# Patient Record
Sex: FEMALE | Race: WHITE | Employment: FULL TIME | URBAN - METROPOLITAN AREA
[De-identification: names, ages, dates, MRNs, and addresses within clinical notes are randomized per-mention and may not be internally consistent; named-entity substitution may affect disease eponyms.]

---

## 2019-04-28 ENCOUNTER — APPOINTMENT (OUTPATIENT)
Dept: CT IMAGING | Age: 59
DRG: 385 | End: 2019-04-28
Attending: EMERGENCY MEDICINE
Payer: COMMERCIAL

## 2019-04-28 ENCOUNTER — APPOINTMENT (OUTPATIENT)
Dept: GENERAL RADIOLOGY | Age: 59
DRG: 385 | End: 2019-04-28
Attending: EMERGENCY MEDICINE
Payer: COMMERCIAL

## 2019-04-28 ENCOUNTER — HOSPITAL ENCOUNTER (INPATIENT)
Age: 59
LOS: 5 days | Discharge: HOME | DRG: 385 | End: 2019-05-03
Attending: EMERGENCY MEDICINE | Admitting: INTERNAL MEDICINE
Payer: COMMERCIAL

## 2019-04-28 DIAGNOSIS — R65.20 SEVERE SEPSIS (HCC): Primary | ICD-10-CM

## 2019-04-28 DIAGNOSIS — K52.9 ACUTE COLITIS: ICD-10-CM

## 2019-04-28 DIAGNOSIS — A41.9 SEVERE SEPSIS (HCC): Primary | ICD-10-CM

## 2019-04-28 PROBLEM — I95.9 HYPOTENSION: Status: ACTIVE | Noted: 2019-04-28

## 2019-04-28 LAB
ABO + RH BLD: NORMAL
ALBUMIN SERPL-MCNC: 3 G/DL (ref 3.5–5)
ALBUMIN/GLOB SERPL: 1.1 {RATIO} (ref 1.1–2.2)
ALP SERPL-CCNC: 60 U/L (ref 45–117)
ALT SERPL-CCNC: 64 U/L (ref 12–78)
ANION GAP SERPL CALC-SCNC: 8 MMOL/L (ref 5–15)
ARTERIAL PATENCY WRIST A: ABNORMAL
AST SERPL-CCNC: 35 U/L (ref 15–37)
BASE DEFICIT BLDV-SCNC: 13 MMOL/L
BASOPHILS # BLD: 0 K/UL (ref 0–0.1)
BASOPHILS NFR BLD: 0 % (ref 0–1)
BDY SITE: ABNORMAL
BILIRUB SERPL-MCNC: 1.3 MG/DL (ref 0.2–1)
BLOOD GROUP ANTIBODIES SERPL: NORMAL
BUN SERPL-MCNC: 18 MG/DL (ref 6–20)
BUN/CREAT SERPL: 25 (ref 12–20)
CALCIUM SERPL-MCNC: 7.1 MG/DL (ref 8.5–10.1)
CHLORIDE SERPL-SCNC: 115 MMOL/L (ref 97–108)
CO2 SERPL-SCNC: 19 MMOL/L (ref 21–32)
CREAT SERPL-MCNC: 0.73 MG/DL (ref 0.55–1.02)
DIFFERENTIAL METHOD BLD: ABNORMAL
EOSINOPHIL # BLD: 0 K/UL (ref 0–0.4)
EOSINOPHIL NFR BLD: 0 % (ref 0–7)
ERYTHROCYTE [DISTWIDTH] IN BLOOD BY AUTOMATED COUNT: 12.6 % (ref 11.5–14.5)
GAS FLOW.O2 O2 DELIVERY SYS: ABNORMAL L/MIN
GLOBULIN SER CALC-MCNC: 2.8 G/DL (ref 2–4)
GLUCOSE SERPL-MCNC: 112 MG/DL (ref 65–100)
HCO3 BLDV-SCNC: 13.3 MMOL/L (ref 23–28)
HCT VFR BLD AUTO: 29 % (ref 35–47)
HEMOCCULT STL QL: POSITIVE
HGB BLD-MCNC: 9 G/DL (ref 11.5–16)
IMM GRANULOCYTES # BLD AUTO: 0.1 K/UL (ref 0–0.04)
IMM GRANULOCYTES NFR BLD AUTO: 1 % (ref 0–0.5)
LACTATE SERPL-SCNC: 2 MMOL/L (ref 0.4–2)
LIPASE SERPL-CCNC: 123 U/L (ref 73–393)
LYMPHOCYTES # BLD: 1 K/UL (ref 0.8–3.5)
LYMPHOCYTES NFR BLD: 8 % (ref 12–49)
MAGNESIUM SERPL-MCNC: 2.2 MG/DL (ref 1.6–2.4)
MCH RBC QN AUTO: 31 PG (ref 26–34)
MCHC RBC AUTO-ENTMCNC: 31 G/DL (ref 30–36.5)
MCV RBC AUTO: 100 FL (ref 80–99)
MONOCYTES # BLD: 0.4 K/UL (ref 0–1)
MONOCYTES NFR BLD: 4 % (ref 5–13)
NEUTS SEG # BLD: 10.7 K/UL (ref 1.8–8)
NEUTS SEG NFR BLD: 87 % (ref 32–75)
NRBC # BLD: 0 K/UL (ref 0–0.01)
NRBC BLD-RTO: 0 PER 100 WBC
PCO2 BLDV: 26.7 MMHG (ref 41–51)
PH BLDV: 7.3 [PH] (ref 7.32–7.42)
PLATELET # BLD AUTO: 211 K/UL (ref 150–400)
PMV BLD AUTO: 9.4 FL (ref 8.9–12.9)
PO2 BLDV: 55 MMHG (ref 25–40)
POTASSIUM SERPL-SCNC: 4 MMOL/L (ref 3.5–5.1)
PROT SERPL-MCNC: 5.8 G/DL (ref 6.4–8.2)
RBC # BLD AUTO: 2.9 M/UL (ref 3.8–5.2)
SAO2 % BLDV: 86 % (ref 65–88)
SODIUM SERPL-SCNC: 142 MMOL/L (ref 136–145)
SPECIMEN EXP DATE BLD: NORMAL
SPECIMEN TYPE: ABNORMAL
TOTAL RESP. RATE, ITRR: 16
TROPONIN I SERPL-MCNC: <0.05 NG/ML
WBC # BLD AUTO: 12.2 K/UL (ref 3.6–11)

## 2019-04-28 PROCEDURE — 71045 X-RAY EXAM CHEST 1 VIEW: CPT

## 2019-04-28 PROCEDURE — 83690 ASSAY OF LIPASE: CPT

## 2019-04-28 PROCEDURE — 99285 EMERGENCY DEPT VISIT HI MDM: CPT

## 2019-04-28 PROCEDURE — 74011636320 HC RX REV CODE- 636/320: Performed by: INTERNAL MEDICINE

## 2019-04-28 PROCEDURE — 84484 ASSAY OF TROPONIN QUANT: CPT

## 2019-04-28 PROCEDURE — 74011250636 HC RX REV CODE- 250/636: Performed by: EMERGENCY MEDICINE

## 2019-04-28 PROCEDURE — 93005 ELECTROCARDIOGRAM TRACING: CPT

## 2019-04-28 PROCEDURE — 36415 COLL VENOUS BLD VENIPUNCTURE: CPT

## 2019-04-28 PROCEDURE — 82272 OCCULT BLD FECES 1-3 TESTS: CPT

## 2019-04-28 PROCEDURE — C1751 CATH, INF, PER/CENT/MIDLINE: HCPCS

## 2019-04-28 PROCEDURE — 82803 BLOOD GASES ANY COMBINATION: CPT

## 2019-04-28 PROCEDURE — 86900 BLOOD TYPING SEROLOGIC ABO: CPT

## 2019-04-28 PROCEDURE — 83735 ASSAY OF MAGNESIUM: CPT

## 2019-04-28 PROCEDURE — 87040 BLOOD CULTURE FOR BACTERIA: CPT

## 2019-04-28 PROCEDURE — 65660000000 HC RM CCU STEPDOWN

## 2019-04-28 PROCEDURE — 83605 ASSAY OF LACTIC ACID: CPT

## 2019-04-28 PROCEDURE — 96361 HYDRATE IV INFUSION ADD-ON: CPT

## 2019-04-28 PROCEDURE — 96360 HYDRATION IV INFUSION INIT: CPT

## 2019-04-28 PROCEDURE — 74177 CT ABD & PELVIS W/CONTRAST: CPT

## 2019-04-28 PROCEDURE — 85025 COMPLETE CBC W/AUTO DIFF WBC: CPT

## 2019-04-28 PROCEDURE — 80053 COMPREHEN METABOLIC PANEL: CPT

## 2019-04-28 PROCEDURE — 75810000455 HC PLCMT CENT VENOUS CATH LVL 2 5182

## 2019-04-28 RX ORDER — ONDANSETRON 2 MG/ML
4 INJECTION INTRAMUSCULAR; INTRAVENOUS
Status: DISCONTINUED | OUTPATIENT
Start: 2019-04-28 | End: 2019-05-03 | Stop reason: HOSPADM

## 2019-04-28 RX ORDER — LISINOPRIL 10 MG/1
10 TABLET ORAL
COMMUNITY
End: 2019-05-03

## 2019-04-28 RX ORDER — LEVOFLOXACIN 5 MG/ML
750 INJECTION, SOLUTION INTRAVENOUS EVERY 24 HOURS
Status: DISCONTINUED | OUTPATIENT
Start: 2019-04-29 | End: 2019-05-02

## 2019-04-28 RX ORDER — LEVOFLOXACIN 5 MG/ML
750 INJECTION, SOLUTION INTRAVENOUS
Status: COMPLETED | OUTPATIENT
Start: 2019-04-28 | End: 2019-04-29

## 2019-04-28 RX ORDER — ATORVASTATIN CALCIUM 10 MG/1
10 TABLET, FILM COATED ORAL
COMMUNITY
End: 2019-09-10

## 2019-04-28 RX ORDER — ONDANSETRON 4 MG/1
4 TABLET, ORALLY DISINTEGRATING ORAL
Status: ACTIVE | OUTPATIENT
Start: 2019-04-28 | End: 2019-04-29

## 2019-04-28 RX ORDER — SODIUM CHLORIDE 0.9 % (FLUSH) 0.9 %
10 SYRINGE (ML) INJECTION
Status: COMPLETED | OUTPATIENT
Start: 2019-04-28 | End: 2019-04-28

## 2019-04-28 RX ORDER — SODIUM CHLORIDE 0.9 % (FLUSH) 0.9 %
5-10 SYRINGE (ML) INJECTION AS NEEDED
Status: DISCONTINUED | OUTPATIENT
Start: 2019-04-28 | End: 2019-04-29 | Stop reason: SDUPTHER

## 2019-04-28 RX ORDER — SODIUM CHLORIDE 0.9 % (FLUSH) 0.9 %
5-40 SYRINGE (ML) INJECTION EVERY 8 HOURS
Status: DISCONTINUED | OUTPATIENT
Start: 2019-04-28 | End: 2019-05-01 | Stop reason: SDUPTHER

## 2019-04-28 RX ORDER — SODIUM CHLORIDE 9 MG/ML
125 INJECTION, SOLUTION INTRAVENOUS CONTINUOUS
Status: DISCONTINUED | OUTPATIENT
Start: 2019-04-28 | End: 2019-05-01

## 2019-04-28 RX ORDER — METRONIDAZOLE 500 MG/100ML
500 INJECTION, SOLUTION INTRAVENOUS EVERY 8 HOURS
Status: DISCONTINUED | OUTPATIENT
Start: 2019-04-29 | End: 2019-04-29

## 2019-04-28 RX ORDER — METRONIDAZOLE 500 MG/100ML
500 INJECTION, SOLUTION INTRAVENOUS
Status: COMPLETED | OUTPATIENT
Start: 2019-04-29 | End: 2019-04-29

## 2019-04-28 RX ORDER — SODIUM CHLORIDE 0.9 % (FLUSH) 0.9 %
5-40 SYRINGE (ML) INJECTION AS NEEDED
Status: DISCONTINUED | OUTPATIENT
Start: 2019-04-28 | End: 2019-05-01 | Stop reason: SDUPTHER

## 2019-04-28 RX ADMIN — Medication 10 ML: at 23:15

## 2019-04-28 RX ADMIN — LEVOFLOXACIN 750 MG: 5 INJECTION, SOLUTION INTRAVENOUS at 23:05

## 2019-04-28 RX ADMIN — SODIUM CHLORIDE 1000 ML: 900 INJECTION, SOLUTION INTRAVENOUS at 18:54

## 2019-04-28 RX ADMIN — SODIUM CHLORIDE 1000 ML: 900 INJECTION, SOLUTION INTRAVENOUS at 23:05

## 2019-04-28 RX ADMIN — Medication 10 ML: at 19:07

## 2019-04-28 RX ADMIN — SODIUM CHLORIDE 1000 ML: 900 INJECTION, SOLUTION INTRAVENOUS at 20:41

## 2019-04-28 RX ADMIN — IOPAMIDOL 100 ML: 755 INJECTION, SOLUTION INTRAVENOUS at 19:07

## 2019-04-28 NOTE — ED NOTES
Pt in [place] on monitor for approx 15 seconds. Pt awake and talking. Dr. Eunice Long notified.

## 2019-04-28 NOTE — ED PROVIDER NOTES
EMERGENCY DEPARTMENT HISTORY AND PHYSICAL EXAM 
 
 
Date: 4/28/2019 Patient Name: Leslie Gonzalez History of Presenting Illness Chief Complaint Patient presents with  Altered mental status  Vomiting History Provided By: Patient, Patient's Sister and EMS 
 
HPI: Leslie Gonzalez, 63 y.o. female with PMHx significant for hypertension, presents via EMS to the ED with cc of abdominal pain nausea vomiting diarrhea and altered mental status. She had the sudden onset of some abdominal pain nausea vomiting and diarrhea that started around 1300. She was feeling fine earlier in the day. Scribes abdominal pain is a diffuse cramping intermittent nonradiating. Her sister called her on the phone shortly after this and noted that she was slightly confused. EMS reports that patient was hypotensive on arrival.  She received a 500 cc normal saline bolus in route. Denies any fevers or chills. Denies any chest pain or shortness of breath. No known sick contacts. She does report her  passed away 2 months ago. PMHx: Significant for hypertension PSHx: Significant for noncontributory Social Hx: Non-smoker There are no other complaints, changes, or physical findings at this time. PCP: None No current facility-administered medications on file prior to encounter. Current Outpatient Medications on File Prior to Encounter Medication Sig Dispense Refill  lisinopril (PRINIVIL, ZESTRIL) 10 mg tablet Take 10 mg by mouth nightly.  atorvastatin (LIPITOR) 10 mg tablet Take 10 mg by mouth nightly. Past History Past Medical History: 
Past Medical History:  
Diagnosis Date  Hypertension  Spina bifida ([address] Past Surgical History: 
Past Surgical History:  
Procedure Laterality Date  HX OTHER SURGICAL  1890s  
 sacral wound flap repair Family History: 
History reviewed. No pertinent family history. Social History: 
Social History Tobacco Use  
  Smoking status: Never Smoker  Smokeless tobacco: Never Used Substance Use Topics  Alcohol use: Never Frequency: Never  Drug use: Never Allergies: Allergies Allergen Reactions  Sulfa (Sulfonamide Antibiotics) Nausea and Vomiting Review of Systems Review of Systems Constitutional: Negative for activity change, chills and fever. HENT: Negative for congestion and sore throat. Eyes: Negative for pain and redness. Respiratory: Negative for cough, chest tightness and shortness of breath. Cardiovascular: Negative for chest pain and palpitations. Gastrointestinal: Positive for diarrhea, nausea and vomiting. Negative for abdominal pain. Genitourinary: Negative for dysuria, frequency and urgency. Musculoskeletal: Negative for back pain and neck pain. Skin: Negative for rash. Neurological: Negative for syncope, light-headedness and headaches. Psychiatric/Behavioral: Positive for confusion. All other systems reviewed and are negative. Physical Exam  
Physical Exam  
Constitutional: She is oriented to person, place, and time. She appears well-developed and well-nourished. No distress. HENT:  
Head: Normocephalic. Nose: Nose normal.  
Mouth/Throat: Oropharynx is clear and moist. No oropharyngeal exudate. Eyes: Pupils are equal, round, and reactive to light. Conjunctivae are normal. No scleral icterus. Neck: Normal range of motion. Neck supple. No JVD present. No tracheal deviation present. No thyromegaly present. Cardiovascular: Normal rate, regular rhythm and intact distal pulses. Exam reveals no gallop and no friction rub. No murmur heard. Pulmonary/Chest: Effort normal and breath sounds normal. No stridor. No respiratory distress. She has no wheezes. She has no rales. Abdominal: Soft. Bowel sounds are normal. She exhibits no distension. There is tenderness. There is no rebound and no guarding. Mild diffuse tenderness. Musculoskeletal: Normal range of motion. She exhibits no edema. Lymphadenopathy:  
  She has no cervical adenopathy. Neurological: She is alert and oriented to person, place, and time. No cranial nerve deficit. She exhibits normal muscle tone. Coordination normal.  
Skin: Skin is warm and dry. No rash noted. She is not diaphoretic. No erythema. Psychiatric: She has a normal mood and affect. Her behavior is normal.  
Nursing note and vitals reviewed. Diagnostic Study Results Labs - Recent Results (from the past 12 hour(s)) CBC WITH AUTOMATED DIFF Collection Time: 04/28/19  6:56 PM  
Result Value Ref Range WBC 12.2 (H) 3.6 - 11.0 K/uL  
 RBC 2.90 (L) 3.80 - 5.20 M/uL HGB 9.0 (L) 11.5 - 16.0 g/dL HCT 29.0 (L) 35.0 - 47.0 % .0 (H) 80.0 - 99.0 FL  
 MCH 31.0 26.0 - 34.0 PG  
 MCHC 31.0 30.0 - 36.5 g/dL  
 RDW 12.6 11.5 - 14.5 % PLATELET 928 942 - 248 K/uL MPV 9.4 8.9 - 12.9 FL  
 NRBC 0.0 0  WBC ABSOLUTE NRBC 0.00 0.00 - 0.01 K/uL NEUTROPHILS 87 (H) 32 - 75 % LYMPHOCYTES 8 (L) 12 - 49 % MONOCYTES 4 (L) 5 - 13 % EOSINOPHILS 0 0 - 7 % BASOPHILS 0 0 - 1 % IMMATURE GRANULOCYTES 1 (H) 0.0 - 0.5 % ABS. NEUTROPHILS 10.7 (H) 1.8 - 8.0 K/UL  
 ABS. LYMPHOCYTES 1.0 0.8 - 3.5 K/UL  
 ABS. MONOCYTES 0.4 0.0 - 1.0 K/UL  
 ABS. EOSINOPHILS 0.0 0.0 - 0.4 K/UL  
 ABS. BASOPHILS 0.0 0.0 - 0.1 K/UL  
 ABS. IMM. GRANS. 0.1 (H) 0.00 - 0.04 K/UL  
 DF AUTOMATED OCCULT BLOOD, STOOL Collection Time: 04/28/19  6:56 PM  
Result Value Ref Range Occult blood, stool POSITIVE (A) NEG    
TYPE & SCREEN Collection Time: 04/28/19  9:44 PM  
Result Value Ref Range Crossmatch Expiration 05/01/2019 ABO/Rh(D) A POSITIVE Antibody screen NEG   
LIPASE Collection Time: 04/28/19  9:44 PM  
Result Value Ref Range Lipase 123 73 - 393 U/L MAGNESIUM Collection Time: 04/28/19  9:44 PM  
Result Value Ref Range Magnesium 2.2 1.6 - 2.4 mg/dL METABOLIC PANEL, COMPREHENSIVE Collection Time: 04/28/19  9:44 PM  
Result Value Ref Range Sodium 142 136 - 145 mmol/L Potassium 4.0 3.5 - 5.1 mmol/L Chloride 115 (H) 97 - 108 mmol/L  
 CO2 19 (L) 21 - 32 mmol/L Anion gap 8 5 - 15 mmol/L Glucose 112 (H) 65 - 100 mg/dL BUN 18 6 - 20 MG/DL Creatinine 0.73 0.55 - 1.02 MG/DL  
 BUN/Creatinine ratio 25 (H) 12 - 20 GFR est AA >60 >60 ml/min/1.73m2 GFR est non-AA >60 >60 ml/min/1.73m2 Calcium 7.1 (L) 8.5 - 10.1 MG/DL Bilirubin, total 1.3 (H) 0.2 - 1.0 MG/DL  
 ALT (SGPT) 64 12 - 78 U/L  
 AST (SGOT) 35 15 - 37 U/L Alk. phosphatase 60 45 - 117 U/L Protein, total 5.8 (L) 6.4 - 8.2 g/dL Albumin 3.0 (L) 3.5 - 5.0 g/dL Globulin 2.8 2.0 - 4.0 g/dL A-G Ratio 1.1 1.1 - 2.2 LACTIC ACID Collection Time: 04/28/19  9:44 PM  
Result Value Ref Range Lactic acid 2.0 0.4 - 2.0 MMOL/L  
TROPONIN I Collection Time: 04/28/19  9:44 PM  
Result Value Ref Range Troponin-I, Qt. <0.05 <0.05 ng/mL POC VENOUS BLOOD GAS Collection Time: 04/28/19 10:35 PM  
Result Value Ref Range Device: ROOM AIR    
 pH, venous (POC) 7.303 (L) 7.32 - 7.42    
 pCO2, venous (POC) 26.7 (L) 41 - 51 MMHG  
 pO2, venous (POC) 55 (H) 25 - 40 mmHg HCO3, venous (POC) 13.3 (L) 23.0 - 28.0 MMOL/L  
 sO2, venous (POC) 86 65 - 88 % Base deficit, venous (POC) 13 mmol/L Allens test (POC) N/A Total resp. rate 16 Site OTHER Specimen type (POC) VENOUS BLOOD Radiologic Studies -  
CT ABD PELV W CONT Final Result IMPRESSION:  
Wall thickening throughout the descending colon, sigmoid colon and rectum  
suspicious for colitis though this could be accentuated by lack of oral contrast  
material/underdistention. No bowel obstruction, free air, or free fluid. XR CHEST PORT Final Result IMPRESSION:  
No pneumothorax following insertion of internal jugular catheter. CT Results  (Last 48 hours) 04/28/19 2213  CT ABD PELV W CONT Final result Impression:  IMPRESSION:  
Wall thickening throughout the descending colon, sigmoid colon and rectum  
suspicious for colitis though this could be accentuated by lack of oral contrast  
material/underdistention. No bowel obstruction, free air, or free fluid. Narrative:  INDICATION: Abdominal pain; n/v/d. hypotensive COMPARISON: None TECHNIQUE:  
Following the uneventful intravenous administration of IV contrast, thin axial  
images were obtained through the abdomen and pelvis. Coronal and sagittal  
reconstructions were generated. Oral contrast was not administered. CT dose  
reduction was achieved through use of a standardized protocol tailored for this  
examination and automatic exposure control for dose modulation. FINDINGS:  
LUNG BASES: No abnormality. LIVER: Hepatic steatosis. GALLBLADDER: Unremarkable. SPLEEN: No enlargement or lesion. PANCREAS: No mass or ductal dilatation. ADRENALS: No mass. KIDNEYS: No mass, calculus, or hydronephrosis. GI TRACT: No bowel obstruction. Wall oral contrast was not administered,  
possible wall thickening throughout the descending and sigmoid colon as well as  
the rectum. PERITONEUM: No free air or free fluid. APPENDIX: Unremarkable. RETROPERITONEUM: No aortic aneurysm. LYMPH NODES: None enlarged. ADDITIONAL COMMENTS: N/A. URINARY BLADDER: Unremarkable. REPRODUCTIVE ORGANS: Unremarkable. LYMPH NODES: None enlarged. FREE FLUID: None. BONES: Evidence of spina bifida. ADDITIONAL COMMENTS: N/A. CXR Results  (Last 48 hours) 04/28/19 2150  XR CHEST PORT Final result Impression:  IMPRESSION:  
No pneumothorax following insertion of internal jugular catheter. Narrative:  INDICATION: central line placement verification EXAMINATION:  AP CHEST, PORTABLE  
   
COMPARISON: None FINDINGS: Single AP portable view of the chest at 2127 hours demonstrates  
interval placement of a right internal jugular venous catheter with tip over the  
distal superior vena cava. The cardiomediastinal silhouette is stable. There is  
no pneumothorax. Medical Decision Making I am the first provider for this patient. I reviewed the vital signs, available nursing notes, past medical history, past surgical history, family history and social history. Vital Signs-Reviewed the patient's vital signs. Patient Vitals for the past 12 hrs: 
 Temp Pulse Resp BP SpO2  
04/28/19 2115  (!) 105 20 (!) 70/18 100 % 04/28/19 2109  (!) 101 16 100/57 96 % 04/28/19 2045  98 22 (!) 68/27 90 % 04/28/19 2030  (!) 101 18 (!) 63/30   
04/28/19 2027  (!) 103 19 (!) 71/29 90 % 04/28/19 2000  (!) 106 18 (!) 80/32   
04/28/19 1956  (!) 110 18 (!) 68/12   
04/28/19 1945  (!) 107  96/84   
04/28/19 1919  (!) 116 18 (!) 55/29   
04/28/19 1915  (!) 111 19 (!) 65/32   
04/28/19 1913  (!) 113 18 (!) 64/34 96 % 04/28/19 1845  (!) 119 22 (!) 77/25 100 % 04/28/19 1841  (!) 119 17 (!) 73/31 100 % 04/28/19 1839 97.6 °F (36.4 °C) (!) 118 18 (!) 65/41 100 % 04/28/19 1824    (!) 62/48  Pulse Oximetry Analysis - 100% on RA Cardiac Monitor:  
Rate: 118 bpm 
Rhythm: Sinus Tachycardia ED EKG interpretation: 19:21 Rhythm: sinus tachycardia; and regular . Rate (approx.): 108; Axis: normal; CT Interval: normal; QRS interval: normal ; ST/T wave: non-specific changes; This EKG was interpreted by Pedro Garcia MD,ED Provider. Records Reviewed: Nursing Notes and Old Medical Records Provider Notes (Medical Decision Making): DDX: Gastroenteritis, dehydration, sepsis, metabolic abnormality, colitis. ED Course:  
Initial assessment performed.  The patients presenting problems have been discussed, and they are in agreement with the care plan formulated and outlined with them. I have encouraged them to ask questions as they arise throughout their visit. On my arrival to the room to evaluate the patient she is lying on her side. Profuse stool output with both formed and liquid diarrhea stools. Was a small amount of bright red blood as well. Procedure Note - Central Venous Access:  
Performed by Mary Vu MD 
 
Obtained informed Consent. Immediately prior to the procedure, the patient was reevaluated and found suitable for the planned procedure and any planned medications. Immediately prior to the procedure a time out was called to verify the correct patient, procedure, equipment, staff, and marking as appropriate. The site was prepped with ChloraPrep and Sterile draping. Using Seldinger technique a Triple Lumen CVC was placed in the Right, Internal Jugular Vein via direct cannulation with 2 number of attempts for Monitoring, Blood Drawing and IV Access. Ultrasound Guidance was utilized. There was good blood return. The following complications were encountered: None. A follow-up chest x-ray was ordered post procedure. The procedure was tolerated well. CRITICAL CARE NOTE : 
 
11:11 PM 
 
 
IMPENDING DETERIORATION -Cardiovascular, CNS and Metabolic ASSOCIATED RISK FACTORS - Hypotension, Shock, Bleeding, Dysrhythmia, Metabolic changes, Dehydration, Vascular Compromise and CNS Decompensation MANAGEMENT- Bedside Assessment and Supervision of Care INTERPRETATION -  Xrays, CT Scan, Blood Gases, ECG and Blood Pressure INTERVENTIONS - hemodynamic mngmt, Metobolic interventions and TLC placement CASE REVIEW - Hospitalist, Medical Sub-Specialist, Nursing and Family TREATMENT RESPONSE -Improved PERFORMED BY - Self and Resident NOTES   : 
 
 
I have spent 50 minutes of critical care time involved in lab review, consultations with specialist, family decision- making, bedside attention and documentation. During this entire length of time I was immediately available to the patient . Critical Care: The reason for providing this level of medical care for this critically ill patient was due to a critical illness that impaired one or more vital organ systems, such that there was a high probability of imminent or life threatening deterioration in the patient's condition. This care involved high complexity decision making to assess, manipulate, and support vital system functions, to treat this degree of vital organ system failure, and to prevent further life threatening deterioration of the patients condition. Jim Soriano MD 
 
 
I consulted the hospitalist Dr. Giovanny Marion. Reviewed patient's presentation labs and imaging studies. He agreed to evaluate patient for admission. PROGRESS NOTE Pt reevaluated. Pt critically ill. Hypotension due to dehydration versus sepsis. WBC only minimally elevated at 12.2. Hgb 9.0 with no old for comparison. Hypotension despite IV fluids. Central line placed. Continue aggressive IV fluid resuscitation. Hold on pressors at this time. CT abdomen pelvis with acute colitis. Started IV Levaquin and Flagyl. Will admit to hospitalist.  
Written by Jim Soriano MD  
 
 
 
 
 
Critical Care Time:  
0 Disposition: 
Admit to hospitalist 
 
PLAN: 
1. Current Discharge Medication List  
  
 
2. Follow-up Information None Return to ED if worse Diagnosis Clinical Impression: 1. Severe sepsis ([address] 2. Acute colitis Please note that this dictation was completed with [employer], the computer voice recognition software. Quite often unanticipated grammatical, syntax, homophones, and other interpretive errors are inadvertently transcribed by the computer software. Please disregard these errors. Please excuse any errors that have escaped final proofreading.

## 2019-04-28 NOTE — ED NOTES
Patient from EMS. Patient is alert to verbal stimuli and disoriented, respirations even and unlabored. EMS states patient began having n/v/d around 1300 and started becoming altered around 1400 per sister at house. Patient was initially hypotensive with EMS, fluid bolus of 500mL provided. Patient arrives to ED with continuing n/v/d. Pt. Meghann Marcos in low bed in POC, side rail x2, cardiac monitor x3.

## 2019-04-29 LAB
ALBUMIN SERPL-MCNC: 2.5 G/DL (ref 3.5–5)
ALBUMIN/GLOB SERPL: 0.9 {RATIO} (ref 1.1–2.2)
ALP SERPL-CCNC: 46 U/L (ref 45–117)
ALT SERPL-CCNC: 51 U/L (ref 12–78)
ANION GAP SERPL CALC-SCNC: 8 MMOL/L (ref 5–15)
APPEARANCE UR: ABNORMAL
ARTERIAL PATENCY WRIST A: ABNORMAL
AST SERPL-CCNC: 28 U/L (ref 15–37)
ATRIAL RATE: 108 BPM
BACTERIA URNS QL MICRO: ABNORMAL /HPF
BASE DEFICIT BLD-SCNC: 13 MMOL/L
BDY SITE: ABNORMAL
BILIRUB SERPL-MCNC: 0.7 MG/DL (ref 0.2–1)
BILIRUB UR QL CFM: NEGATIVE
BUN SERPL-MCNC: 13 MG/DL (ref 6–20)
BUN/CREAT SERPL: 28 (ref 12–20)
C DIFF GDH STL QL: NEGATIVE
C DIFF TOX A+B STL QL IA: NEGATIVE
CA-I BLD-SCNC: 1.11 MMOL/L (ref 1.12–1.32)
CALCIUM SERPL-MCNC: 6.7 MG/DL (ref 8.5–10.1)
CALCULATED P AXIS, ECG09: 49 DEGREES
CALCULATED R AXIS, ECG10: 87 DEGREES
CALCULATED T AXIS, ECG11: 4 DEGREES
CHLORIDE SERPL-SCNC: 118 MMOL/L (ref 97–108)
CO2 SERPL-SCNC: 19 MMOL/L (ref 21–32)
COLOR UR: YELLOW
CORTIS AM PEAK SERPL-MCNC: 19 UG/DL (ref 4.3–22.45)
CREAT SERPL-MCNC: 0.46 MG/DL (ref 0.55–1.02)
DIAGNOSIS, 93000: NORMAL
EPITH CASTS URNS QL MICRO: ABNORMAL /LPF
ERYTHROCYTE [DISTWIDTH] IN BLOOD BY AUTOMATED COUNT: 12.9 % (ref 11.5–14.5)
GAS FLOW.O2 O2 DELIVERY SYS: ABNORMAL L/MIN
GLOBULIN SER CALC-MCNC: 2.7 G/DL (ref 2–4)
GLUCOSE SERPL-MCNC: 113 MG/DL (ref 65–100)
GLUCOSE UR STRIP.AUTO-MCNC: NEGATIVE MG/DL
HCO3 BLD-SCNC: 14.8 MMOL/L (ref 22–26)
HCT VFR BLD AUTO: 32.1 % (ref 35–47)
HCT VFR BLD AUTO: 32.4 % (ref 35–47)
HCT VFR BLD AUTO: 32.5 % (ref 35–47)
HGB BLD-MCNC: 10.3 G/DL (ref 11.5–16)
HGB BLD-MCNC: 10.4 G/DL (ref 11.5–16)
HGB BLD-MCNC: 10.8 G/DL (ref 11.5–16)
HGB UR QL STRIP: NEGATIVE
HYALINE CASTS URNS QL MICRO: ABNORMAL /LPF (ref 0–5)
INTERPRETATION: NORMAL
KETONES UR QL STRIP.AUTO: ABNORMAL MG/DL
LACTATE SERPL-SCNC: 1.9 MMOL/L (ref 0.4–2)
LEUKOCYTE ESTERASE UR QL STRIP.AUTO: ABNORMAL
MCH RBC QN AUTO: 31.4 PG (ref 26–34)
MCHC RBC AUTO-ENTMCNC: 33.2 G/DL (ref 30–36.5)
MCV RBC AUTO: 94.5 FL (ref 80–99)
NITRITE UR QL STRIP.AUTO: POSITIVE
NRBC # BLD: 0 K/UL (ref 0–0.01)
NRBC BLD-RTO: 0 PER 100 WBC
P-R INTERVAL, ECG05: 130 MS
PCO2 BLD: 34.8 MMHG (ref 35–45)
PH BLD: 7.24 [PH] (ref 7.35–7.45)
PH UR STRIP: 5 [PH] (ref 5–8)
PHOSPHATE SERPL-MCNC: 3 MG/DL (ref 2.6–4.7)
PLATELET # BLD AUTO: 215 K/UL (ref 150–400)
PMV BLD AUTO: 9.8 FL (ref 8.9–12.9)
PO2 BLD: 72 MMHG (ref 80–100)
POTASSIUM SERPL-SCNC: 4.1 MMOL/L (ref 3.5–5.1)
PROCALCITONIN SERPL-MCNC: 7.6 NG/ML
PROT SERPL-MCNC: 5.2 G/DL (ref 6.4–8.2)
PROT UR STRIP-MCNC: 30 MG/DL
Q-T INTERVAL, ECG07: 350 MS
QRS DURATION, ECG06: 78 MS
QTC CALCULATION (BEZET), ECG08: 469 MS
RBC # BLD AUTO: 3.44 M/UL (ref 3.8–5.2)
RBC #/AREA URNS HPF: ABNORMAL /HPF (ref 0–5)
SAO2 % BLD: 91 % (ref 92–97)
SODIUM SERPL-SCNC: 145 MMOL/L (ref 136–145)
SP GR UR REFRACTOMETRY: 1.02 (ref 1–1.03)
SPECIMEN TYPE: ABNORMAL
TSH SERPL DL<=0.05 MIU/L-ACNC: 0.34 UIU/ML (ref 0.36–3.74)
UA: UC IF INDICATED,UAUC: ABNORMAL
UROBILINOGEN UR QL STRIP.AUTO: 1 EU/DL (ref 0.2–1)
VENTRICULAR RATE, ECG03: 108 BPM
WBC # BLD AUTO: 10 K/UL (ref 3.6–11)
WBC URNS QL MICRO: ABNORMAL /HPF (ref 0–4)

## 2019-04-29 PROCEDURE — 87045 FECES CULTURE AEROBIC BACT: CPT

## 2019-04-29 PROCEDURE — 85027 COMPLETE CBC AUTOMATED: CPT

## 2019-04-29 PROCEDURE — 74011000250 HC RX REV CODE- 250: Performed by: EMERGENCY MEDICINE

## 2019-04-29 PROCEDURE — 87449 NOS EACH ORGANISM AG IA: CPT

## 2019-04-29 PROCEDURE — 74011250636 HC RX REV CODE- 250/636: Performed by: INTERNAL MEDICINE

## 2019-04-29 PROCEDURE — 74011250637 HC RX REV CODE- 250/637: Performed by: EMERGENCY MEDICINE

## 2019-04-29 PROCEDURE — 87086 URINE CULTURE/COLONY COUNT: CPT

## 2019-04-29 PROCEDURE — 84145 PROCALCITONIN (PCT): CPT

## 2019-04-29 PROCEDURE — 65660000000 HC RM CCU STEPDOWN

## 2019-04-29 PROCEDURE — 74011250636 HC RX REV CODE- 250/636: Performed by: EMERGENCY MEDICINE

## 2019-04-29 PROCEDURE — 82533 TOTAL CORTISOL: CPT

## 2019-04-29 PROCEDURE — 85018 HEMOGLOBIN: CPT

## 2019-04-29 PROCEDURE — 82803 BLOOD GASES ANY COMBINATION: CPT

## 2019-04-29 PROCEDURE — 36415 COLL VENOUS BLD VENIPUNCTURE: CPT

## 2019-04-29 PROCEDURE — 77030034850

## 2019-04-29 PROCEDURE — 84443 ASSAY THYROID STIM HORMONE: CPT

## 2019-04-29 PROCEDURE — 81001 URINALYSIS AUTO W/SCOPE: CPT

## 2019-04-29 PROCEDURE — 83605 ASSAY OF LACTIC ACID: CPT

## 2019-04-29 PROCEDURE — 84100 ASSAY OF PHOSPHORUS: CPT

## 2019-04-29 PROCEDURE — P9047 ALBUMIN (HUMAN), 25%, 50ML: HCPCS | Performed by: EMERGENCY MEDICINE

## 2019-04-29 PROCEDURE — 80053 COMPREHEN METABOLIC PANEL: CPT

## 2019-04-29 PROCEDURE — 74011000250 HC RX REV CODE- 250: Performed by: INTERNAL MEDICINE

## 2019-04-29 RX ORDER — METRONIDAZOLE 500 MG/100ML
500 INJECTION, SOLUTION INTRAVENOUS EVERY 8 HOURS
Status: DISCONTINUED | OUTPATIENT
Start: 2019-04-29 | End: 2019-05-01

## 2019-04-29 RX ORDER — ACETAMINOPHEN 325 MG/1
650 TABLET ORAL
Status: DISCONTINUED | OUTPATIENT
Start: 2019-04-29 | End: 2019-05-03 | Stop reason: HOSPADM

## 2019-04-29 RX ORDER — ALBUMIN HUMAN 250 G/1000ML
12.5 SOLUTION INTRAVENOUS ONCE
Status: COMPLETED | OUTPATIENT
Start: 2019-04-29 | End: 2019-04-29

## 2019-04-29 RX ADMIN — SODIUM CHLORIDE 125 ML/HR: 900 INJECTION, SOLUTION INTRAVENOUS at 22:27

## 2019-04-29 RX ADMIN — LEVOFLOXACIN 750 MG: 5 INJECTION, SOLUTION INTRAVENOUS at 22:32

## 2019-04-29 RX ADMIN — Medication 10 ML: at 08:23

## 2019-04-29 RX ADMIN — ACETAMINOPHEN 650 MG: 325 TABLET ORAL at 15:41

## 2019-04-29 RX ADMIN — Medication 20 ML: at 14:00

## 2019-04-29 RX ADMIN — ALBUMIN (HUMAN) 12.5 G: 0.25 INJECTION, SOLUTION INTRAVENOUS at 08:20

## 2019-04-29 RX ADMIN — Medication 4 MCG/MIN: at 12:40

## 2019-04-29 RX ADMIN — SODIUM CHLORIDE 1944 ML: 900 INJECTION, SOLUTION INTRAVENOUS at 02:54

## 2019-04-29 RX ADMIN — FAMOTIDINE 20 MG: 10 INJECTION, SOLUTION INTRAVENOUS at 08:22

## 2019-04-29 RX ADMIN — METRONIDAZOLE 500 MG: 500 INJECTION, SOLUTION INTRAVENOUS at 08:22

## 2019-04-29 RX ADMIN — METRONIDAZOLE 500 MG: 500 INJECTION, SOLUTION INTRAVENOUS at 08:23

## 2019-04-29 RX ADMIN — METRONIDAZOLE 500 MG: 500 INJECTION, SOLUTION INTRAVENOUS at 16:47

## 2019-04-29 RX ADMIN — Medication 2 MCG/MIN: at 08:38

## 2019-04-29 RX ADMIN — Medication 10 ML: at 22:26

## 2019-04-29 RX ADMIN — FAMOTIDINE 20 MG: 10 INJECTION, SOLUTION INTRAVENOUS at 20:22

## 2019-04-29 RX ADMIN — Medication 0.5 MCG/MIN: at 07:46

## 2019-04-29 RX ADMIN — Medication 3 MCG/MIN: at 10:49

## 2019-04-29 NOTE — CONSULTS
GI Consultation Note Francesca Wu)    NAME: Ean Cannon : 1960 MRN: [id number]   ATTG: Dr. Aguilar Section PCP: Yang De MD  Date/Time:  2019 5:18 PM  Subjective:   REASON FOR CONSULT:        Rik Juares is a 63 y.o. W female who I was asked to see for colitis and diarrhea. She's with her sister in the room. They relate 3 separate instances of severe diarrhea, each lasting 3 days, since January. This episode, which has been much worse, resulted in hypotension and she remains on levophed with SBP 88 during my interview. She notes stool has been non-bloody. She had vomiting at times. Some urgency but no real abd pain. Just extreme weakness. PMH is notable for spina bifida, though she functions independently at home by herself. Her   3 months ago. CT on admission with left-sided colitis. No previous colonoscopy, didn't think she could do the prep without being in the hospital.    Past Medical History:   Diagnosis Date    Hypertension     Spina bifida [facility])       Past Surgical History:   Procedure Laterality Date    HX OTHER SURGICAL      sacral wound flap repair     Social History     Tobacco Use    Smoking status: Never Smoker    Smokeless tobacco: Never Used   Substance Use Topics    Alcohol use: Never     Frequency: Never      History reviewed. No pertinent family history. Allergies   Allergen Reactions    Sulfa (Sulfonamide Antibiotics) Nausea and Vomiting      Home Medications:  Prior to Admission Medications   Prescriptions Last Dose Informant Patient Reported? Taking?   atorvastatin (LIPITOR) 10 mg tablet 2019 at Unknown time  Yes Yes   Sig: Take 10 mg by mouth nightly. lisinopril (PRINIVIL, ZESTRIL) 10 mg tablet 2019 at Unknown time  Yes Yes   Sig: Take 10 mg by mouth nightly.       Facility-Administered Medications: None     Hospital medications:  Current Facility-Administered Medications   Medication Dose Route Frequency    metroNIDAZOLE (FLAGYL) IVPB premix 500 mg  500 mg IntraVENous Q8H    NOREPINephrine (LEVOPHED) 8 mg in dextrose 5% 250 mL infusion  2-16 mcg/min IntraVENous TITRATE    acetaminophen (TYLENOL) tablet 650 mg  650 mg Oral Q6H PRN    levoFLOXacin (LEVAQUIN) 750 mg in D5W IVPB  750 mg IntraVENous Q24H    0.9% sodium chloride infusion  125 mL/hr IntraVENous CONTINUOUS    sodium chloride (NS) flush 5-40 mL  5-40 mL IntraVENous Q8H    sodium chloride (NS) flush 5-40 mL  5-40 mL IntraVENous PRN    ondansetron (ZOFRAN) injection 4 mg  4 mg IntraVENous Q4H PRN    famotidine (PF) (PEPCID) 20 mg in sodium chloride 0.9% 10 mL injection  20 mg IntraVENous Q12H     REVIEW OF SYSTEMS:     []     Unable to obtain  ROS due to  []    mental status change  []    sedated   []    intubated   [x]    Total of 11 systems reviewed as follows:  Const:   negative weight loss  Eyes:   negative diplopia or visual changes, negative eye pain  ENT:   negative coryza, negative sore throat  Resp:   negative cough, hemoptysis, dyspnea  Cards:  negative for chest pain, palpitations, lower extremity edema  :  negative for frequency, dysuria and hematuria  Skin:   negative for rash and pruritus  Heme:  negative for easy bruising and gum/nose bleeding  MS:  negative for myalgias, arthralgias, back pain and muscle weakness  Neurolo:  negative for headaches, dizziness, vertigo, memory problems   Psych:  negative for feelings of anxiety, depression     Pertinent Positives include :    Objective:   VITALS:    Visit Vitals  BP 95/46   Pulse (!) 123   Temp 98.1 °F (36.7 °C) Comment: Pt was feeling \"Very hot\"   Resp 17   Ht 4' 7\" (1.397 m)   Wt 64.8 kg (142 lb 13.7 oz)   SpO2 99%   BMI 33.20 kg/m²     Temp (24hrs), Av.3 °F (36.8 °C), Min:97.6 °F (36.4 °C), Max:99 °F (37.2 °C)    PHYSICAL EXAM:   General:    Alert, cooperative, sleepy, no distress, appears stated age. Head:   Normocephalic, without obvious abnormality, atraumatic. Eyes:   Conjunctivae clear, anicteric sclerae. Pupils are equal  Nose:  Nares normal. No drainage or sinus tenderness. Throat:    Lips, mucosa, and tongue normal.  No Thrush  Neck:  Supple, symmetrical,  no adenopathy, thyroid: non tender  Back:    Symmetric,  No CVA tenderness. Lungs:   CTA bilaterally. No wheezing/rhonchi/rales. Chest wall:  No tenderness or deformity. No Accessory muscle use. Heart:   Regular rate and rhythm,  no murmur, rub or gallop. Abdomen:   Soft, non-tender. Not distended. Bowel sounds normal. No masses  Extremities: Atraumatic, No cyanosis. Skin:     Texture, turgor normal. No rashes/lesions/jaundice  Lymph:  Cervical, supraclavicular normal.  Psych:  Good insight. Not depressed. Not anxious or agitated. Neurologic: EOMs intact. No facial asymmetry. No aphasia or slurred speech. LAB DATA REVIEWED:    Recent Results (from the past 48 hour(s))   CBC WITH AUTOMATED DIFF    Collection Time: 04/28/19  6:56 PM   Result Value Ref Range    WBC 12.2 (H) 3.6 - 11.0 K/uL    RBC 2.90 (L) 3.80 - 5.20 M/uL    HGB 9.0 (L) 11.5 - 16.0 g/dL    HCT 29.0 (L) 35.0 - 47.0 %    .0 (H) 80.0 - 99.0 FL    MCH 31.0 26.0 - 34.0 PG    MCHC 31.0 30.0 - 36.5 g/dL    RDW 12.6 11.5 - 14.5 %    PLATELET 789 849 - 913 K/uL    MPV 9.4 8.9 - 12.9 FL    NRBC 0.0 0  WBC    ABSOLUTE NRBC 0.00 0.00 - 0.01 K/uL    NEUTROPHILS 87 (H) 32 - 75 %    LYMPHOCYTES 8 (L) 12 - 49 %    MONOCYTES 4 (L) 5 - 13 %    EOSINOPHILS 0 0 - 7 %    BASOPHILS 0 0 - 1 %    IMMATURE GRANULOCYTES 1 (H) 0.0 - 0.5 %    ABS. NEUTROPHILS 10.7 (H) 1.8 - 8.0 K/UL    ABS. LYMPHOCYTES 1.0 0.8 - 3.5 K/UL    ABS. MONOCYTES 0.4 0.0 - 1.0 K/UL    ABS. EOSINOPHILS 0.0 0.0 - 0.4 K/UL    ABS. BASOPHILS 0.0 0.0 - 0.1 K/UL    ABS. IMM.  GRANS. 0.1 (H) 0.00 - 0.04 K/UL    DF AUTOMATED     OCCULT BLOOD, STOOL    Collection Time: 04/28/19  6:56 PM   Result Value Ref Range    Occult blood, stool POSITIVE (A) NEG     CULTURE, BLOOD, PAIRED    Collection Time: 04/28/19  7:18 PM   Result Value Ref Range    Special Requests: NO SPECIAL REQUESTS      Culture result: NO GROWTH AFTER 13 HOURS     EKG, 12 LEAD, INITIAL    Collection Time: 04/28/19  7:21 PM   Result Value Ref Range    Ventricular Rate 108 BPM    Atrial Rate 108 BPM    P-R Interval 130 ms    QRS Duration 78 ms    Q-T Interval 350 ms    QTC Calculation (Bezet) 469 ms    Calculated P Axis 49 degrees    Calculated R Axis 87 degrees    Calculated T Axis 4 degrees    Diagnosis       Sinus tachycardia  Cannot rule out Anterior infarct , age undetermined  No previous ECGs available  Confirmed by Shalonda Dover ([id number]) on 4/29/2019 11:01:40 AM     TYPE & SCREEN    Collection Time: 04/28/19  9:44 PM   Result Value Ref Range    Crossmatch Expiration 05/01/2019     ABO/Rh(D) A POSITIVE     Antibody screen NEG    LIPASE    Collection Time: 04/28/19  9:44 PM   Result Value Ref Range    Lipase 123 73 - 393 U/L   MAGNESIUM    Collection Time: 04/28/19  9:44 PM   Result Value Ref Range    Magnesium 2.2 1.6 - 2.4 mg/dL   METABOLIC PANEL, COMPREHENSIVE    Collection Time: 04/28/19  9:44 PM   Result Value Ref Range    Sodium 142 136 - 145 mmol/L    Potassium 4.0 3.5 - 5.1 mmol/L    Chloride 115 (H) 97 - 108 mmol/L    CO2 19 (L) 21 - 32 mmol/L    Anion gap 8 5 - 15 mmol/L    Glucose 112 (H) 65 - 100 mg/dL    BUN 18 6 - 20 MG/DL    Creatinine 0.73 0.55 - 1.02 MG/DL    BUN/Creatinine ratio 25 (H) 12 - 20      GFR est AA >60 >60 ml/min/1.73m2    GFR est non-AA >60 >60 ml/min/1.73m2    Calcium 7.1 (L) 8.5 - 10.1 MG/DL    Bilirubin, total 1.3 (H) 0.2 - 1.0 MG/DL    ALT (SGPT) 64 12 - 78 U/L    AST (SGOT) 35 15 - 37 U/L    Alk.  phosphatase 60 45 - 117 U/L    Protein, total 5.8 (L) 6.4 - 8.2 g/dL    Albumin 3.0 (L) 3.5 - 5.0 g/dL    Globulin 2.8 2.0 - 4.0 g/dL    A-G Ratio 1.1 1.1 - 2.2     LACTIC ACID    Collection Time: 04/28/19  9:44 PM   Result Value Ref Range    Lactic acid 2.0 0.4 - 2.0 MMOL/L   TROPONIN I    Collection Time: 04/28/19  9:44 PM   Result Value Ref Range    Troponin-I, Qt. <0.05 <0.05 ng/mL   POC VENOUS BLOOD GAS    Collection Time: 04/28/19 10:35 PM   Result Value Ref Range    Device: ROOM AIR      pH, venous (POC) 7.303 (L) 7.32 - 7.42      pCO2, venous (POC) 26.7 (L) 41 - 51 MMHG    pO2, venous (POC) 55 (H) 25 - 40 mmHg    HCO3, venous (POC) 13.3 (L) 23.0 - 28.0 MMOL/L    sO2, venous (POC) 86 65 - 88 %    Base deficit, venous (POC) 13 mmol/L    Allens test (POC) N/A      Total resp.  rate 16      Site OTHER      Specimen type (POC) VENOUS BLOOD     URINALYSIS W/ REFLEX CULTURE    Collection Time: 04/29/19 12:29 AM   Result Value Ref Range    Color YELLOW      Appearance CLOUDY (A) CLEAR      Specific gravity 1.025 1.003 - 1.030      pH (UA) 5.0 5.0 - 8.0      Protein 30 (A) NEG mg/dL    Glucose NEGATIVE  NEG mg/dL    Ketone TRACE (A) NEG mg/dL    Blood NEGATIVE  NEG      Urobilinogen 1.0 0.2 - 1.0 EU/dL    Nitrites POSITIVE (A) NEG      Leukocyte Esterase SMALL (A) NEG      UA:UC IF INDICATED URINE CULTURE ORDERED (A) CNI      WBC 0-4 0 - 4 /hpf    RBC 0-5 0 - 5 /hpf    Epithelial cells FEW FEW /lpf    Bacteria 1+ (A) NEG /hpf    Hyaline cast 2-5 0 - 5 /lpf   BILIRUBIN, CONFIRM    Collection Time: 04/29/19 12:29 AM   Result Value Ref Range    Bilirubin UA, confirm NEGATIVE  NEG     LACTIC ACID    Collection Time: 04/29/19  5:14 AM   Result Value Ref Range    Lactic acid 1.9 0.4 - 2.0 MMOL/L   CBC W/O DIFF    Collection Time: 04/29/19  5:14 AM   Result Value Ref Range    WBC 10.0 3.6 - 11.0 K/uL    RBC 3.44 (L) 3.80 - 5.20 M/uL    HGB 10.8 (L) 11.5 - 16.0 g/dL    HCT 32.5 (L) 35.0 - 47.0 %    MCV 94.5 80.0 - 99.0 FL    MCH 31.4 26.0 - 34.0 PG    MCHC 33.2 30.0 - 36.5 g/dL    RDW 12.9 11.5 - 14.5 %    PLATELET 750 496 - 985 K/uL    MPV 9.8 8.9 - 12.9 FL    NRBC 0.0 0  WBC    ABSOLUTE NRBC 0.00 0.00 - 6.73 K/uL   METABOLIC PANEL, COMPREHENSIVE    Collection Time: 04/29/19  5:14 AM   Result Value Ref Range    Sodium 145 136 - 145 mmol/L    Potassium 4.1 3.5 - 5.1 mmol/L    Chloride 118 (H) 97 - 108 mmol/L    CO2 19 (L) 21 - 32 mmol/L    Anion gap 8 5 - 15 mmol/L    Glucose 113 (H) 65 - 100 mg/dL    BUN 13 6 - 20 MG/DL    Creatinine 0.46 (L) 0.55 - 1.02 MG/DL    BUN/Creatinine ratio 28 (H) 12 - 20      GFR est AA >60 >60 ml/min/1.73m2    GFR est non-AA >60 >60 ml/min/1.73m2    Calcium 6.7 (L) 8.5 - 10.1 MG/DL    Bilirubin, total 0.7 0.2 - 1.0 MG/DL    ALT (SGPT) 51 12 - 78 U/L    AST (SGOT) 28 15 - 37 U/L    Alk.  phosphatase 46 45 - 117 U/L    Protein, total 5.2 (L) 6.4 - 8.2 g/dL    Albumin 2.5 (L) 3.5 - 5.0 g/dL    Globulin 2.7 2.0 - 4.0 g/dL    A-G Ratio 0.9 (L) 1.1 - 2.2     CULTURE, STOOL    Collection Time: 04/29/19  5:14 AM   Result Value Ref Range    Special Requests: NO SPECIAL REQUESTS      Campylobacter antigen NEGATIVE      Culture result: PENDING    C. DIFFICILE AG & TOXIN A/B    Collection Time: 04/29/19  5:14 AM   Result Value Ref Range    GDH ANTIGEN NEGATIVE  NEG      C. difficile toxin NEGATIVE  NEG      INTERPRETATION NEGATIVE FOR TOXIGENIC C. DIFFICILE NTXCD     PROCALCITONIN    Collection Time: 04/29/19  9:04 AM   Result Value Ref Range    Procalcitonin 7.6 ng/mL   TSH 3RD GENERATION    Collection Time: 04/29/19  9:04 AM   Result Value Ref Range    TSH 0.34 (L) 0.36 - 3.74 uIU/mL   PHOSPHORUS    Collection Time: 04/29/19  9:04 AM   Result Value Ref Range    Phosphorus 3.0 2.6 - 4.7 MG/DL   CORTISOL, AM    Collection Time: 04/29/19  9:04 AM   Result Value Ref Range    Cortisol, a.m. 19.0 4.30 - 22.45 ug/dL   HGB & HCT    Collection Time: 04/29/19  9:04 AM   Result Value Ref Range    HGB 10.3 (L) 11.5 - 16.0 g/dL    HCT 32.4 (L) 35.0 - 47.0 %   POC EG7    Collection Time: 04/29/19 11:02 AM   Result Value Ref Range    Calcium, ionized (POC) 1.11 (L) 1.12 - 1.32 mmol/L    pH (POC) 7.236 (LL) 7.35 - 7.45      pCO2 (POC) 34.8 (L) 35.0 - 45.0 MMHG    pO2 (POC) 72 (L) 80 - 100 MMHG    HCO3 (POC) 14.8 (L) 22 - 26 MMOL/L    Base deficit (POC) 13 mmol/L sO2 (POC) 91 (L) 92 - 97 %    Site OTHER      Device: OTHER      Allens test (POC) N/A      Specimen type (POC) VENOUS BLOOD       IMAGING RESULTS:  CT A/P:  \"IMPRESSION:  Wall thickening throughout the descending colon, sigmoid colon and rectum  suspicious for colitis though this could be accentuated by lack of oral contrast  material/underdistention. No bowel obstruction, free air, or free fluid. \"    Assessment/Plan:      Active Problems:    Colitis presumed infectious (4/28/2019)      Hypotension (4/28/2019)      Severe sepsis ([address] (4/28/2019)    62 yo F with acute colitis, left-sided, recurrent. Could be infectious, inflammatory (or possibly both). Would treat sepsis as you are doing first, then may require colonoscopy for diagnosis. C.diff negative. Levophed requirement unideal, can't get sedated with SBP 88 and on pressors.  For now, continue abx, if needed can add corticosteroids if infection ruled out but ideally we can get a colonoscopy for flex sig first.  ___________________________________________________  RECOMMENDATIONS:      -- sepsis management as you are doing  -- agree with empirix abx for now  -- follow for clinical improvement  -- okay with liquid diet for now, rest the gut otherwise  -- as improves, can plan for bowel prep and colonoscopy    Discussed Code Status:    [x]    Full Code      []    DNR    ___________________________________________________  Care Plan discussed with:    [x]    Patient   [x]    Family   [x]    Nursing   []    Attending  Total Time :  45   minutes   ___________________________________________________  GI: Kenia Beal MD

## 2019-04-29 NOTE — PROGRESS NOTES
0800 pt with hypotension and tachycardia, levophed started for pressure support. Lopez order obtained due to inability to properly monitor I/O, nurse driven order placed. Patient educated of infection risk of indwelling catheter and proper care. Patient verbalized understanding and states she straight caths at home every 2-3 hours due to high residuals. Patient had episode of incontinence prior to lopez placement, unable to measure output. Patient will not benefit from purewick due to high residuals and anatomy. Lopez placed with PCT (not second RN as no RN available) sterile technique used.

## 2019-04-29 NOTE — PROGRESS NOTES
Reason for Admission:   Severe sepsis RRAT Score:   0 Plan for utilizing home health:     No recommendations at this time Current Advanced Directive/Advance Care Plan: FULL-sister Likelihood of Readmission:  LOW Transition of Care Plan:                   
 
CM completed room visit with pt and sister by bedside. Pt reported that she resides alone in their one story home. Pt reported that she is independent with ADLs, and he drives. Pt reported that she is active with PCP: seen last year and uses [facility] (Vanda Chun). Pt reported that she uses DME at home: wheelchair and shower chair. Pt reported no HHC and SNF in the past. 
 
Pt denies needing additional services at d/c. Pt listed her sister: Constantino Campos as decision maker, when discussing Advance Care Planning. CM will continue to follow. Care Management Interventions PCP Verified by CM: Yes Mode of Transport at Discharge: Other (see comment) Transition of Care Consult (CM Consult): Discharge Planning Discharge Durable Medical Equipment: No 
Physical Therapy Consult: No 
Occupational Therapy Consult: Yes Speech Therapy Consult: No 
Current Support Network: Own Home, Lives Alone Confirm Follow Up Transport: Family Plan discussed with Pt/Family/Caregiver: Yes Discharge Location Discharge Placement: Home [facility], [address] [facility]

## 2019-04-29 NOTE — ROUTINE PROCESS
Care assumed, hypotensive, levophed started. .1000 titrated levophed to 3, continue to monitor 12:40 PM 
Levo to 4m cg Map trending >65, continue to monitor. [address] [facility] at bedside.

## 2019-04-29 NOTE — PROGRESS NOTES
Hospitalist Progress Note NAME: Maria Elena Vila :  1960 MRN:  [id number] Assessment / Plan: 
 
 
Septic shock w/ worsening hypotension, despite IV fluid boluses Secondary to acute colitis, suspected infectious, present on admission Rectal bleeding, BRB with heme positive stools History of 2 bouts of similar symptoms in the past, no colonoscopy Anemia, possible acute blood loss (no baseline) 
-Patient's blood pressure when I was called was in the 70s with a heart rate in the 130s at approximately 7 this morning. An IV fluid bolus of 250 was ordered as well as an infusion of albumin and to initiate pressors with Levophed via her central line was placed in the ER. 
-We will repeat her lactic acid which has been normal despite her hypotension. 
-We will check her other electrolytes including her phosphorus and ionized calcium given the drop in her calcium and supplement electrolytes as needed 
-We will check a cortisol level and TSH 
-We will continue her Levaquin and Flagyl and follow-up blood cultures, urine cultures and stool studies. -Await evaluation by GI, may need colonoscopy 
-We will follow her H&H but so far it appears that only small amounts of blood and H&H is stable, we will transfuse as needed and avoid anticoagulants. Send anemia labs 
-If persistent hypotension may require transfer to intensive care unit in the pulmonary critical care consult. 
-Her tachycardia is likely related to her hypotension, but will monitor on telemetry currently in sinus, negative troponin on admission, no chest pain, consider an echocardiogram if persistent hypotension to rule out any underlying cardiac disease contributing to hypotension and monitor for any potential arrhythmias given her recurrent episodes of palpitations.   
-check a TSH to rule out any thyroid dysfunction History of hypertension We will hold blood pressure medications given her current hypotension Hyperlipidemia We will hold her statin for now LFTs and lipase okay other than somewhat low albumin, 2.5 this morning after fluids Neurogenic bladder Secondary to spina bifida 
-Patient does in and out cath 
-given her sepsis and need for close monitoring of urine output with her hypotension, we will place a Tee catheter and remove as soon as possible 
- she has no sign of urinary tract infection based on her initial urinalysis which appeared to be possibly a contaminated specimen, does have some nitrates and leukocyte esterase but only 0-4 white blood cell and was a very concentrated specimen.   
-urine culture is pending. History of spina bifida 
-Patient is normally independent she has no sensation in her legs but is able to transfer on her own to a manual wheelchair and does all her ADLs. Code Status: Full Surrogate Decision Maker:  [employer] # [phone] 
  
DVT Prophylaxis: SCDs GI Prophylaxis: IV pepcid for now 
  
Baseline: Pt lives alone after recent death of spouse 60 days ago, independent with ADLs, uses wheelchair 30.0 - 39.9 Obese / Body mass index is 33.2 kg/m². Recommended Disposition: TBD Subjective: Chief Complaint / Reason for Physician Visit 
abd pain/n/v/d/AMS Called to evaluate patient at change of shift with progressive hypotension, BP 70s and HR 130s. Patient was admitted yesterday evening with severe sepsis hypertension tachycardia leukocytosis felt to be secondary to acute colitis. Patient received sepsis bolus protocol and overnight had some recurrent hypotension requiring additional 2 L of fluid. A central line was placed and patient required pressors. Her lactic acid is normal she is seen positive stools but H&H is stable. She was placed empirically on Levaquin and Flagyl for changes seen on the CT scan consistent with colitis.  
 
Patient reports some sensation of palpitations earlier this morning but not currently. She denies any lightheadedness or dizziness no chest pain breath. She currently does not have abdominal pain but when she has the diarrhea she gets acute abdominal cramping. Reports some spotting of blood from her rectum, bright red but no stool per nurse. Her symptoms started acutely in the last 24 hours. she has had 2 bouts that were similar but did not require hospitalization. She has never had a colonoscopy. She denies any recent antibiotic treatment well prior to the symptoms. She denies any respiratory symptoms or new urinary symptoms. she does chronic in and out caths and has noticed that her urine is much darker than usual.  Vomiting yesterday, but denies current nausea. No sick exposures or unusual foods. Discussed with RN events overnight. Review of Systems: 
Symptom Y/N Comments  Symptom Y/N Comments Fever/Chills    Chest Pain n   
Poor Appetite    Edema Cough n   Abdominal Pain n   
Sputum    Joint Pain SOB/ALVARADO n   Pruritis/Rash Nausea/vomit n   Tolerating PT/OT Diarrhea y blood  Tolerating Diet Constipation    Other Could NOT obtain due to:   
 
Objective: VITALS:  
Last 24hrs VS reviewed since prior progress note. Most recent are: 
Patient Vitals for the past 24 hrs: 
 Temp Pulse Resp BP SpO2  
04/29/19 1102  (!) 121 16 111/56 100 % 04/29/19 1047 98.3 °F (36.8 °C) (!) 120 12  99 % 04/29/19 1032 98.3 °F (36.8 °C) (!) 122 16 (!) 84/29 100 % 04/29/19 1017  (!) 118   100 % 04/29/19 1002    (!) 72/29   
04/29/19 0947  (!) 124   100 % 04/29/19 0932  (!) 120  106/61 100 % 04/29/19 0917  (!) 127 16  100 % 04/29/19 0902  (!) 127 17 (!) 81/31 100 % 04/29/19 0847 98.3 °F (36.8 °C) (!) 125 17 (!) 87/48 99 % 04/29/19 0837 98.6 °F (37 °C) (!) 131 12 (!) 82/36 99 % 04/29/19 0719 98.6 °F (37 °C) (!) 140 14 90/49 99 % 04/29/19 0644  (!) 133 19 (!) 88/41   
04/29/19 0642  (!) 134 13  98 % 04/29/19 0430 99 °F (37.2 °C) (!) 123 19 100/43 99 % 04/29/19 0400    98/55   
04/29/19 0359  (!) 122 15  100 % 04/29/19 0330 98.3 °F (36.8 °C) (!) 121 20 106/55 100 % 04/29/19 0252  (!) 132 20 (!) 84/36 97 % 04/29/19 0052  (!) 111 17  100 % 04/29/19 0050 98 °F (36.7 °C) (!) 111 17 117/72 100 % 04/29/19 0000  (!) 109  100/70 100 % 04/28/19 2328  (!) 107  96/49 100 % 04/28/19 2300  (!) 114  (!) 58/27 100 % 04/28/19 2115  (!) 105 20 (!) 70/18 100 % 04/28/19 2109  (!) 101 16 100/57 96 % 04/28/19 2045  98 22 (!) 68/27 90 % 04/28/19 2030  (!) 101 18 (!) 63/30   
04/28/19 2027  (!) 103 19 (!) 71/29 90 % 04/28/19 2000  (!) 106 18 (!) 80/32   
04/28/19 1956  (!) 110 18 (!) 68/12   
04/28/19 1945  (!) 107  96/84   
04/28/19 1919  (!) 116 18 (!) 55/29   
04/28/19 1915  (!) 111 19 (!) 65/32   
04/28/19 1913  (!) 113 18 (!) 64/34 96 % 04/28/19 1845  (!) 119 22 (!) 77/25 100 % 04/28/19 1841  (!) 119 17 (!) 73/31 100 % 04/28/19 1839 97.6 °F (36.4 °C) (!) 118 18 (!) 65/41 100 % 04/28/19 1824    (!) 62/48  Intake/Output Summary (Last 24 hours) at 4/29/2019 1204 Last data filed at 4/29/2019 9306 Gross per 24 hour Intake 2569 ml Output 3 ml Net 2566 ml PHYSICAL EXAM: 
Patient is awake and alert remarkably well-appearing given the circumstances on room air no distress oriented x3. Conjunctiva pink mucous membranes are tacky neck is supple nontender. Cardiovascular regular rate tachycardic no obvious murmurs rubs or gallops currently in the 130s. Lungs are grossly clear no wheezes rhonchi or crackles. Abdomen is obese bowel sounds present soft nontender no rebound or guarding. Extremities no clubbing cyanosis or significant edema her feet are cool to touch palpable pedal pulses capillary refill. She has no sensation in her legs baseline from SB. Reviewed most current lab test results and cultures  YES 
 Reviewed most current radiology test results   YES Review and summation of old records today    NO Reviewed patient's current orders and MAR    YES 
PMH/SH reviewed - no change compared to H&P 
________________________________________________________________________ Care Plan discussed with: 
  Comments Patient y Family  y Sister on phone RN Care Manager Consultant  y Heads up to [facility] Multidiciplinary team rounds were held today with , nursing, pharmacist and clinical coordinator. Patient's plan of care was discussed; medications were reviewed and discharge planning was addressed. ________________________________________________________________________ Total NON critical care TIME:    Minutes Total CRITICAL CARE TIME Spent:   40 Minutes non procedure based Comments >50% of visit spent in counseling and coordination of care    
________________________________________________________________________ Cristi Sequeira MD  
 
Procedures: see electronic medical records for all procedures/Xrays and details which were not copied into this note but were reviewed prior to creation of Plan. LABS: 
I reviewed today's most current labs and imaging studies. Pertinent labs include: 
Recent Labs  
  04/29/19 
0904 04/29/19 
0514 04/28/19 
1856 WBC  --  10.0 12.2* HGB 10.3* 10.8* 9.0*  
HCT 32.4* 32.5* 29.0*  
PLT  --  215 211 Recent Labs  
  04/29/19 
0904 04/29/19 
0514 04/28/19 
2144 NA  --  145 142 K  --  4.1 4.0  
CL  --  118* 115* CO2  --  19* 19* GLU  --  113* 112* BUN  --  13 18 CREA  --  0.46* 0.73 CA  --  6.7* 7.1*  
MG  --   --  2.2 PHOS 3.0  --   --   
ALB  --  2.5* 3.0* TBILI  --  0.7 1.3*  
SGOT  --  28 35 ALT  --  51 64 Signed: Cristi Sequeira MD

## 2019-04-29 NOTE — ED NOTES
Patient bedding and brief changed during straight cath - Straight Cath performed with 2 Rn's at bedside with sterile technique used

## 2019-04-29 NOTE — H&P
Hospitalist Admission NoteNAME: Mackenzie Gomez :  1960 MRN:  [id number] Date/Time:  2019 10:50 PM 
 
Patient PCP: Preston Nazario MD 
______________________________________________________________________ Given the patient's current clinical presentation, I have a high level of concern for decompensation if discharged from the emergency department. Complex decision making was performed, which includes reviewing the patient's available past medical records, laboratory results, and x-ray films. My assessment of this patient's clinical condition and my plan of care is as follows. Assessment / Plan: 
Severe Sepsis (tachycardia, Hypotension, tachypnea, leukocytosis + infection)  POA Due to L sided Colitis (Desc, Sigmoid, Rectum) POA- presumed infectious Causing Hypotension POA Causing Lower GI bleed POA 
WBC= 12.2 Lactate= 2.0 Stool Guaiac +ve in ER 
CT A/P with IV contrast= Desc, Sigmoid Colon & Rectal wall thickening Admit to Stepdown unit for close observation - pt at risk of further deterioration due to Hypotension- may progress to septic shock needing Pressor support if pt fails to respond to IVF challenge- s/p 3 L NS in ER, will order 2L more before maintenance IVF at 125ml/hr thereafter Pt has R IJ CVC for pressors if needed- start levophed if MAP <65 after completion of total 5 L NS Check Lactate in AM 
Follow Blood Cx (sent in ER), Stool studies (ordered) Empiric IV Abx for now- Levaquin + Flagyl for now Contact enteric precautions for now IP GI consult Dr Cuong Arnett (per pt/family choice) Avoid anticoagulation at this time due to LGIBleed- likely due to colitis Hold Home BP med- lisinopril Clear liquids for now Zofran prn HTN Holding BP med Lisinopril as above for hypotension Hyperlipidemia Holding home statin for now Code Status: Full Surrogate Decision Maker: sister Vanessa Neumann # [phone] DVT Prophylaxis: SCDs GI Prophylaxis: IV pepcid for now Baseline: Pt lives alone after recent death of spouse 60 days ago, pretty independent with ADLs Subjective: CHIEF COMPLAINT: Diarrhea with Nausea/vomiting x  1 day HISTORY OF PRESENT ILLNESS:    
Beka Kaminski is a 63 y.o.  female who presents with above complains from home via EMS. Pt presents with CC of sudden onset Diarrhea with associated Nausea/vomiting x 1 day H/o associated crampy abdominal pain which relieves with BM per pt 
H/o associated AMS/confusion today noticed by sister who brought her in to ER via EMS. Denies any preceding fever, chills, sick contact, bad food. H/o Associated palpitations prior to the episodes- denies any known heart disease/Afib diagnosis. Pt was found to have Hypotension with CT A/P showing L sided colitis & stool +ve for Guaiac in ER . We were asked to admit for work up and evaluation of the above problems. Past Medical History:  
Diagnosis Date  Hypertension  Spina bifida ([facility] [address] Past Surgical History:  
Procedure Laterality Date  HX OTHER SURGICAL  1890s  
 sacral wound flap repair Social History Tobacco Use  Smoking status: Never Smoker  Smokeless tobacco: Never Used Substance Use Topics  Alcohol use: Never Frequency: Never Family History +ve for CAD, Breat cancer Denies any h/o colorectal cancer or IBD in family Allergies Allergen Reactions  Sulfa (Sulfonamide Antibiotics) Nausea and Vomiting Prior to Admission medications Medication Sig Start Date End Date Taking? Authorizing Provider  
lisinopril (PRINIVIL, ZESTRIL) 10 mg tablet Take 10 mg by mouth nightly. Yes Melodie Ordoñez MD  
atorvastatin (LIPITOR) 10 mg tablet Take 10 mg by mouth nightly. Yes Tiago, MD Melodie  
 
 
REVIEW OF SYSTEMS:    
 
 
 
Total of 12 systems reviewed as follows:   
   POSITIVE= underlined text  Negative = text not underlined General:  fever, chills, sweats, generalized weakness, weight loss/gain,  
   loss of appetite Eyes:    blurred vision, eye pain, loss of vision, double vision ENT:    rhinorrhea, pharyngitis Respiratory:   cough, sputum production, SOB, ALVARADO, wheezing, pleuritic pain  
Cardiology:   chest pain, palpitations, orthopnea, PND, edema, syncope Gastrointestinal:  abdominal pain , N/V, diarrhea, dysphagia, constipation, bleeding Genitourinary:  frequency, urgency, dysuria, hematuria, incontinence Muskuloskeletal :  arthralgia, myalgia, back pain Hematology:  easy bruising, nose or gum bleeding, lymphadenopathy Dermatological: rash, ulceration, pruritis, color change / jaundice Endocrine:   hot flashes or polydipsia Neurological:  headache, dizziness, confusion (transient), focal weakness, paresthesia, Speech difficulties, memory loss, gait difficulty Psychological: Feelings of anxiety, depression, agitation Objective: VITALS:   
Visit Vitals BP (!) 70/18 Pulse (!) 105 Temp 97.6 °F (36.4 °C) Resp 20 Ht 4' 7\" (1.397 m) Wt 64.8 kg (142 lb 13.7 oz) SpO2 100% BMI 33.20 kg/m² PHYSICAL EXAM: 
 
General:    Alert, cooperative, no distress, appears stated age. HEENT: Atraumatic, anicteric sclerae, pink conjunctivae No oral ulcers, mucosa moist, throat clear, dentition fair Neck:  Supple, symmetrical,  thyroid: non tender Lungs:   Clear to auscultation bilaterally. No Wheezing or Rhonchi. No rales. Chest wall:  No tenderness  No Accessory muscle use. Heart:   Regular  rhythm,  No  murmur   No edema Abdomen:   Soft, mildly tender on the LLQ +. Not distended. Bowel sounds normal 
Extremities: No cyanosis. No clubbing,   
  Skin turgor normal, Capillary refill normal, Radial dial pulse 2+ Skin:     Not pale. Not Jaundiced  No rashes Psych:  Good insight. Not depressed. Not anxious or agitated. Neurologic: EOMs intact. No facial asymmetry.  No aphasia or slurred speech. Symmetrical strength, Sensation grossly intact. Alert and oriented X 4.  
 
_______________________________________________________________________ Care Plan discussed with: 
  Comments Patient x Family  x Sister & rest of the family at bedside in ER  
RN x Care Manager Consultant:  lizzy Fields  
_______________________________________________________________________ Expected  Disposition:  
Home with Family x HH/PT/OT/RN ?  
SNF/LTC   
GEO   
________________________________________________________________________ TOTAL TIME:  71 Minutes Critical Care Provided   71  Minutes non procedure based Comments  
 x Reviewed previous records  
>50% of visit spent in counseling and coordination of care x Discussion with patient and family and questions answered 
  
 
________________________________________________________________________ Signed: Ciarra Floyd MD 
 
Procedures: see electronic medical records for all procedures/Xrays and details which were not copied into this note but were reviewed prior to creation of Plan. LAB DATA REVIEWED:   
Recent Results (from the past 24 hour(s)) CBC WITH AUTOMATED DIFF Collection Time: 04/28/19  6:56 PM  
Result Value Ref Range WBC 12.2 (H) 3.6 - 11.0 K/uL  
 RBC 2.90 (L) 3.80 - 5.20 M/uL HGB 9.0 (L) 11.5 - 16.0 g/dL HCT 29.0 (L) 35.0 - 47.0 % .0 (H) 80.0 - 99.0 FL  
 MCH 31.0 26.0 - 34.0 PG  
 MCHC 31.0 30.0 - 36.5 g/dL  
 RDW 12.6 11.5 - 14.5 % PLATELET 821 719 - 122 K/uL MPV 9.4 8.9 - 12.9 FL  
 NRBC 0.0 0  WBC ABSOLUTE NRBC 0.00 0.00 - 0.01 K/uL NEUTROPHILS 87 (H) 32 - 75 % LYMPHOCYTES 8 (L) 12 - 49 % MONOCYTES 4 (L) 5 - 13 % EOSINOPHILS 0 0 - 7 % BASOPHILS 0 0 - 1 % IMMATURE GRANULOCYTES 1 (H) 0.0 - 0.5 % ABS. NEUTROPHILS 10.7 (H) 1.8 - 8.0 K/UL  
 ABS. LYMPHOCYTES 1.0 0.8 - 3.5 K/UL  
 ABS. MONOCYTES 0.4 0.0 - 1.0 K/UL ABS. EOSINOPHILS 0.0 0.0 - 0.4 K/UL  
 ABS. BASOPHILS 0.0 0.0 - 0.1 K/UL  
 ABS. IMM. GRANS. 0.1 (H) 0.00 - 0.04 K/UL  
 DF AUTOMATED OCCULT BLOOD, STOOL Collection Time: 04/28/19  6:56 PM  
Result Value Ref Range Occult blood, stool POSITIVE (A) NEG    
TYPE & SCREEN Collection Time: 04/28/19  9:44 PM  
Result Value Ref Range Crossmatch Expiration 05/01/2019 ABO/Rh(D) A POSITIVE Antibody screen NEG   
LIPASE Collection Time: 04/28/19  9:44 PM  
Result Value Ref Range Lipase 123 73 - 393 U/L MAGNESIUM Collection Time: 04/28/19  9:44 PM  
Result Value Ref Range Magnesium 2.2 1.6 - 2.4 mg/dL METABOLIC PANEL, COMPREHENSIVE Collection Time: 04/28/19  9:44 PM  
Result Value Ref Range Sodium 142 136 - 145 mmol/L Potassium 4.0 3.5 - 5.1 mmol/L Chloride 115 (H) 97 - 108 mmol/L  
 CO2 19 (L) 21 - 32 mmol/L Anion gap 8 5 - 15 mmol/L Glucose 112 (H) 65 - 100 mg/dL BUN 18 6 - 20 MG/DL Creatinine 0.73 0.55 - 1.02 MG/DL  
 BUN/Creatinine ratio 25 (H) 12 - 20 GFR est AA >60 >60 ml/min/1.73m2 GFR est non-AA >60 >60 ml/min/1.73m2 Calcium 7.1 (L) 8.5 - 10.1 MG/DL Bilirubin, total 1.3 (H) 0.2 - 1.0 MG/DL  
 ALT (SGPT) 64 12 - 78 U/L  
 AST (SGOT) 35 15 - 37 U/L Alk. phosphatase 60 45 - 117 U/L Protein, total 5.8 (L) 6.4 - 8.2 g/dL Albumin 3.0 (L) 3.5 - 5.0 g/dL Globulin 2.8 2.0 - 4.0 g/dL A-G Ratio 1.1 1.1 - 2.2 LACTIC ACID Collection Time: 04/28/19  9:44 PM  
Result Value Ref Range Lactic acid 2.0 0.4 - 2.0 MMOL/L  
TROPONIN I Collection Time: 04/28/19  9:44 PM  
Result Value Ref Range Troponin-I, Qt. <0.05 <0.05 ng/mL POC VENOUS BLOOD GAS Collection Time: 04/28/19 10:35 PM  
Result Value Ref Range Device: ROOM AIR    
 pH, venous (POC) 7.303 (L) 7.32 - 7.42    
 pCO2, venous (POC) 26.7 (L) 41 - 51 MMHG  
 pO2, venous (POC) 55 (H) 25 - 40 mmHg  HCO3, venous (POC) 13.3 (L) 23.0 - 28.0 MMOL/L  
 sO2, venous (POC) 86 65 - 88 % Base deficit, venous (POC) 13 mmol/L Allens test (POC) N/A Total resp. rate 16 Site OTHER Specimen type (POC) VENOUS BLOOD

## 2019-04-29 NOTE — PROGRESS NOTES
Initial Nutrition Assessment: 
 
INTERVENTIONS/RECOMMENDATIONS:  
· Diet/Snacks, General/Healthful diet: Advance diet as tolerated · Supplements: Ensure clear  BID,  Gelatein 20 BID ASSESSMENT:  
Patient was admitted for severe sepsis, hypotension, lower GI bleed and colitis . PMH hypertension and hyperlipidemia . Pt uses a wheelchair for mobility. She stated that she is unsure of any weight loss because she does not get the opportunity to weigh herself due to her mobility status . She stated that she has started to regain her appetite and reported feeling hungry this morning. She was very interested in having lunch and stated that the foods offered on the clear liquid diet soothes her throat . Patient wanted to learn more about the foods she could tolerate while on this diet because red and orange foods are currently restricted. Discussed the addition of  oral nutrition supplements to meals. Patient stated that she was interested in trying Ensure clear and Gelatein 20 with some of her meals. Patient asked to be advanced to a low sodium diet as tolerated. GI consult pending. Advance diet as tolerated. Diet Order: Clear liquids 
% Eaten:  No data found. Pertinent Medications: [x]Reviewed []Other: Famotidine, levo Pertinent Labs: [x]Reviewed []Other Food Allergies: [x]None []Other Last BM: 04/29   [x]Active     []Hyperactive  []Hypoactive       [] Absent BS Skin:    [x] Intact   [] Incision  [] Breakdown  [] Other: Anthropometrics:  
Height: 4' 7\" (139.7 cm) Weight: 64.8 kg (142 lb 13.7 oz) IBW (%IBW):   ( ) UBW (%UBW):   (  %) Last Weight Metrics: 
Weight Loss Metrics 4/28/2019 Today's Wt 142 lb 13.7 oz  
BMI 33.2 kg/m2 BMI: Body mass index is 33.2 kg/m². This BMI is indicative of: 
 []Underweight    []Normal    []Overweight    [x] Obesity   [] Extreme Obesity (BMI>40) Estimated Nutrition Needs (Based on):  
1300 Kcals/day(BMR (1075) X 1.2 AF ) , 65 g( 1.0 G/KG BW) Protein Carbohydrate: At Least 130 g/day  Fluids: 1300 mL/day (1ml/kcal) NUTRITION DIAGNOSES:  
Problem:  Altered GI function Etiology: related to colitis Signs/Symptoms: as evidenced by diarrhea and clear liquid diet NUTRITION INTERVENTIONS: 
Meals/Snacks: Modify diet/texture/consistency/nutrients   Supplements: Commercial supplement GOAL:  
Continue PO intake >70 % of meals and diet advanced next 1-3 days LEARNING NEEDS (Diet, Food/Nutrient-Drug Interaction):  
 [x] None Identified 
 [] Identified and Education Provided/Documented 
 [] Identified and Pt declined/was not appropriate Cultureal, Episcopal, OR Ethnic Dietary Needs:  
 [x] None Identified 
 [] Identified and Addressed 
 
 [x] Interdisciplinary Care Plan Reviewed/Documented  
 [x] Discharge Planning:Continue PO intake, advance diet to low sodium as tolerated MONITORING /EVALUATION:  
Food/Nutrient Intake Outcomes: Total energy intake Physical Signs/Symptoms Outcomes: Weight/weight change NUTRITION RISK:  
 [x] Patient At Nutritional Risk  
 [] Patient Not At Nutritional Risk PT SEEN FOR:  
 []  MD Consult: []Calorie Count []Diabetic Diet Education []Diet Education []Electrolyte Management []General Nutrition Management and Supplements []Management of Tube Feeding []TPN Recommendations [x]  RN Referral:  [x]MST score >=2 
   []Enteral/Parenteral Nutrition PTA []Pregnant: Gestational DM or Multigestation 
   []Pressure Ulcer/Wound Care needs 
     
[]  Low BMI 
[]  DUANE Langley [facility] Pager [phone] Weekend Pager [phone]

## 2019-04-29 NOTE — ED NOTES
TRANSFER - OUT REPORT: 
 
Verbal report given to Keyana Louis RN (name) on Claudette Pal  being transferred to #[id number](unit) for routine progression of care Report consisted of patients Situation, Background, Assessment and  
Recommendations(SBAR). Information from the following report(s) SBAR, ED Summary, [facility] and Recent Results was reviewed with the receiving nurse. Lines:  
Triple Lumen RIJ  04/28/19 Right Internal jugular (Active) Central Line Being Utilized Yes 4/28/2019 10:20 PM  
Site Assessment Clean, dry, & intact 4/28/2019 10:20 PM  
Infiltration Assessment 0 4/28/2019 10:20 PM  
Affected Extremity/Extremities Color distal to insertion site pink (or appropriate for race) 4/28/2019 10:20 PM  
Dressing Status Clean, dry, & intact 4/28/2019 10:20 PM  
Positive Blood Return (Medial Site) Yes 4/28/2019 10:20 PM  
Positive Blood Return (Lateral Site) Yes 4/28/2019 10:20 PM  
Positive Blood Return (Site #3) Yes 4/28/2019 10:20 PM  
   
Peripheral IV 04/28/19 Right Antecubital (Active) Site Assessment Clean, dry, & intact 4/28/2019  6:51 PM  
Phlebitis Assessment 0 4/28/2019  6:51 PM  
Infiltration Assessment 0 4/28/2019  6:51 PM  
Dressing Status Clean, dry, & intact 4/28/2019  6:51 PM  
Hub Color/Line Status Pink 4/28/2019  6:51 PM  
   
Peripheral IV 04/28/19 Right;Upper Antecubital (Active) Site Assessment Clean, dry, & intact 4/28/2019  7:20 PM  
Phlebitis Assessment 0 4/28/2019  7:20 PM  
Infiltration Assessment 0 4/28/2019  7:20 PM  
Dressing Status Clean, dry, & intact 4/28/2019  7:20 PM  
Hub Color/Line Status Pink 4/28/2019  7:20 PM  
Action Taken Blood drawn 4/28/2019  7:20 PM  
  
 
Opportunity for questions and clarification was provided. Patient transported with: 
 Registered Nurse Tech

## 2019-04-30 LAB
ANION GAP SERPL CALC-SCNC: 5 MMOL/L (ref 5–15)
BACTERIA SPEC CULT: NORMAL
BASOPHILS # BLD: 0 K/UL (ref 0–0.1)
BASOPHILS NFR BLD: 0 % (ref 0–1)
BUN SERPL-MCNC: 3 MG/DL (ref 6–20)
BUN/CREAT SERPL: 9 (ref 12–20)
CALCIUM SERPL-MCNC: 7.7 MG/DL (ref 8.5–10.1)
CC UR VC: NORMAL
CHLORIDE SERPL-SCNC: 115 MMOL/L (ref 97–108)
CO2 SERPL-SCNC: 23 MMOL/L (ref 21–32)
COMMENT, HOLDF: NORMAL
CREAT SERPL-MCNC: 0.32 MG/DL (ref 0.55–1.02)
DIFFERENTIAL METHOD BLD: ABNORMAL
EOSINOPHIL # BLD: 0 K/UL (ref 0–0.4)
EOSINOPHIL NFR BLD: 0 % (ref 0–7)
ERYTHROCYTE [DISTWIDTH] IN BLOOD BY AUTOMATED COUNT: 13.2 % (ref 11.5–14.5)
FERRITIN SERPL-MCNC: 73 NG/ML (ref 26–388)
FOLATE SERPL-MCNC: 20.6 NG/ML (ref 5–21)
GLUCOSE SERPL-MCNC: 104 MG/DL (ref 65–100)
HCT VFR BLD AUTO: 29.7 % (ref 35–47)
HGB BLD-MCNC: 9.7 G/DL (ref 11.5–16)
IMM GRANULOCYTES # BLD AUTO: 0 K/UL (ref 0–0.04)
IMM GRANULOCYTES NFR BLD AUTO: 0 % (ref 0–0.5)
IRON SATN MFR SERPL: 10 % (ref 20–50)
IRON SERPL-MCNC: 21 UG/DL (ref 35–150)
LACTATE SERPL-SCNC: 1.4 MMOL/L (ref 0.4–2)
LYMPHOCYTES # BLD: 1.2 K/UL (ref 0.8–3.5)
LYMPHOCYTES NFR BLD: 23 % (ref 12–49)
MAGNESIUM SERPL-MCNC: 1.8 MG/DL (ref 1.6–2.4)
MCH RBC QN AUTO: 31.2 PG (ref 26–34)
MCHC RBC AUTO-ENTMCNC: 32.7 G/DL (ref 30–36.5)
MCV RBC AUTO: 95.5 FL (ref 80–99)
MONOCYTES # BLD: 0.3 K/UL (ref 0–1)
MONOCYTES NFR BLD: 6 % (ref 5–13)
NEUTS BAND NFR BLD MANUAL: 6 %
NEUTS SEG # BLD: 3.8 K/UL (ref 1.8–8)
NEUTS SEG NFR BLD: 65 % (ref 32–75)
NRBC # BLD: 0 K/UL (ref 0–0.01)
NRBC BLD-RTO: 0 PER 100 WBC
PHOSPHATE SERPL-MCNC: 1.2 MG/DL (ref 2.6–4.7)
PLATELET # BLD AUTO: 193 K/UL (ref 150–400)
PMV BLD AUTO: 9.6 FL (ref 8.9–12.9)
POTASSIUM SERPL-SCNC: 3.4 MMOL/L (ref 3.5–5.1)
RBC # BLD AUTO: 3.11 M/UL (ref 3.8–5.2)
RBC MORPH BLD: ABNORMAL
SAMPLES BEING HELD,HOLD: NORMAL
SERVICE CMNT-IMP: NORMAL
SODIUM SERPL-SCNC: 143 MMOL/L (ref 136–145)
TIBC SERPL-MCNC: 216 UG/DL (ref 250–450)
VIT B12 SERPL-MCNC: 170 PG/ML (ref 193–986)
WBC # BLD AUTO: 5.3 K/UL (ref 3.6–11)

## 2019-04-30 PROCEDURE — 74011250636 HC RX REV CODE- 250/636: Performed by: INTERNAL MEDICINE

## 2019-04-30 PROCEDURE — 74011250637 HC RX REV CODE- 250/637: Performed by: EMERGENCY MEDICINE

## 2019-04-30 PROCEDURE — 97530 THERAPEUTIC ACTIVITIES: CPT | Performed by: PHYSICAL THERAPIST

## 2019-04-30 PROCEDURE — 74011000250 HC RX REV CODE- 250: Performed by: EMERGENCY MEDICINE

## 2019-04-30 PROCEDURE — 82607 VITAMIN B-12: CPT

## 2019-04-30 PROCEDURE — 82746 ASSAY OF FOLIC ACID SERUM: CPT

## 2019-04-30 PROCEDURE — 80048 BASIC METABOLIC PNL TOTAL CA: CPT

## 2019-04-30 PROCEDURE — 85025 COMPLETE CBC W/AUTO DIFF WBC: CPT

## 2019-04-30 PROCEDURE — 74011000250 HC RX REV CODE- 250: Performed by: INTERNAL MEDICINE

## 2019-04-30 PROCEDURE — 84100 ASSAY OF PHOSPHORUS: CPT

## 2019-04-30 PROCEDURE — 97165 OT EVAL LOW COMPLEX 30 MIN: CPT | Performed by: OCCUPATIONAL THERAPIST

## 2019-04-30 PROCEDURE — 83540 ASSAY OF IRON: CPT

## 2019-04-30 PROCEDURE — 82728 ASSAY OF FERRITIN: CPT

## 2019-04-30 PROCEDURE — 36415 COLL VENOUS BLD VENIPUNCTURE: CPT

## 2019-04-30 PROCEDURE — 83735 ASSAY OF MAGNESIUM: CPT

## 2019-04-30 PROCEDURE — 65660000000 HC RM CCU STEPDOWN

## 2019-04-30 PROCEDURE — 97535 SELF CARE MNGMENT TRAINING: CPT | Performed by: OCCUPATIONAL THERAPIST

## 2019-04-30 PROCEDURE — 97161 PT EVAL LOW COMPLEX 20 MIN: CPT | Performed by: PHYSICAL THERAPIST

## 2019-04-30 PROCEDURE — 83605 ASSAY OF LACTIC ACID: CPT

## 2019-04-30 RX ORDER — CYANOCOBALAMIN 1000 UG/ML
1000 INJECTION, SOLUTION INTRAMUSCULAR; SUBCUTANEOUS DAILY
Status: DISCONTINUED | OUTPATIENT
Start: 2019-04-30 | End: 2019-05-03 | Stop reason: HOSPADM

## 2019-04-30 RX ORDER — POLYETHYLENE GLYCOL 3350, SODIUM SULFATE ANHYDROUS, SODIUM BICARBONATE, SODIUM CHLORIDE, POTASSIUM CHLORIDE 236; 22.74; 6.74; 5.86; 2.97 G/4L; G/4L; G/4L; G/4L; G/4L
4000 POWDER, FOR SOLUTION ORAL ONCE
Status: COMPLETED | OUTPATIENT
Start: 2019-04-30 | End: 2019-04-30

## 2019-04-30 RX ADMIN — CYANOCOBALAMIN 1000 MCG: 1000 INJECTION, SOLUTION INTRAMUSCULAR; SUBCUTANEOUS at 18:47

## 2019-04-30 RX ADMIN — POTASSIUM PHOSPHATE, MONOBASIC AND POTASSIUM PHOSPHATE, DIBASIC: 224; 236 INJECTION, SOLUTION, CONCENTRATE INTRAVENOUS at 10:03

## 2019-04-30 RX ADMIN — FAMOTIDINE 20 MG: 10 INJECTION, SOLUTION INTRAVENOUS at 08:32

## 2019-04-30 RX ADMIN — POLYETHYLENE GLYCOL 3350, SODIUM SULFATE ANHYDROUS, SODIUM BICARBONATE, SODIUM CHLORIDE, POTASSIUM CHLORIDE 4000 ML: 236; 22.74; 6.74; 5.86; 2.97 POWDER, FOR SOLUTION ORAL at 17:45

## 2019-04-30 RX ADMIN — Medication 10 ML: at 17:47

## 2019-04-30 RX ADMIN — LEVOFLOXACIN 750 MG: 5 INJECTION, SOLUTION INTRAVENOUS at 22:01

## 2019-04-30 RX ADMIN — Medication 30 ML: at 20:49

## 2019-04-30 RX ADMIN — FAMOTIDINE 20 MG: 10 INJECTION, SOLUTION INTRAVENOUS at 20:48

## 2019-04-30 RX ADMIN — METRONIDAZOLE 500 MG: 500 INJECTION, SOLUTION INTRAVENOUS at 08:32

## 2019-04-30 RX ADMIN — METRONIDAZOLE 500 MG: 500 INJECTION, SOLUTION INTRAVENOUS at 17:44

## 2019-04-30 RX ADMIN — ACETAMINOPHEN 650 MG: 325 TABLET ORAL at 20:48

## 2019-04-30 RX ADMIN — METRONIDAZOLE 500 MG: 500 INJECTION, SOLUTION INTRAVENOUS at 01:28

## 2019-04-30 NOTE — ACP (ADVANCE CARE PLANNING)
Request by Silvana Mcgill, nurse to assist with Advance Medical Directive (AMD) in Progressive Care. Consulted with patients nurse. Explained document to patient and patient's sister, who were present in the room. Assisted patient in completing the document. Made a copy for patients chart and handed it to Silvana Mcgill. Returned original document and another copy to the patient. [address] Paging Service [address] [place] [phone])

## 2019-04-30 NOTE — PROGRESS NOTES
Hospitalist Progress Note NAME: Vonna Lefort :  1960 MRN:  [id number] Assessment / Plan: 
Septic shock versus Hypovolemic Shock Acute colitis Hematochezia (History of 2 bouts of similar symptoms in the past, no colonoscopy) Anemia, acute blood loss anemia seems less certain because of Hgb trends->wanemia work-up reveals Ironr deficiency as well as Vitamin B12 deficiency 
-continue IVF's 
-Levophed weaned to off on am of  
-continue on Levaquin and Flagyl 
-C. Diff ruled out 
-Colonoscopy tomorrow per Dr. Epi Sarah  
-start IM B12 supplementation since B12 level is 170 
-continue to monitor Hgb Low TSH: 
-check Free T4 History of hypertension 
-continue to hold PTA ACEI 
  
Hyperlipidemia 
-will resume home statin on discharge Neurogenic bladder Secondary to spina bifida 
-Patient does in and out cath at home 
-UCx negative 
-will discontinue lopez after tomorrow's colonoscopy 
  
History of spina bifida 
-Patient is normally independent she has no sensation in her legs but is able to transfer on her own to a manual wheelchair and does all her ADLs.   
Hypophosphatemia: 
Hypokalemia: 
-IV potassium phosphate ordered today Obesity: Body mass index is 33.2 kg/m². Code Status: Full Surrogate Decision Maker: sister Tanmay Potts # [phone] 
  
DVT Prophylaxis: SCDs GI Prophylaxis: IV pepcid for now 
  
Baseline: Pt lives alone after recent death of spouse 60 days ago, independent with ADLs, uses wheelchair 
  
Recommended Disposition: TBD Subjective: Chief Complaint / Reason for Physician Visit: follow-up hypotension Patient is seen for follow-up Feels better Still with small volume diarrhea Case discussed with RN Review of Systems: 
Symptom Y/N Comments  Symptom Y/N Comments Fever/Chills    Chest Pain n   
Poor Appetite    Edema Cough    Abdominal Pain y sometimes Sputum    Joint Pain SOB/ALVARADO n   Pruritis/Rash Nausea/vomit    Tolerating PT/OT Diarrhea    Tolerating Diet Constipation    Other y Palpitations PTA Could NOT obtain due to:   
 
Objective: VITALS:  
Last 24hrs VS reviewed since prior progress note. Most recent are: 
Patient Vitals for the past 24 hrs: 
 Temp Pulse Resp BP SpO2  
04/30/19 1159  (!) 109  112/51 96 % 04/30/19 1155  (!) 110  94/51 95 % 04/30/19 1103 98.3 °F (36.8 °C) (!) 107 18 114/55 99 % 04/30/19 0953  (!) 102 21 114/57   
04/30/19 0900 98.2 °F (36.8 °C) (!) 104 19 117/64 98 % 04/30/19 0853  (!) 111 21 117/64   
04/30/19 0830    108/57   
04/30/19 0800  100 9 106/53   
04/30/19 0700  (!) 109 16 126/67   
04/30/19 0630  (!) 117 15 121/60   
04/30/19 0530    115/49   
04/30/19 0500  (!) 109  120/64   
04/30/19 0441  (!) 115 20 117/58 99 % 04/30/19 0400  (!) 103 17 112/69 (!) 88 % 04/30/19 0330    (!) 86/54   
04/30/19 0325  (!) 119 20 113/60 100 % 04/30/19 0310 98.7 °F (37.1 °C) (!) 106 16 (!) 88/42 99 % 04/30/19 0300  (!) 102  100/47   
04/30/19 0230  (!) 117 18 111/54 100 % 04/30/19 0200  (!) 107 15 118/61 98 % 04/30/19 0136  (!) 110 17 121/64 99 % 04/30/19 0133  (!) 111  125/64   
04/30/19 0130  (!) 115  122/68   
04/30/19 0037  (!) 120 13 107/63 100 % 04/29/19 2330  (!) 136 17 105/70 99 % 04/29/19 2300 98.6 °F (37 °C) (!) 115 17 116/60 99 % 04/29/19 2230    116/64   
04/29/19 2200  (!) 107 15 115/62 99 % 04/29/19 2130  (!) 105 16 110/61 99 % 04/29/19 2100  (!) 124  120/59   
04/29/19 2000  (!) 110 19 96/54 98 % 04/29/19 1948  (!) 117  108/58 98 % 04/29/19 1900 98.2 °F (36.8 °C) (!) 122 15 115/61 99 % 04/29/19 1802 98.3 °F (36.8 °C) (!) 124 17 104/47 97 % 04/29/19 1732  (!) 128 17 (!) 84/44 97 % 04/29/19 1702  (!) 127 25 (!) 88/42 96 % 04/29/19 1632  (!) 123 17 95/46 99 % 04/29/19 1602    98/43   
04/29/19 1532 98.1 °F (36.7 °C) (!) 126 18 94/60 100 % 04/29/19 1502  (!) 122 17 120/63 100 % 04/29/19 1432  (!) 121 19 119/64 99 % Intake/Output Summary (Last 24 hours) at 4/30/2019 1427 Last data filed at 4/30/2019 5182 Gross per 24 hour Intake 2773.89 ml Output 3180 ml Net -406.11 ml PHYSICAL EXAM: 
General: WD, WN. Alert, cooperative, no acute distress   
EENT:  EOMI. Anicteric sclerae. MM dry Resp:  CTA bilaterally, no wheezing or rales. No accessory muscle use CV:  Regular  rhythm,  No edema GI:  Soft, Non distended, Non tender.  +Bowel sounds Neurologic:  Alert and oriented X 3, normal speech, Psych:   Good insight. Not anxious nor agitated Skin:  No rashes. No jaundice Reviewed most current lab test results and cultures  YES Reviewed most current radiology test results   YES Review and summation of old records today    NO Reviewed patient's current orders and MAR    YES 
PMH/ reviewed - no change compared to H&P 
________________________________________________________________________ Care Plan discussed with: 
  Comments Patient x Family RN x Care Manager Consultant Multidiciplinary team rounds were held today with , nursing, pharmacist and clinical coordinator. Patient's plan of care was discussed; medications were reviewed and discharge planning was addressed. ________________________________________________________________________ Total NON critical care TIME:  35   Minutes Total CRITICAL CARE TIME Spent:   Minutes non procedure based Comments >50% of visit spent in counseling and coordination of care x   
________________________________________________________________________ Marie Lands, MD  
 
Procedures: see electronic medical records for all procedures/Xrays and details which were not copied into this note but were reviewed prior to creation of Plan. LABS: 
I reviewed today's most current labs and imaging studies. Pertinent labs include: 
Recent Labs 04/30/19 1434 04/29/19 
1800 04/29/19 
8730 04/29/19 
0514 04/28/19 
1856 WBC 5.3  --   --  10.0 12.2* HGB 9.7* 10.4* 10.3* 10.8* 9.0*  
HCT 29.7* 32.1* 32.4* 32.5* 29.0*  
  --   --  215 211 Recent Labs 04/30/19 
7682 04/29/19 
1143 04/29/19 
4622 04/28/19 
2144   --  145 142  
K 3.4*  --  4.1 4.0  
*  --  118* 115* CO2 23  --  19* 19* *  --  113* 112* BUN 3*  --  13 18 CREA 0.32*  --  0.46* 0.73 CA 7.7*  --  6.7* 7.1*  
MG 1.8  --   --  2.2 PHOS 1.2* 3.0  --   --   
ALB  --   --  2.5* 3.0* TBILI  --   --  0.7 1.3*  
SGOT  --   --  28 35 ALT  --   --  51 64 Signed: Creighton Schirmer, MD

## 2019-04-30 NOTE — ROUTINE PROCESS
0800 levo to 3 mcg 
0900 levo to2 
1005 levo to 1, map 68 
 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 12yo M with VHR ALL on maintenance chemotherapy protocol AALL 1131 admitted for fever (resolved) and prolonged pancytopenia most likely due to infection (MSSA bacteremia +Typhlitis) and the myelosuppressive effect of chemotherapy.   Initially HLH was suspected and Jevin was started on Anakinra, but due to low clinical suspicion given borderline HLH criteria, Anakinra was stopped on 1/24/17. His hospital course complicated by resolved MSSA bacteremia, typhlitis, and ELVIN.

## 2019-04-30 NOTE — PROGRESS NOTES
Occupational Therapy OT consult received, chart reviewed. Patient was seen this morning for OT evaluation. Recommend patient resume her outpatient PT upon discharge (for her shoulder), but no OT needs. Full evaluation note to follow.

## 2019-04-30 NOTE — PROGRESS NOTES
PCU SHIFT NURSING NOTE Bedside shift change report given to Benja Blancas  (oncoming nurse) by Rocco Suarez (offgoing nurse). Report included the following information SBAR, Kardex, Intake/Output, MAR, Recent Results and Cardiac Rhythm ST. Shift Summary:  
0045 Lactic acid and morning labs drawn and sent. 0130  Levophed gtt decreased from 4 mcg/min to 3 mcg/min. Continuing to monitor b/p closely. 0630  Unable to wean levophed further d/t labile b/p's overnight. B/p readings might be positional.   
 
0730  Bedside report given to Rocco Suarez. Admission Date 4/28/2019 Admission Diagnosis Severe sepsis ([facility] [address] [A41.9, R65.20] Hypotension [I95.9] Colitis presumed infectious [K52.9] Consults IP CONSULT TO GASTROENTEROLOGY Consults []PT []OT []Speech  
[]Case Management  
  
[] Palliative Cardiac Monitoring Order  
[x]Yes []No  
 
IV drips [x]Yes Drip:  Levophed          Dose: 4 mcg/min Drip:                            Dose: 
Drip:                            Dose:  
[]No  
 
GI Prophylaxis []Yes []No  
 
 
 
DVT Prophylaxis SCDs:     
     
 Hugo stockings:     
  
[] Medication []Contraindicated []None Activity Level Activity Level: Bed Rest   
    
Purposeful Rounding every 1-2 hour? [x]Yes Traore Score  Total Score: 3 Bed Alarm (If score 3 or >) [x]Yes  
[] Refused (See signed refusal form in chart) Martinez Score  Martinez Score: 16 Martinez Score (if score 14 or less) []PMT consult  
[]Wound Care consult []Specialty bed  
[] Nutrition consult Needs prior to discharge:  
Home O2 required:   
[]Yes  
[x]No  
 If yes, how much O2 required? Other:  
 Last Bowel Movement: Last Bowel Movement Date: 04/29/19 Influenza Vaccine Received Flu Vaccine for Current Season (usually Sept-March): Yes Pneumonia Vaccine Diet Active Orders Diet DIET CLEAR LIQUID  
  
LDAs Triple Lumen RIJ  04/28/19 Right Internal jugular (Active) Central Line Being Utilized Yes 4/29/2019 11:00 PM  
Criteria for Appropriate Use Irritant/vesicant medication 4/29/2019 11:00 PM  
Site Assessment Clean, dry, & intact 4/29/2019 11:00 PM  
Infiltration Assessment 0 4/29/2019 11:00 PM  
Affected Extremity/Extremities Color distal to insertion site pink (or appropriate for race) 4/29/2019 11:00 PM  
Date of Last Dressing Change 04/29/19 4/29/2019 11:00 PM  
Dressing Status Clean, dry, & intact 4/29/2019 11:00 PM  
Dressing Type Disk with Chlorhexadine gluconate (CHG) 4/29/2019 11:00 PM  
Positive Blood Return (Medial Site) Yes 4/29/2019  7:00 PM  
Positive Blood Return (Lateral Site) Yes 4/29/2019 11:00 PM  
Positive Blood Return (Site #3) Yes 4/29/2019 11:00 PM  
    
Peripheral IV 04/28/19 Right Antecubital (Active) Site Assessment Clean, dry, & intact 4/29/2019 11:00 PM  
Phlebitis Assessment 0 4/29/2019 11:00 PM  
Infiltration Assessment 0 4/29/2019 11:00 PM  
Dressing Status Clean, dry, & intact 4/29/2019 11:00 PM  
Dressing Type Tape;Transparent 4/29/2019 11:00 PM  
Hub Color/Line Status Pink;Flushed;Patent 4/29/2019 11:00 PM  
Action Taken Open ports on tubing capped 4/29/2019 11:00 PM  
   
Peripheral IV 04/28/19 Right;Upper Antecubital (Active) Site Assessment Clean, dry, & intact 4/29/2019 11:00 PM  
Phlebitis Assessment 0 4/29/2019 11:00 PM  
Infiltration Assessment 0 4/29/2019 11:00 PM  
Dressing Status Clean, dry, & intact 4/29/2019 11:00 PM  
Dressing Type Tape;Transparent 4/29/2019 11:00 PM  
Hub Color/Line Status Pink;Flushed;Patent 4/29/2019 11:00 PM  
Action Taken Open ports on tubing capped 4/29/2019 11:00 PM  
                  
Urinary Catheter Urinary Catheter 04/29/19 Tee-Indications for Use: Accurate measurement of urinary output Intake & Output Date 04/29/19 0700 - 04/30/19 5831 04/30/19 0700 - 05/01/19 3230 Shift 0700-1859 1900-0659 24 Hour Total 0700-1859 1900-0659 24 Hour Total  
INTAKE  
P.O.  200 200     
  P. O.  200 200 I.V.(mL/kg/hr) 250(0.3) 1094.3 1344. 3 Levophed Volume  94.3 94.3 Volume (0.9% sodium chloride infusion) 250 1000 1250 Shift Total(mL/kg) 250(3.9) 1294. 3(20) 1544. 3(23.8) OUTPUT Urine(mL/kg/hr) 980(1.3) 500 1480 Urine Output (mL) (Urinary Catheter 04/29/19 Tee)  Stool Stool Occurrence(s) 1 x  1 x Shift Total(mL/kg) 980(15.1) 500(7.7) [id number]) NET -730 794.3 64.3 Weight (kg) 64.8 64.8 64.8 64.8 64.8 64.8 Readmission Risk Assessment Tool Score Low Risk   
      
 0 Total Score Criteria that do not apply:  
 Has Seen PCP in Last 6 Months (Yes=3, No=0) . Living with Significant Other. Assisted Living. LTAC. SNF. or  
Rehab Patient Length of Stay (>5 days = 3) IP Visits Last 12 Months (1-3=4, 4=9, >4=11) Pt. Coverage (Medicare=5 , Medicaid, or Self-Pay=4) Charlson Comorbidity Score (Age + Comorbid Conditions) Expected Length of Stay - - - Actual Length of Stay 2

## 2019-04-30 NOTE — PROGRESS NOTES
Occupational Therapy EVALUATION/discharge Patient: Shane Diaz (12 y.o. female) Date: 4/30/2019 Primary Diagnosis: Severe sepsis ([facility] [address] [A41.9, R65.20] Hypotension [I95.9] Colitis presumed infectious [K52.9] Procedure(s) (LRB): 
COLONOSCOPY (N/A) Precautions:     
 
ASSESSMENT:  
Based on the objective data described below, the patient presents at an overall SBA to Independent level for basic self-care. At baseline, she uses a w/c for mobility, but is independent with transfers and ADL. Patient is functioning mostly at her baseline level, but supervision/assist is being provided mostly due to environmental limitations (She is 4'7\" and her feet don't hit the floor from the side of the bed like they do at home and she has a lopez and IV attached). No concerns for her safety or ability to perform ADL when she gets back home into her adapted environment. Recommended that she be up out of bed as much as possible, but to have someone stand by for transfers while here. She was getting outpatient ortho PT for her shoulder and is recommended to resume at discharge. Further skilled acute occupational therapy is not indicated at this time. Discharge Recommendations: None Further Equipment Recommendations for Discharge: None SUBJECTIVE:  
Patient stated I think I'll be fine.  OBJECTIVE DATA SUMMARY:  
HISTORY:  
Past Medical History:  
Diagnosis Date  Hypertension  Spina bifida ([facility] [address] Past Surgical History:  
Procedure Laterality Date  HX OTHER SURGICAL  1890s  
 sacral wound flap repair Prior Level of Function/Environment/Context: Independent in an accessible apartment; Patient drives Expanded or extensive additional review of patient history: h/o spina bifida Home Situation Home Environment: Apartment # Steps to Enter: 0 Wheelchair Ramp: No(flat entry into home) One/Two Story Residence: One story Living Alone: Yes Support Systems: Family member(s) Patient Expects to be [facility] Current [facility] Used/Available at Home: Wheelchair, Transfer bench, Grab bars Tub or Shower Type: Tub/Shower combination Hand dominance: Right EXAMINATION OF PERFORMANCE DEFICITS: 
Cognitive/Behavioral Status: 
Neurologic State: Alert Orientation Level: Oriented X4 Cognition: Appropriate decision making; Follows commands Perception: Appears intact Perseveration: No perseveration noted Safety/Judgement: Awareness of environment; Insight into deficits;Good awareness of safety precautions Hearing: Auditory Auditory Impairment: None Vision/Perceptual:   
    
    
    
  
    
Acuity: Within Defined Limits Corrective Lenses: Glasses Range of Motion: 
AROM: Generally decreased, functional 
  
  
  
  
  
  
  
Strength: 
Strength: Generally decreased, functional(UE's WFL. LE paralyzed) Coordination: 
  
Fine Motor Skills-Upper: Left Intact; Right Intact Gross Motor Skills-Upper: Left Intact; Right Intact Tone & Sensation: 
Sensation: (no sensation in B LE's) Balance: 
Sitting: Intact Standing: (patient does not stand) Functional Mobility and Transfers for ADLs:Bed Mobility: 
Supine to Sit: Supervision Sit to Supine: Supervision Scooting: Supervision Transfers: 
Bathroom Mobility: (uses w/c) Toilet Transfer : Stand-by assistance ADL Assessment: 
Feeding: Independent Oral Facial Hygiene/Grooming: Independent(seated) Bathing: Setup(seated or in bed) Upper Body Dressing: Modified independent Lower Body Dressing: Supervision Toileting: Stand by assistance ADL Intervention and task modifications: 
Discussed safety and fall prevention. Recommended that she have someone stand by for transfers, mostly due to height of the bed, IV, and lopez. Patient indicates that she was going to outpatient PT for her shoulder due to overuse injuries of her shoulders.   Discussed the possibility of a power wheelchair. Patient indicates she has been talking with her PT about pursuing in the near future. Cognitive Retraining Safety/Judgement: Awareness of environment; Insight into deficits;Good awareness of safety precautions Functional Measure: 
Barthel Index: 
 
Bathin Bladder: 0 Bowels: 5 Groomin Dressin Feeding: 10 Mobility: 5 Stairs: 0 Toilet Use: 5 Transfer (Bed to Chair and Back): 10 Total: 45/100 Percentage of impairment  
0% 1-19% 20-39% 40-59% 60-79% 80-99% 100% Barthel Score 0-100 100 99-80 79-60 59-40 20-39 1-19 
 0 The Barthel ADL Index: Guidelines 1. The index should be used as a record of what a patient does, not as a record of what a patient could do. 2. The main aim is to establish degree of independence from any help, physical or verbal, however minor and for whatever reason. 3. The need for supervision renders the patient not independent. 4. A patient's performance should be established using the best available evidence. Asking the patient, friends/relatives and nurses are the usual sources, but direct observation and common sense are also important. However direct testing is not needed. 5. Usually the patient's performance over the preceding 24-48 hours is important, but occasionally longer periods will be relevant. 6. Middle categories imply that the patient supplies over 50 per cent of the effort. 7. Use of aids to be independent is allowed. Deanna Bobby., Barthel, D.W. ([id number]). Functional evaluation: the Barthel Index. [address] [facility] (14)2. Bessie Watson slade JONATHAN Beckman, Enzo Lma., Ariana Hayes., [place], [facility] (). Measuring the change indisability after inpatient rehabilitation; comparison of the responsiveness of the Barthel Index and Functional [place] Measure. Journal of Neurology, Neurosurgery, and Psychiatry, 66(4), 634-999.  
DAIN CameronJ.LUZ ELENA, YEN Jovel, Angi Tobin MLeolaA. (2004.) Assessment of post-stroke quality of life in cost-effectiveness studies: The usefulness of the Barthel Index and the EuroQoL-5D. [facility], 13, [phone] Occupational Therapy Evaluation Charge Determination History Examination Decision-Making LOW Complexity : Brief history review  LOW Complexity : 1-3 performance deficits relating to physical, cognitive , or psychosocial skils that result in activity limitations and / or participation restrictions  MEDIUM Complexity : Patient may present with comorbidities that affect occupational performnce. Miniml to moderate modification of tasks or assistance (eg, physical or verbal ) with assesment(s) is necessary to enable patient to complete evaluation Based on the above components, the patient evaluation is determined to be of the following complexity level: LOW Pain: 
Pain Scale 1: Numeric (0 - 10) Pain Intensity 1: 0 Activity Tolerance:  
Good Please refer to the flowsheet for vital signs taken during this treatment. After treatment:  
[x]  Patient left in no apparent distress sitting up in wheelchair 
[]  Patient left in no apparent distress in bed 
[x]  Call bell left within reach [x]  Nursing notified 
[]  Caregiver present 
[]  Bed alarm activated COMMUNICATION/EDUCATION:  
Communication/Collaboration: 
[x]      Home safety education was provided and the patient/caregiver indicated understanding. [x]      Patient/family have participated as able and agree with findings and recommendations. []      Patient is unable to participate in plan of care at this time. Findings and recommendations were discussed with: Physical Therapist and Registered Nurse PADMAJA Mike/L Time Calculation: 28 mins

## 2019-04-30 NOTE — PROGRESS NOTES
GI Progress Note (Ev) NAME:Jessica Lindsey :1960 XQN:[id number] ATTG: Dr. Rafael Gonsalves PCP: Derrell Apgar, MD 
Date/Time:  2019 12:27 PM  
 
Reason for following: colitis Assessment: · Acute colitis, recurrent episodes though · Abnormal CT scan with left-sided colitis · Severe dehydration · Hypotension, now off pressors, spina bifida, associated neurogenic bladder Plan:  
· Reasonable to continue empiric abx for now · Clear liquid diet · Bowel prep this afternoon · NPO p MN 
· Diagnostic colonoscopy tomorrow, exclude inflammatory bowel disease Subjective:  
Discussed with RN events overnight. Weaned from pressors this AM. Feels much better. Still mucousy stool. Still some blood. Had a self-limited episode of emesis during a transfer earlier. Stool cultures negative. Review of Systems: 
Symptom Y/N Comments  Symptom Y/N Comments Fever/Chills n   Chest Pain n   
Cough n   Headaches n Sputum n   Joint Pain n   
SOB/ALVARADO n   Pruritis/Rash n Tolerating Diet y   Other Could NOT obtain due to:   
 
Objective: VITALS:  
Last 24hrs VS reviewed since prior progress note. Most recent are: 
Visit Vitals /51 (BP 1 Location: Left arm, BP Patient Position: Sitting) Pulse (!) 109 Temp 98.3 °F (36.8 °C) Resp 18 Ht 4' 7\" (1.397 m) Wt 64.8 kg (142 lb 13.7 oz) SpO2 96% BMI 33.20 kg/m² Intake/Output Summary (Last 24 hours) at 2019 1227 Last data filed at 2019 4968 Gross per 24 hour Intake 2773.89 ml Output 3180 ml Net -406.11 ml PHYSICAL EXAM: 
General: WD, WN. Alert, cooperative, no acute distress   
HEENT: NC, Atraumatic. Anicteric sclerae. Lungs:  CTA Bilaterally. No Wheezing/Rhonchi/Rales. Heart:  Regular  rhythm,  No murmur (), No Rubs, No Gallops Abdomen: Soft, Non distended, Non tender.  +Bowel sounds, no HSM Extremities: No c/c/e Neurologic:  Alert and oriented X 3. Psych:   Good insight. Not anxious nor agitated. Lab and Radiology Data Reviewed: (see below) Medications Reviewed: (see below) PMH/SH reviewed - no change compared to H&P 
________________________________________________________________________ Total time spent with patient: 15 minutes ________________________________________________________________________ Care Plan discussed with: 
Patient x Family RN x Consultant:    
 
Joshua Heck MD  
 
Procedures: see electronic medical records for all procedures/Xrays and details which were not copied into this note but were reviewed prior to creation of Plan. LABS: 
Recent Labs 04/30/19 
0059 04/29/19 
1800  04/29/19 
1130 WBC 5.3  --   --  10.0 HGB 9.7* 10.4*   < > 10.8* HCT 29.7* 32.1*   < > 32.5*  
  --   --  215  
 < > = values in this interval not displayed. Recent Labs 04/30/19 
2215 04/29/19 
4602 04/29/19 
4031 04/28/19 
2144   --  145 142  
K 3.4*  --  4.1 4.0  
*  --  118* 115* CO2 23  --  19* 19* BUN 3*  --  13 18 CREA 0.32*  --  0.46* 0.73 *  --  113* 112* CA 7.7*  --  6.7* 7.1*  
MG 1.8  --   --  2.2 PHOS 1.2* 3.0  --   --   
 
Recent Labs  
  04/29/19 
0514 04/28/19 
2144 SGOT 28 35 AP 46 60  
TP 5.2* 5.8* ALB 2.5* 3.0*  
GLOB 2.7 2.8 LPSE  --  123 No results for input(s): INR, PTP, APTT in the last 72 hours. No lab exists for component: INREXT Recent Labs 04/30/19 
0059 TIBC 216* PSAT 10* FERR 73 Lab Results Component Value Date/Time Folate 20.6 04/30/2019 12:59 AM  
 
No results for input(s): PH, PCO2, PO2 in the last 72 hours. No results for input(s): CPK, CKMB in the last 72 hours. No lab exists for component: TROPONINI Lab Results Component Value Date/Time  Color YELLOW 04/29/2019 12:29 AM  
 Appearance CLOUDY (A) 04/29/2019 12:29 AM  
 Specific gravity 1.025 04/29/2019 12:29 AM  
 pH (UA) 5.0 04/29/2019 12:29 AM  
 Protein 30 (A) 04/29/2019 12:29 AM  
 Glucose NEGATIVE  04/29/2019 12:29 AM  
 Ketone TRACE (A) 04/29/2019 12:29 AM  
 Urobilinogen 1.0 04/29/2019 12:29 AM  
 Nitrites POSITIVE (A) 04/29/2019 12:29 AM  
 Leukocyte Esterase SMALL (A) 04/29/2019 12:29 AM  
 Epithelial cells FEW 04/29/2019 12:29 AM  
 Bacteria 1+ (A) 04/29/2019 12:29 AM  
 WBC 0-4 04/29/2019 12:29 AM  
 RBC 0-5 04/29/2019 12:29 AM  
 
 
MEDICATIONS: 
Current Facility-Administered Medications Medication Dose Route Frequency  potassium phosphate 30 mmol in 0.9% sodium chloride 500 mL infusion   IntraVENous ONCE  
 metroNIDAZOLE (FLAGYL) IVPB premix 500 mg  500 mg IntraVENous Q8H  
 NOREPINephrine (LEVOPHED) 8 mg in dextrose 5% 250 mL infusion  2-16 mcg/min IntraVENous TITRATE  acetaminophen (TYLENOL) tablet 650 mg  650 mg Oral Q6H PRN  
 levoFLOXacin (LEVAQUIN) 750 mg in D5W IVPB  750 mg IntraVENous Q24H  
 0.9% sodium chloride infusion  125 mL/hr IntraVENous CONTINUOUS  
 sodium chloride (NS) flush 5-40 mL  5-40 mL IntraVENous Q8H  
 sodium chloride (NS) flush 5-40 mL  5-40 mL IntraVENous PRN  
 ondansetron (ZOFRAN) injection 4 mg  4 mg IntraVENous Q4H PRN  
 famotidine (PF) (PEPCID) 20 mg in sodium chloride 0.9% 10 mL injection  20 mg IntraVENous Q12H

## 2019-04-30 NOTE — PROGRESS NOTES
Problem: Risk for Spread of Infection Goal: Prevent transmission of infectious organism to others Description Prevent the transmission of infectious organisms to other patients, staff members, and visitors. Outcome: Progressing Towards Goal 
  
Problem: Patient Education:  Go to Education Activity Goal: Patient/Family Education Outcome: Progressing Towards Goal 
  
Problem: Pressure Injury - Risk of 
Goal: *Prevention of pressure injury Description Document Martinez Scale and appropriate interventions in the flowsheet. Outcome: Progressing Towards Goal 
  
Problem: Patient Education: Go to Patient Education Activity Goal: Patient/Family Education Outcome: Progressing Towards Goal 
  
Problem: Falls - Risk of 
Goal: *Absence of Falls Description Document Anaid Bryan Fall Risk and appropriate interventions in the flowsheet. Outcome: Progressing Towards Goal 
  
Problem: Patient Education: Go to Patient Education Activity Goal: Patient/Family Education Outcome: Progressing Towards Goal 
  
Problem: Sepsis: Day of Diagnosis Goal: *Pneumococcal immunization (if eligible) Outcome: Progressing Towards Goal 
Goal: *Influenza immunization (if eligible) Outcome: Progressing Towards Goal 
Goal: Activity/Safety Outcome: Progressing Towards Goal 
Goal: Consults, if ordered Outcome: Progressing Towards Goal 
Goal: Diagnostic Test/Procedures Outcome: Progressing Towards Goal 
Goal: Nutrition/Diet Outcome: Progressing Towards Goal 
Goal: Discharge Planning Outcome: Progressing Towards Goal 
Goal: Medications Outcome: Progressing Towards Goal 
Goal: Respiratory Outcome: Progressing Towards Goal 
Goal: Treatments/Interventions/Procedures Outcome: Progressing Towards Goal 
Goal: Psychosocial 
Outcome: Progressing Towards Goal

## 2019-04-30 NOTE — PROGRESS NOTES
Spiritual Care Assessment/Progress Note [address] 
 
 
NAME: Kassidy Friend      MRN: [id number] AGE: 63 y.o. SEX: female Christian Affiliation: Erin Askew Language: [place] 4/30/2019     Total Time (in minutes): 41 Spiritual Assessment begun in [facility] 2 PROGRESSIVE CARE through conversation with: 
  
    [x]Patient        [] Family    [] Friend(s) Reason for Consult: Advance medical directive consult Spiritual beliefs: (Please include comment if needed) [x] Identifies with a jess tradition:     
   [] Supported by a jess community:        
   [] Claims no spiritual orientation:       
   [] Seeking spiritual identity:            
   [] Adheres to an individual form of spirituality:       
   [] Not able to assess:                   
 
    
Identified resources for coping:  
   [] Prayer                           
   [] Music                  [] Guided Imagery [x] Family/friends                 [] Pet visits [] Devotional reading                         [] Unknown 
   [] Other:                                        
 
 
Interventions offered during this visit: (See comments for more details) Patient Interventions: Advance medical directive completed, Advance medical directive consult, Affirmation of emotions/emotional suffering, Initial/Spiritual assessment, patient floor, Normalization of emotional/spiritual concerns Family/Friend(s): Advance medical directive consult, Advance medical directive completed Plan of Care: 
 
 [x] Support spiritual and/or cultural needs  
 [] Support AMD and/or advance care planning process    
 [] Support grieving process 
 [] Coordinate Rites and/or Rituals  
 [] Coordination with community clergy [] No spiritual needs identified at this time 
 [] Detailed Plan of Care below (See Comments)  [] Make referral to Music Therapy 
[] Make referral to Pet Therapy    
[] Make referral to Addiction services [] Make referral to [facility] 
[] Make referral to Spiritual Care Partner 
[] No future visits requested       
[x] Follow up visits as needed Comments:  
Request by ALMAZ Wells, to assist with Advance Medical Directive (AMD) in Progressive Care. Consulted with patients nurse. Explained document to patient and patient's sister, who were present in the room. Assisted patient in completing the document. Made a copy for patients chart and handed it to Markel Chiu. Returned original document and another copy to the patient. [address]  Paging Service [phone])

## 2019-04-30 NOTE — PROGRESS NOTES
Problem: Mobility Impaired (Adult and Pediatric) Goal: *Acute Goals and Plan of Care (Insert Text) Description Physical Therapy Goals Initiated 4/30/2019 1. Patient will move from supine to sit and sit to supine  in bed with modified independence within 7 day(s). 2.  Patient will transfer from bed to chair and chair to bed with distant supervision/set-up using the least restrictive device within 7 day(s). Outcome: Progressing Towards Goal 
 PHYSICAL THERAPY EVALUATION Patient: Barbie Mustafa (30 y.o. female) Date: 4/30/2019 Primary Diagnosis: Severe sepsis ([facility] [address] [A41.9, R65.20] Hypotension [I95.9] Colitis presumed infectious [K52.9] Procedure(s) (LRB): 
COLONOSCOPY (N/A) Precautions: fall ASSESSMENT : 
Based on the objective data described below, the patient presents with slight decrease in functional mobility from baseline level of function. Prior to admit patient lives alone in a 1 level apartment with flat entry into apartment. Wheelchair bound at baseline and performs transfers and ADLs on her own. Works and drives. Patient currently needing supervision for transfer to w/c secondary to different surface than she is used to. Currently does not have cushion in w/c and would recommend she not sit too long because of this. Educated patient on alternative way to enter bed (T transfer) if she does not feel safe to pivot back to bed. Will follow up 1-2 more visits to ensure safety. Otherwise she is close to baseline and will likely not require any HH PT.  Suggest resumption of OP PT to address her shld pain. Patient will benefit from skilled intervention to address the above impairments. Patient?s rehabilitation potential is considered to be Good Factors which may influence rehabilitation potential include:  
? None noted ? Mental ability/status ? Medical condition ? Home/family situation and support systems ? Safety awareness ?         Pain tolerance/management 
? Other: PLAN : 
Recommendations and Planned Interventions: 
?           Bed Mobility Training             ? Neuromuscular Re-Education ? Transfer Training                   ? Orthotic/Prosthetic Training 
? Gait Training                         ? Modalities ? Therapeutic Exercises           ? Edema Management/Control ? Therapeutic Activities            ? Patient and Family Training/Education ? Other (comment): Frequency/Duration: Patient will be followed by physical therapy  2 times a week to address goals. Discharge Recommendations: Home Health Further Equipment Recommendations for Discharge: none SUBJECTIVE:  
Patient stated ? This is a little different way to transfer than I am used to.? OBJECTIVE DATA SUMMARY:  
HISTORY:   
Past Medical History:  
Diagnosis Date Hypertension Spina bifida ([facility] [address] Past Surgical History:  
Procedure Laterality Date HX OTHER SURGICAL  1890s  
 sacral wound flap repair Prior Level of Function/Home Situation: Independent with transfers and self care at home. Is non ambulatory secondary to spina bifida and uses a w/c. Works and drives. Personal factors and/or comorbidities impacting plan of care:  
 
Home Situation Home Environment: Apartment # Steps to Enter: 0 Wheelchair Ramp: No(flat entry into home) One/Two Story Residence: One story Living Alone: Yes Support Systems: Family member(s) Patient Expects to be Discharged to[de-identified] [id number] Current DME Used/Available at Home: Wheelchair, Transfer bench, Grab bars Tub or Shower Type: Tub/Shower combination EXAMINATION/PRESENTATION/DECISION MAKING:  
Critical Behavior: 
Neurologic State: Alert Orientation Level: Oriented X4 Cognition: Appropriate decision making, Appropriate for age attention/concentration, Appropriate safety awareness, Follows commands Hearing: Auditory Auditory Impairment: None Range Of Motion: 
AROM: Generally decreased, functional 
  
  
  
  
  
  
  
Strength:   
Strength: Generally decreased, functional(UE's WFL. LE paralyzed) Tone & Sensation:  
  
  
  
  
  
Sensation: (no sensation in B LE's) Coordination: 
  
Vision:  
  
Functional Mobility: 
Bed Mobility: 
  
Supine to Sit: Supervision Sit to Supine: Supervision Scooting: Supervision Transfers: 
  
  
     
  
Lateral Transfers: Supervision Balance:  
Sitting: Intact Standing: (patient does not stand) Ambulation/Gait Training: 
  
  
  
  
Gait Description (WDL): (non ambulatory at baseline) Pain: 
Pain Scale 1: Numeric (0 - 10) Pain Intensity 1: 0 Activity Tolerance: VSS-BP stable Please refer to the flowsheet for vital signs taken during this treatment. After treatment:  
?         Patient left in no apparent distress sitting up in chair ? Patient left in no apparent distress in bed 
? Call bell left within reach ? Nursing notified ? Caregiver present ? Bed alarm activated COMMUNICATION/EDUCATION:  
The patient?s plan of care was discussed with: Physical Therapist, Occupational Therapist and Registered Nurse. ?         Fall prevention education was provided and the patient/caregiver indicated understanding. ? Patient/family have participated as able in goal setting and plan of care. ?         Patient/family agree to work toward stated goals and plan of care. ?         Patient understands intent and goals of therapy, but is neutral about his/her participation. ? Patient is unable to participate in goal setting and plan of care. Thank you for this referral. 
Benjamin Up, PT, DPT Time Calculation: 28 mins

## 2019-05-01 ENCOUNTER — ANESTHESIA EVENT (OUTPATIENT)
Dept: ENDOSCOPY | Age: 59
DRG: 385 | End: 2019-05-01
Payer: COMMERCIAL

## 2019-05-01 ENCOUNTER — ANESTHESIA (OUTPATIENT)
Dept: ENDOSCOPY | Age: 59
DRG: 385 | End: 2019-05-01
Payer: COMMERCIAL

## 2019-05-01 LAB
ALBUMIN SERPL-MCNC: 2.4 G/DL (ref 3.5–5)
ALBUMIN/GLOB SERPL: 0.9 {RATIO} (ref 1.1–2.2)
ALP SERPL-CCNC: 42 U/L (ref 45–117)
ALT SERPL-CCNC: 26 U/L (ref 12–78)
ANION GAP SERPL CALC-SCNC: 7 MMOL/L (ref 5–15)
AST SERPL-CCNC: 13 U/L (ref 15–37)
BACTERIA SPEC CULT: NORMAL
BASOPHILS # BLD: 0 K/UL (ref 0–0.1)
BASOPHILS NFR BLD: 0 % (ref 0–1)
BILIRUB SERPL-MCNC: 0.6 MG/DL (ref 0.2–1)
BUN SERPL-MCNC: 2 MG/DL (ref 6–20)
BUN/CREAT SERPL: 7 (ref 12–20)
C JEJUNI+C COLI AG STL QL: NEGATIVE
CALCIUM SERPL-MCNC: 7.5 MG/DL (ref 8.5–10.1)
CHLORIDE SERPL-SCNC: 112 MMOL/L (ref 97–108)
CO2 SERPL-SCNC: 23 MMOL/L (ref 21–32)
CREAT SERPL-MCNC: 0.27 MG/DL (ref 0.55–1.02)
DIFFERENTIAL METHOD BLD: ABNORMAL
E COLI SXT1+2 STL IA: NEGATIVE
EOSINOPHIL # BLD: 0 K/UL (ref 0–0.4)
EOSINOPHIL NFR BLD: 1 % (ref 0–7)
ERYTHROCYTE [DISTWIDTH] IN BLOOD BY AUTOMATED COUNT: 12.8 % (ref 11.5–14.5)
GLOBULIN SER CALC-MCNC: 2.6 G/DL (ref 2–4)
GLUCOSE SERPL-MCNC: 92 MG/DL (ref 65–100)
HCT VFR BLD AUTO: 26.7 % (ref 35–47)
HGB BLD-MCNC: 8.7 G/DL (ref 11.5–16)
IMM GRANULOCYTES # BLD AUTO: 0 K/UL (ref 0–0.04)
IMM GRANULOCYTES NFR BLD AUTO: 1 % (ref 0–0.5)
LYMPHOCYTES # BLD: 1.1 K/UL (ref 0.8–3.5)
LYMPHOCYTES NFR BLD: 21 % (ref 12–49)
MCH RBC QN AUTO: 31 PG (ref 26–34)
MCHC RBC AUTO-ENTMCNC: 32.6 G/DL (ref 30–36.5)
MCV RBC AUTO: 95 FL (ref 80–99)
MONOCYTES # BLD: 0.5 K/UL (ref 0–1)
MONOCYTES NFR BLD: 9 % (ref 5–13)
NEUTS SEG # BLD: 3.6 K/UL (ref 1.8–8)
NEUTS SEG NFR BLD: 68 % (ref 32–75)
NRBC # BLD: 0 K/UL (ref 0–0.01)
NRBC BLD-RTO: 0 PER 100 WBC
PLATELET # BLD AUTO: 169 K/UL (ref 150–400)
PMV BLD AUTO: 9.6 FL (ref 8.9–12.9)
POTASSIUM SERPL-SCNC: 2.9 MMOL/L (ref 3.5–5.1)
PROT SERPL-MCNC: 5 G/DL (ref 6.4–8.2)
RBC # BLD AUTO: 2.81 M/UL (ref 3.8–5.2)
SERVICE CMNT-IMP: NORMAL
SODIUM SERPL-SCNC: 142 MMOL/L (ref 136–145)
T4 FREE SERPL-MCNC: 1 NG/DL (ref 0.8–1.5)
WBC # BLD AUTO: 5.3 K/UL (ref 3.6–11)

## 2019-05-01 PROCEDURE — 74011250636 HC RX REV CODE- 250/636: Performed by: INTERNAL MEDICINE

## 2019-05-01 PROCEDURE — 76040000007: Performed by: INTERNAL MEDICINE

## 2019-05-01 PROCEDURE — 77030021593 HC FCPS BIOP ENDOSC BSC -A: Performed by: INTERNAL MEDICINE

## 2019-05-01 PROCEDURE — 88305 TISSUE EXAM BY PATHOLOGIST: CPT

## 2019-05-01 PROCEDURE — 0DBM8ZX EXCISION OF DESCENDING COLON, VIA NATURAL OR ARTIFICIAL OPENING ENDOSCOPIC, DIAGNOSTIC: ICD-10-PCS | Performed by: INTERNAL MEDICINE

## 2019-05-01 PROCEDURE — 74011250637 HC RX REV CODE- 250/637: Performed by: EMERGENCY MEDICINE

## 2019-05-01 PROCEDURE — 0DBN8ZX EXCISION OF SIGMOID COLON, VIA NATURAL OR ARTIFICIAL OPENING ENDOSCOPIC, DIAGNOSTIC: ICD-10-PCS | Performed by: INTERNAL MEDICINE

## 2019-05-01 PROCEDURE — 84439 ASSAY OF FREE THYROXINE: CPT

## 2019-05-01 PROCEDURE — 65660000000 HC RM CCU STEPDOWN

## 2019-05-01 PROCEDURE — 74011250637 HC RX REV CODE- 250/637: Performed by: INTERNAL MEDICINE

## 2019-05-01 PROCEDURE — 36415 COLL VENOUS BLD VENIPUNCTURE: CPT

## 2019-05-01 PROCEDURE — 74011250636 HC RX REV CODE- 250/636

## 2019-05-01 PROCEDURE — 76060000032 HC ANESTHESIA 0.5 TO 1 HR: Performed by: INTERNAL MEDICINE

## 2019-05-01 PROCEDURE — 80053 COMPREHEN METABOLIC PANEL: CPT

## 2019-05-01 PROCEDURE — 74011000250 HC RX REV CODE- 250: Performed by: INTERNAL MEDICINE

## 2019-05-01 PROCEDURE — 0DBP8ZX EXCISION OF RECTUM, VIA NATURAL OR ARTIFICIAL OPENING ENDOSCOPIC, DIAGNOSTIC: ICD-10-PCS | Performed by: INTERNAL MEDICINE

## 2019-05-01 PROCEDURE — 85025 COMPLETE CBC W/AUTO DIFF WBC: CPT

## 2019-05-01 RX ORDER — NALOXONE HYDROCHLORIDE 0.4 MG/ML
0.4 INJECTION, SOLUTION INTRAMUSCULAR; INTRAVENOUS; SUBCUTANEOUS
Status: DISCONTINUED | OUTPATIENT
Start: 2019-05-01 | End: 2019-05-01 | Stop reason: HOSPADM

## 2019-05-01 RX ORDER — MAGNESIUM SULFATE HEPTAHYDRATE 40 MG/ML
2 INJECTION, SOLUTION INTRAVENOUS ONCE
Status: COMPLETED | OUTPATIENT
Start: 2019-05-01 | End: 2019-05-01

## 2019-05-01 RX ORDER — SODIUM CHLORIDE 0.9 % (FLUSH) 0.9 %
5-40 SYRINGE (ML) INJECTION AS NEEDED
Status: DISCONTINUED | OUTPATIENT
Start: 2019-05-01 | End: 2019-05-03 | Stop reason: HOSPADM

## 2019-05-01 RX ORDER — LIDOCAINE HYDROCHLORIDE 20 MG/ML
INJECTION, SOLUTION INFILTRATION; PERINEURAL AS NEEDED
Status: DISCONTINUED | OUTPATIENT
Start: 2019-05-01 | End: 2019-05-01 | Stop reason: HOSPADM

## 2019-05-01 RX ORDER — SODIUM CHLORIDE 0.9 % (FLUSH) 0.9 %
5-40 SYRINGE (ML) INJECTION EVERY 8 HOURS
Status: DISCONTINUED | OUTPATIENT
Start: 2019-05-01 | End: 2019-05-03 | Stop reason: HOSPADM

## 2019-05-01 RX ORDER — POTASSIUM CHLORIDE 14.9 MG/ML
10 INJECTION INTRAVENOUS
Status: COMPLETED | OUTPATIENT
Start: 2019-05-01 | End: 2019-05-01

## 2019-05-01 RX ORDER — ESMOLOL HYDROCHLORIDE 10 MG/ML
INJECTION INTRAVENOUS AS NEEDED
Status: DISCONTINUED | OUTPATIENT
Start: 2019-05-01 | End: 2019-05-01 | Stop reason: HOSPADM

## 2019-05-01 RX ORDER — METRONIDAZOLE 500 MG/100ML
500 INJECTION, SOLUTION INTRAVENOUS EVERY 12 HOURS
Status: DISCONTINUED | OUTPATIENT
Start: 2019-05-01 | End: 2019-05-02

## 2019-05-01 RX ORDER — PROPOFOL 10 MG/ML
INJECTION, EMULSION INTRAVENOUS AS NEEDED
Status: DISCONTINUED | OUTPATIENT
Start: 2019-05-01 | End: 2019-05-01 | Stop reason: HOSPADM

## 2019-05-01 RX ORDER — DEXTROMETHORPHAN/PSEUDOEPHED 2.5-7.5/.8
1.2 DROPS ORAL
Status: DISCONTINUED | OUTPATIENT
Start: 2019-05-01 | End: 2019-05-01 | Stop reason: HOSPADM

## 2019-05-01 RX ORDER — EPINEPHRINE 0.1 MG/ML
1 INJECTION INTRACARDIAC; INTRAVENOUS
Status: DISCONTINUED | OUTPATIENT
Start: 2019-05-01 | End: 2019-05-01 | Stop reason: HOSPADM

## 2019-05-01 RX ORDER — FLUMAZENIL 0.1 MG/ML
0.2 INJECTION INTRAVENOUS
Status: DISCONTINUED | OUTPATIENT
Start: 2019-05-01 | End: 2019-05-01 | Stop reason: HOSPADM

## 2019-05-01 RX ORDER — ATROPINE SULFATE 0.1 MG/ML
0.5 INJECTION INTRAVENOUS
Status: DISCONTINUED | OUTPATIENT
Start: 2019-05-01 | End: 2019-05-01 | Stop reason: HOSPADM

## 2019-05-01 RX ORDER — SODIUM CHLORIDE 9 MG/ML
25 INJECTION, SOLUTION INTRAVENOUS CONTINUOUS
Status: DISCONTINUED | OUTPATIENT
Start: 2019-05-01 | End: 2019-05-01

## 2019-05-01 RX ADMIN — Medication 10 ML: at 22:01

## 2019-05-01 RX ADMIN — METRONIDAZOLE 500 MG: 500 INJECTION, SOLUTION INTRAVENOUS at 08:21

## 2019-05-01 RX ADMIN — MESALAMINE 1000 MG: 250 CAPSULE ORAL at 12:33

## 2019-05-01 RX ADMIN — POTASSIUM CHLORIDE 10 MEQ: 200 INJECTION, SOLUTION INTRAVENOUS at 10:05

## 2019-05-01 RX ADMIN — METRONIDAZOLE 500 MG: 500 INJECTION, SOLUTION INTRAVENOUS at 00:04

## 2019-05-01 RX ADMIN — FAMOTIDINE 20 MG: 10 INJECTION, SOLUTION INTRAVENOUS at 08:22

## 2019-05-01 RX ADMIN — PROPOFOL 25 MG: 10 INJECTION, EMULSION INTRAVENOUS at 10:38

## 2019-05-01 RX ADMIN — PROPOFOL 25 MG: 10 INJECTION, EMULSION INTRAVENOUS at 10:33

## 2019-05-01 RX ADMIN — LEVOFLOXACIN 750 MG: 5 INJECTION, SOLUTION INTRAVENOUS at 22:00

## 2019-05-01 RX ADMIN — METHYLPREDNISOLONE SODIUM SUCCINATE 40 MG: 40 INJECTION, POWDER, FOR SOLUTION INTRAMUSCULAR; INTRAVENOUS at 11:46

## 2019-05-01 RX ADMIN — PROPOFOL 25 MG: 10 INJECTION, EMULSION INTRAVENOUS at 10:40

## 2019-05-01 RX ADMIN — Medication 30 ML: at 03:03

## 2019-05-01 RX ADMIN — ACETAMINOPHEN 650 MG: 325 TABLET ORAL at 03:03

## 2019-05-01 RX ADMIN — PROPOFOL 25 MG: 10 INJECTION, EMULSION INTRAVENOUS at 10:46

## 2019-05-01 RX ADMIN — MESALAMINE 1000 MG: 250 CAPSULE ORAL at 22:00

## 2019-05-01 RX ADMIN — CYANOCOBALAMIN 1000 MCG: 1000 INJECTION, SOLUTION INTRAMUSCULAR; SUBCUTANEOUS at 20:41

## 2019-05-01 RX ADMIN — METRONIDAZOLE 500 MG: 500 INJECTION, SOLUTION INTRAVENOUS at 20:41

## 2019-05-01 RX ADMIN — LIDOCAINE HYDROCHLORIDE 40 MG: 20 INJECTION, SOLUTION INFILTRATION; PERINEURAL at 10:29

## 2019-05-01 RX ADMIN — Medication 30 ML: at 05:15

## 2019-05-01 RX ADMIN — MAGNESIUM SULFATE HEPTAHYDRATE 2 G: 40 INJECTION, SOLUTION INTRAVENOUS at 06:07

## 2019-05-01 RX ADMIN — PROPOFOL 50 MG: 10 INJECTION, EMULSION INTRAVENOUS at 10:29

## 2019-05-01 RX ADMIN — PROPOFOL 25 MG: 10 INJECTION, EMULSION INTRAVENOUS at 10:35

## 2019-05-01 RX ADMIN — METHYLPREDNISOLONE SODIUM SUCCINATE 40 MG: 40 INJECTION, POWDER, FOR SOLUTION INTRAMUSCULAR; INTRAVENOUS at 20:39

## 2019-05-01 RX ADMIN — ESMOLOL HYDROCHLORIDE 25 MG: 10 INJECTION INTRAVENOUS at 10:53

## 2019-05-01 RX ADMIN — SODIUM CHLORIDE 125 ML/HR: 900 INJECTION, SOLUTION INTRAVENOUS at 10:23

## 2019-05-01 RX ADMIN — POTASSIUM CHLORIDE 10 MEQ: 200 INJECTION, SOLUTION INTRAVENOUS at 07:09

## 2019-05-01 RX ADMIN — FAMOTIDINE 20 MG: 10 INJECTION, SOLUTION INTRAVENOUS at 20:37

## 2019-05-01 RX ADMIN — MESALAMINE 1000 MG: 250 CAPSULE ORAL at 17:23

## 2019-05-01 RX ADMIN — POTASSIUM CHLORIDE 10 MEQ: 200 INJECTION, SOLUTION INTRAVENOUS at 08:22

## 2019-05-01 RX ADMIN — ACETAMINOPHEN 650 MG: 325 TABLET ORAL at 12:06

## 2019-05-01 RX ADMIN — ESMOLOL HYDROCHLORIDE 25 MG: 10 INJECTION INTRAVENOUS at 10:47

## 2019-05-01 RX ADMIN — SIMETHICONE 80 MG: 20 SUSPENSION/ DROPS ORAL at 10:44

## 2019-05-01 RX ADMIN — POTASSIUM CHLORIDE 10 MEQ: 200 INJECTION, SOLUTION INTRAVENOUS at 08:55

## 2019-05-01 RX ADMIN — SODIUM CHLORIDE 125 ML/HR: 900 INJECTION, SOLUTION INTRAVENOUS at 00:03

## 2019-05-01 RX ADMIN — PROPOFOL 25 MG: 10 INJECTION, EMULSION INTRAVENOUS at 10:43

## 2019-05-01 RX ADMIN — SALINE NASAL SPRAY 2 SPRAY: 1.5 SOLUTION NASAL at 01:45

## 2019-05-01 RX ADMIN — Medication 40 ML: at 11:49

## 2019-05-01 RX ADMIN — Medication 10 ML: at 06:09

## 2019-05-01 NOTE — PERIOP NOTES
TRANSFER - OUT REPORT: 
 
Verbal report given to Qasim Parker RN(name) on [employer]  being transferred to [facility](unit) for routine progression of care Report consisted of patients Situation, Background, Assessment and  
Recommendations(SBAR). Information from the following report(s) SBAR was reviewed with the receiving nurse. Lines:  
Triple Lumen RIJ  04/28/19 Right Internal jugular (Active) Central Line Being Utilized Yes 5/1/2019  9:00 AM  
Criteria for Appropriate Use Limited/no vessel suitable for conventional peripheral access 5/1/2019  9:00 AM  
Site Assessment Clean, dry, & intact 5/1/2019  9:00 AM  
Infiltration Assessment 0 5/1/2019  9:00 AM  
Affected Extremity/Extremities Color distal to insertion site pink (or appropriate for race) 5/1/2019  9:00 AM  
Date of Last Dressing Change 05/01/19 5/1/2019  9:00 AM  
Dressing Status Clean, dry, & intact 5/1/2019  9:00 AM  
Dressing Type Disk with Chlorhexadine gluconate (CHG) 5/1/2019  9:00 AM  
Action Taken Dressing changed 5/1/2019  7:09 AM  
Proximal Hub Color/Line Status Flushed 5/1/2019  9:00 AM  
Positive Blood Return (Medial Site) Yes 5/1/2019  3:15 AM  
Medial Hub Color/Line Status Flushed; Infusing 5/1/2019  9:00 AM  
Positive Blood Return (Lateral Site) Yes 4/30/2019  9:08 PM  
Distal Hub Color/Line Status Brown;Capped 5/1/2019  3:15 AM  
Positive Blood Return (Site #3) Yes 4/30/2019  9:08 PM  
Alcohol Cap Used Yes 5/1/2019  9:00 AM  
   
Peripheral IV 04/28/19 Right;Upper Antecubital (Active) Site Assessment Clean, dry, & intact 5/1/2019  9:00 AM  
Phlebitis Assessment 0 5/1/2019  9:00 AM  
Infiltration Assessment 0 5/1/2019  9:00 AM  
Dressing Status Clean, dry, & intact 5/1/2019  9:00 AM  
Dressing Type Transparent;Tape 5/1/2019  3:15 AM  
Hub Color/Line Status Pink;Capped 5/1/2019  3:15 AM  
Action Taken Open ports on tubing capped 4/30/2019  3:10 AM  
Alcohol Cap Used Yes 5/1/2019  9:00 AM  
  
 
 Opportunity for questions and clarification was provided.

## 2019-05-01 NOTE — PROGRESS NOTES
Hospitalist Progress Note NAME: Mackenzie Gomez :  1960 MRN:  [id number] Assessment / Plan: Hypovolemic Shock: resolved; Septic shock ruled out by clinical course Acute colitis: felt to be Ulcerative Colitis after Colonoscopy on  Hematochezia (History of 2 bouts of similar symptoms in the past, no colonoscopy) Anemia, acute blood loss anemia seems less certain because of Hgb trends->anemia work-up reveals Iron deficiency as well as Vitamin B12 deficiency 
-discontinue IVF's, patient on  
-Levophed weaned to off on am of  
-continue on Levaquin and Flagyl, empiric at this time, will likely discontinue once pathology available 
-C. Diff ruled out 
-continue to monitor Hgb, current down-trend may be in part dilutional 
 
Low Vitamin B12 levels 
-continue IM B12 supplementation since B12 level is 170 Low TSH: 
-Free T4 normal, Sich Euthyroid History of hypertension 
-continue to hold PTA ACEI 
  
Hyperlipidemia 
-will resume home statin on discharge Neurogenic bladder Secondary to spina bifida 
-Patient does in and out cath at home 
-UCx negative 
-will leave lopez at this time, likely discontinue prior to discharge 
  
History of spina bifida 
-Patient is normally independent she has no sensation in her legs but is able to transfer on her own to a manual wheelchair and does all her ADLs.   
Hypophosphatemia: 
Hypokalemia: 
Hypomagnesemia: 
-replete electrolytes and monitor Obesity: Body mass index is 33.2 kg/m². Code Status: Full Surrogate Decision Maker: sister Vanessa Neumann # [phone] 
  
DVT Prophylaxis: SCDs GI Prophylaxis: IV pepcid for now 
  
Baseline: Pt lives alone after recent death of spouse 60 days ago, independent with ADLs, uses wheelchair 
  
Recommended Disposition: TBD Subjective: Chief Complaint / Reason for Physician Visit: follow-up hypotension Patient is seen for follow-up post colonoscopy Has HA No abdominal pain No bleeding Review of Systems: 
Symptom Y/N Comments  Symptom Y/N Comments Fever/Chills n   Chest Pain n   
Poor Appetite n   Edema Cough    Abdominal Pain n   
Sputum    Joint Pain SOB/ALVARADO n   Pruritis/Rash Nausea/vomit    Tolerating PT/OT Diarrhea    Tolerating Diet Constipation    Other Could NOT obtain due to:   
 
Objective: VITALS:  
Last 24hrs VS reviewed since prior progress note. Most recent are: 
Patient Vitals for the past 24 hrs: 
 Temp Pulse Resp BP SpO2  
05/01/19 1141 98.1 °F (36.7 °C) 90 16 118/53 100 % 05/01/19 1122  80 16 101/56 99 % 05/01/19 1117  90 20 107/55 95 % 05/01/19 1111  98 18 114/73 100 % 05/01/19 1109 97.5 °F (36.4 °C) 97 16 110/69 100 % 05/01/19 1054  (!) 105 22 114/90 97 % 05/01/19 1024  90 19 105/55 99 % 05/01/19 0824 98.3 °F (36.8 °C) 96 14 100/43 98 % 05/01/19 0620  92 15  96 % 05/01/19 0504  88 17  97 % 05/01/19 0315 97.4 °F (36.3 °C) 100 15 104/50 99 % 05/01/19 0203  87   97 % 05/01/19 0102  83 14  98 % 05/01/19 0016  93 14  100 % 04/30/19 2346 98.3 °F (36.8 °C) (!) 104 19 106/48 99 % 04/30/19 2305  97 14  96 % 04/30/19 2227  90 16  100 % 04/30/19 2108  100  112/52   
04/30/19 2045  (!) 102 21  100 % 04/30/19 1943 98.1 °F (36.7 °C) (!) 101 17 104/52 100 % 04/30/19 1436 98.1 °F (36.7 °C) 94 16 114/59 100 % 04/30/19 1359  99 20 114/59 100 % 04/30/19 1259  (!) 108 17 110/51 100 % 04/30/19 1159  (!) 109 (!) 32 112/51 96 % 04/30/19 1155  (!) 110  94/51 95 % Intake/Output Summary (Last 24 hours) at 5/1/2019 1144 Last data filed at 5/1/2019 1055 Gross per 24 hour Intake 5374.31 ml Output 2100 ml Net 3274.31 ml PHYSICAL EXAM: 
General: WD, WN. Alert, cooperative, no acute distress   
EENT:  EOMI. Anicteric sclerae. MM dry Resp:  CTA bilaterally, no wheezing or rales. No accessory muscle use CV:  Regular  rhythm,  No edema GI:  Soft, Non distended, No significant tenderness.  +Bowel sounds Neurologic:  Alert and oriented X 3, normal speech, Psych:   Good insight. Not anxious nor agitated Skin:  No rashes. No jaundice Reviewed most current lab test results and cultures  YES Reviewed most current radiology test results   YES Review and summation of old records today    NO Reviewed patient's current orders and MAR    YES 
PMH/SH reviewed - no change compared to H&P 
________________________________________________________________________ Care Plan discussed with: 
  Comments Patient x Family  x sister RN x Care Manager Consultant Multidiciplinary team rounds were held today with , nursing, pharmacist and clinical coordinator. Patient's plan of care was discussed; medications were reviewed and discharge planning was addressed. ________________________________________________________________________ Total NON critical care TIME:  35   Minutes Total CRITICAL CARE TIME Spent:   Minutes non procedure based Comments >50% of visit spent in counseling and coordination of care x   
________________________________________________________________________ Aye Tobias MD  
 
Procedures: see electronic medical records for all procedures/Xrays and details which were not copied into this note but were reviewed prior to creation of Plan. LABS: 
I reviewed today's most current labs and imaging studies. Pertinent labs include: 
Recent Labs 05/01/19 
0301 04/30/19 
0059 04/29/19 
1800  04/29/19 
7547 WBC 5.3 5.3  --   --  10.0 HGB 8.7* 9.7* 10.4*   < > 10.8* HCT 26.7* 29.7* 32.1*   < > 32.5*  
 193  --   --  215  
 < > = values in this interval not displayed. Recent Labs 05/01/19 
0301 04/30/19 
6194 04/29/19 
6998 04/29/19 
8452 04/28/19 
2144  143  --  145 142  
K 2.9* 3.4*  --  4.1 4.0  
* 115*  --  118* 115* CO2 23 23  --  19* 19* GLU 92 104*  --  113* 112* BUN 2* 3*  --  13 18 CREA 0.27* 0.32*  --  0.46* 0.73 CA 7.5* 7.7*  --  6.7* 7.1*  
MG  --  1.8  --   --  2.2 PHOS  --  1.2* 3.0  --   --   
ALB 2.4*  --   --  2.5* 3.0* TBILI 0.6  --   --  0.7 1.3*  
SGOT 13*  --   --  28 35 ALT 26  --   --  51 64 Signed: Cam Barry MD

## 2019-05-01 NOTE — ROUTINE PROCESS
Billy Mercedes 1960 
[id number] Situation: 
Verbal report received from: CHARLEE Bernardo RN Procedure: Procedure(s): 
COLONOSCOPY 
COLON BIOPSY Background: 
 
Preoperative diagnosis: Colitis [K52.9] Abnormal CT scan, gastrointestinal tract [R93.3] Postoperative diagnosis: Left-sided colitis :  Dr. Brenden Medrano Assistant(s): Endoscopy Technician-1: Eleonore Laser Endoscopy RN-1: Trinh Carl RN Specimens:  
ID Type Source Tests Collected by Time Destination 1 : Random colon biopsy Preservative   Mandy Gottron, MD 5/1/2019 1041 Pathology 2 : Splenic flexure ulcer biopsy Taylerative   Mandy Gottron, MD 5/1/2019 1046 Pathology 3 : Left colon biopsy Preskevinative   Mandy Gottron, MD 5/1/2019 1048 Pathology 4 : Rectal ulcer biopsy Preskevinative   Mandy Gottron, MD 5/1/2019 1050 Pathology H. Pylori Assessment: Anesthesia gave intra-procedure sedation and medications, see anesthesia flow sheet Intravenous fluids: NS@ [place] Vital signs stable Abdominal assessment: round and soft Recommendation:. Return to floor 35 23 07 Family or Friend Permission to share finding with family or friend

## 2019-05-01 NOTE — H&P
Date of Surgery Update: 
Christiano Michael was seen and examined. History and physical has been reviewed. The patient has been examined.  There have been no significant clinical changes since the completion of the originally dated History and Physical. 
 
Signed By: Jacek Hayden MD   
 May 1, 2019 10:27 AM

## 2019-05-01 NOTE — PERIOP NOTES
Anesthesia reports 200mg Propofol, 40mg Lidocaine 50 Mg Versedand 150mL NS given during procedure. Received report from anesthesia staff on vital signs and status of patient.

## 2019-05-01 NOTE — ANESTHESIA PREPROCEDURE EVALUATION
Relevant Problems No relevant active problems Anesthetic History No history of anesthetic complications Review of Systems / Medical History Patient summary reviewed, nursing notes reviewed and pertinent labs reviewed Pulmonary Within defined limits Neuro/Psych Within defined limits Cardiovascular Hypertension Exercise tolerance: >4 METS 
  
GI/Hepatic/Renal 
Within defined limits Endo/Other Obesity Other Findings Comments: Colitis Sepsis Hypokalemia, received 30 mEq today Spina bifida Physical Exam 
 
Airway Mallampati: II 
TM Distance: 4 - 6 cm Neck ROM: normal range of motion Mouth opening: Normal 
 
 Cardiovascular Regular rate and rhythm,  S1 and S2 normal,  no murmur, click, rub, or gallop Dental 
No notable dental hx Pulmonary Breath sounds clear to auscultation Abdominal 
GI exam deferred Other Findings Anesthetic Plan ASA: 3 Anesthesia type: total IV anesthesia and MAC Induction: Intravenous Anesthetic plan and risks discussed with: Patient

## 2019-05-01 NOTE — PROCEDURES
NAME:  Leslie Gonzalez   :   1960   MRN:   [id number]     Date/Time:  2019 10:27 AM    Colonoscopy Operative Report    Procedure Type:  Colonoscopy with biopsy     Indications: colitis on CT scan, suspect possible IBD  Pre-operative Diagnosis: see indication above  Post-operative Diagnosis:  See findings below  :  Mendez Cervantes MD  Referring Provider: Gregory Benson MD    Exam:  Airway: clear, no airway problems anticipated  Heart: RRR, without gallops or rubs  Lungs: clear bilaterally without wheezes, crackles, or rhonchi  Abdomen: soft, nontender, nondistended, bowel sounds present  Mental Status: awake, alert and oriented to person, place and time    Sedation:  MAC anesthesia Propofol  Procedure Details:  After informed consent was obtained with all risks and benefits of procedure explained and preoperative exam completed, the patient was taken to the endoscopy suite and placed in the left lateral decubitus position. Upon sequential sedation as per above, a digital rectal exam was performed demonstrating internal hemorrhoids. The Olympus videocolonoscope  was inserted in the rectum and carefully advanced to the cecum, which was identified by the ileocecal valve and appendiceal orifice. The quality of preparation was poor. The colonoscope was slowly withdrawn with careful evaluation between folds. Retroflexion in the rectum was completed demonstrating internal hemorrhoids. Findings:   ANUS: Anal exam reveals no masses but prolapsed internal hemorrhoids, sphincter tone is lax. RECTUM: severe ulcerations, contact friability of erythematous mucosa with mucosal microabscesses. Biopsied. SIGMOID COLON: contact friability of erythematous mucosa with mucosal microabscesses. Biopsied. DESCENDING COLON: contact friability of erythematous mucosa with mucosal microabscesses. Biopsied.   TRANSVERSE COLON: The mucosa is normal with good vascular pattern and without ulcers, diverticula, and polyps within limitations of poor bowel prep. ASCENDING COLON: The mucosa is normal with good vascular pattern and without ulcers, diverticula, and polyps within limitations of poor bowel prep. CECUM: The appendiceal orifice appears normal. The ileocecal valve appears normal.   TERMINAL ILEUM: The terminal ileum was not entered. Specimen Removed:  Random colon, left colon, rectal ulcer  Complications:  None. EBL:     None.     Impression:  -- ulceration, mucosal friability and hemorrhage in the left colon, from rectum to splenic flexure, biopsied; appearance most consistent with left-sided ulcerative colitis  -- prolapsed internal hemorrhoids  -- lax rectal tone  -- sub-optimal bowel preparation    Recommendations:   -- await pathology results  -- add methylprednisolone  -- add mesalamine daily  -- follow response  -- resume diet    Discharge Disposition:  Back to wards      Osbaldo Llamas MD

## 2019-05-01 NOTE — PROGRESS NOTES
Problem: Pressure Injury - Risk of 
Goal: *Prevention of pressure injury Description Document Martinez Scale and appropriate interventions in the flowsheet. Outcome: Progressing Towards Goal 
  
Problem: Patient Education: Go to Patient Education Activity Goal: Patient/Family Education Outcome: Progressing Towards Goal 
  
Problem: Falls - Risk of 
Goal: *Absence of Falls Description Document Howard Christopher Fall Risk and appropriate interventions in the flowsheet. Outcome: Progressing Towards Goal 
  
Problem: Sepsis: Day of Diagnosis Goal: Activity/Safety Outcome: Progressing Towards Goal 
  
Problem: Sepsis: Day of Diagnosis Goal: Respiratory Outcome: Progressing Towards Goal 
  
Problem: Risk for Spread of Infection Goal: Prevent transmission of infectious organism to others Description Prevent the transmission of infectious organisms to other patients, staff members, and visitors. Outcome: Resolved/Met Problem: Patient Education:  Go to Education Activity Goal: Patient/Family Education Outcome: Resolved/Met

## 2019-05-01 NOTE — PROGRESS NOTES
Bedside and Verbal shift change report given to Madhuri Amos (oncoming nurse) by Yeison Hooks (offgoing nurse). Report included the following information SBAR, ED Summary, Intake/Output, MAR, Recent Results and Cardiac Rhythm sinus tachy. 2200 Pt completed consuming bowel prep, still have liquid brown tinged bowel movements. 0006 transfer pt to new bed, with working scale. Pt complains of nasal congestion, RN contacted MD, orders 0613 consent signed for colonoscopy, pt continues to have small clearing liquid stools 0530 Pt K 2.9 on this mornings labs, contacted on call MD to update on pt status. MD placed new orders. 0700 Bedside and Verbal shift change report given to Yeison Hooks (oncoming nurse) by Madhuri Amos (offgoing nurse). Report included the following information SBAR, Intake/Output, MAR, Recent Results and Cardiac Rhythm sinus, sinus tachy.

## 2019-05-01 NOTE — ROUTINE PROCESS
0845  Report to endo 10:17 AM 
 
Transport to [facility] for colonoscopy, tele aware,

## 2019-05-02 LAB
ALBUMIN SERPL-MCNC: 2.7 G/DL (ref 3.5–5)
ALBUMIN/GLOB SERPL: 1 {RATIO} (ref 1.1–2.2)
ALP SERPL-CCNC: 48 U/L (ref 45–117)
ALT SERPL-CCNC: 27 U/L (ref 12–78)
ANION GAP SERPL CALC-SCNC: 7 MMOL/L (ref 5–15)
AST SERPL-CCNC: 12 U/L (ref 15–37)
BASOPHILS # BLD: 0 K/UL (ref 0–0.1)
BASOPHILS NFR BLD: 0 % (ref 0–1)
BILIRUB SERPL-MCNC: 0.3 MG/DL (ref 0.2–1)
BUN SERPL-MCNC: 5 MG/DL (ref 6–20)
BUN/CREAT SERPL: 12 (ref 12–20)
CALCIUM SERPL-MCNC: 8 MG/DL (ref 8.5–10.1)
CHLORIDE SERPL-SCNC: 113 MMOL/L (ref 97–108)
CO2 SERPL-SCNC: 23 MMOL/L (ref 21–32)
CREAT SERPL-MCNC: 0.42 MG/DL (ref 0.55–1.02)
DIFFERENTIAL METHOD BLD: ABNORMAL
EOSINOPHIL # BLD: 0 K/UL (ref 0–0.4)
EOSINOPHIL NFR BLD: 0 % (ref 0–7)
ERYTHROCYTE [DISTWIDTH] IN BLOOD BY AUTOMATED COUNT: 12.6 % (ref 11.5–14.5)
GLOBULIN SER CALC-MCNC: 2.7 G/DL (ref 2–4)
GLUCOSE SERPL-MCNC: 188 MG/DL (ref 65–100)
HCT VFR BLD AUTO: 27.5 % (ref 35–47)
HGB BLD-MCNC: 9.2 G/DL (ref 11.5–16)
IMM GRANULOCYTES # BLD AUTO: 0.1 K/UL (ref 0–0.04)
IMM GRANULOCYTES NFR BLD AUTO: 2 % (ref 0–0.5)
LYMPHOCYTES # BLD: 0.5 K/UL (ref 0.8–3.5)
LYMPHOCYTES NFR BLD: 11 % (ref 12–49)
MAGNESIUM SERPL-MCNC: 2.2 MG/DL (ref 1.6–2.4)
MCH RBC QN AUTO: 31 PG (ref 26–34)
MCHC RBC AUTO-ENTMCNC: 33.5 G/DL (ref 30–36.5)
MCV RBC AUTO: 92.6 FL (ref 80–99)
MONOCYTES # BLD: 0.1 K/UL (ref 0–1)
MONOCYTES NFR BLD: 3 % (ref 5–13)
NEUTS SEG # BLD: 3.8 K/UL (ref 1.8–8)
NEUTS SEG NFR BLD: 85 % (ref 32–75)
NRBC # BLD: 0 K/UL (ref 0–0.01)
NRBC BLD-RTO: 0 PER 100 WBC
PHOSPHATE SERPL-MCNC: 1.6 MG/DL (ref 2.6–4.7)
PLATELET # BLD AUTO: 202 K/UL (ref 150–400)
PMV BLD AUTO: 9.9 FL (ref 8.9–12.9)
POTASSIUM SERPL-SCNC: 3.5 MMOL/L (ref 3.5–5.1)
PROT SERPL-MCNC: 5.4 G/DL (ref 6.4–8.2)
RBC # BLD AUTO: 2.97 M/UL (ref 3.8–5.2)
SODIUM SERPL-SCNC: 143 MMOL/L (ref 136–145)
WBC # BLD AUTO: 4.4 K/UL (ref 3.6–11)

## 2019-05-02 PROCEDURE — 74011250636 HC RX REV CODE- 250/636: Performed by: INTERNAL MEDICINE

## 2019-05-02 PROCEDURE — 74011250637 HC RX REV CODE- 250/637: Performed by: INTERNAL MEDICINE

## 2019-05-02 PROCEDURE — 65660000000 HC RM CCU STEPDOWN

## 2019-05-02 PROCEDURE — 85025 COMPLETE CBC W/AUTO DIFF WBC: CPT

## 2019-05-02 PROCEDURE — 74011000250 HC RX REV CODE- 250: Performed by: INTERNAL MEDICINE

## 2019-05-02 PROCEDURE — 97530 THERAPEUTIC ACTIVITIES: CPT | Performed by: PHYSICAL THERAPIST

## 2019-05-02 PROCEDURE — 83735 ASSAY OF MAGNESIUM: CPT

## 2019-05-02 PROCEDURE — 36415 COLL VENOUS BLD VENIPUNCTURE: CPT

## 2019-05-02 PROCEDURE — 84100 ASSAY OF PHOSPHORUS: CPT

## 2019-05-02 PROCEDURE — 80053 COMPREHEN METABOLIC PANEL: CPT

## 2019-05-02 RX ORDER — PREDNISONE 20 MG/1
40 TABLET ORAL
Status: DISCONTINUED | OUTPATIENT
Start: 2019-05-03 | End: 2019-05-03 | Stop reason: HOSPADM

## 2019-05-02 RX ADMIN — MESALAMINE 1000 MG: 250 CAPSULE ORAL at 21:05

## 2019-05-02 RX ADMIN — Medication 10 ML: at 06:36

## 2019-05-02 RX ADMIN — METHYLPREDNISOLONE SODIUM SUCCINATE 40 MG: 40 INJECTION, POWDER, FOR SOLUTION INTRAMUSCULAR; INTRAVENOUS at 09:37

## 2019-05-02 RX ADMIN — CYANOCOBALAMIN 1000 MCG: 1000 INJECTION, SOLUTION INTRAMUSCULAR; SUBCUTANEOUS at 20:37

## 2019-05-02 RX ADMIN — FAMOTIDINE 20 MG: 10 INJECTION, SOLUTION INTRAVENOUS at 09:37

## 2019-05-02 RX ADMIN — Medication 10 ML: at 14:16

## 2019-05-02 RX ADMIN — MESALAMINE 1000 MG: 250 CAPSULE ORAL at 18:21

## 2019-05-02 RX ADMIN — MESALAMINE 1000 MG: 250 CAPSULE ORAL at 14:16

## 2019-05-02 RX ADMIN — Medication 10 ML: at 21:05

## 2019-05-02 RX ADMIN — SODIUM CHLORIDE: 900 INJECTION, SOLUTION INTRAVENOUS at 09:47

## 2019-05-02 RX ADMIN — MESALAMINE 1000 MG: 250 CAPSULE ORAL at 10:42

## 2019-05-02 NOTE — PROGRESS NOTES
0700: Bedside shift change report given to Santos Maloney RN (oncoming nurse) by Pat Coe RN (offgoing nurse). Report included the following information SBAR, Kardex, ED Summary, Procedure Summary, Intake/Output, MAR and Recent Results. 1135: PIV accidentally removed by pt, will reattempt new PIV after pt showers  
1900: Bedside shift change report given to Tom Hardwick RN (oncoming nurse) by Santos Maloney RN (offgoing nurse). Report included the following information SBAR, Kardex, ED Summary, Procedure Summary, Intake/Output, MAR and Recent Results. Problem: Pressure Injury - Risk of 
Goal: *Prevention of pressure injury Description Document Martinez Scale and appropriate interventions in the flowsheet. Outcome: Progressing Towards Goal 
  
Problem: Patient Education: Go to Patient Education Activity Goal: Patient/Family Education Outcome: Progressing Towards Goal 
  
Problem: Falls - Risk of 
Goal: *Absence of Falls Description Document Charolett President Fall Risk and appropriate interventions in the flowsheet. Outcome: Progressing Towards Goal 
  
Problem: Patient Education: Go to Patient Education Activity Goal: Patient/Family Education Outcome: Progressing Towards Goal 
  
Problem: Sepsis: Day of Diagnosis Goal: Off Pathway (Use only if patient is Off Pathway) Outcome: Progressing Towards Goal 
Goal: *Pneumococcal immunization (if eligible) Outcome: Progressing Towards Goal 
Goal: *Influenza immunization (if eligible) Outcome: Progressing Towards Goal 
Goal: Activity/Safety Outcome: Progressing Towards Goal 
Goal: Consults, if ordered Outcome: Progressing Towards Goal 
Goal: Diagnostic Test/Procedures Outcome: Progressing Towards Goal 
Goal: Nutrition/Diet Outcome: Progressing Towards Goal 
Goal: Discharge Planning Outcome: Progressing Towards Goal 
Goal: Medications Outcome: Progressing Towards Goal 
Goal: Respiratory Outcome: Progressing Towards Goal 
Goal: Treatments/Interventions/Procedures Outcome: Progressing Towards Goal 
Goal: Psychosocial 
Outcome: Progressing Towards Goal 
  
Problem: Patient Education: Go to Patient Education Activity Goal: Patient/Family Education Outcome: Progressing Towards Goal 
  
Problem: Infection - Risk of, Urinary Catheter-Associated Urinary Tract Infection Goal: *Absence of infection signs and symptoms Outcome: Progressing Towards Goal 
  
Problem: Patient Education: Go to Patient Education Activity Goal: Patient/Family Education Outcome: Progressing Towards Goal

## 2019-05-02 NOTE — PROGRESS NOTES
Hospitalist Progress Note NAME: Veda Goyal :  1960 MRN:  [id number] Assessment / Plan: Hypovolemic Shock: resolved; Septic shock ruled out by clinical course Acute colitis: felt to be Ulcerative Colitis after Colonoscopy on  Hematochezia (History of 2 bouts of similar symptoms in the past, no colonoscopy) Anemia, acute blood loss anemia seems less certain because of Hgb trends->anemia work-up reveals Iron deficiency as well as Vitamin B12 deficiency 
-Levophed weaned to off on am of  
-discontinue on Levaquin and Flagyl, empiric at this time, not sure it is helping patient in setting of unremarkable Cx's, patient prefers not to be on unless needed 
-continue to monitor Hgb, current down-trend may be in part dilutional; essentially stable 
-continue on steroids, de-escalate to Prednisone, continue mesalamine 
-case discussed with GI today 
-consult Nutrition per patient request 
 
Low Vitamin B12 levels 
-continue IM B12 supplementation since B12 level is 170, patient to practive injection today, discussed with ALENA Mon TSH: 
-Free T4 normal, Sick Euthyroid History of hypertension 
-continue to hold PTA ACEI 
  
Hyperlipidemia 
-will resume home statin on discharge Neurogenic bladder Secondary to spina bifida 
-Patient does in and out cath at home 
-UCx negative 
-will d/c lopez tomorrow am 
  
History of spina bifida 
-Patient is normally independent she has no sensation in her legs but is able to transfer on her own to a manual wheelchair and does all her ADLs. -PT ordered Hypophosphatemia: 
Hypokalemia: 
Hypomagnesemia: 
-replete with IV potassium phosphate today Obesity: Body mass index is 33.2 kg/m². Code Status: Full Surrogate Decision Maker: sister Juan Domingo # [phone] 
  
DVT Prophylaxis: SCDs GI Prophylaxis: IV pepcid for now 
  
Baseline: Pt lives alone after recent death of spouse about 2 months ago, independent with ADLs, uses wheelchair 
  
 Recommended Disposition: TBD Subjective: Chief Complaint / Reason for Physician Visit: follow-up hypotension Patient seen for follow-up Feels better today Would like therapy and Nutrition consult Wants to practice B12 injection Review of Systems: 
Symptom Y/N Comments  Symptom Y/N Comments Fever/Chills n   Chest Pain n   
Poor Appetite n   Edema Cough    Abdominal Pain n   
Sputum    Joint Pain SOB/ALVARADO n   Pruritis/Rash Nausea/vomit    Tolerating PT/OT Diarrhea    Tolerating Diet Constipation    Other Could NOT obtain due to:   
 
Objective: VITALS:  
Last 24hrs VS reviewed since prior progress note. Most recent are: 
Patient Vitals for the past 24 hrs: 
 Temp Pulse Resp BP SpO2  
05/02/19 0758 98.3 °F (36.8 °C) 89 18 119/61 95 % 05/02/19 0415 98.1 °F (36.7 °C) 80 20 108/61   
05/01/19 2319 98.3 °F (36.8 °C) 86 20 121/61   
05/01/19 1909   18  94 % 05/01/19 1539 98.1 °F (36.7 °C) 95 23 107/53 98 % 05/01/19 1141 98.1 °F (36.7 °C) 90 16 118/53 100 % 05/01/19 1122  80 16 101/56 99 % 05/01/19 1117  90 20 107/55 95 % 05/01/19 1111  98 18 114/73 100 % 05/01/19 1109 97.5 °F (36.4 °C) 97 16 110/69 100 % 05/01/19 1054  (!) 105 22 114/90 97 % 05/01/19 1024  90 19 105/55 99 % Intake/Output Summary (Last 24 hours) at 5/2/2019 6662 Last data filed at 5/2/2019 5268 Gross per 24 hour Intake 720 ml Output 1950 ml Net -1230 ml PHYSICAL EXAM: 
General: WD, WN. Alert, cooperative, no acute distress   
EENT:  EOMI. Anicteric sclerae. MM dry Resp:  CTA bilaterally, no wheezing or rales. No accessory muscle use CV:  Regular  rhythm,  No edema GI:  Soft, Non distended, No significant tenderness.  +Bowel sounds Neurologic:  Alert and oriented X 3, normal speech, Psych:   Good insight. Not anxious nor agitated Skin:  No rashes. No jaundice Reviewed most current lab test results and cultures  YES 
 Reviewed most current radiology test results   YES Review and summation of old records today    NO Reviewed patient's current orders and MAR    YES 
PMH/SH reviewed - no change compared to H&P 
________________________________________________________________________ Care Plan discussed with: 
  Comments Patient x Family RN x Care Manager Consultant  x Dr. Amador Jones Multidiciplinary team rounds were held today with , nursing, pharmacist and clinical coordinator. Patient's plan of care was discussed; medications were reviewed and discharge planning was addressed. ________________________________________________________________________ Total NON critical care TIME:  25   Minutes Total CRITICAL CARE TIME Spent:   Minutes non procedure based Comments >50% of visit spent in counseling and coordination of care    
________________________________________________________________________ Kee Magaña MD  
 
Procedures: see electronic medical records for all procedures/Xrays and details which were not copied into this note but were reviewed prior to creation of Plan. LABS: 
I reviewed today's most current labs and imaging studies. Pertinent labs include: 
Recent Labs 05/02/19 
0403 05/01/19 
0301 04/30/19 
5094 WBC 4.4 5.3 5.3 HGB 9.2* 8.7* 9.7* HCT 27.5* 26.7* 29.7*  
 169 193 Recent Labs 05/02/19 
0403 05/01/19 
0301 04/30/19 
9428  142 143  
K 3.5 2.9* 3.4*  
* 112* 115* CO2 23 23 23 * 92 104* BUN 5* 2* 3*  
CREA 0.42* 0.27* 0.32* CA 8.0* 7.5* 7.7* MG 2.2  --  1.8 PHOS 1.6*  --  1.2* ALB 2.7* 2.4*  --   
TBILI 0.3 0.6  --   
SGOT 12* 13*  --   
ALT 27 26  --   
 
 
Signed: Kee Magaña MD

## 2019-05-02 NOTE — ANESTHESIA POSTPROCEDURE EVALUATION
Procedure(s): 
COLONOSCOPY 
COLON BIOPSY. total IV anesthesia, MAC Anesthesia Post Evaluation Patient location during evaluation: PACU Note status: Adequate. Level of consciousness: responsive to verbal stimuli and sleepy but conscious Pain management: satisfactory to patient Airway patency: patent Anesthetic complications: no 
Cardiovascular status: acceptable Respiratory status: acceptable Hydration status: acceptable Comments: +Post-Anesthesia Evaluation and Assessment Patient: Misti Cheung MRN: [id number]  SSN: [id number] YOB: 1960  Age: 63 y.o. Sex: female Cardiovascular Function/Vital Signs /66 (BP 1 Location: Left arm, BP Patient Position: At rest)   Pulse 75   Temp 36.7 °C (98 °F)   Resp 16   Ht 4' 7\" (1.397 m)   Wt 71.2 kg (156 lb 15.5 oz)   SpO2 94%   BMI 36.48 kg/m² Patient is status post Procedure(s): 
COLONOSCOPY 
COLON BIOPSY. Nausea/Vomiting: Controlled. Postoperative hydration reviewed and adequate. Pain: 
Pain Scale 1: Numeric (0 - 10) (05/02/19 1123) Pain Intensity 1: 0 (05/02/19 1123) Managed. Neurological Status: At baseline. Mental Status and Level of Consciousness: Arousable. Pulmonary Status:  
O2 Device: Room air (05/01/19 1539) Adequate oxygenation and airway patent. Complications related to anesthesia: None Post-anesthesia assessment completed. No concerns. Signed By: Yanira Reece MD  
 5/2/2019 Post anesthesia nausea and vomiting:  controlled Vitals Value Taken Time /66 5/2/2019 11:23 AM  
Temp 36.7 °C (98 °F) 5/2/2019 11:23 AM  
Pulse 72 5/2/2019  3:18 PM  
Resp 20 5/2/2019  3:18 PM  
SpO2 94 % 5/2/2019 11:23 AM  
Vitals shown include unvalidated device data.

## 2019-05-02 NOTE — PROGRESS NOTES
Nutrition Assessment: 
 
INTERVENTIONS/RECOMMENDATIONS:  
Advance to Regular/low fiber diet ASSESSMENT:  
Chart reviewed; diet advanced to GI lite. Colonoscopy showing likely left-sided ulcerative colitis. Patient requesting diet education and consult received but she mostly wanted recipes. Discussed low fiber diet. Handouts provided and questions answered. PO intake has improved. She likes the ensure clear supplement. She reports she is likely being discharged home tomorrow. Diet Order: (GI Lite, ensure clear BID, gelatein BID) % Eaten:   
Patient Vitals for the past 72 hrs: 
 % Diet Eaten 05/01/19 1724 100 % Pertinent Medications: [x] Reviewed []Other: Vitamin B12, Solu-medrol, KPhos Pertinent Labs: [x]Reviewed  []Other: Phos 1.6 Food Allergies: [x]None []Other:   Last BM: 5/1   [x]Active     []Hyperactive  []Hypoactive       [] Absent  BS Skin:    [x] Intact   [] Incision  [] Breakdown   []Edema   []Other: Anthropometrics: Height: 4' 7\" (139.7 cm) Weight: 71.2 kg (156 lb 15.5 oz) IBW (%IBW):   ( ) UBW (%UBW):   (  %) BMI: Body mass index is 36.48 kg/m². This BMI is indicative of: 
[]Underweight   []Normal   []Overweight   [x] Obesity   [] Extreme Obesity (BMI>40) Last Weight Metrics: 
Weight Loss Metrics 5/2/2019 Today's Wt 156 lb 15.5 oz  
BMI 36.48 kg/m2 Estimated Nutrition Needs (Based on): 4328 Kcals/day(BMR (1137) x 1. 3AF) , 71 g(1.0 g/kg bw) Protein Carbohydrate: At Least 130 g/day  Fluids: 1350 mL/day Pt expected to meet estimated nutrient needs: [x]Yes []No 
 
NUTRITION DIAGNOSES:  
Problem:  Altered GI function Etiology: related to colitis Signs/Symptoms: as evidenced by diarrhea and clear liquid diet Previous diagnosis resolved; GI symptoms resolved. Tolerating GI lite diet NUTRITION INTERVENTIONS: 
Meals/Snacks: General/healthful diet   Supplements: Commercial supplement GOAL:  
PO intake >70% of meals/supplements next 5-7 days NUTRITION MONITORING AND EVALUATION Food/Nutrient Intake Outcomes: Total energy intake Physical Signs/Symptoms Outcomes: Weight/weight change, Electrolyte and renal profile, GI profile Previous Goal Met: 
 [x] Met              [] Progressing Towards Goal              [] Not Progressing Towards Goal  
Previous Recommendations: 
 [x] Implemented          [] Not Implemented          [] Not Applicable LEARNING NEEDS (Diet, Food/Nutrient-Drug Interaction):  
 [] None Identified 
 [x] Identified and Education Provided/Documented 
 [] Identified and Pt declined/was not appropriate Cultural, Buddhism, OR Ethnic Dietary Needs:  
 [x] None Identified 
 [] Identified and Addressed 
 
 [x] Interdisciplinary Care Plan Reviewed/Documented  
 [x] Discharge Planning: Low fiber diet  
 [] Participated in Interdisciplinary Rounds NUTRITION RISK:  
 [] High              [] Moderate           [x]  Low  []  Minimal/Uncompromised [facility] Pager [phone] Weekend Pager [phone]

## 2019-05-02 NOTE — PROGRESS NOTES
Problem: Mobility Impaired (Adult and Pediatric) Goal: *Acute Goals and Plan of Care (Insert Text) Description Physical Therapy Goals Initiated 4/30/2019 1. Patient will move from supine to sit and sit to supine  in bed with modified independence within 7 day(s). 2.  Patient will transfer from bed to chair and chair to bed with distant supervision/set-up using the least restrictive device within 7 day(s). Outcome: Resolved/Met PHYSICAL THERAPY TREATMENT and Discharge Patient: Veda Goyal (22 y.o. female) Date: 5/2/2019 Diagnosis: Severe sepsis ([facility] [address] [A41.9, R65.20] Hypotension [I95.9] Colitis presumed infectious [K52.9] <principal problem not specified> Procedure(s) (LRB): 
COLONOSCOPY (N/A) COLON BIOPSY (N/A) 1 Day Post-Op Chart, physical therapy assessment, plan of care and goals were reviewed. ASSESSMENT: Patient has progressed well in therapy. She is independent with bed mobility and completed transfer to wheelchair with CGA/SBA. Patient reporting she feels she is now at her baseline and does not require further therapy. Patient is independent at [employer] level and working at baseline. She would benefit from having her w/c serviced following discharge. Patient is safe to transfer to w/c with supervision from nursing staff. No further PT indicated at this time. Will sign off. Progression toward goals: 
x? Improving appropriately and progressing toward goals ? Improving slowly and progressing toward goals ? Not making progress toward goals and plan of care will be adjusted PLAN: 
Patient continues to benefit from skilled intervention to address the above impairments. Continue treatment per established plan of care. Discharge Recommendations:  None Further Equipment Recommendations for Discharge:  Patient would benefit from having w/c serviced SUBJECTIVE:  
Patient stated I am so excited to get into the shower! Jt Wright OBJECTIVE DATA SUMMARY:  
 Critical Behavior: 
Neurologic State: Alert Orientation Level: Oriented X4 Cognition: Appropriate decision making, Appropriate for age attention/concentration, Appropriate safety awareness, Follows commands Safety/Judgement: Awareness of environment, Insight into deficits, Good awareness of safety precautions Functional Mobility Training: 
Bed Mobility: 
  
Supine to Sit: Independent Scooting: Independent Transfers: 
  
  
     
Bed to Chair: Contact guard assistance Balance: 
Sitting: Intact Pain: 
Pain Scale 1: Numeric (0 - 10) Pain Intensity 1: 0 Activity Tolerance: VSS Please refer to the flowsheet for vital signs taken during this treatment. After treatment:  
x? Patient left in no apparent distress sitting up in wheelchair ? Patient left in no apparent distress in bed 
x? Call bell left within reach 
x? Nursing notified ? Caregiver present ? Bed alarm activated COMMUNICATION/COLLABORATION:  
The patients plan of care was discussed with: Registered Nurse Teresa Coker, PT Time Calculation: 31 mins

## 2019-05-02 NOTE — PROGRESS NOTES
Bedside shift change report given to Jason Hernandez (oncoming nurse) by Rocco Ganser (offgoing nurse). Report included the following information SBAR, Kardex, Procedure Summary, Intake/Output, MAR, Recent Results and Cardiac Rhythm NSR.

## 2019-05-02 NOTE — PROGRESS NOTES
GI PROGRESS NOTE Zaria Aleman, [phone] NP in-hospital cell phone M-F until 4:30 After 5pm or on weekends, please call  for physician on call NAME:Jessica Lindsey :1960 QQC:[id number] ATTG: Dr Nelia Gu PCP: Preston Nazario MD 
Date/Time:  2019 9:45 AM  
 
Primary GI: Dr. Elver Rush Reason for following:  
 
Assessment:  
Acute colitis, hematochezia, Anemia, Shock · recurrent episodes without colonic evaluation until yesterday 19 · Abnormal CT scan with left-sided colitis Colonoscopy 19 
-- ulceration, mucosal friability and hemorrhage in the left colon, from rectum to splenic flexure, biopsied; appearance most consistent with left-sided ulcerative colitis -- prolapsed internal hemorrhoids -- lax rectal tone -- sub-optimal bowel preparation · BRBPR and mucous overnight, mostly smears · Hgb stable today at 9.2, WBC 4.4 Severe dehydration Hypotension inpatient but hx of HTN Spina bifida with associated neurogenic bladder Plan:  
· Pathology pending - likely will take a few more days · Transition to PO steroids - Prednisone 40mg x 5 days, then 30mg x 5 days, then 20mg x 5 days, then 10mg x 5 days then stop. · Continue Mesalamine 4gm daily-- resume diet · Discontinue antibiotics · Okay to advance diet to regular, low fiber diet · Will set up outpatient follow up appointment in 3 weeks to discuss long term treatment. · Will sign off as nothing further to add at this time. Subjective:  
Discussed with RN events overnight. Complaint Y/N Description Abdominal Pain n Hematemesis n Hematochezia y Smears of BRB and mucous Melena n   
Constipation n   
Diarrhea n Dyspepsia n Dysphagia Jaundiced n   
Nausea/vomiting n Review of Systems: 
Symptom Y/N Comments  Symptom Y/N Comments Fever/Chills    Chest Pain Cough    Headaches n Sputum    Joint Pain SOB/ALVARADO    Pruritis/Rash Tolerating Diet y   Other Pt concerned about fluid filled blister on top of right foot. Pt feels as though foot is \"tighter\" than it was yesterday. Pt tearful with nurse present. Pt feels overwhelmed with medical situation and home situation. Pt states \"i'm so tired of being sick. \" pt states she only has nephew and pt parents for support.  . Pt may benefit from SW consult for temporary assistance at home. Will make hospitalist and sw aware. Could NOT obtain due to:   
 
Objective: VITALS:  
Last 24hrs VS reviewed since prior progress note. Most recent are: 
Visit Vitals /61 (BP 1 Location: Left arm, BP Patient Position: At rest) Pulse 89 Temp 98.3 °F (36.8 °C) Resp 18 Ht 4' 7\" (1.397 m) Wt 71.2 kg (156 lb 15.5 oz) SpO2 95% BMI 36.48 kg/m² Intake/Output Summary (Last 24 hours) at 5/2/2019 0945 Last data filed at 5/2/2019 6820 Gross per 24 hour Intake 720 ml Output 1950 ml Net -1230 ml PHYSICAL EXAM: 
General:          WD, WN. Alert, cooperative, no acute distress, hx of spina bifida   
EENT:              EOMI. Anicteric sclerae. MM dry Resp:               CTA bilaterally, no wheezing or rales. No accessory muscle use CV:                  Regular  rhythm,  No edema GI:                   Soft, Non distended, No significant tenderness.  +Bowel sounds Neurologic:      Alert and oriented X 3, normal speech, Psych:             Good insight. Not anxious nor agitated Skin:                No rashes. No jaundice 
  
Lab and Radiology Data Reviewed: (see below) Medications Reviewed: (see below) PMH/SH reviewed - no change compared to H&P 
________________________________________________________________________ Total time spent with patient: 30 minutes ________________________________________________________________________ Care Plan discussed with: 
Patient y Family RN y  
           Consultant: Jose Agrawal NP Procedures: see electronic medical records for all procedures/Xrays and details which were not copied into this note but were reviewed prior to creation of Plan. LABS: 
Recent Labs 05/02/19 
0403 05/01/19 
0301 WBC 4.4 5.3 HGB 9.2* 8.7* HCT 27.5* 26.7*  
 169 Recent Labs 05/02/19 
0403 05/01/19 
0301 04/30/19 
5269  142 143  
K 3.5 2.9* 3.4*  
* 112* 115* CO2 23 23 23 BUN 5* 2* 3*  
CREA 0.42* 0.27* 0.32* * 92 104* CA 8.0* 7.5* 7.7* MG 2.2  --  1.8 PHOS 1.6*  --  1.2* Recent Labs 05/02/19 
0403 05/01/19 
0301 SGOT 12* 13* AP 48 42* TP 5.4* 5.0* ALB 2.7* 2.4*  
GLOB 2.7 2.6 No results for input(s): INR, PTP, APTT in the last 72 hours. No lab exists for component: INREXT Recent Labs 04/30/19 
0059 TIBC 216* PSAT 10* FERR 73 Lab Results Component Value Date/Time Folate 20.6 04/30/2019 12:59 AM  
 
No results for input(s): PH, PCO2, PO2 in the last 72 hours. No results for input(s): CPK, CKMB in the last 72 hours. No lab exists for component: TROPONINI Lab Results Component Value Date/Time Color YELLOW 04/29/2019 12:29 AM  
 Appearance CLOUDY (A) 04/29/2019 12:29 AM  
 Specific gravity 1.025 04/29/2019 12:29 AM  
 pH (UA) 5.0 04/29/2019 12:29 AM  
 Protein 30 (A) 04/29/2019 12:29 AM  
 Glucose NEGATIVE  04/29/2019 12:29 AM  
 Ketone TRACE (A) 04/29/2019 12:29 AM  
 Urobilinogen 1.0 04/29/2019 12:29 AM  
 Nitrites POSITIVE (A) 04/29/2019 12:29 AM  
 Leukocyte Esterase SMALL (A) 04/29/2019 12:29 AM  
 Epithelial cells FEW 04/29/2019 12:29 AM  
 Bacteria 1+ (A) 04/29/2019 12:29 AM  
 WBC 0-4 04/29/2019 12:29 AM  
 RBC 0-5 04/29/2019 12:29 AM  
 
 
MEDICATIONS: 
Current Facility-Administered Medications Medication Dose Route Frequency  potassium phosphate 30 mmol in 0.9% sodium chloride 500 mL infusion   IntraVENous ONCE  
 sodium chloride (OCEAN) 0.65 % nasal squeeze bottle 2 Spray  2 Spray Both Nostrils Q2H PRN  
 sodium chloride (NS) flush 5-40 mL  5-40 mL IntraVENous Q8H  
 sodium chloride (NS) flush 5-40 mL  5-40 mL IntraVENous PRN  
 methylPREDNISolone (PF) (SOLU-MEDROL) injection 40 mg  40 mg IntraVENous Q12H  
 mesalamine (PENTASA) CR capsule 1,000 mg  1 g Oral QID  cyanocobalamin (VITAMIN B12) injection 1,000 mcg  1,000 mcg IntraMUSCular DAILY  acetaminophen (TYLENOL) tablet 650 mg  650 mg Oral Q6H PRN  
  ondansetron (ZOFRAN) injection 4 mg  4 mg IntraVENous Q4H PRN

## 2019-05-02 NOTE — PROGRESS NOTES
Bedside shift change report given to Jones Mejía (oncoming nurse) by Renee Marin (offgoing nurse).  Report included the following information SBAR, Kardex, Procedure Summary, Intake/Output, MAR, Recent Results and Cardiac Rhythm NSR.

## 2019-05-03 VITALS
WEIGHT: 159.7 LBS | HEART RATE: 72 BPM | BODY MASS INDEX: 36.96 KG/M2 | SYSTOLIC BLOOD PRESSURE: 105 MMHG | DIASTOLIC BLOOD PRESSURE: 55 MMHG | TEMPERATURE: 98.3 F | HEIGHT: 55 IN | OXYGEN SATURATION: 99 % | RESPIRATION RATE: 18 BRPM

## 2019-05-03 PROBLEM — K51.90 ULCERATIVE COLITIS (HCC): Status: ACTIVE | Noted: 2019-05-03

## 2019-05-03 PROBLEM — E53.8 B12 DEFICIENCY: Status: ACTIVE | Noted: 2019-05-03

## 2019-05-03 LAB
ANION GAP SERPL CALC-SCNC: 5 MMOL/L (ref 5–15)
BACTERIA SPEC CULT: NORMAL
BUN SERPL-MCNC: 11 MG/DL (ref 6–20)
BUN/CREAT SERPL: 29 (ref 12–20)
CALCIUM SERPL-MCNC: 8.2 MG/DL (ref 8.5–10.1)
CHLORIDE SERPL-SCNC: 114 MMOL/L (ref 97–108)
CO2 SERPL-SCNC: 24 MMOL/L (ref 21–32)
CREAT SERPL-MCNC: 0.38 MG/DL (ref 0.55–1.02)
GLUCOSE SERPL-MCNC: 97 MG/DL (ref 65–100)
HCT VFR BLD AUTO: 28.5 % (ref 35–47)
HGB BLD-MCNC: 9.4 G/DL (ref 11.5–16)
PHOSPHATE SERPL-MCNC: 2.8 MG/DL (ref 2.6–4.7)
POTASSIUM SERPL-SCNC: 3.8 MMOL/L (ref 3.5–5.1)
SERVICE CMNT-IMP: NORMAL
SODIUM SERPL-SCNC: 143 MMOL/L (ref 136–145)

## 2019-05-03 PROCEDURE — 74011636637 HC RX REV CODE- 636/637: Performed by: INTERNAL MEDICINE

## 2019-05-03 PROCEDURE — 85018 HEMOGLOBIN: CPT

## 2019-05-03 PROCEDURE — 80048 BASIC METABOLIC PNL TOTAL CA: CPT

## 2019-05-03 PROCEDURE — 74011250637 HC RX REV CODE- 250/637: Performed by: INTERNAL MEDICINE

## 2019-05-03 PROCEDURE — 84100 ASSAY OF PHOSPHORUS: CPT

## 2019-05-03 PROCEDURE — 74011250637 HC RX REV CODE- 250/637: Performed by: EMERGENCY MEDICINE

## 2019-05-03 PROCEDURE — 36415 COLL VENOUS BLD VENIPUNCTURE: CPT

## 2019-05-03 RX ORDER — SYRINGE WITH NEEDLE, 1 ML 27GX1/2"
1 SYRINGE, EMPTY DISPOSABLE MISCELLANEOUS
Qty: 30 SYRINGE | Refills: 0 | Status: SHIPPED | OUTPATIENT
Start: 2019-05-03 | End: 2019-09-10

## 2019-05-03 RX ORDER — LANOLIN ALCOHOL/MO/W.PET/CERES
325 CREAM (GRAM) TOPICAL
Qty: 30 TAB | Refills: 5 | Status: SHIPPED | OUTPATIENT
Start: 2019-05-03 | End: 2019-12-13

## 2019-05-03 RX ORDER — MESALAMINE 0.38 G/1
1.5 CAPSULE, EXTENDED RELEASE ORAL DAILY
Qty: 120 CAP | Refills: 1 | Status: SHIPPED | OUTPATIENT
Start: 2019-05-03 | End: 2019-05-03

## 2019-05-03 RX ORDER — MESALAMINE 0.38 G/1
1.5 CAPSULE, EXTENDED RELEASE ORAL DAILY
Qty: 120 CAP | Refills: 1 | Status: SHIPPED | OUTPATIENT
Start: 2019-05-03 | End: 2019-12-13

## 2019-05-03 RX ORDER — CYANOCOBALAMIN 1000 UG/ML
1000 INJECTION, SOLUTION INTRAMUSCULAR; SUBCUTANEOUS
Qty: 1 VIAL | Refills: 0 | Status: SHIPPED | OUTPATIENT
Start: 2019-05-03 | End: 2021-07-20 | Stop reason: SDUPTHER

## 2019-05-03 RX ORDER — PREDNISONE 10 MG/1
TABLET ORAL
Qty: 50 TAB | Refills: 0 | Status: SHIPPED | OUTPATIENT
Start: 2019-05-03 | End: 2019-09-10 | Stop reason: ALTCHOICE

## 2019-05-03 RX ADMIN — ACETAMINOPHEN 650 MG: 325 TABLET ORAL at 11:07

## 2019-05-03 RX ADMIN — PREDNISONE 40 MG: 20 TABLET ORAL at 09:09

## 2019-05-03 RX ADMIN — Medication 10 ML: at 06:39

## 2019-05-03 RX ADMIN — MESALAMINE 1000 MG: 250 CAPSULE ORAL at 09:09

## 2019-05-03 NOTE — PROGRESS NOTES
Problem: Pressure Injury - Risk of 
Goal: *Prevention of pressure injury Description Document Martinez Scale and appropriate interventions in the flowsheet. Outcome: Resolved/Met Problem: Patient Education: Go to Patient Education Activity Goal: Patient/Family Education Outcome: Resolved/Met Problem: Falls - Risk of 
Goal: *Absence of Falls Description Document Piter Viramontes Fall Risk and appropriate interventions in the flowsheet. Outcome: Resolved/Met Problem: Patient Education: Go to Patient Education Activity Goal: Patient/Family Education Outcome: Resolved/Met Problem: Sepsis: Day of Diagnosis Goal: Off Pathway (Use only if patient is Off Pathway) Outcome: Resolved/Met Goal: *Pneumococcal immunization (if eligible) Outcome: Resolved/Met Goal: *Influenza immunization (if eligible) Outcome: Resolved/Met Goal: Activity/Safety Outcome: Resolved/Met Goal: Consults, if ordered Outcome: Resolved/Met Goal: Diagnostic Test/Procedures Outcome: Resolved/Met Goal: Nutrition/Diet Outcome: Resolved/Met Goal: Discharge Planning Outcome: Resolved/Met Goal: Medications Outcome: Resolved/Met Goal: Respiratory Outcome: Resolved/Met Goal: Treatments/Interventions/Procedures Outcome: Resolved/Met Goal: Psychosocial 
Outcome: Resolved/Met Problem: Patient Education: Go to Patient Education Activity Goal: Patient/Family Education Outcome: Resolved/Met Problem: Infection - Risk of, Urinary Catheter-Associated Urinary Tract Infection Goal: *Absence of infection signs and symptoms Outcome: Resolved/Met Problem: Patient Education: Go to Patient Education Activity Goal: Patient/Family Education Outcome: Resolved/Met

## 2019-05-03 NOTE — PROGRESS NOTES
Bedside and Verbal shift change report given to Robbin/ [facility] (oncoming nurse) by CAYDEN Benitez RN (offgoing nurse). Report given with SBAR, Kardex, Intake/Output, MAR and Recent Results.

## 2019-05-03 NOTE — PROGRESS NOTES
1256 - Bedside shift report received from [facility]. Included MAR, Kardex, I/O, Recent Results. Pt resting in bed. 
 
0935 - lopez catheter removed. 1100 - I have reviewed discharge instructions with the patient. The patient verbalized understanding.

## 2019-05-03 NOTE — PROGRESS NOTES
PCU SHIFT NURSING NOTE Bedside shift change report given to Tatiana Donaldson (oncoming nurse) by Kanika Ordonez RN (offgoing nurse). Report included the following information SBAR, Kardex, Recent Results and Cardiac Rhythm NSR. Shift Summary:  
1925 Patient in bed watching television. Resting quietly. Vitals are stable. Admission Date 4/28/2019 Admission Diagnosis Severe sepsis ([address] [A41.9, R65.20] Hypotension [I95.9] Colitis presumed infectious [K52.9] Consults IP CONSULT TO GASTROENTEROLOGY Consults []PT []OT []Speech  
[]Case Management  
  
[] Palliative Cardiac Monitoring Order []Yes []No  
 
IV drips []Yes Drip:                            Dose: 
Drip:                            Dose: 
Drip:                            Dose:  
[]No  
 
GI Prophylaxis []Yes []No  
 
 
 
DVT Prophylaxis SCDs:  Sequential Compression Device: Bilateral  
  Patient Refused VTE Prophylaxis: Yes Hugo stockings:     
  
[] Medication []Contraindicated []None Activity Level Activity Level: Up with Assistance Activity Assistance: Partial (one person) Purposeful Rounding every 1-2 hour? []Yes Traore Score  Total Score: 3 Bed Alarm (If score 3 or >) []Yes  
[] Refused (See signed refusal form in chart) Martinez Score  Martinez Score: 15 Martinez Score (if score 14 or less) []PMT consult  
[]Wound Care consult []Specialty bed  
[] Nutrition consult Needs prior to discharge:  
Home O2 required:   
[]Yes []No  
 If yes, how much O2 required? Other:  
 Last Bowel Movement: Last Bowel Movement Date: 05/02/19 Influenza Vaccine Received Flu Vaccine for Current Season (usually Sept-March): Yes Pneumonia Vaccine Diet Active Orders Diet DIET GI LITE (POST SURGICAL) LDAs Peripheral IV 05/02/19 Anterior;Right Forearm (Active) Site Assessment Clean, dry, & intact 5/2/2019  7:25 PM  
 Phlebitis Assessment 0 5/2/2019  7:25 PM  
Infiltration Assessment 0 5/2/2019  7:25 PM  
Dressing Status Clean, dry, & intact 5/2/2019  7:25 PM  
Dressing Type Tape;Transparent 5/2/2019  7:25 PM  
Hub Color/Line Status Blue;Patent 5/2/2019  7:25 PM  
Action Taken Open ports on tubing capped 5/2/2019  7:25 PM  
Alcohol Cap Used Yes 5/2/2019  7:25 PM  
                  
Urinary Catheter Urinary Catheter 04/29/19 Tee-Indications for Use: Chronic urinary retention/bladder outlet obstruction Intake & Output Date 05/01/19 1900 - 05/02/19 0659 05/02/19 0700 - 05/03/19 3530 Shift 2446-1303 24 Hour Total 6470-5056 4411-4664 24 Hour Total  
INTAKE  
P.O. 330 570     
  P.O. 330 570     
I. V.(mL/kg/hr)  150 Volume (0.9% sodium chloride infusion)  150 Shift Total(mL/kg) 330(4.6) 720(10.1) OUTPUT Urine(mL/kg/hr) 1050 1950 650(0.8) 250 900 Urine Voided 650 650 Urine Output (mL) (Urinary Catheter 04/29/19 Tee) 400 1300 650 250 900 Stool Stool Occurrence(s)   2 x 1 x 3 x Shift Total(mL/kg) 1050(14.7) 1984(27.0) 650(9.1) 250(3.5) 900(12.6) NET -720 -1230 -650 -250 -900 Weight (kg) 71.2 71.2 71.2 71.2 71.2 Readmission Risk Assessment Tool Score Low Risk   
      
 0 Total Score Criteria that do not apply:  
 Has Seen PCP in Last 6 Months (Yes=3, No=0) . Living with Significant Other. Assisted Living. LTAC. SNF. or  
Rehab Patient Length of Stay (>5 days = 3) IP Visits Last 12 Months (1-3=4, 4=9, >4=11) Pt. Coverage (Medicare=5 , Medicaid, or Self-Pay=4) Charlson Comorbidity Score (Age + Comorbid Conditions) Expected Length of Stay 4d 19h Actual Length of Stay 4

## 2019-05-03 NOTE — DISCHARGE INSTRUCTIONS
HOSPITALIST DISCHARGE INSTRUCTIONS    NAME: Maria Elena Vila   :  1960   MRN:  [id number]     Date/Time:  5/3/2019 9:47 AM    ADMIT DATE: 2019     DISCHARGE DATE: 5/3/2019     DISCHARGE DIAGNOSIS:  Hypovolemic Shock: resolved  Acute colitis/Ulcerative Colitis diagnosed after Colonoscopy on   Hematochezia (History of 2 bouts of similar symptoms in the past, no colonoscopy)  Anemia, related to Iron deficiency as well as Vitamin B12 deficiency  Low Vitamin B12 level  Low TSH-Free T4 normal, thus Sick Euthyroid   History of hypertension  Hyperlipidemia  Neurogenic bladder, Secondary to spina bifida  Hypophosphatemia, resolved after repletion  Hypokalemia, resolved after repletion  Hypomagnesemia, resolved after repletion    MEDICATIONS:  As per medication reconciliation  list  · It is important that you take the medication exactly as they are prescribed. · Keep your medication in the bottles provided by the pharmacist and keep a list of the medication names, dosages, and times to be taken in your wallet. · Do not take other medications without consulting your doctor. Pain Management: Use tylenol (acetaminophen) as needed for pain    What to do at Home    Recommended diet:  Low fiber diet    Recommended activity: Resume same activity    If you experience any of the following symptoms then please call your primary care physician or return to the emergency room if you cannot get hold of your doctor:  Fever, chills, nausea, vomiting, diarrhea, change in mentation, falling, bleeding, shortness of breath or any other concerning symptoms. Follow Up: With your PCP, Dr. Oliva Sweeney MD , to discuss your blood pressure, B12 injections, reckeck of your thyroid function in 4-6 weeks as well as monitoring of your iron levels. With Dr. Lenny Martino to discuss you Ulcerative Colitis. Their office plans to contact you for a 3 week follow-up appointment.       Information obtained by :  I understand that if any problems occur once I am at home I am to contact my physician. I understand and acknowledge receipt of the instructions indicated above.                                                                                                                                            Physician's or R.N.'s Signature                                                                  Date/Time                                                                                                                                              Patient or Representative Signature                                                          Date/Time

## 2019-05-03 NOTE — PROGRESS NOTES
PCP EMMANUEL appt scheduled with Dr. Gregory Crocker on 5/6/2019 at 1:00pm Appt added to AVS. Gaurav Irvin CM Specialist

## 2019-05-03 NOTE — DISCHARGE SUMMARY
Hospitalist Discharge Summary     Patient ID:  Ladan Sanderson  [id number]  59 y.o.  1960 4/28/2019    PCP on record: Josue Holter, MD    Admit date: 4/28/2019  Discharge date and time: 5/3/2019    DISCHARGE DIAGNOSIS:  Hypovolemic Shock: resolved  Acute colitis/Ulcerative Colitis diagnosed after Colonoscopy on 5/1  Hematochezia (History of 2 bouts of similar symptoms in the past, no colonoscopy)  Anemia, related to Iron deficiency as well as Vitamin B12 deficiency  Low Vitamin B12 level  Low TSH-Free T4 normal, thus Sick Euthyroid   History of hypertension  Hyperlipidemia  Neurogenic bladder, Secondary to spina bifida  Hypophosphatemia, resolved after repletion  Hypokalemia, resolved after repletion  Hypomagnesemia, resolved after repletion      CONSULTATIONS:  IP CONSULT TO GASTROENTEROLOGY    See HPI from H&P of Devi Moura MD:    ______________________________________________________________________  DISCHARGE SUMMARY/HOSPITAL COURSE:  for full details see H&P, daily progress notes, labs, consult notes.      Hospital course via problem below:      Hypovolemic Shock: resolved; Septic shock ruled out by clinical course  Acute colitis: felt to be Ulcerative Colitis after Colonoscopy on 5/1  Hematochezia (History of 2 bouts of similar symptoms in the past without the bleeding)  Anemia, acute blood loss anemia ruled out->anemia work-up reveals Iron deficiency as well as Vitamin B12 deficiency  -Levophed weaned to off on am of 4/30  -discontinued Levaquin/Flagyl on 5/2 (empiric based on clinical course) in setting of unremarkable Cultures  -Hgb, subtle down-trend in part dilutional; essentially stable  -continue on steroids, de-escalate to Prednisone (see taper below), continue mesalamine  -follow-up with GI in 3 weeks to discuss pathology results (pending at time of discharge)     Low Vitamin B12 levels  -continue IM B12 supplementation since B12 level is 170    Iron Deficiency: Iron level 21, TIBC 216, % saturation 10 and Ferritin of 73  -oral iron started at discharge, should have repeat iron levels checked in several months to ensure repletion     Low TSH:  -Free T4 normal, Sick Euthyroid; enouuraged repeat TFT's in 4-6 weeks     History of hypertension  -continue to hold lisinopril on discharge as systolic BP was still in the low 100s     Hyperlipidemia  -resume home statin on discharge     Neurogenic bladder Secondary to spina bifida  -Patient does in and out cath at home  -UCx negative     Spina bifida  -Patient is normally independent she has no sensation in her legs but is able to transfer on her own to a manual wheelchair and does all her ADLs.    Hypophosphatemia:  Hypokalemia:  Hypomagnesemia:  -improved after repletion      _______________________________________________________________________  Patient seen and examined by me on discharge day. Pertinent Findings:  Gen:    Not in distress  Chest: No accessory muscle use  CVS:   Regular rhythm   Abd:  ND  Neuro:  Alert  _______________________________________________________________________  DISCHARGE MEDICATIONS:   Current Discharge Medication List      START taking these medications    Details   mesalamine ER (APRISO) 0.375 gram 24 hour capsule Take 4 Caps by mouth daily. Qty: 120 Cap, Refills: 1      cyanocobalamin (VITAMIN B12) 1,000 mcg/mL injection 1 mL by IntraMUSCular route every seven (7) days. Do this for 4 weeks and then monthly thereafter  Qty: 1 Vial, Refills: 0      predniSONE (DELTASONE) 10 mg tablet Prednisone 40mg x 5 days, then 30mg x 5 days, then 20mg x 5 days, then 10mg x 5 days then stop. Qty: 50 Tab, Refills: 0      Syringe with Needle, Disp, 1 mL 25 gauge x 1\" syrg 1 Syringe by Does Not Apply route every seven (7) days.  Inject 1000 mcg B12 every 7 days for 4 weeks and then once monthly thereafter  Qty: 30 Syringe, Refills: 0      ferrous sulfate 325 mg (65 mg iron) tablet Take 1 Tab by mouth Daily (before breakfast). Qty: 30 Tab, Refills: 5         CONTINUE these medications which have NOT CHANGED    Details   atorvastatin (LIPITOR) 10 mg tablet Take 10 mg by mouth nightly. STOP taking these medications       lisinopril (PRINIVIL, ZESTRIL) 10 mg tablet Comments:   Reason for Stopping: Follow Up: With your PCP, Dr. Abdi Maher MD , to discuss your blood pressure, B12 injections, reckeck of your thyroid function in 4-6 weeks as well as monitoring of your iron levels. With Dr. Power Dominique to discuss you Ulcerative Colitis.  Their office plans to contact you for a 3 week follow-up appointment.  ________________________________________________________________    Risk of deterioration: Low    Condition at Discharge:  Stable  __________________________________________________________________    Disposition  Home with family, no needs    ____________________________________________________________________    Code Status: Full Code  ___________________________________________________________________  Case discussed with GI, Patient's Outpatient Pharmacist, RN, CM and Patient     Total time in minutes spent coordinating this discharge (includes going over instructions, follow-up, prescriptions, and preparing report for sign off to her PCP) :  40 minutes    Signed:  Laquita Smith MD

## 2019-05-03 NOTE — PROGRESS NOTES
PCP EMMANUEL appt scheduled with Dr. Yeyo Elise on 5/6/2019 at 1:00pm Appt added to AVS. Layton Beaver CM Specialist

## 2019-08-02 ENCOUNTER — HOSPITAL ENCOUNTER (OUTPATIENT)
Dept: CT IMAGING | Age: 59
Discharge: HOME | End: 2019-08-02
Attending: INTERNAL MEDICINE
Payer: COMMERCIAL

## 2019-08-02 DIAGNOSIS — K51.90 ULCERATIVE COLITIS (HCC): ICD-10-CM

## 2019-08-02 DIAGNOSIS — K55.039 ACUTE ISCHEMIC COLITIS (HCC): ICD-10-CM

## 2019-08-02 DIAGNOSIS — E61.1 IRON DEFICIENCY: ICD-10-CM

## 2019-08-02 PROCEDURE — 74011636320 HC RX REV CODE- 636/320: Performed by: INTERNAL MEDICINE

## 2019-08-02 PROCEDURE — 74177 CT ABD & PELVIS W/CONTRAST: CPT

## 2019-08-02 RX ORDER — SODIUM CHLORIDE 0.9 % (FLUSH) 0.9 %
10 SYRINGE (ML) INJECTION
Status: COMPLETED | OUTPATIENT
Start: 2019-08-02 | End: 2019-08-02

## 2019-08-02 RX ADMIN — Medication 10 ML: at 10:18

## 2019-08-02 RX ADMIN — IOHEXOL 20 ML: 240 INJECTION, SOLUTION INTRATHECAL; INTRAVASCULAR; INTRAVENOUS; ORAL at 10:18

## 2019-08-02 RX ADMIN — IOPAMIDOL 100 ML: 755 INJECTION, SOLUTION INTRAVENOUS at 10:18

## 2019-08-15 ENCOUNTER — TELEPHONE (OUTPATIENT)
Dept: INTERNAL MEDICINE CLINIC | Age: 59
End: 2019-08-15

## 2019-08-15 NOTE — TELEPHONE ENCOUNTER
General Message/Vendor Calls     Caller's first and last name:       Reason for call: Pt requesting a call back regarding seeing if the Dr has a tablet to assist with her getting on for appointment on 09/10/19 due to she's in a wheel chair.        Callback required yes/no and why:Yes       Best contact number(s):[phone]       Details to clarify the request:       Iram Villasenor

## 2019-08-16 ENCOUNTER — TELEPHONE (OUTPATIENT)
Dept: INTERNAL MEDICINE CLINIC | Age: 59
End: 2019-08-16

## 2019-08-16 NOTE — TELEPHONE ENCOUNTER
Called, spoke to pt. Two identifiers confirmed. Pt inquiring on if the office had adjustable exam tables. Notified pt some of tables are adjustable. Pt verbalized understanding of information discussed w/ no further questions at this time.

## 2019-08-16 NOTE — TELEPHONE ENCOUNTER
----- Message from Chip Eddy sent at 8/16/2019 10:38 AM EDT -----  Regarding: Dr. Berta Mae  Patient return call    Caller's first and last name and relationship (if not the patient): ((patient))      Best contact number(s):[phone]      Whose call is being returned: Agustina      Details to clarify the request: Pt missed a call form Agustina and would like  call back      Chip Eddy

## 2019-09-10 ENCOUNTER — OFFICE VISIT (OUTPATIENT)
Dept: INTERNAL MEDICINE CLINIC | Age: 59
End: 2019-09-10

## 2019-09-10 VITALS
DIASTOLIC BLOOD PRESSURE: 72 MMHG | SYSTOLIC BLOOD PRESSURE: 114 MMHG | RESPIRATION RATE: 16 BRPM | TEMPERATURE: 98 F | OXYGEN SATURATION: 100 % | HEIGHT: 55 IN | BODY MASS INDEX: 37.12 KG/M2 | HEART RATE: 92 BPM

## 2019-09-10 DIAGNOSIS — E66.01 SEVERE OBESITY (HCC): ICD-10-CM

## 2019-09-10 DIAGNOSIS — Z00.00 ROUTINE ADULT HEALTH MAINTENANCE: Primary | ICD-10-CM

## 2019-09-10 DIAGNOSIS — N31.9 NEUROGENIC BLADDER: ICD-10-CM

## 2019-09-10 DIAGNOSIS — Q05.9 SPINA BIFIDA, UNSPECIFIED HYDROCEPHALUS PRESENCE, UNSPECIFIED SPINAL REGION (HCC): ICD-10-CM

## 2019-09-10 DIAGNOSIS — I10 ESSENTIAL HYPERTENSION: ICD-10-CM

## 2019-09-10 DIAGNOSIS — K52.9 COLITIS: ICD-10-CM

## 2019-09-10 DIAGNOSIS — E78.2 MIXED HYPERLIPIDEMIA: ICD-10-CM

## 2019-09-10 DIAGNOSIS — E53.8 LOW VITAMIN B12 LEVEL: ICD-10-CM

## 2019-09-10 DIAGNOSIS — D50.9 IRON DEFICIENCY ANEMIA, UNSPECIFIED IRON DEFICIENCY ANEMIA TYPE: ICD-10-CM

## 2019-09-10 RX ORDER — LISINOPRIL 10 MG/1
TABLET ORAL DAILY
COMMUNITY
End: 2019-12-13 | Stop reason: SDUPTHER

## 2019-09-10 RX ORDER — SIMVASTATIN 10 MG/1
10 TABLET, FILM COATED ORAL
COMMUNITY
End: 2019-12-13 | Stop reason: SDUPTHER

## 2019-09-10 RX ORDER — DICLOFENAC POTASSIUM 50 MG/1
50 TABLET, FILM COATED ORAL DAILY
COMMUNITY
End: 2019-12-13 | Stop reason: SDUPTHER

## 2019-09-10 NOTE — PROGRESS NOTES
Reviewed record in preparation for visit and have obtained necessary documentation. Identified pt with two pt identifiers(name and ). Chief Complaint   Patient presents with   [address]  Maintenance Due   Topic Date Due    Hepatitis C Test  1960    DTaP/Tdap/Td  (1 - Tdap) 1982    Pap Test  1982    Shingles Vaccine (1 of 2) 2011    Mammogram  2011    Flu Vaccine  2019       Ms. Ramesh Quintero has a reminder for a \"due or due soon\" health maintenance. I have asked that she discuss this further with her primary care provider for follow-up on this health maintenance. Coordination of Care Questionnaire:  :     1) Have you been to an emergency room, urgent care clinic since your last visit? yes   Hospitalized since your last visit? yes             2) Have you seen or consulted any other health care providers outside of [address] [place] [address] since your last visit? no  (Include any pap smears or colon screenings in this section.)    3) In the event something were to happen to you and you were unable to speak on your behalf, do you have an Advance Directive/ Living Will in place stating your wishes? YES    Do you have an Advance Directive on file? yes    4) Are you interested in receiving information on Advance Directives? NO    Patient is accompanied by self I have received verbal consent from Angel Barnhart to discuss any/all medical information while they are present in the room.

## 2019-09-10 NOTE — PATIENT INSTRUCTIONS
Office Policies    Phone calls/patient messages:            Please allow up to 24 hours for someone in the office to contact you about your call or message. Be mindful your provider may be out of the office or your message may require further review. We encourage you to use [facility] for your messages as this is a faster, more efficient way to communicate with our office                         Medication Refills:            Prescription medications require 48-72 business hours to process. We encourage you to use [facility] for your refills. For controlled medications: Please allow 72 business hours to process. Certain medications may require you to  a written prescription at our office. NO narcotic/controlled medications will be prescribed after 4pm Monday through Friday or on weekends              Form/Paperwork Completion:            Please note a $25 fee may incur for all paperwork for completed by our providers. We ask that you allow 7-10 business days. Pre-payment is due prior to picking up/faxing the completed form. You may also download your forms to [facility] to have your doctor print off. Well Visit, Women 48 to 72: Care Instructions  Your Care Instructions    Physical exams can help you stay healthy. Your doctor has checked your overall health and may have suggested ways to take good care of yourself. He or she also may have recommended tests. At home, you can help prevent illness with healthy eating, regular exercise, and other steps. Follow-up care is a key part of your treatment and safety. Be sure to make and go to all appointments, and call your doctor if you are having problems. It's also a good idea to know your test results and keep a list of the medicines you take. How can you care for yourself at home? · Reach and stay at a healthy weight. This will lower your risk for many problems, such as obesity, diabetes, heart disease, and high blood pressure.   · Get at least 27 minutes of exercise on most days of the week. Walking is a good choice. You also may want to do other activities, such as running, swimming, cycling, or playing tennis or team sports. · Do not smoke. Smoking can make health problems worse. If you need help quitting, talk to your doctor about stop-smoking programs and medicines. These can increase your chances of quitting for good. · Protect your skin from too much sun. When you're outdoors from 10 a.m. to 4 p.m., stay in the shade or cover up with clothing and a hat with a wide brim. Wear sunglasses that block UV rays. Even when it's cloudy, put broad-spectrum sunscreen (SPF 30 or higher) on any exposed skin. · See a dentist one or two times a year for checkups and to have your teeth cleaned. · Wear a seat belt in the car. Follow your doctor's advice about when to have certain tests. These tests can spot problems early. · Cholesterol. Your doctor will tell you how often to have this done based on your age, family history, or other things that can increase your risk for heart attack and stroke. · Blood pressure. Have your blood pressure checked during a routine doctor visit. Your doctor will tell you how often to check your blood pressure based on your age, your blood pressure results, and other factors. · Mammogram. Ask your doctor how often you should have a mammogram, which is an X-ray of your breasts. A mammogram can spot breast cancer before it can be felt and when it is easiest to treat. · Pap test and pelvic exam. Ask your doctor how often you should have a Pap test. You may not need to have a Pap test as often as you used to. · Vision. Have your eyes checked every year or two or as often as your doctor suggests. Some experts recommend that you have yearly exams for glaucoma and other age-related eye problems starting at age 48. · Hearing. Tell your doctor if you notice any change in your hearing.  You can have tests to find out how well you hear.  · Diabetes. Ask your doctor whether you should have tests for diabetes. · Colorectal cancer. Your risk for colorectal cancer gets higher as you get older. Some experts say that adults should start regular screening at age 48 and stop at age 76. Others say to start before age 48 or continue after age 76. Talk with your doctor about your risk and when to start and stop screening. · Thyroid disease. Talk to your doctor about whether to have your thyroid checked as part of a regular physical exam. Women have an increased chance of a thyroid problem. · Osteoporosis. You should begin tests for bone density at age 72. If you are younger than 72, ask your doctor whether you have factors that may increase your risk for this disease. You may want to have this test before age 72. · Heart attack and stroke risk. At least every 4 to 6 years, you should have your risk for heart attack and stroke assessed. Your doctor uses factors such as your age, blood pressure, cholesterol, and whether you smoke or have diabetes to show what your risk for a heart attack or stroke is over the next 10 years. When should you call for help? Watch closely for changes in your health, and be sure to contact your doctor if you have any problems or symptoms that concern you. Where can you learn more? Go to http://shilpa-bill.info/. Enter U460 in the search box to learn more about \"Well Visit, Women 50 to 72: Care Instructions. \"  Current as of: December 13, 2018  Content Version: 12.1  © 3935-0134 [employer], Incorporated. Care instructions adapted under license by [employer] (which disclaims liability or warranty for this information). If you have questions about a medical condition or this instruction, always ask your healthcare professional. [place] [address] any warranty or liability for your use of this information.     Would be a good idea to get your fasting labs done a few days before your next visit with me, then we can discuss the results. Come back for flu shot, we have flu shot clinic every Thursday. No appt needed.

## 2019-09-10 NOTE — PROGRESS NOTES
Peggy Vang is a 63 y.o. female who presents for evaluation of npv, cpe. [facility] dr [facility]. [facility] Was d/c to home with prednisone and mesalamine, but has since follow up with gi and stopped both. Bowels much improved. Saw ortho downstairs for right shoulder pain. Working with PT now. Her  passed away earlier this year. He also had spina bifida. They were  almost 30 years. ROS:  Constitutional: negative for fevers, chills, anorexia and weight loss  Eyes:   negative for visual disturbance and irritation  ENT:   negative for tinnitus,sore throat,nasal congestion,ear pain,hoarseness  Respiratory:  negative for cough, hemoptysis, dyspnea,wheezing  CV:   negative for chest pain, palpitations, lower extremity edema  GI:   negative for nausea, vomiting, diarrhea, abdominal pain,melena  Genitourinary: negative for frequency, dysuria and hematuria  Musculoskel: negative for myalgias, arthralgias, back pain, muscle weakness. ++right shoulder joint pain  Neurological:  negative for headaches, dizziness, focal weakness, numbness  Psychiatric:     Negative for depression or anxiety      Past Medical History:   Diagnosis Date    Hypertension     Spina bifida [facility])        Past Surgical History:   Procedure Laterality Date    COLONOSCOPY N/A 5/1/2019    COLONOSCOPY performed by Yovani Hale MD at [address] [place] [address]  5/1/2019         HX OTHER SURGICAL  1890s    sacral wound flap repair       History reviewed. No pertinent family history.     Social History     Socioeconomic History    Marital status:      Spouse name: Not on file    Number of children: Not on file    Years of education: Not on file    Highest education level: Not on file   Occupational History    Not on file   Social Needs    Financial resource strain: Not on file    Food insecurity:     Worry: Not on file     Inability: Not on file   [place] [employer] needs:     Medical: Not on file     Non-medical: Not on file   Tobacco Use    Smoking status: Never Smoker    Smokeless tobacco: Never Used   Substance and Sexual Activity    Alcohol use: Never     Frequency: Never    Drug use: Never    Sexual activity: Not Currently   Lifestyle    Physical activity:     Days per week: Not on file     Minutes per session: Not on file    Stress: Not on file   Relationships    Social connections:     Talks on phone: Not on file     Gets together: Not on file     Attends Oriental orthodox service: Not on file     Active member of club or organization: Not on file     Attends meetings of clubs or organizations: Not on file     Relationship status: Not on file    Intimate partner violence:     Fear of current or ex partner: Not on file     Emotionally abused: Not on file     Physically abused: Not on file     Forced sexual activity: Not on file   Other Topics Concern    Not on file   Social History Narrative    Not on file            Visit Vitals  /72 (BP 1 Location: Left arm, BP Patient Position: Sitting)   Pulse 92   Temp 98 °F (36.7 °C) (Oral)   Resp 16   Ht 4' 7\" (1.397 m)   SpO2 100%   BMI 37.12 kg/m²       Physical Examination:   General - Well appearing female  HEENT - PERRL, TM no erythema/opacification, normal nasal turbinates, no oropharyngeal erythema or exudate, MMM  Neck - supple, no bruits, no thyroidomegaly, no lymphadenopathy  Pulm - clear to auscultation bilaterally  Cardio - RRR, normal S1 S2, no murmur  Abd - soft, nontender, no masses, no HSM  Extrem - no edema, +2 distal pulses  Neuro-  No focal deficits, CN intact     Assessment/Plan:    1. Routine adult health maintenance--check cbc, cmp, flp, tsh, a1c, hcv, b12  2. Spina bifida--uses w/c most times now. In process of trying to get motorized w/c  3.  htn--controlled with lisinopril,  Check cbc, cmp  4.   Neurogenic bladder from spina bifida--straight caths self about every 4 hours  5.  hyperlipids--on zocor, check flp, cmp  6. Low b12--on replacement, checklevels  7. Hx iron def anemia--check cbc  8. Hx colitis--follows with dr Harriet Ivey. No longer on prednisone or mesalamine.   9.  Right shoulder tendonitis--follows with ortho, dr Murtaza Srivastava    Will get flu shot in October  Had mamm at [facility]  Had pap with [facility]  rtc 3 months        Ciarra Pandey III, DO

## 2019-11-18 ENCOUNTER — TELEPHONE (OUTPATIENT)
Dept: INTERNAL MEDICINE CLINIC | Age: 59
End: 2019-11-18

## 2019-11-18 NOTE — TELEPHONE ENCOUNTER
Called, spoke to pt. Two identifiers confirmed. Notified pt labs have been ordered. Pt verbalized understanding of information discussed w/ no further questions at this time.

## 2019-11-18 NOTE — TELEPHONE ENCOUNTER
[facility], [address] Office Pool             General Message/Vendor Calls     Caller's first and last name: Patient       Reason for call: Requesting to schedule an appointment for a lab visit.        Callback required yes/no and why: yes       Best contact number(s): [phone]       Details to clarify the request:       Benita Lomax     Copy/Paste   [place]

## 2019-12-06 ENCOUNTER — DOCUMENTATION ONLY (OUTPATIENT)
Dept: INTERNAL MEDICINE CLINIC | Age: 59
End: 2019-12-06

## 2019-12-07 LAB
25(OH)D3+25(OH)D2 SERPL-MCNC: 19.3 NG/ML (ref 30–100)
ALBUMIN SERPL-MCNC: 4.5 G/DL (ref 3.5–5.5)
ALBUMIN/GLOB SERPL: 1.8 {RATIO} (ref 1.2–2.2)
ALP SERPL-CCNC: 70 IU/L (ref 39–117)
ALT SERPL-CCNC: 41 IU/L (ref 0–32)
AST SERPL-CCNC: 24 IU/L (ref 0–40)
BASOPHILS # BLD AUTO: 0 X10E3/UL (ref 0–0.2)
BASOPHILS NFR BLD AUTO: 1 %
BILIRUB SERPL-MCNC: 0.9 MG/DL (ref 0–1.2)
BUN SERPL-MCNC: 16 MG/DL (ref 6–24)
BUN/CREAT SERPL: 40 (ref 9–23)
CALCIUM SERPL-MCNC: 9.6 MG/DL (ref 8.7–10.2)
CHLORIDE SERPL-SCNC: 98 MMOL/L (ref 96–106)
CHOLEST SERPL-MCNC: 256 MG/DL (ref 100–199)
CO2 SERPL-SCNC: 24 MMOL/L (ref 20–29)
CREAT SERPL-MCNC: 0.4 MG/DL (ref 0.57–1)
EOSINOPHIL # BLD AUTO: 0.1 X10E3/UL (ref 0–0.4)
EOSINOPHIL NFR BLD AUTO: 1 %
ERYTHROCYTE [DISTWIDTH] IN BLOOD BY AUTOMATED COUNT: 11.7 % (ref 12.3–15.4)
EST. AVERAGE GLUCOSE BLD GHB EST-MCNC: 117 MG/DL
GLOBULIN SER CALC-MCNC: 2.5 G/DL (ref 1.5–4.5)
GLUCOSE SERPL-MCNC: 97 MG/DL (ref 65–99)
HBA1C MFR BLD: 5.7 % (ref 4.8–5.6)
HCT VFR BLD AUTO: 38.2 % (ref 34–46.6)
HCV AB S/CO SERPL IA: <0.1 S/CO RATIO (ref 0–0.9)
HCV AB SERPL QL IA: NORMAL
HDLC SERPL-MCNC: 51 MG/DL
HGB BLD-MCNC: 12.7 G/DL (ref 11.1–15.9)
IMM GRANULOCYTES # BLD AUTO: 0 X10E3/UL (ref 0–0.1)
IMM GRANULOCYTES NFR BLD AUTO: 0 %
LDLC SERPL CALC-MCNC: 168 MG/DL (ref 0–99)
LYMPHOCYTES # BLD AUTO: 1.4 X10E3/UL (ref 0.7–3.1)
LYMPHOCYTES NFR BLD AUTO: 32 %
MCH RBC QN AUTO: 31.2 PG (ref 26.6–33)
MCHC RBC AUTO-ENTMCNC: 33.2 G/DL (ref 31.5–35.7)
MCV RBC AUTO: 94 FL (ref 79–97)
MONOCYTES # BLD AUTO: 0.3 X10E3/UL (ref 0.1–0.9)
MONOCYTES NFR BLD AUTO: 8 %
NEUTROPHILS # BLD AUTO: 2.5 X10E3/UL (ref 1.4–7)
NEUTROPHILS NFR BLD AUTO: 58 %
PLATELET # BLD AUTO: 324 X10E3/UL (ref 150–450)
POTASSIUM SERPL-SCNC: 4.4 MMOL/L (ref 3.5–5.2)
PROT SERPL-MCNC: 7 G/DL (ref 6–8.5)
RBC # BLD AUTO: 4.07 X10E6/UL (ref 3.77–5.28)
SODIUM SERPL-SCNC: 138 MMOL/L (ref 134–144)
TRIGL SERPL-MCNC: 184 MG/DL (ref 0–149)
TSH SERPL DL<=0.005 MIU/L-ACNC: 1.17 UIU/ML (ref 0.45–4.5)
VIT B12 SERPL-MCNC: 688 PG/ML (ref 232–1245)
VLDLC SERPL CALC-MCNC: 37 MG/DL (ref 5–40)
WBC # BLD AUTO: 4.3 X10E3/UL (ref 3.4–10.8)

## 2019-12-08 RX ORDER — MELATONIN
1000 DAILY
Qty: 90 TAB | Refills: 3 | Status: SHIPPED | OUTPATIENT
Start: 2019-12-08 | End: 2019-12-13

## 2019-12-08 NOTE — PROGRESS NOTES
No longer anemic, which is good. Cholesterol levels bit elevated, . Make sure taking zocor daily. Vit D bit low, but b12 normal.  rx sent in for vit D. Continue other meds as before.

## 2019-12-10 NOTE — PROGRESS NOTES
Called, spoke to pt. Two identifiers confirmed. Pt notified of results/recommendations per Dr. Ami Howell. Pt verbalized understanding of information discussed w/ no further questions at this time.

## 2019-12-13 ENCOUNTER — OFFICE VISIT (OUTPATIENT)
Dept: INTERNAL MEDICINE CLINIC | Age: 59
End: 2019-12-13

## 2019-12-13 VITALS
OXYGEN SATURATION: 96 % | HEART RATE: 86 BPM | BODY MASS INDEX: 37.12 KG/M2 | DIASTOLIC BLOOD PRESSURE: 70 MMHG | RESPIRATION RATE: 14 BRPM | TEMPERATURE: 97.9 F | HEIGHT: 55 IN | SYSTOLIC BLOOD PRESSURE: 110 MMHG

## 2019-12-13 DIAGNOSIS — E53.8 LOW VITAMIN B12 LEVEL: ICD-10-CM

## 2019-12-13 DIAGNOSIS — Z23 ENCOUNTER FOR IMMUNIZATION: ICD-10-CM

## 2019-12-13 DIAGNOSIS — I10 ESSENTIAL HYPERTENSION: ICD-10-CM

## 2019-12-13 DIAGNOSIS — E78.2 MIXED HYPERLIPIDEMIA: ICD-10-CM

## 2019-12-13 DIAGNOSIS — N31.9 NEUROGENIC BLADDER: ICD-10-CM

## 2019-12-13 DIAGNOSIS — Q05.9 SPINA BIFIDA, UNSPECIFIED HYDROCEPHALUS PRESENCE, UNSPECIFIED SPINAL REGION (HCC): Primary | ICD-10-CM

## 2019-12-13 RX ORDER — DICLOFENAC POTASSIUM 50 MG/1
50 TABLET, FILM COATED ORAL DAILY
Qty: 90 TAB | Refills: 3 | Status: SHIPPED | OUTPATIENT
Start: 2019-12-13 | End: 2021-07-20 | Stop reason: SDUPTHER

## 2019-12-13 RX ORDER — SIMVASTATIN 10 MG/1
10 TABLET, FILM COATED ORAL
Qty: 90 TAB | Refills: 3 | Status: SHIPPED | OUTPATIENT
Start: 2019-12-13 | End: 2020-06-19 | Stop reason: DRUGHIGH

## 2019-12-13 RX ORDER — LISINOPRIL 10 MG/1
10 TABLET ORAL DAILY
Qty: 90 TAB | Refills: 3 | Status: SHIPPED | OUTPATIENT
Start: 2019-12-13 | End: 2020-12-11 | Stop reason: SDUPTHER

## 2019-12-13 RX ORDER — ERGOCALCIFEROL 1.25 MG/1
50000 CAPSULE ORAL
Qty: 12 CAP | Refills: 3 | Status: SHIPPED | OUTPATIENT
Start: 2019-12-13 | End: 2020-06-17

## 2019-12-13 NOTE — PROGRESS NOTES
Reviewed record in preparation for visit and have obtained necessary documentation. Identified pt with two pt identifiers(name and ). Chief Complaint   Patient presents with    Cholesterol Problem       Health Maintenance Due   Topic Date Due    DTaP/Tdap/Td series (1 - Tdap) 1972    Shingrix Vaccine Age 50> (1 of 2) 2011    PAP AKA CERVICAL CYTOLOGY  2018    Influenza Age 9 to Adult  2019       Ms. Quynh Mcclellan has a reminder for a \"due or due soon\" health maintenance. I have asked that she discuss health maintenance topic(s) due with Her  primary care provider. Coordination of Care Questionnaire:  :     1) Have you been to an emergency room, urgent care clinic since your last visit? no   Hospitalized since your last visit? no             2) Have you seen or consulted any other health care providers outside of [address] [place] [address] since your last visit? no  (Include any pap smears or colon screenings in this section.)    3) Do you have an Advance Directive on file? yes    4) Are you interested in receiving information on Advance Directives? NO    Patient is accompanied by self I have received verbal consent from Sergio Jones to discuss any/all medical information while they are present in the room.

## 2019-12-13 NOTE — PROGRESS NOTES
Jakub Ribera is a 63 y.o. female who presents for evaluation of routine follow up. Last seen by me sept 10, 2019 in npv. Overall doing well, though right shoulder has been bothering her somewhat. Saw orthova, and is working with PT and taking diclofenac. Stopped taking her zocor as she wondered if that was causing her muscle pains. Has since resolved the zocor. ROS:  Constitutional: negative for fevers, chills, anorexia and weight loss  Eyes:   negative for visual disturbance and irritation  ENT:   negative for tinnitus,sore throat,nasal congestion,ear pain,hoarseness  Respiratory:  negative for cough, hemoptysis, dyspnea,wheezing  CV:   negative for chest pain, palpitations, lower extremity edema  GI:   negative for nausea, vomiting, diarrhea, abdominal pain,melena  Genitourinary: negative for frequency, dysuria and hematuria  Musculoskel: negative for myalgias, arthralgias, back pain, muscle weakness. ++right shoulder joint pain  Neurological:  negative for headaches, dizziness, focal weakness, numbness  Psychiatric:     Negative for depression or anxiety      Past Medical History:   Diagnosis Date    Hypercholesterolemia     Hypertension     Spina bifida [facility])        Past Surgical History:   Procedure Laterality Date    COLONOSCOPY N/A 5/1/2019    COLONOSCOPY performed by Tom Mast MD at [facility]  5/1/2019         HX OTHER SURGICAL  1890s    sacral wound flap repair       History reviewed. No pertinent family history.     Social History     Socioeconomic History    Marital status:      Spouse name: Not on file    Number of children: Not on file    Years of education: Not on file    Highest education level: Not on file   Occupational History    Not on file   Social Needs    Financial resource strain: Not on file    Food insecurity:     Worry: Not on file     Inability: Not on file    Transportation needs:     Medical: Not on file Non-medical: Not on file   Tobacco Use    Smoking status: Never Smoker    Smokeless tobacco: Never Used   Substance and Sexual Activity    Alcohol use: Never     Frequency: Never    Drug use: Never    Sexual activity: Not Currently   Lifestyle    Physical activity:     Days per week: Not on file     Minutes per session: Not on file    Stress: Not on file   Relationships    Social connections:     Talks on phone: Not on file     Gets together: Not on file     Attends Moravian service: Not on file     Active member of club or organization: Not on file     Attends meetings of clubs or organizations: Not on file     Relationship status: Not on file    Intimate partner violence:     Fear of current or ex partner: Not on file     Emotionally abused: Not on file     Physically abused: Not on file     Forced sexual activity: Not on file   Other Topics Concern    Not on file   Social History Narrative    Not on file            Visit Vitals  /70 (BP 1 Location: Right arm, BP Patient Position: Sitting)   Pulse 86   Temp 97.9 °F (36.6 °C) (Oral)   Resp 14   Ht 4' 7\" (1.397 m)   LMP 08/01/2018 (LMP Unknown)   SpO2 96%   BMI 37.12 kg/m²       Physical Examination:   General - Well appearing female  HEENT - PERRL, TM no erythema/opacification, normal nasal turbinates, no oropharyngeal erythema or exudate, MMM  Neck - supple, no bruits, no thyroidomegaly, no lymphadenopathy  Pulm - clear to auscultation bilaterally  Cardio - RRR, normal S1 S2, no murmur  Abd - soft, nontender, no masses, no HSM  Extrem - no edema, +2 distal pulses  Neuro-  No focal deficits, CN intact     Assessment/Plan:    1.  hyperlipids--resumed zocor, check flp again at next visit in 6 months  2. Low vit d--rx sent in  3.  htn--controlled with lisinopril  4. Spina bifida--unable to walk, w/c dependent. Asks about getting a scale for w/c pts. 5.  Neurogenic bladder--straight caths self  6. Low b12--on replacement, at goal now  7.   Hx colitis--resolved, no longer on any treatment    Flu shot given today.   rx given for tdap and shingrix  rtc 6 months        Phylliss Billing III, DO

## 2019-12-13 NOTE — PATIENT INSTRUCTIONS
Neurogenic Bladder: Care Instructions  Your Care Instructions    Neurogenic bladder is nerve damage that keeps the bladder from working properly. The damage can be caused by an injury or disease. You may find it hard to go to the bathroom when you need to. Or you may leak urine between bathroom visits. Nerve damage in the brain, spinal cord, or elsewhere in the body can cause neurogenic bladder. Diseases that can lead to neurogenic bladder include Parkinson's disease, diabetes, and multiple sclerosis. The treatment for neurogenic bladder depends on the cause. Sometimes you can solve the problem by changing your diet, doing exercises to keep urine from leaking, or learning how to control your bladder. You might be able to empty your bladder using a thin flexible tube called a catheter that you insert into the bladder. However, you may need medicine, surgery, or both. Follow-up care is a key part of your treatment and safety. Be sure to make and go to all appointments, and call your doctor if you are having problems. It's also a good idea to know your test results and keep a list of the medicines you take. How can you care for yourself at home? · Take medicine as prescribed. If your doctor prescribed antibiotics, take them as directed. Do not stop taking them just because you feel better. You need to take the full course of antibiotics. · If a certain food seems to affect your bladder, stop eating it to see if the problem goes away. · Do not smoke. It can irritate the bladder and cause bladder cancer. If you need help quitting, talk to your doctor about stop-smoking programs and medicines. These can increase your chances of quitting for good. · Try bladder training. Set certain times to go to the bathroom, and slowly increase the time between bathroom visits. This may help lengthen the time your bladder can hold urine. · Do pelvic floor (Kegel) exercises, which tighten and strengthen pelvic muscles.  To do Kegel exercises:  ? Squeeze the same muscles you would use to stop your urine. Your belly and thighs should not move. ? Hold the squeeze for 3 seconds, and then relax for 3 seconds. ? Start with 3 seconds. Then add 1 second each week until you are able to squeeze for 10 seconds. ? Repeat the exercise 10 to 15 times a session. Do three or more sessions a day. · Wash your pubic area with a mild soap. Avoid deodorant soaps or soaps with heavy perfumes. · Wear loose-fitting clothing that does not put pressure on your bladder. · Wear pads in your underwear to absorb urine leakage during treatment. · Consider joining a support group. Sharing your experiences with other people who have the same problem may help you learn more and cope better. When should you call for help? Call your doctor now or seek immediate medical care if:    · You have a fever not caused by the flu or other illness.     · You have severe pain in your lower back.     · You have blood or pus in your urine.     · Your urine is cloudy or smells bad.     · You have pain or bleeding when you insert the catheter.     · You have swelling in your belly.     · You cannot urinate.    Watch closely for changes in your health, and be sure to contact your doctor if:    · You do not get better as expected. Where can you learn more? Go to http://shilpa-bill.info/. Enter F358 in the search box to learn more about \"Neurogenic Bladder: Care Instructions. \"  Current as of: December 19, 2018  Content Version: 12.2  © 4044-1956 [employer]. Care instructions adapted under license by [employer] (which disclaims liability or warranty for this information). If you have questions about a medical condition or this instruction, always ask your healthcare professional. [address] any warranty or liability for your use of this information.

## 2019-12-16 ENCOUNTER — TELEPHONE (OUTPATIENT)
Dept: INTERNAL MEDICINE CLINIC | Age: 59
End: 2019-12-16

## 2019-12-16 NOTE — TELEPHONE ENCOUNTER
Returned call to patient. Clarification given for Vitamin D. Patient to take Vitamin D 50,000 units once weekly. Per patient, no further questions.

## 2019-12-16 NOTE — TELEPHONE ENCOUNTER
Patient states she needs a call back to discuss her Prescription for Vitamin D & what was discussed & what was actually on the prescription. Please call to advise.  Thank you

## 2020-06-15 ENCOUNTER — TELEPHONE (OUTPATIENT)
Dept: INTERNAL MEDICINE CLINIC | Age: 60
End: 2020-06-15

## 2020-06-15 DIAGNOSIS — E55.9 VITAMIN D DEFICIENCY: Primary | ICD-10-CM

## 2020-06-15 DIAGNOSIS — R73.03 PREDIABETES: ICD-10-CM

## 2020-06-15 DIAGNOSIS — E78.2 MIXED HYPERLIPIDEMIA: ICD-10-CM

## 2020-06-15 NOTE — TELEPHONE ENCOUNTER
Vince Le III, DO  You 5 minutes ago (10:46 AM)     Fasting labs ordered:  vit d, cmp, flp, a1c. Routing comment      Lab orders faxed.

## 2020-06-17 LAB
25(OH)D3+25(OH)D2 SERPL-MCNC: 22.1 NG/ML (ref 30–100)
ALBUMIN SERPL-MCNC: 4.5 G/DL (ref 3.8–4.9)
ALBUMIN/GLOB SERPL: 2.6 {RATIO} (ref 1.2–2.2)
ALP SERPL-CCNC: 63 IU/L (ref 39–117)
ALT SERPL-CCNC: 40 IU/L (ref 0–32)
AST SERPL-CCNC: 25 IU/L (ref 0–40)
BILIRUB SERPL-MCNC: 0.5 MG/DL (ref 0–1.2)
BUN SERPL-MCNC: 11 MG/DL (ref 6–24)
BUN/CREAT SERPL: 26 (ref 9–23)
CALCIUM SERPL-MCNC: 9.5 MG/DL (ref 8.7–10.2)
CHLORIDE SERPL-SCNC: 102 MMOL/L (ref 96–106)
CHOLEST SERPL-MCNC: 165 MG/DL (ref 100–199)
CO2 SERPL-SCNC: 23 MMOL/L (ref 20–29)
CREAT SERPL-MCNC: 0.43 MG/DL (ref 0.57–1)
EST. AVERAGE GLUCOSE BLD GHB EST-MCNC: 117 MG/DL
GLOBULIN SER CALC-MCNC: 1.7 G/DL (ref 1.5–4.5)
GLUCOSE SERPL-MCNC: 134 MG/DL (ref 65–99)
HBA1C MFR BLD: 5.7 % (ref 4.8–5.6)
HDLC SERPL-MCNC: 47 MG/DL
LDLC SERPL CALC-MCNC: 70 MG/DL (ref 0–99)
POTASSIUM SERPL-SCNC: 3.9 MMOL/L (ref 3.5–5.2)
PROT SERPL-MCNC: 6.2 G/DL (ref 6–8.5)
SODIUM SERPL-SCNC: 139 MMOL/L (ref 134–144)
TRIGL SERPL-MCNC: 238 MG/DL (ref 0–149)
VLDLC SERPL CALC-MCNC: 48 MG/DL (ref 5–40)

## 2020-06-17 RX ORDER — ACETAMINOPHEN 500 MG
2000 TABLET ORAL 2 TIMES DAILY
Qty: 180 CAP | Refills: 3 | Status: SHIPPED | OUTPATIENT
Start: 2020-06-17 | End: 2020-12-22 | Stop reason: DRUGHIGH

## 2020-06-19 ENCOUNTER — VIRTUAL VISIT (OUTPATIENT)
Dept: INTERNAL MEDICINE CLINIC | Age: 60
End: 2020-06-19

## 2020-06-19 DIAGNOSIS — E78.2 MIXED HYPERLIPIDEMIA: ICD-10-CM

## 2020-06-19 DIAGNOSIS — M19.012 PRIMARY OSTEOARTHRITIS OF LEFT SHOULDER: ICD-10-CM

## 2020-06-19 DIAGNOSIS — E55.9 VITAMIN D DEFICIENCY: ICD-10-CM

## 2020-06-19 DIAGNOSIS — E53.8 LOW VITAMIN B12 LEVEL: ICD-10-CM

## 2020-06-19 DIAGNOSIS — R73.03 PREDIABETES: ICD-10-CM

## 2020-06-19 DIAGNOSIS — I10 ESSENTIAL HYPERTENSION: Primary | ICD-10-CM

## 2020-06-19 DIAGNOSIS — N31.9 NEUROGENIC BLADDER: ICD-10-CM

## 2020-06-19 DIAGNOSIS — Q05.9 SPINA BIFIDA, UNSPECIFIED HYDROCEPHALUS PRESENCE, UNSPECIFIED SPINAL REGION (HCC): ICD-10-CM

## 2020-06-19 DIAGNOSIS — K52.9 COLITIS: ICD-10-CM

## 2020-06-19 NOTE — PROGRESS NOTES
Wicho Lezama is a 63 y.o. female who was seen by synchronous (real-time) audio-video technology on 2020. Consent: Wicho Lezama, who was seen by synchronous (real-time) audio-video technology, and/or her healthcare decision maker, is aware that this patient-initiated, Telehealth encounter on 2020 is a billable service, with coverage as determined by her insurance carrier. She is aware that she may receive a bill and has provided verbal consent to proceed: Yes. Assessment & Plan:   Diagnoses and all orders for this visit:    1. Essential hypertension    2. Spina bifida, unspecified hydrocephalus presence, unspecified spinal region ([facility] [address]    3. Low vitamin B12 level    4. Neurogenic bladder    5. Mixed hyperlipidemia    6. Colitis    7. Vitamin D deficiency    8. Primary osteoarthritis of left shoulder    9. Prediabetes        I spent at least 25 minutes on this visit with this established patient. 712  Subjective:   Wicho Lezama is a 63 y.o. female who was seen for Hypertension and Cholesterol Problem    Last seen by me dec 13, 2019. Doing better now, though has had rough couple of months. Her 's 1 year passing was in feb, her dog  a few weeks ago, and another family member passed away a few months ago. She is on the mend. This is a virtual visit due to covid 19. Prior to Admission medications    Medication Sig Start Date End Date Taking? Authorizing Provider   cholecalciferol (VITAMIN D3) (2,000 UNITS /50 MCG) cap capsule Take 2,000 Units by mouth two (2) times a day. 20  Yes Vince Le III, DO   simvastatin (ZOCOR) 20 mg tablet TAKE 1 TABLET BY MOUTH ONCE DAILY 3/31/20  Yes Vince Le III, DO   lisinopril (PRINIVIL, ZESTRIL) 10 mg tablet Take 1 Tab by mouth daily. 19  Yes Vince Le III, DO   diclofenac potassium (CATAFLAM) 50 mg tablet Take 1 Tab by mouth daily.  19  Yes Vince Le III, DO   cyanocobalamin (VITAMIN B12) 1,000 mcg/mL injection 1 mL by IntraMUSCular route every seven (7) days. Do this for 4 weeks and then monthly thereafter 5/3/19  Yes Luann Ulrich MD     Allergies   Allergen Reactions    Sulfa (Sulfonamide Antibiotics) Nausea and Vomiting           ROS      Objective:     Visit Vitals  LMP 08/01/2018 (LMP Unknown)      General: alert, cooperative, no distress   Mental  status: normal mood, behavior, speech, dress, motor activity, and thought processes, able to follow commands   HENT: NCAT   Neck: no visualized mass   Resp: no respiratory distress   Neuro: no gross deficits   Skin: no discoloration or lesions of concern on visible areas   Psychiatric: normal affect, consistent with stated mood, no evidence of hallucinations     Additional exam findings:     1. Vit d def--increase replacement to 2000 bid  2.  hyperlipids--much improved, continue zocor  3. Prediabetes--stable, a1c 5.7  4.  b12 def--on replacement  5. Bilateral shoulder osteoarthritis--does well with diclofenac, prn tylenol  6.  htn--continue lisinopril  7. Spina bifida with neurogenic bladder--  8. Hx colitis--    rtc 6 months for cpe. We discussed the expected course, resolution and complications of the diagnosis(es) in detail. Medication risks, benefits, costs, interactions, and alternatives were discussed as indicated. I advised her to contact the office if her condition worsens, changes or fails to improve as anticipated. She expressed understanding with the diagnosis(es) and plan. Luli Asencio is a 63 y.o. female who was evaluated by a video visit encounter for concerns as above. Patient identification was verified prior to start of the visit. A caregiver was present when appropriate. Due to this being a TeleHealth encounter (During GOVUV-65 public health emergency), evaluation of the following organ systems was limited: Vitals/Constitutional/EENT/Resp/CV/GI//MS/Neuro/Skin/Heme-Lymph-Imm.   Pursuant to the emergency declaration under the [facility], [facility] waiver authority and the [employer] and [employer] Act, this Virtual  Visit was conducted, with patient's (and/or legal guardian's) consent, to reduce the patient's risk of exposure to COVID-19 and provide necessary medical care. Services were provided through a video synchronous discussion virtually to substitute for in-person clinic visit. Patient and provider were located at their individual homes.       Wilfredo Real III, DO

## 2020-06-19 NOTE — PATIENT INSTRUCTIONS
Office Policies Phone calls/patient messages: Please allow up to 24 hours for someone in the office to contact you about your call or message. Be mindful your provider may be out of the office or your message may require further review. We encourage you to use [employer] for your messages as this is a faster, more efficient way to communicate with our office Medication Refills: 
         
Prescription medications require 48-72 business hours to process. We encourage you to use [employer] for your refills. For controlled medications: Please allow 72 business hours to process. Certain medications may require you to  a written prescription at our office. NO narcotic/controlled medications will be prescribed after 4pm Monday through Friday or on weekends Form/Paperwork Completion: 
         
Please note a $25 fee may incur for all paperwork for completed by our providers. We ask that you allow 7-10 business days. Pre-payment is due prior to picking up/faxing the completed form. You may also download your forms to [employer] to have your doctor print off. This is an established visit conducted via telemedicine. The patient has been instructed that this meets HIPAA criteria and acknowledges and agrees to this method of visitation.  
 
Briseyda Estrada LPN 
15/63/02 
5:15 AM

## 2020-12-09 ENCOUNTER — TELEPHONE (OUTPATIENT)
Dept: INTERNAL MEDICINE CLINIC | Age: 60
End: 2020-12-09

## 2020-12-09 NOTE — TELEPHONE ENCOUNTER
----- Message from Murtaza Hearn sent at 12/9/2020 10:26 AM EST -----  Regarding: Dr Costella Kussmaul Message/Vendor Calls    Caller's first and last name: n/a      Reason for call: routine blood work, A1C      Callback required yes/no and why: yes      Best contact number(s):[phone]      Details to clarify the request: Pt is asking if she needs to get blood work before her next appointment. Pt was advised she will receive a call if she can have the blood work done before the visit. If she does not hear from the office she will do the blood work after her visit.        Message from [facility]

## 2020-12-11 RX ORDER — LISINOPRIL 10 MG/1
10 TABLET ORAL DAILY
Qty: 90 TAB | Refills: 3 | Status: SHIPPED | OUTPATIENT
Start: 2020-12-11 | End: 2021-07-20

## 2020-12-17 ENCOUNTER — TELEPHONE (OUTPATIENT)
Dept: INTERNAL MEDICINE CLINIC | Age: 60
End: 2020-12-17

## 2020-12-17 NOTE — TELEPHONE ENCOUNTER
Pt decided to wait until Monday. Pt verbalized understanding of information discussed w/ no further questions at this time.

## 2020-12-17 NOTE — TELEPHONE ENCOUNTER
----- Message from Rosalina Freedman sent at 12/17/2020  9:48 AM EST -----  Regarding: Dr. Elsa Galarza  Caller's first and last name: self  Reason for call: Has a possible UTI. Would like to know if she should bring a sample in today or wait until her appointment on Monday. Callback required yes/no and why: Yes, for further instructions.   Best contact number(s): [phone]  Details to clarify the request:  Copy/paste [place]

## 2020-12-21 ENCOUNTER — OFFICE VISIT (OUTPATIENT)
Dept: INTERNAL MEDICINE CLINIC | Age: 60
End: 2020-12-21
Payer: COMMERCIAL

## 2020-12-21 VITALS
DIASTOLIC BLOOD PRESSURE: 73 MMHG | TEMPERATURE: 97.2 F | OXYGEN SATURATION: 98 % | HEIGHT: 55 IN | SYSTOLIC BLOOD PRESSURE: 126 MMHG | HEART RATE: 99 BPM | WEIGHT: 159 LBS | RESPIRATION RATE: 16 BRPM | BODY MASS INDEX: 36.8 KG/M2

## 2020-12-21 DIAGNOSIS — K59.00 CONSTIPATION, UNSPECIFIED CONSTIPATION TYPE: ICD-10-CM

## 2020-12-21 DIAGNOSIS — N31.9 NEUROGENIC BLADDER: ICD-10-CM

## 2020-12-21 DIAGNOSIS — Q05.9 SPINA BIFIDA, UNSPECIFIED HYDROCEPHALUS PRESENCE, UNSPECIFIED SPINAL REGION (HCC): ICD-10-CM

## 2020-12-21 DIAGNOSIS — E66.01 SEVERE OBESITY (HCC): ICD-10-CM

## 2020-12-21 DIAGNOSIS — R30.0 PAINFUL URGING TO URINATE: ICD-10-CM

## 2020-12-21 DIAGNOSIS — Z00.00 ANNUAL PHYSICAL EXAM: Primary | ICD-10-CM

## 2020-12-21 DIAGNOSIS — E55.9 VITAMIN D DEFICIENCY: ICD-10-CM

## 2020-12-21 DIAGNOSIS — R73.03 PREDIABETES: ICD-10-CM

## 2020-12-21 DIAGNOSIS — I10 ESSENTIAL HYPERTENSION: ICD-10-CM

## 2020-12-21 DIAGNOSIS — E78.2 MIXED HYPERLIPIDEMIA: ICD-10-CM

## 2020-12-21 LAB
BILIRUB UR QL STRIP: NEGATIVE
GLUCOSE UR-MCNC: NEGATIVE MG/DL
KETONES P FAST UR STRIP-MCNC: NEGATIVE MG/DL
PH UR STRIP: 6 [PH] (ref 4.6–8)
PROT UR QL STRIP: NEGATIVE
SP GR UR STRIP: 1 (ref 1–1.03)
UA UROBILINOGEN AMB POC: NORMAL (ref 0.2–1)
URINALYSIS CLARITY POC: CLEAR
URINALYSIS COLOR POC: YELLOW
URINE BLOOD POC: NORMAL
URINE LEUKOCYTES POC: NEGATIVE
URINE NITRITES POC: NEGATIVE

## 2020-12-21 PROCEDURE — 81003 URINALYSIS AUTO W/O SCOPE: CPT | Performed by: INTERNAL MEDICINE

## 2020-12-21 PROCEDURE — 99396 PREV VISIT EST AGE 40-64: CPT | Performed by: INTERNAL MEDICINE

## 2020-12-21 NOTE — PROGRESS NOTES
Trina Son is a 62 y.o. female who presents for evaluation of annual cpe. Last seen by me June 19, 2020 in virtual visit. Overall doing ok, though has struggled with constipation more so lately. Trying to drink more water and increase fiber in her diet. Also has plans to do weight loss program with [facility] weight loss clinic. ROS:  Constitutional: negative for fevers, chills, anorexia and weight loss  Eyes:   negative for visual disturbance and irritation  ENT:   negative for tinnitus,sore throat,nasal congestion,ear pain,hoarseness  Respiratory:  negative for cough, hemoptysis, dyspnea,wheezing  CV:   negative for chest pain, palpitations, lower extremity edema  GI:   negative for nausea, vomiting, diarrhea, abdominal pain,melena  Genitourinary: negative for frequency, dysuria and hematuria  Musculoskel: negative for myalgias, arthralgias, back pain, muscle weakness, joint pain  Neurological:  negative for headaches, dizziness, focal weakness, numbness  Psychiatric:     Negative for depression or anxiety      Past Medical History:   Diagnosis Date    Hypercholesterolemia     Hypertension     Spina bifida [facility])        Past Surgical History:   Procedure Laterality Date    COLONOSCOPY N/A 5/1/2019    COLONOSCOPY performed by Jony Acevedo MD at [facility] ENDOSCOPY    COLONOSCOPY,DIAGNOSTIC  5/1/2019         HX OTHER SURGICAL  1890s    sacral wound flap repair       History reviewed. No pertinent family history.     Social History     Socioeconomic History    Marital status:      Spouse name: Not on file    Number of children: Not on file    Years of education: Not on file    Highest education level: Not on file   Occupational History    Not on file   Social Needs    Financial resource strain: Not on file    Food insecurity     Worry: Not on file     Inability: Not on file    Transportation needs     Medical: Not on file     Non-medical: Not on file   Tobacco Use    Smoking status: Never Smoker    Smokeless tobacco: Never Used   Substance and Sexual Activity    Alcohol use: Never     Frequency: Never    Drug use: Never    Sexual activity: Not Currently   Lifestyle    Physical activity     Days per week: Not on file     Minutes per session: Not on file    Stress: Not on file   Relationships    Social connections     Talks on phone: Not on file     Gets together: Not on file     Attends Yazidism service: Not on file     Active member of club or organization: Not on file     Attends meetings of clubs or organizations: Not on file     Relationship status: Not on file    Intimate partner violence     Fear of current or ex partner: Not on file     Emotionally abused: Not on file     Physically abused: Not on file     Forced sexual activity: Not on file   Other Topics Concern    Not on file   Social History Narrative    Not on file            Visit Vitals  /73 (BP 1 Location: Left arm, BP Patient Position: Sitting)   Pulse 99   Temp 97.2 °F (36.2 °C) (Temporal)   Resp 16   Ht 4' 7\" (1.397 m)   Wt 159 lb (72.1 kg)   LMP 08/01/2018 (LMP Unknown)   SpO2 98%   BMI 36.96 kg/m²       Physical Examination:   General - Well appearing female. In w/c due to spina bifida. HEENT - PERRL, TM no erythema/opacification, normal nasal turbinates, no oropharyngeal erythema or exudate, MMM  Neck - supple, no bruits, no thyroidomegaly, no lymphadenopathy  Pulm - clear to auscultation bilaterally  Cardio - RRR, normal S1 S2, no murmur  Abd - soft, nontender, no masses, no HSM  Extrem - no edema, +2 distal pulses  Neuro-  No focal deficits, CN intact     Assessment/Plan:    1. Annual cpe--check cbc, cmp, flp, tsh, a1c  2. htn--controlled with lisinpril. Check cbc, cmp, tsh  3.  hyperlipids--on zocor, check flp, cmp  4.  predm--check a1c, last was 5.7  5. Vit b12 def--on monthly shots. Check levels  6.   Vit d def--check levels,  She had stopped replacement recently, wondered if it was contributing to her constipation  7. Spina bifida with neurogenic bladder and bowels--ua today looked clean  8. Constipation--suggested to add stool softener, colace or senna. Stay hydrated. Follows with dr Ana Maria Hurtado. Declines shingrix. Had flu shot few months ago. Has appt for mamm and pap in feb.   rtc 6 months        Beverley Kowalski III, DO

## 2020-12-21 NOTE — PATIENT INSTRUCTIONS
Constipation: Care Instructions Your Care Instructions Constipation means that you have a hard time passing stools (bowel movements). People pass stools from 3 times a day to once every 3 days. What is normal for you may be different. Constipation may occur with pain in the rectum and cramping. The pain may get worse when you try to pass stools. Sometimes there are small amounts of bright red blood on toilet paper or the surface of stools. This is because of enlarged veins near the rectum (hemorrhoids). A few changes in your diet and lifestyle may help you avoid ongoing constipation. Your doctor may also prescribe medicine to help loosen your stool. Some medicines can cause constipation. These include pain medicines and antidepressants. Tell your doctor about all the medicines you take. Your doctor may want to make a medicine change to ease your symptoms. Follow-up care is a key part of your treatment and safety. Be sure to make and go to all appointments, and call your doctor if you are having problems. It's also a good idea to know your test results and keep a list of the medicines you take. How can you care for yourself at home? · Drink plenty of fluids, enough so that your urine is light yellow or clear like water. If you have kidney, heart, or liver disease and have to limit fluids, talk with your doctor before you increase the amount of fluids you drink. · Include high-fiber foods in your diet each day. These include fruits, vegetables, beans, and whole grains. · Get at least 30 minutes of exercise on most days of the week. Walking is a good choice. You also may want to do other activities, such as running, swimming, cycling, or playing tennis or team sports. · Take a fiber supplement, such as Citrucel or Metamucil, every day. Read and follow all instructions on the label. · Schedule time each day for a bowel movement. A daily routine may help. Take your time having your bowel movement. · Support your feet with a small step stool when you sit on the toilet. This helps flex your hips and places your pelvis in a squatting position. · Your doctor may recommend an over-the-counter laxative to relieve your constipation. Examples are Milk of Magnesia and MiraLax. Read and follow all instructions on the label. Do not use laxatives on a long-term basis. When should you call for help? Call your doctor now or seek immediate medical care if: 
  · You have new or worse belly pain.  
  · You have new or worse nausea or vomiting.  
  · You have blood in your stools. Watch closely for changes in your health, and be sure to contact your doctor if: 
  · Your constipation is getting worse.  
  · You do not get better as expected. Where can you learn more? Go to http://www.gray.com/ Enter 21 [phone] in the search box to learn more about \"Constipation: Care Instructions. \" Current as of: June 26, 2019               Content Version: 12.6 © 3330-4165 [employer]. Care instructions adapted under license by [employer] (which disclaims liability or warranty for this information). If you have questions about a medical condition or this instruction, always ask your healthcare professional. [place] [address] any warranty or liability for your use of this information. Would add daily stool softener, like colace or senna-s.

## 2020-12-22 LAB
25(OH)D3+25(OH)D2 SERPL-MCNC: 17.4 NG/ML (ref 30–100)
ALBUMIN SERPL-MCNC: 4.7 G/DL (ref 3.8–4.9)
ALBUMIN/GLOB SERPL: 2 {RATIO} (ref 1.2–2.2)
ALP SERPL-CCNC: 74 IU/L (ref 39–117)
ALT SERPL-CCNC: 34 IU/L (ref 0–32)
AST SERPL-CCNC: 21 IU/L (ref 0–40)
BASOPHILS # BLD AUTO: 0 X10E3/UL (ref 0–0.2)
BASOPHILS NFR BLD AUTO: 1 %
BILIRUB SERPL-MCNC: 0.8 MG/DL (ref 0–1.2)
BUN SERPL-MCNC: 10 MG/DL (ref 6–24)
BUN/CREAT SERPL: 24 (ref 9–23)
CALCIUM SERPL-MCNC: 9.7 MG/DL (ref 8.7–10.2)
CHLORIDE SERPL-SCNC: 100 MMOL/L (ref 96–106)
CHOLEST SERPL-MCNC: 170 MG/DL (ref 100–199)
CO2 SERPL-SCNC: 25 MMOL/L (ref 20–29)
CREAT SERPL-MCNC: 0.42 MG/DL (ref 0.57–1)
EOSINOPHIL # BLD AUTO: 0 X10E3/UL (ref 0–0.4)
EOSINOPHIL NFR BLD AUTO: 0 %
ERYTHROCYTE [DISTWIDTH] IN BLOOD BY AUTOMATED COUNT: 11.5 % (ref 11.7–15.4)
EST. AVERAGE GLUCOSE BLD GHB EST-MCNC: 117 MG/DL
GLOBULIN SER CALC-MCNC: 2.4 G/DL (ref 1.5–4.5)
GLUCOSE SERPL-MCNC: 91 MG/DL (ref 65–99)
HBA1C MFR BLD: 5.7 % (ref 4.8–5.6)
HCT VFR BLD AUTO: 39.6 % (ref 34–46.6)
HDLC SERPL-MCNC: 57 MG/DL
HGB BLD-MCNC: 13.1 G/DL (ref 11.1–15.9)
IMM GRANULOCYTES # BLD AUTO: 0 X10E3/UL (ref 0–0.1)
IMM GRANULOCYTES NFR BLD AUTO: 0 %
LDLC SERPL CALC-MCNC: 86 MG/DL (ref 0–99)
LYMPHOCYTES # BLD AUTO: 1.8 X10E3/UL (ref 0.7–3.1)
LYMPHOCYTES NFR BLD AUTO: 28 %
MCH RBC QN AUTO: 30.5 PG (ref 26.6–33)
MCHC RBC AUTO-ENTMCNC: 33.1 G/DL (ref 31.5–35.7)
MCV RBC AUTO: 92 FL (ref 79–97)
MONOCYTES # BLD AUTO: 0.4 X10E3/UL (ref 0.1–0.9)
MONOCYTES NFR BLD AUTO: 7 %
NEUTROPHILS # BLD AUTO: 4.1 X10E3/UL (ref 1.4–7)
NEUTROPHILS NFR BLD AUTO: 64 %
PLATELET # BLD AUTO: 297 X10E3/UL (ref 150–450)
POTASSIUM SERPL-SCNC: 4.4 MMOL/L (ref 3.5–5.2)
PROT SERPL-MCNC: 7.1 G/DL (ref 6–8.5)
RBC # BLD AUTO: 4.3 X10E6/UL (ref 3.77–5.28)
SODIUM SERPL-SCNC: 138 MMOL/L (ref 134–144)
TRIGL SERPL-MCNC: 159 MG/DL (ref 0–149)
TSH SERPL DL<=0.005 MIU/L-ACNC: 0.77 UIU/ML (ref 0.45–4.5)
VIT B12 SERPL-MCNC: 623 PG/ML (ref 232–1245)
VLDLC SERPL CALC-MCNC: 27 MG/DL (ref 5–40)
WBC # BLD AUTO: 6.4 X10E3/UL (ref 3.4–10.8)

## 2020-12-22 RX ORDER — CHOLECALCIFEROL TAB 125 MCG (5000 UNIT) 125 MCG
5000 TAB ORAL DAILY
Qty: 90 TAB | Refills: 3 | Status: SHIPPED | OUTPATIENT
Start: 2020-12-22 | End: 2021-07-20 | Stop reason: ALTCHOICE

## 2020-12-23 NOTE — PROGRESS NOTES
Called pt  Verified 2 identifiers  Advised of  lab result not that he increased vit D Rx and sent to pharmacy, otherwise labs looked good. Pt expressed understanding of information given.

## 2021-03-29 RX ORDER — DICLOFENAC SODIUM 50 MG/1
TABLET, DELAYED RELEASE ORAL
Qty: 60 TAB | Refills: 2 | Status: SHIPPED | OUTPATIENT
Start: 2021-03-29 | End: 2021-07-20 | Stop reason: ALTCHOICE

## 2021-04-21 RX ORDER — SIMVASTATIN 20 MG/1
TABLET, FILM COATED ORAL
Qty: 90 TAB | Refills: 3 | Status: SHIPPED | OUTPATIENT
Start: 2021-04-21 | End: 2021-07-20 | Stop reason: SDUPTHER

## 2021-07-20 ENCOUNTER — OFFICE VISIT (OUTPATIENT)
Dept: INTERNAL MEDICINE CLINIC | Age: 61
End: 2021-07-20
Payer: COMMERCIAL

## 2021-07-20 VITALS
BODY MASS INDEX: 36.96 KG/M2 | DIASTOLIC BLOOD PRESSURE: 79 MMHG | OXYGEN SATURATION: 98 % | SYSTOLIC BLOOD PRESSURE: 134 MMHG | RESPIRATION RATE: 14 BRPM | HEART RATE: 77 BPM | HEIGHT: 55 IN

## 2021-07-20 DIAGNOSIS — R30.0 DYSURIA: ICD-10-CM

## 2021-07-20 DIAGNOSIS — R73.03 PREDIABETES: ICD-10-CM

## 2021-07-20 DIAGNOSIS — N31.9 NEUROGENIC BLADDER: ICD-10-CM

## 2021-07-20 DIAGNOSIS — E78.2 MIXED HYPERLIPIDEMIA: ICD-10-CM

## 2021-07-20 DIAGNOSIS — K59.00 CONSTIPATION, UNSPECIFIED CONSTIPATION TYPE: ICD-10-CM

## 2021-07-20 DIAGNOSIS — Q05.9 SPINA BIFIDA, UNSPECIFIED HYDROCEPHALUS PRESENCE, UNSPECIFIED SPINAL REGION (HCC): Primary | ICD-10-CM

## 2021-07-20 DIAGNOSIS — L98.9 SKIN LESIONS, GENERALIZED: ICD-10-CM

## 2021-07-20 DIAGNOSIS — I10 ESSENTIAL HYPERTENSION: ICD-10-CM

## 2021-07-20 PROBLEM — K51.90 ULCERATIVE COLITIS (HCC): Status: RESOLVED | Noted: 2019-05-03 | Resolved: 2021-07-20

## 2021-07-20 LAB
BILIRUB UR QL STRIP: NEGATIVE
GLUCOSE UR-MCNC: NEGATIVE MG/DL
KETONES P FAST UR STRIP-MCNC: NEGATIVE MG/DL
PH UR STRIP: 7.5 [PH] (ref 4.6–8)
PROT UR QL STRIP: NEGATIVE
SP GR UR STRIP: 1.01 (ref 1–1.03)
UA UROBILINOGEN AMB POC: NORMAL (ref 0.2–1)
URINALYSIS CLARITY POC: CLEAR
URINALYSIS COLOR POC: YELLOW
URINE BLOOD POC: NEGATIVE
URINE LEUKOCYTES POC: NEGATIVE
URINE NITRITES POC: NEGATIVE

## 2021-07-20 PROCEDURE — 99214 OFFICE O/P EST MOD 30 MIN: CPT | Performed by: INTERNAL MEDICINE

## 2021-07-20 PROCEDURE — 81003 URINALYSIS AUTO W/O SCOPE: CPT | Performed by: INTERNAL MEDICINE

## 2021-07-20 RX ORDER — CYANOCOBALAMIN 1000 UG/ML
1000 INJECTION, SOLUTION INTRAMUSCULAR; SUBCUTANEOUS
Qty: 1 VIAL | Refills: 5 | Status: SHIPPED | OUTPATIENT
Start: 2021-07-20 | End: 2021-08-10

## 2021-07-20 RX ORDER — LISINOPRIL 10 MG/1
10 TABLET ORAL DAILY
COMMUNITY
End: 2021-07-20 | Stop reason: SDUPTHER

## 2021-07-20 RX ORDER — SIMVASTATIN 20 MG/1
TABLET, FILM COATED ORAL
Qty: 90 TABLET | Refills: 3 | Status: SHIPPED | OUTPATIENT
Start: 2021-07-20 | End: 2022-02-03 | Stop reason: SDUPTHER

## 2021-07-20 RX ORDER — LINACLOTIDE 72 UG/1
72 CAPSULE, GELATIN COATED ORAL DAILY
COMMUNITY
Start: 2021-07-02 | End: 2022-03-23 | Stop reason: SINTOL

## 2021-07-20 RX ORDER — LISINOPRIL 10 MG/1
10 TABLET ORAL DAILY
Qty: 90 TABLET | Refills: 3 | Status: SHIPPED | OUTPATIENT
Start: 2021-07-20 | End: 2022-02-03 | Stop reason: SDUPTHER

## 2021-07-20 RX ORDER — DICLOFENAC POTASSIUM 50 MG/1
50 TABLET, FILM COATED ORAL DAILY
Qty: 90 TABLET | Refills: 3 | Status: SHIPPED | OUTPATIENT
Start: 2021-07-20 | End: 2022-02-03 | Stop reason: SDUPTHER

## 2021-07-20 NOTE — PATIENT INSTRUCTIONS
Constipation: Care Instructions  Your Care Instructions     Constipation means that you have a hard time passing stools (bowel movements). People pass stools from 3 times a day to once every 3 days. What is normal for you may be different. Constipation may occur with pain in the rectum and cramping. The pain may get worse when you try to pass stools. Sometimes there are small amounts of bright red blood on toilet paper or the surface of stools. This is because of enlarged veins near the rectum (hemorrhoids). A few changes in your diet and lifestyle may help you avoid ongoing constipation. Your doctor may also prescribe medicine to help loosen your stool. Some medicines can cause constipation. These include pain medicines and antidepressants. Tell your doctor about all the medicines you take. Your doctor may want to make a medicine change to ease your symptoms. Follow-up care is a key part of your treatment and safety. Be sure to make and go to all appointments, and call your doctor if you are having problems. It's also a good idea to know your test results and keep a list of the medicines you take. How can you care for yourself at home? · Drink plenty of fluids. If you have kidney, heart, or liver disease and have to limit fluids, talk with your doctor before you increase the amount of fluids you drink. · Include high-fiber foods in your diet each day. These include fruits, vegetables, beans, and whole grains. · Get at least 30 minutes of exercise on most days of the week. Walking is a good choice. You also may want to do other activities, such as running, swimming, cycling, or playing tennis or team sports. · Take a fiber supplement, such as Citrucel or Metamucil, every day. Read and follow all instructions on the label. · Schedule time each day for a bowel movement. A daily routine may help. Take your time having your bowel movement.   · Support your feet with a small step stool when you sit on the toilet. This helps flex your hips and places your pelvis in a squatting position. · Your doctor may recommend an over-the-counter laxative to relieve your constipation. Examples are Milk of Magnesia and MiraLax. Read and follow all instructions on the label. Do not use laxatives on a long-term basis. When should you call for help? Call your doctor now or seek immediate medical care if:    · You have new or worse belly pain.     · You have new or worse nausea or vomiting.     · You have blood in your stools. Watch closely for changes in your health, and be sure to contact your doctor if:    · Your constipation is getting worse.     · You do not get better as expected. Where can you learn more? Go to http://www.miguel.com/  Enter P343 in the search box to learn more about \"Constipation: Care Instructions. \"  Current as of: February 26, 2020               Content Version: 12.8  © 2272-8538 [employer]. Care instructions adapted under license by [employer] (which disclaims liability or warranty for this information). If you have questions about a medical condition or this instruction, always ask your healthcare professional. [place] [address] any warranty or liability for your use of this information.

## 2021-07-20 NOTE — PROGRESS NOTES
Patti Pope is a 62 y.o. female who presents for evaluation of routine follow up. Last seen by me dec 21, 2020 in [facility]. Overall has done ok since then. However, left shoulder, upper arm, has been tingling more of late. No weakness. Also having some sensations of being 'flushed'--comes and goes. She would like to see derm, PT, and neurology. She did see gyn, had pap and mamm. ROS:  Constitutional: negative for fevers, chills, anorexia and weight loss  Eyes:   negative for visual disturbance and irritation  ENT:   negative for tinnitus,sore throat,nasal congestion,ear pain,hoarseness  Respiratory:  negative for cough, hemoptysis, dyspnea,wheezing  CV:   negative for chest pain, palpitations, lower extremity edema  GI:   negative for nausea, vomiting, diarrhea, abdominal pain,melena  Genitourinary: negative for frequency, dysuria and hematuria  Musculoskel: negative for myalgias, arthralgias, back pain, muscle weakness, joint pain  Neurological:  negative for headaches, dizziness, focal weakness, numbness  Psychiatric:     Negative for depression or anxiety      Past Medical History:   Diagnosis Date    COVID-19 05/2021    Hypercholesterolemia     Hypertension     Sp[facility] bifida [facility])        Past Surgical History:   Procedure Laterality Date    COLONOSCOPY N/A 5/1/2019    COL[facility] performed by [facility]y [facility]a [facility] at [facility]  5/1/2019         HX OTHER SURGICAL  1890s    sacral wound flap repair       History reviewed. No pertinent family history.     Social History     Socioeconomic History    Marital status:      Spouse name: Not on file    Number of children: Not on file    Years of education: Not on file    Highest education level: Not on file   Occupational History    Not on file   Tobacco Use    Smoking status: Never Smoker    Smokeless tobacco: Never Used   Substance and Sexual Activity    Alcohol use: Never    Drug use: Never    Sexual activity: Not Currently   Other Topics Concern    Not on file   Social History Narrative    Not on file     Social Determinants of Health     Financial Resource Strain:     Difficulty of Paying Living Expenses:    Food Insecurity:     Worried About Running Out of Food in the Last Year:     [address] in the Last Year:    Transportation Needs:     Lack of Transportation (Medical):  Lack of Transportation (Non-Medical):    Physical Activity:     Days of Exercise per Week:     Minutes of Exercise per Session:    Stress:     Feeling of Stress :    Social Connections:     Frequency of Communication with Friends and Family:     Frequency of Social Gatherings with Friends and Family:     Attends [employer] Services:     Active Member of Clubs or Organizations:     Attends Club or Organization Meetings:     Marital Status:    Intimate Partner Violence:     Fear of Current or Ex-Partner:     Emotionally Abused:     Physically Abused:     Sexually Abused:             Visit Vitals  /79 (BP 1 Location: Right upper arm, BP Patient Position: Sitting)   Pulse 77   Resp 14   Ht 4' 7\" (1.397 m)   LMP 08/01/2018 (LMP Unknown)   SpO2 98%   BMI 36.96 kg/m²       Physical Examination:   General - Well appearing female. In w/c. HEENT - PERRL, TM no erythema/opacification, normal nasal turbinates, no oropharyngeal erythema or exudate, MMM  Neck - supple, no bruits, no thyroidomegaly, no lymphadenopathy  Pulm - clear to auscultation bilaterally  Cardio - RRR, normal S1 S2, no murmur  Abd - soft, nontender, no masses, no HSM  Extrem - no edema, +2 distal pulses  Neuro-  No focal deficits, CN intact     Assessment/Plan:    1.  ibs-constipation--much improved after starting metamucil. I think she can hold the linzess  2.  htn--continue lisinopril  3. Spina bifida with neurogenic bladder and colon--referral to PT  4.   LUE paresthesias--referral to neurology, dr alena Conrad  5.  covid infection, may 2021--resolved  6.  hyperlipids--on zocor  7.   Skin lesions--referral to derm    Had pap and pam with gyn  rtc 5 months for cpe        Jean-Paul Temple III, DO

## 2021-09-23 NOTE — PROGRESS NOTES
Neurology Note    Patient ID:  Mirian Noel  [id number]  69 y.o.  1960    Date of Consultation:  September 24, 2021    Referring Physician: Dr. Kelly Halsted    Reason for Consultation:  Neurological symptoms    Assessment and Plan:    The patient is a pleasant 61-year-old female with a history of spina bifida, type II Chiari malformation, vitamin D deficiency, hypertension, dyslipidemia, and chronic shoulder tendinitis who presents with sensory symptoms and pain radiating down her left upper extremity. 1. Radiating left upper extremity sensory disturbance and pain:  The differential for this does include focal neuropathy such as an ulnar neuropathy. She does rest on her elbows a considerable amount of time. Also the differential would be cervical radiculopathy, brachial plexopathy, musculoskeletal etiology originating from her shoulder. I would like to start by performing an EMG/nerve conduction study. I did discuss the rationale of this with the patient today. Depending on those results, may need to consider spinal neuro imaging or imaging of her shoulder. I would like her to minimize resting on her elbow or put additional padding on her chair. She will begin to think at work in regards to better ergonomic posturing. 2. History of Chiari malformation and spina bifida:  Her examination does not reveal any increasing signs of upper motor neuron dysfunction. She  Does have chronic incontinence for many years  I will follow clinically for the time being. 3.  Risk for vascular disease: She will continue on her blood pressure and medicine for dyslipidemia. She will discuss with her primary care doctor in regards to the need for low-dose antiplatelet therapy. Subjective: I have intermittent pain in my left shoulder and arm     History of Present Illness:   Mirian Noel is a 62 y.o. female who was referred to the neurology clinic at [facility] for evaluation.   She does have a history of hypertension, spina bifida, Chiari malformation, vitamin D deficiency, dyslipidemia. She was referred from her primary care doctor due to left shoulder, upper arm tingling. The patient states that she does have a longstanding history of neurological conditions. She was born with spina bifida. She also was found on neuro imaging to have a type II Chiari malformation. She did not need a shunt to be placed. She has been wheelchair-bound since her middle school years. At baseline she has very little movement in her lower extremities but does have good strength in her upper extremities and does live independently. She is able to drive and does work a full-time job. She has had a longstanding history of shoulder issues and rotator tendinitis. She has received an injection in her right shoulder in the past.  Due to increasing neck stiffness she has been getting physical therapy recently at [facility]. They are working on her neck, shoulder and back with different exercises and electrical stimulation. The primary reason for her visit today is that she is now developing symptoms that began in her left axilla and at times to radiate down into her arm and hand. It is rather intermittent when this radiation occurs and she is not know of any specific triggers for this. It does not necessarily wake her up from sleep. The physical therapy that she is doing is not hurting her and she does feel it may be helping her. She did have an x-ray performed on September 2 of her neck at [address] [place] [address]. She reports that her vision, speech, and swallowing are okay. She does have history of bowel bladder difficulties since childhood. She does intermittently straight cath herself and she has done this for many years. She does have a vagal response when she is straining with bowel movement but she tries to minimize the event that this occurs.   She does have chronic sensory loss in her lower extremities. She was hospitalized approximately 2 years ago with ulcerative colitis and sepsis. She did used to follow-up with Dr. Jerrica Russell but has not seen him for many years      Past Medical History:   Diagnosis Date    COVID-19 2021    Hypercholesterolemia     Hypertension     Spina bifida [facility])         Past Surgical History:   Procedure Laterality Date    COLONOSCOPY N/A 2019    COLONOS[facility] Yesica [facility]epeda [facility] at [facility]  2019         HX OTHER SURGICAL  1890s    sacral wound flap repair        No family history on file. No family history of neuromuscular disease    Social History     Tobacco Use    Smoking status: Never Smoker    Smokeless tobacco: Never Used   Substance Use Topics    Alcohol use: Never        Allergies   Allergen Reactions    Sulfa (Sulfonamide Antibiotics) Nausea and Vomiting        Prior to Admission medications    Medication Sig Start Date End Date Taking? Authorizing Provider   cyanocobalamin (VITAMIN B12) 1,000 mcg/mL injection INJECT 1 ML INTRAMUSCULARLY EVERY SEVEN DAYS. DO THIS FOR 4 WEEKS AND THEN MONTHLY THEREAFTER 8/10/21  Yes Vince Le III, DO   Linzess 72 mcg cap capsule Take 72 mcg by mouth daily. 21  Yes Provider, Historical   simvastatin (ZOCOR) 20 mg tablet TAKE 1 TABLET BY MOUTH EVERY DAY 21  Yes Vince Le III, DO   lisinopriL (PRINIVIL, ZESTRIL) 10 mg tablet Take 1 Tablet by mouth daily. 21  Yes Vince Le III, DO   diclofenac potassium (CATAFLAM) 50 mg tablet Take 1 Tablet by mouth daily.  21  Yes Emerald Pitts III, DO       Review of Systems:    General, constitutional: negative  Eyes, vision: negative  Ears, nose, throat: negative  Cardiovascular, heart: negative  Respiratory: negative  Gastrointestinal: negative  Genitourinary: negative  Musculoskeletal: negative  Skin and integumentary: negative  Psychiatric: negative  Endocrine: negative  Neurological: negative, except for HPI  Hematologic/lymphatic: negative  Allergy/immunology: negative    Objective:     Visit Vitals  /74 (BP 1 Location: Right arm, BP Patient Position: Sitting, BP Cuff Size: Adult)   Pulse 100   Resp 16   Wt 140 lb (63.5 kg)   LMP 08/01/2018 (LMP Unknown)   SpO2 97%   BMI 32.54 kg/m²       Physical Exam:      General:  appears well nourished in no acute distress  Neck: no carotid bruits  Lungs: clear to auscultation  Heart:  no murmurs, regular rate  Lower extremity: peripheral pulses palpable and no edema  Skin: intact    Neurological exam:    Awake, alert, oriented to person, place and time  Recent and remote memory were normal  Attention and concentration were intact  Language was intact. There was no aphasia  Speech: no dysarthria  Fund of knowledge was preserved    Cranial nerves:   II-XII were tested    Perrrla  Visual fields were full  Eomi, no evidence of nystagmus  Facial sensation:  normal and symmetric  Facial motor: normal and symmetric  Hearing intact  SCM strength intact  Tongue: midline without fasciculations    Motor: Tone normal in her upper extremities but reduced in her lower extremities. No evidence of fasciculations    Strength testing:   deltoid triceps biceps Wrist ext. Wrist flex. intrinsics Hip flex. Hip ext. Knee ext. Knee flex Dorsi flex Plantar flex   Right 5 5 5 5 5 5 1 1 1 1 1 1   Left 5 5 5 5 5 5 1 1 1 1 1 1         Sensory:  Upper extremity: intact to pp, light touch, and vibration > 10 seconds  Lower extremity: She has decreased pinprick and vibration in her distal lower extremities which is chronic    Reflexes:    Right Left  Biceps  2 2  Triceps 2 2  Brachiorad.  2 2  Patella  - -  Achilles - -      Cerebellar testing:  no tremor apparent, finger/nose and kristi were intact    Gait: This was not assessed due to her weakness    Labs:     Lab Results   Component Value Date/Time    Hemoglobin A1c 5.7 (H) 12/21/2020 10:50 AM Sodium 138 12/21/2020 10:50 AM    Potassium 4.4 12/21/2020 10:50 AM    Chloride 100 12/21/2020 10:50 AM    Glucose 91 12/21/2020 10:50 AM    BUN 10 12/21/2020 10:50 AM    Creatinine 0.42 (L) 12/21/2020 10:50 AM    Calcium 9.7 12/21/2020 10:50 AM    WBC 6.4 12/21/2020 10:50 AM    HCT 39.6 12/21/2020 10:50 AM    HGB 13.1 12/21/2020 10:50 AM    PLATELET 432 35/34/9483 10:50 AM       Imaging:    No results found for this or any previous visit. Results from [place] encounter on 08/02/19    CT ABD PELV W CONT    Narrative  EXAM: CT ABD PELV W CONT    INDICATION: Ulcerative colitis ([facility] [address] Iron deficiency, Acute ischemic colitis  ([facility] [address]    COMPARISON: CT 4/20/2019. CONTRAST: 100 mL of Isovue-370. TECHNIQUE:  Following the uneventful intravenous administration of contrast, thin axial  images were obtained through the abdomen and pelvis. Coronal and sagittal  reconstructions were generated. Oral contrast 20 mL Omnipaque 240 administered. CT dose reduction was achieved through use of a standardized protocol tailored  for this examination and automatic exposure control for dose modulation. FINDINGS:  LUNG BASES: Clear. INCIDENTALLY IMAGED HEART AND MEDIASTINUM: Unremarkable. LIVER: No mass or biliary dilatation. GALLBLADDER: Unremarkable. SPLEEN: No mass. PANCREAS: No mass or ductal dilatation. ADRENALS: Unremarkable. KIDNEYS: No mass, calculus, or hydronephrosis. STOMACH: Unremarkable. SMALL BOWEL: No dilatation or wall thickening. COLON: Diffusely distended with fecal material with no evident wall thickening  or dilation or pericolonic inflammation. APPENDIX: Unremarkable. PERITONEUM: No ascites or pneumoperitoneum. RETROPERITONEUM: No lymphadenopathy or aortic aneurysm. REPRODUCTIVE ORGANS: Uterus and ovaries appear unremarkable. URINARY BLADDER: No mass or calculus. BONES: Rightward convex scoliosis with some mild degenerative spine change.   Bilateral advanced osteoarthritis of the hips. No acute fracture or aggressive  lesion. ADDITIONAL COMMENTS: No large vessel mesenteric vascular occlusion is  identified. Impression  IMPRESSION: Pancolonic fecal retention. No CT evidence of colitis or other acute  findings. Laboratory results from December 2020 revealed a normal vitamin B12 level, hemoglobin A1c 5.7, LDL 86, triglycerides 159, vitamin D 17.4. Patient Active Problem List   Diagnosis Code    Hypotension I95.9    Severe sepsis ([facility] [address] A41.9, R65.20    B12 deficiency E53.8    Severe obesity ([facility] [address] E66.01    Iron deficiency anemia D50.9    Neurogenic bladder N31.9    Spina bifida ([facility] [address] Q05.9    Mixed hyperlipidemia E78.2        The patient should return to clinic for emg/ncs    Renewed medication: none today    I spent  60  minutes on the day of the encounter preparing the office visit by reviewing medical records, obtaining a history, performing examination, counseling and educating the patient on diagnosis, ordering  tests, documenting in the clinical medical record, and coordinating the care for the patient. The patient had the ability to ask questions and all questions were answered.                Signed By:  Fausto Cali DO FAAN    September 24, 2021

## 2021-09-24 ENCOUNTER — OFFICE VISIT (OUTPATIENT)
Dept: NEUROLOGY | Age: 61
End: 2021-09-24
Payer: COMMERCIAL

## 2021-09-24 VITALS
DIASTOLIC BLOOD PRESSURE: 74 MMHG | OXYGEN SATURATION: 97 % | BODY MASS INDEX: 32.54 KG/M2 | SYSTOLIC BLOOD PRESSURE: 128 MMHG | HEART RATE: 100 BPM | WEIGHT: 140 LBS | RESPIRATION RATE: 16 BRPM

## 2021-09-24 DIAGNOSIS — G60.9 IDIOPATHIC PERIPHERAL NEUROPATHY: ICD-10-CM

## 2021-09-24 DIAGNOSIS — R20.9 DISTURBANCE OF SKIN SENSATION: Primary | ICD-10-CM

## 2021-09-24 DIAGNOSIS — G62.9 NEUROPATHY: ICD-10-CM

## 2021-09-24 DIAGNOSIS — M79.602 LEFT ARM PAIN: ICD-10-CM

## 2021-09-24 DIAGNOSIS — Q07.01 CHIARI MALFORMATION TYPE II (HCC): ICD-10-CM

## 2021-09-24 DIAGNOSIS — Q05.4 SPINA BIFIDA WITH HYDROCEPHALUS, UNSPECIFIED SPINAL REGION (HCC): ICD-10-CM

## 2021-09-24 PROCEDURE — 99205 OFFICE O/P NEW HI 60 MIN: CPT | Performed by: PSYCHIATRY & NEUROLOGY

## 2021-09-24 NOTE — LETTER
9/24/2021    Patient: Pamela Alberts   YOB: 1960   Date of Visit: 9/24/2021     Melissa Bai III, DO  [address]  [address]  [facility] [address]    Dear Kassie Cheung DO,      Thank you for referring Ms. Pamela Alberts to [address] [place] [address] for evaluation. My notes for this consultation are attached. If you have questions, please do not hesitate to call me. I look forward to following your patient along with you.       Sincerely,    Harry Rodas DO

## 2021-10-22 ENCOUNTER — OFFICE VISIT (OUTPATIENT)
Dept: NEUROLOGY | Age: 61
End: 2021-10-22
Payer: COMMERCIAL

## 2021-10-22 DIAGNOSIS — M79.10 MUSCLE PAIN: ICD-10-CM

## 2021-10-22 DIAGNOSIS — M54.2 NECK PAIN: ICD-10-CM

## 2021-10-22 DIAGNOSIS — R20.0 LEFT ARM NUMBNESS: ICD-10-CM

## 2021-10-22 DIAGNOSIS — M79.602 LEFT ARM PAIN: ICD-10-CM

## 2021-10-22 PROCEDURE — 95886 MUSC TEST DONE W/N TEST COMP: CPT | Performed by: PSYCHIATRY & NEUROLOGY

## 2021-10-22 PROCEDURE — 95910 NRV CNDJ TEST 7-8 STUDIES: CPT | Performed by: PSYCHIATRY & NEUROLOGY

## 2021-10-22 NOTE — PROCEDURES
ELECTRODIAGNOSTIC REPORT      Test Date:  10/22/2021    Patient: Chris Hu : 1960 Physician: Dr. Noemy Woodard   ID#:  SEX: Female Ref. Phys: Dr. Noemy Woodard     Patient History / Exam:  Pleasant female with sensory disturbance and pain in her left upper extremity. Patient does reveal full strength and patchy nondermatomal sensory loss. EMG & NCV Findings:    Nerve conduction studies as listed below in the left upper extremity were within reference of normal.    Disposable concentric needle examination of the muscles listed below in the left upper extremity  was normal.    Interpretation: This study was normal.  There was no electrodiagnostic evidence upon todays examination suggesting a focal or generalized large fiber neuropathy, myopathy, or cervical radiculopathy. Should continue with aggressive physical therapy for musculoskeletal etiologies and continue to work on her ergonomic posture with her new motorized chair.      ___________________________  Elissa Rodas D.O.   FAAN        Nerve Conduction Studies  Anti Sensory Summary Table     Stim Site NR Onset (ms) Peak (ms) O-P Amp (µV) Norm Peak (ms) Norm O-P Amp Site1 Site2 Dist (cm) Norm Gonzalo (m/s)   Left Median Anti Sensory (2nd Digit)   Wrist    2.8 3.3 41.7 <4 >11 Wrist 2nd Digit 14.0    Left Radial Anti Sensory (Base 1st Digit)   Wrist    1.3 1.8 59.2 <2.9 >15 Wrist Base 1st Digit 10.0    Left Ulnar Anti Sensory (5th Digit)   Wrist    2.5 3.3 12.9 <4.0 >10 Wrist 5th Digit 14.0      Motor Summary Table     Stim Site NR Onset (ms) Norm Onset (ms) O-P Amp (mV) Norm O-P Amp P-T Amp (mV) Site1 Site2 Dist (cm) Gonzalo (m/s)   Left Median Motor (Abd Poll Brev)   Wrist    3.4 <4.5 10.4 >4.1  Wrist Abd Poll Brev 8.0 24   Elbow    6.6  9.5   Elbow Wrist 18.5 58   Left Ulnar Motor (Abd Dig Minimi)   Wrist    1.9 <3.1 7.1 >7.0  Wrist Abd Dig Minimi 8.0 42   B Elbow    5.0  7.0   B Elbow Wrist 21.0 68   A Elbow    7.0  6.9   A Elbow B Elbow 10.0 50     Comparison Summary Table     Stim Site NR Peak (ms) P-T Amp (µV) Site1 Site2 Dist (cm) Delta-P (ms)   Left Median/Ulnar Palm Comparison (Wrist)   Median Palm    1.9 75.6 Median Palm Ulnar Palm 8.0 0.3   Ulnar Palm    1.6 34.8           EMG     Side Muscle Nerve Root Ins Act Fibs Psw Recrt Duration Amp Poly Comment   Left Deltoid Axillary C5-6 Nml Nml Nml Nml Nml Nml Nml    Left Triceps Radial C6-7-8 Nml Nml Nml Nml Nml Nml Nml    Left Biceps Musculocut C5-6 Nml Nml Nml Nml Nml Nml Nml    Left PronatorTeres Median C6-7 Nml Nml Nml Nml Nml Nml Nml    Left 1stDorInt Ulnar C8-T1 Nml Nml Nml Nml Nml Nml Nml    Left Lower Cerv Parasp Rami C7,T1 Nml Nml Nml Nml Nml Nml Nml      Waveforms:

## 2022-01-14 RX ORDER — CYANOCOBALAMIN 1000 UG/ML
INJECTION, SOLUTION INTRAMUSCULAR; SUBCUTANEOUS
Qty: 4 ML | Refills: 5
Start: 2022-01-14 | End: 2022-06-01 | Stop reason: SDUPTHER

## 2022-01-14 NOTE — TELEPHONE ENCOUNTER
PCP: Abran Villanueva DO    Last appt: 7/20/2021  Future Appointments   Date Time Provider Wilmer Kapadia   2/3/2022  3:00 PM Abran Villanueva DO [facility] BS AMB   6/24/2022  9:40 AM Dannielle Rodas DO [facility] BS AMB       Requested Prescriptions     Pending Prescriptions Disp Refills    cyanocobalamin (VITAMIN B12) 1,000 mcg/mL injection 4 mL 5     Sig: INJECT 1 ML INTRAMUSCULARLY EVERY SEVEN DAYS.  DO THIS FOR 4 WEEKS AND THEN MONTHLY THEREAFTER       Prior labs and Blood pressures:  BP Readings from Last 3 Encounters:   09/24/21 128/74   07/20/21 134/79   12/21/20 126/73     Lab Results   Component Value Date/Time    Sodium 138 12/21/2020 10:50 AM    Potassium 4.4 12/21/2020 10:50 AM    Chloride 100 12/21/2020 10:50 AM    CO2 25 12/21/2020 10:50 AM    Anion gap 5 05/03/2019 03:41 AM    Glucose 91 12/21/2020 10:50 AM    BUN 10 12/21/2020 10:50 AM    Creatinine 0.42 (L) 12/21/2020 10:50 AM    BUN/Creatinine ratio 24 (H) 12/21/2020 10:50 AM    GFR est  12/21/2020 10:50 AM    GFR est non- 12/21/2020 10:50 AM    Calcium 9.7 12/21/2020 10:50 AM     Lab Results   Component Value Date/Time    Hemoglobin A1c 5.7 (H) 12/21/2020 10:50 AM     Lab Results   Component Value Date/Time    Cholesterol, total 170 12/21/2020 10:50 AM    HDL Cholesterol 57 12/21/2020 10:50 AM    LDL, calculated 86 12/21/2020 10:50 AM    LDL, calculated 70 06/16/2020 01:13 PM    VLDL, calculated 27 12/21/2020 10:50 AM    VLDL, calculated 48 (H) 06/16/2020 01:13 PM    Triglyceride 159 (H) 12/21/2020 10:50 AM     Lab Results   Component Value Date/Time    VITAMIN D, 25-HYDROXY 17.4 (L) 12/21/2020 10:50 AM       Lab Results   Component Value Date/Time    TSH 0.772 12/21/2020 10:50 AM    TSH 0.34 (L) 04/29/2019 09:04 AM

## 2022-01-14 NOTE — TELEPHONE ENCOUNTER
----- Message from Lalita Rodríguez sent at 1/13/2022  2:22 PM EST -----  Subject: Refill Request    QUESTIONS  Name of Medication? cyanocobalamin (VITAMIN B12) 1,000 mcg/mL injection  Patient-reported dosage and instructions? INJECT 1 ML INTRAMUSCULARLY   EVERY SEVEN DAYS. DO THIS FOR 4 WEEKS AND THEN MONTHLY THEREAFTER  How many days do you have left? 10  Preferred Pharmacy? [id number]  Pharmacy phone number (if available)? [phone]  ---------------------------------------------------------------------------  --------------  CALL BACK INFO  What is the best way for the office to contact you? OK to leave message on   voicemail  Preferred Call Back Phone Number?  [phone]

## 2022-02-03 ENCOUNTER — OFFICE VISIT (OUTPATIENT)
Dept: INTERNAL MEDICINE CLINIC | Age: 62
End: 2022-02-03
Payer: COMMERCIAL

## 2022-02-03 VITALS
SYSTOLIC BLOOD PRESSURE: 112 MMHG | OXYGEN SATURATION: 97 % | TEMPERATURE: 97.6 F | RESPIRATION RATE: 16 BRPM | DIASTOLIC BLOOD PRESSURE: 73 MMHG | HEART RATE: 91 BPM

## 2022-02-03 DIAGNOSIS — R73.03 PREDIABETES: ICD-10-CM

## 2022-02-03 DIAGNOSIS — H65.92 LEFT OTITIS MEDIA WITH EFFUSION: ICD-10-CM

## 2022-02-03 DIAGNOSIS — E78.2 MIXED HYPERLIPIDEMIA: ICD-10-CM

## 2022-02-03 DIAGNOSIS — M62.838 MUSCLE SPASMS OF NECK: ICD-10-CM

## 2022-02-03 DIAGNOSIS — I10 ESSENTIAL HYPERTENSION: ICD-10-CM

## 2022-02-03 DIAGNOSIS — Z00.00 ANNUAL PHYSICAL EXAM: Primary | ICD-10-CM

## 2022-02-03 DIAGNOSIS — Q05.9 SPINA BIFIDA, UNSPECIFIED HYDROCEPHALUS PRESENCE, UNSPECIFIED SPINAL REGION (HCC): ICD-10-CM

## 2022-02-03 DIAGNOSIS — N31.9 NEUROGENIC BLADDER: ICD-10-CM

## 2022-02-03 DIAGNOSIS — Q07.01 CHIARI MALFORMATION TYPE II (HCC): ICD-10-CM

## 2022-02-03 DIAGNOSIS — M81.0 POSTMENOPAUSAL BONE LOSS: ICD-10-CM

## 2022-02-03 DIAGNOSIS — E55.9 VITAMIN D DEFICIENCY: ICD-10-CM

## 2022-02-03 DIAGNOSIS — K59.00 CONSTIPATION, UNSPECIFIED CONSTIPATION TYPE: ICD-10-CM

## 2022-02-03 LAB
BILIRUB UR QL STRIP: NEGATIVE
GLUCOSE UR-MCNC: NEGATIVE MG/DL
KETONES P FAST UR STRIP-MCNC: NORMAL MG/DL
PH UR STRIP: 5.5 [PH] (ref 4.6–8)
PROT UR QL STRIP: NEGATIVE
SP GR UR STRIP: 1.03 (ref 1–1.03)
UA UROBILINOGEN AMB POC: NORMAL (ref 0.2–1)
URINALYSIS CLARITY POC: NORMAL
URINALYSIS COLOR POC: YELLOW
URINE BLOOD POC: NEGATIVE
URINE LEUKOCYTES POC: NEGATIVE
URINE NITRITES POC: POSITIVE

## 2022-02-03 PROCEDURE — 81003 URINALYSIS AUTO W/O SCOPE: CPT | Performed by: INTERNAL MEDICINE

## 2022-02-03 PROCEDURE — 99396 PREV VISIT EST AGE 40-64: CPT | Performed by: INTERNAL MEDICINE

## 2022-02-03 RX ORDER — DICLOFENAC POTASSIUM 50 MG/1
50 TABLET, FILM COATED ORAL DAILY
Qty: 90 TABLET | Refills: 3 | Status: SHIPPED | OUTPATIENT
Start: 2022-02-03 | End: 2022-08-01

## 2022-02-03 RX ORDER — SIMVASTATIN 20 MG/1
TABLET, FILM COATED ORAL
Qty: 90 TABLET | Refills: 3 | Status: SHIPPED | OUTPATIENT
Start: 2022-02-03

## 2022-02-03 RX ORDER — AMOXICILLIN AND CLAVULANATE POTASSIUM 875; 125 MG/1; MG/1
1 TABLET, FILM COATED ORAL EVERY 12 HOURS
Qty: 20 TABLET | Refills: 0 | Status: SHIPPED | OUTPATIENT
Start: 2022-02-03 | End: 2022-02-13

## 2022-02-03 RX ORDER — LISINOPRIL 10 MG/1
10 TABLET ORAL DAILY
Qty: 90 TABLET | Refills: 3 | Status: SHIPPED | OUTPATIENT
Start: 2022-02-03 | End: 2022-03-18

## 2022-02-03 NOTE — PROGRESS NOTES
Timothy Madrid is a 62 y.o. female who presents for evaluation of annual cpe. Last seen by me July 20, 2021 for left shoulder tingling. She saw neurology, had negative emg/ncs. She did some PT and her symptoms resolved. Went to [facility] in November for left ear issues. She was given some steroid drops, but that did not seem to help. Some pain radiating into left neck now. She would like to see PT again. Also concerned about possible uti. ROS:  Constitutional: negative for fevers, chills, anorexia and weight loss  Eyes:   negative for visual disturbance and irritation  ENT:   negative for tinnitus,sore throat,nasal congestion,hoarseness. ++left ear pain  Respiratory:  negative for cough, hemoptysis, dyspnea,wheezing  CV:   negative for chest pain, palpitations, lower extremity edema  GI:   negative for nausea, vomiting, diarrhea, abdominal pain,melena  Genitourinary: negative for frequency, dysuria and hematuria  Musculoskel: negative for myalgias, arthralgias, back pain, muscle weakness, joint pain  Neurological:  negative for headaches, dizziness, focal weakness, numbness  Psychiatric:     Negative for depression or anxiety      Past Medical History:   Diagnosis Date    COVID-19 05/2021    Hypercholesterolemia     Hypertension     Spina bifida [facility])        Past Surgical History:   Procedure Laterality Date    COLONOSCOPY N/A 5/1/2019    COLON[facility]COPY performed by Radhika Young [facility] at [facility]  5/1/2019         HX OTHER SURGICAL  1890s    sacral wound flap repair       No family history on file.     Social History     Socioeconomic History    Marital status:      Spouse name: Not on file    Number of children: Not on file    Years of education: Not on file    Highest education level: Not on file   Occupational History    Not on file   Tobacco Use    Smoking status: Never Smoker    Smokeless tobacco: Never Used   Substance and Sexual Activity    Alcohol use: Never    Drug use: Never    Sexual activity: Not Currently   Other Topics Concern    Not on file   Social History Narrative    Not on file     Social Determinants of Health     Financial Resource Strain:     Difficulty of Paying Living Expenses: Not on file   Food Insecurity:     Worried About Running Out of Food in the Last Year: Not on file    Amos of Food in the Last Year: Not on file   Transportation Needs:     Lack of Transportation (Medical): Not on file    Lack of Transportation (Non-Medical): Not on file   Physical Activity:     Days of Exercise per Week: Not on file    Minutes of Exercise per Session: Not on file   Stress:     Feeling of Stress : Not on file   Social Connections:     Frequency of Communication with Friends and Family: Not on file    Frequency of Social Gatherings with Friends and Family: Not on file    Attends Scientologist Services: Not on file    Active Member of [address] [place] [address] [employer] or Organizations: Not on file    Attends Club or Organization Meetings: Not on file    Marital Status: Not on file   Intimate Partner Violence:     Fear of Current or Ex-Partner: Not on file    Emotionally Abused: Not on file    Physically Abused: Not on file    Sexually Abused: Not on file   Housing Stability:     Unable to Pay for Housing in the Last Year: Not on file    Number of [place] in the Last Year: Not on file    Unstable Housing in the Last Year: Not on file            Visit Vitals  /73 (BP 1 Location: Left arm, BP Patient Position: Sitting)   Pulse 91   Temp 97.6 °F (36.4 °C) (Temporal)   Resp 16   LMP 08/01/2018 (LMP Unknown)   SpO2 97%       Physical Examination:   General - Well appearing female.   In w/c  HEENT - PERRL, TM no erythema/opacification, normal nasal turbinates, no oropharyngeal erythema or exudate, MMM.  ++left TM cloudy  Neck - supple, no bruits, no thyroidomegaly, no lymphadenopathy  Pulm - clear to auscultation bilaterally  Cardio - RRR, normal S1 S2, no murmur  Abd - soft, nontender, no masses, no HSM  Extrem - no edema, +2 distal pulses  Neuro-  No focal deficits, CN intact     Assessment/Plan:    1. Annual cpe--check cbc, cmp, flp, tsh, a1c, vit d  2. Neurogenic bladder--check ua--nitrite +, leuks negative. Will send for cx. She denies any symptoms, just cloudy urine. 3.  Spina bifida with arnold chiari malformation--follows with neurology  4.  hyperlpids--on zocor, check flpo, cmp  5.  ibs-constipatino--never tried linzess. Doing ok with current regimen  6. htn--controlled with lisinopril  7. Post menopausal bone loss--check dexa scan  8.   Left om--rx for augmentin    Has pap and mamm upcomign with gyn  rtc 6 months        Ernestine Bragg III, DO

## 2022-02-03 NOTE — PATIENT INSTRUCTIONS

## 2022-02-14 ENCOUNTER — HOSPITAL ENCOUNTER (OUTPATIENT)
Dept: PHYSICAL THERAPY | Age: 62
Discharge: HOME | End: 2022-02-14
Payer: COMMERCIAL

## 2022-02-14 PROCEDURE — 97530 THERAPEUTIC ACTIVITIES: CPT

## 2022-02-14 PROCEDURE — 97162 PT EVAL MOD COMPLEX 30 MIN: CPT

## 2022-02-14 PROCEDURE — 97110 THERAPEUTIC EXERCISES: CPT

## 2022-02-14 PROCEDURE — 97140 MANUAL THERAPY 1/> REGIONS: CPT

## 2022-02-14 NOTE — PROGRESS NOTES
PT INITIAL EVALUATION NOTE - [facility] 2-15    Patient Name: Mitch Santana  Date:2022  : 1960  [x]  Patient  Verified  Payor: Trevor Tran / Plan: Pilar Alu / Product Type: HMO /    In time:   Out time: 955  Total Treatment Time (min): 71 (patient required restroom break from 8:54-9:00am)  Total Timed Codes (min): 40  1:1 Treatment Time ( only): 40   Visit #: 1     Treatment Area: Neck muscle spasm [M62.838]    SUBJECTIVE  Pain Level (0-10 scale): 2 currently in L-sided neck and shoulder. The patient describes symptoms as \"tightness, aching\". Any medication changes, allergies to medications, adverse drug reactions, diagnosis change, or new procedure performed?: [] No    [x] Yes (see summary sheet for update)  Subjective:      *The patient is R hand dominant*    The patient reports she participated in bout of outpatient physical therapy services for her chronic R shoulder pain at [address] [place] [address] ([address] [place] [address]), finishing up mid-2021. She notes that just prior to finishing up her PT bout there, she had insidious onset of L ear and neck pain for which she went to urgent care/PCP and received steroid drops/antibiotic, which did not help much. She does endorse intermittent tingling into her whole L hand, which she does not feel occurs at similar times to intermittent neck pain; she denies any numbness nor temperature changes into hand. She does endorse some \"aching\" into L elbow and forearm and along R triceps. She notes that she has used a manual wheelchair for mobility due to spina bifida for many years; she drives using hand controls, transfers independently, and works full time. She notes she has been working from home since start of COVID-19 pandemic and feels this has contributed to onset of current condition as she has been less active.  She has been working on her shoulder exercises from physical therapy each morning to include: cross body stretch, cervical rotations, triceps stretch, home pulleys into shoulder flexion/abduction, doorway stretch, rows/extensions/no money/shoulder internal rotation/external rotation using red band, and pressure reliefs throughout her day. She performs stand pivot transfers independently, however does not ambulate. She attempts to change positions or surfaces every 2-3 hours while working. She does have new motorized assist wheelchair, however does not yet have level of comfort with this wheelchair and has been using manual wheelchair more recently. Current functional limitations include: notes discomfort when first waking up in the morning; and transfers. Goals: \"Get rid of the neck pain, strengthen, and work on flexibility because I feel my body getting stiff. \"    OBJECTIVE/EXAMINATION    Posture: Seated in manual wheelchair = Forward head posture, bilateral shoulder internal rotation, bilateral scapular winging, L > R accessory muscle recruitment  Other Observations: Shoulder AROM: WNL all planes bilaterally (see below for shoulder ER/IR AROM)  Gait and Functional Mobility: The patient arrives self-propelling herself in manual wheelchair at a level of (I), demonstrating increased bilateral accessory muscle recruitment and decreased bilateral shoulder extension  Palpation: Tender to palpation at L proximal scalenes/first rib/bilateral supraspinatus/infraspinatus/teres minor/R levator scapula/rhomboids near attachment at medial scapular border        Cervical AROM:        R  L    Flexion    51      Extension   54      Side Bending   30 (noted \"stretching\")  25 (noted \"popping\")    Rotation   78 (p!)  74      A/PROM Shoulder:   Right   Left   IR   Hand to T12           Hand to T10   ER   Hand to T4             Hand to T3    Joint Mobility Assessment: Cervical: WNL to PA/bilateral lateral bending/rotational joint mobilizations (C2-7)                                                  Glenohumeral: WNL to L AP/inferior joint mobilizations    Flexibility: Noted grossly decreased bilateral pectoralis major/minor/latissimus dorsi/subscapularis muscle length in seated and mat table positions    UPPER QUARTER     MUSCLE STRENGTH  KEY        R  L  0 - No Contraction   Flexion   4  4-  1 - Trace    Extension (elbow) 4-  4  2 - Poor    Abduction  4 (p!)  4- (noted L shoulder hiking, UE p!)  3 - Fair     IR   5  5  4 - Good    ER   4+  4 (noted L shoulder p!)  5 - Normal       Special Tests: Speed's: (-) bilaterally      Yergason: (+) on L                            Cervical Compression: (-)                  Cervical Distraction: (-)                            ULNTT: MSK response for L median/radial/ulnar nerves                                         Longus Colli Endurance Test: 9 seconds    15 min Therapeutic Exercise:  [x] See flow sheet :   Rationale: increase ROM, increase strength, improve coordination, improve balance and increase proprioception to improve the patients ability to sleep, transfer, and perform work tasks    15 min Therapeutic Activity:  []  See flow sheet : Includes time spent educating patient on home management strategies, including pillow positioning while lying on L side, use of heat/ice and progressive home exercise to manage symptoms throughout her day, and workstation ergonomic considerations. Patient verbalizing and demonstrating understanding of provided education with 100% accuracy using teach back method.    Rationale: increase ROM, increase strength, improve coordination, improve balance and increase proprioception  to improve the patients ability to sleep, transfer, and perform work tasks    10 min Manual Therapy: Bilateral cervical PA/rotational/lateral joint mobilizations (Grade III, C2-7); L shoulder PROM, all planes to tolerance; L glenohumeral inferior/AP joint mobilizations (Grade III); STM/TPR bilateral suboccipitals/L upper trapezius/levator scapula/scalenes/supraspinatus/infraspinatus/teres minor/subscapularis/pectoralis major/minor/latissimus dorsi; L pectoralis major MWM (pin + stretch with horizontal abduction at 120 degrees of shoulder elevation);  L subscapularis MWM (pin + stretch with shoulder ER); supine L scalenes MWM (pin + stretch with R cervical side bending, 10x); seated L first rib inferior mobilizations (Grade III)    Rationale: decrease pain, increase ROM, increase tissue extensibility, decrease trigger points and increase postural awareness to improve the patients ability to sleep, transfer, and perform work tasks          With   [x] TE   [x] TA   [] Neuro   [] SC   [] other: Patient Education: [x] Review HEP    [] Progressed/Changed HEP based on:   [] positioning   [] body mechanics   [] transfers   [] heat/ice application    [] other:      Other Objective/Functional Measures: FOTO Functional Measure: 50/100                Pain Level (0-10 scale) post treatment: Patient noting significant decrease in familiar L-sided neck symptoms post-evaluation today    ASSESSMENT/Changes in Function:     [x]  See Plan of [address] Flores, PT, DPT 2/14/2022

## 2022-02-14 NOTE — PROGRESS NOTES
[address] Physical Therapy  [address] ([address]), [address] [place]  Phone: [phone] Fax: [phone]    Plan of Care/Statement of Necessity for Physical Therapy Services  2-15    Patient name: Jacquie Halsted  : 1960  Provider#: [id number]  Referral source: Ritu Newton      Medical/Treatment Diagnosis: Neck muscle spasm [M62.838]     Prior Hospitalization: see medical history     Comorbidities: Arthritis, hypertension, scoliosis, Chiari malformation, spina bifida, non-ambulatory (performs stand-pivot transfers from wheelchair to/from surfaces, GI dysfunction, headaches, incontinence, chronic back and R shoulder pain, obesity  Prior Level of Function: Independent, non-ambulatory, at a level of I/Mod I for transfers and mobility using manual/power assist wheelchair  Medications: Verified on Patient Summary List    Start of Care: 23      Onset Date: Approximately 2021       The [address] [place] [address] and following information is based on the information from the initial evaluation. Assessment/ key information:     The patient is a 62year old female who presents to physical therapy services due to subacute-onset L-sided neck pain and intermittent L UE \"aching\"/hand numbness. She demonstrates signs and symptoms consistent with movement coordination and muscle power impairments secondary to rotator cuff dysfunction, including (-) [place] cluster for cervical radiculopathy, decreased multiplanar L > R UE strength, decreased deep neck flexor and postural endurance (longus colli endurance test = 9 seconds), and aberrant scapulohumeral movement patterns including increased L > R accessory muscle recruitment with transfers and multiplanar overhead shoulder AROM. Patient noting significant decrease in familiar L-sided neck symptoms post-evaluation and treatment today.  The patient would benefit from continued skilled physical therapy services to improve symptom management and safety/endurance/independence with functional tasks such as transfers and work activities. Evaluation Complexity History HIGH Complexity :3+ comorbidities / personal factors will impact the outcome/ POC ; Examination HIGH Complexity : 4+ Standardized tests and measures addressing body structure, function, activity limitation and / or participation in recreation  ;Presentation MEDIUM Complexity : Evolving with changing characteristics  ; Clinical Decision Making MEDIUM Complexity : FOTO score of 26-74  Overall Complexity Rating: MEDIUM    Problem List: pain affecting function, decrease ROM, decrease strength, impaired gait/ balance, decrease ADL/ functional abilitiies, decrease activity tolerance, decrease flexibility/ joint mobility and decrease transfer abilities   Treatment Plan may include any combination of the following: Therapeutic exercise, Therapeutic activities, Neuromuscular re-education, Physical agent/modality, Gait/balance training, Manual therapy, Patient education, Self Care training, Functional mobility training, Home safety training, Stair training and Other: Dry Needling  Patient / Family readiness to learn indicated by: asking questions, trying to perform skills and interest  Persons(s) to be included in education: patient (P)  Barriers to Learning/Limitations: None  Patient Goal (s): \"Get rid of the neck pain, strengthen, and work on flexibility because I feel my body getting stiff.   Patient Self Reported Health Status: good  Rehabilitation Potential: good    Short Term Goals: To be accomplished in 6-8 treatments: The patient will demonstrate understanding of and compliance with updated and progressive HEP toward improved participation in physical therapy plan of care.               The patient will demonstrate ability to transfer to/from wheelchair to mat table 3/3 times without onset of familiar L-sided neck pain toward improved quality of life  Long Term Goals: To be accomplished in 12-16 treatments: The patient will demonstrate multiplanar bilateral UE strength increased by >= 1/2 MMT grade toward improved endurance with functional activities such as transfers and work tasks. The patient will demonstrate longus colli endurance test >= 38 seconds toward improved postural endurance with transfers and work tasks. The patient will score >= 57 on FOTO to demonstrate significantly improved subjective report of function. Frequency / Duration: Patient to be seen 2 times per week for 12-16 treatments. Patient/ Caregiver education and instruction: self care, activity modification and exercises    [x]  Plan of care has been reviewed with PTA Kraig Cockayne, PT, DPT 2/14/2022     ________________________________________________________________________    I certify that the above Therapy Services are being furnished while the patient is under my care. I agree with the treatment plan and certify that this therapy is necessary.     Physician's Signature:____________________  Date:____________Time: _________      Rosalva Schroeder

## 2022-02-16 ENCOUNTER — APPOINTMENT (OUTPATIENT)
Dept: INTERNAL MEDICINE CLINIC | Age: 62
End: 2022-02-16

## 2022-02-17 ENCOUNTER — PATIENT OUTREACH (OUTPATIENT)
Dept: INTERNAL MEDICINE CLINIC | Age: 62
End: 2022-02-17

## 2022-02-17 LAB
25(OH)D3+25(OH)D2 SERPL-MCNC: 12 NG/ML (ref 30–100)
ALBUMIN SERPL-MCNC: 5 G/DL (ref 3.8–4.9)
ALBUMIN/GLOB SERPL: 2 {RATIO} (ref 1.2–2.2)
ALP SERPL-CCNC: 83 IU/L (ref 44–121)
ALT SERPL-CCNC: 72 IU/L (ref 0–32)
AST SERPL-CCNC: 40 IU/L (ref 0–40)
BASOPHILS # BLD AUTO: 0 X10E3/UL (ref 0–0.2)
BASOPHILS NFR BLD AUTO: 1 %
BILIRUB SERPL-MCNC: 0.6 MG/DL (ref 0–1.2)
BUN SERPL-MCNC: 10 MG/DL (ref 8–27)
BUN/CREAT SERPL: 21 (ref 12–28)
CALCIUM SERPL-MCNC: 9.8 MG/DL (ref 8.7–10.3)
CHLORIDE SERPL-SCNC: 100 MMOL/L (ref 96–106)
CHOLEST SERPL-MCNC: 342 MG/DL (ref 100–199)
CO2 SERPL-SCNC: 21 MMOL/L (ref 20–29)
CREAT SERPL-MCNC: 0.48 MG/DL (ref 0.57–1)
EOSINOPHIL # BLD AUTO: 0.1 X10E3/UL (ref 0–0.4)
EOSINOPHIL NFR BLD AUTO: 1 %
ERYTHROCYTE [DISTWIDTH] IN BLOOD BY AUTOMATED COUNT: 11.7 % (ref 11.7–15.4)
EST. AVERAGE GLUCOSE BLD GHB EST-MCNC: 120 MG/DL
GLOBULIN SER CALC-MCNC: 2.5 G/DL (ref 1.5–4.5)
GLUCOSE SERPL-MCNC: 104 MG/DL (ref 65–99)
HBA1C MFR BLD: 5.8 % (ref 4.8–5.6)
HCT VFR BLD AUTO: 45.6 % (ref 34–46.6)
HDLC SERPL-MCNC: 55 MG/DL
HGB BLD-MCNC: 14.7 G/DL (ref 11.1–15.9)
IMM GRANULOCYTES # BLD AUTO: 0 X10E3/UL (ref 0–0.1)
IMM GRANULOCYTES NFR BLD AUTO: 0 %
LDLC SERPL CALC-MCNC: 213 MG/DL (ref 0–99)
LYMPHOCYTES # BLD AUTO: 1.3 X10E3/UL (ref 0.7–3.1)
LYMPHOCYTES NFR BLD AUTO: 29 %
MCH RBC QN AUTO: 31.1 PG (ref 26.6–33)
MCHC RBC AUTO-ENTMCNC: 32.2 G/DL (ref 31.5–35.7)
MCV RBC AUTO: 97 FL (ref 79–97)
MONOCYTES # BLD AUTO: 0.3 X10E3/UL (ref 0.1–0.9)
MONOCYTES NFR BLD AUTO: 6 %
NEUTROPHILS # BLD AUTO: 2.7 X10E3/UL (ref 1.4–7)
NEUTROPHILS NFR BLD AUTO: 63 %
PLATELET # BLD AUTO: 334 X10E3/UL (ref 150–450)
POTASSIUM SERPL-SCNC: 4.6 MMOL/L (ref 3.5–5.2)
PROT SERPL-MCNC: 7.5 G/DL (ref 6–8.5)
RBC # BLD AUTO: 4.72 X10E6/UL (ref 3.77–5.28)
SODIUM SERPL-SCNC: 141 MMOL/L (ref 134–144)
TRIGL SERPL-MCNC: 357 MG/DL (ref 0–149)
TSH SERPL DL<=0.005 MIU/L-ACNC: 1.46 UIU/ML (ref 0.45–4.5)
VLDLC SERPL CALC-MCNC: 74 MG/DL (ref 5–40)
WBC # BLD AUTO: 4.4 X10E3/UL (ref 3.4–10.8)

## 2022-02-17 RX ORDER — CHOLECALCIFEROL TAB 125 MCG (5000 UNIT) 125 MCG
5000 TAB ORAL DAILY
Qty: 90 TABLET | Refills: 3 | Status: SHIPPED | OUTPATIENT
Start: 2022-02-17 | End: 2022-03-04

## 2022-02-17 NOTE — PROGRESS NOTES
Needs help getting weight down, eating healthier. Cholesterol levels are much worse, LDL up to 213. Has she been taking zocor? A1c is also up to 5.8, for average sugar of 120 (prediabetes). Liver tests are also bit elevated now, likely due to 'fatty liver'. Vit D also very low again. Rx sent in for replacement. Other labs look good.

## 2022-02-17 NOTE — PROGRESS NOTES
[phone] - Pt was given result notes message from Dr. Anne Box. Pt will  vitamin D. She scheduled an appt with this [facility] for prediabetes education on February 28th at 11:30. Pt had to stop the augmentin for UTI due to stomach pain and diarrhea. She took four days. Her symptoms are gone and she will not take any more unless Dr Anne Box wants to give her a different antibiotic.  Message sent to Dr. Anne Box

## 2022-02-17 NOTE — PROGRESS NOTES
DO Ivonne Rodgers [employer], RN  Needs help getting weight down, eating healthier.   Cholesterol levels are much worse, LDL up to 213.  Has she been taking zocor?  A1c is also up to 5.8, for average sugar of 120 (prediabetes).   Liver tests are also bit elevated now, likely due to 'fatty liver'.    Vit D also very low again.  Rx sent in for replacement. Other labs look good. Emily Gastelum RN   2/17/2022  2:45 PM EST Back to [facility]         [phone] - Pt was given result notes message from Dr. Kenia Grayson. Pt will  vitamin D. She scheduled an appt with this [facility] for prediabetes education on February 28th at 11:30. Pt had to stop the augmentin for UTI due to stomach pain and diarrhea. She took four days. Her symptoms are gone and she will not take any more unless Dr Kenia Grayson wants to give her a different antibiotic. Message sent to Dr. Kenia Grayson   Pt stated she stopped Zocor for intense intestinal issues and will restart and try again. DO Ivonne Rodgers [employer], RN  Once her urine cx results, if I think we need to give her something else, will decide then.

## 2022-02-22 ENCOUNTER — HOSPITAL ENCOUNTER (OUTPATIENT)
Dept: PHYSICAL THERAPY | Age: 62
Discharge: HOME | End: 2022-02-22
Payer: COMMERCIAL

## 2022-02-22 PROCEDURE — 97140 MANUAL THERAPY 1/> REGIONS: CPT

## 2022-02-22 PROCEDURE — 97110 THERAPEUTIC EXERCISES: CPT

## 2022-02-22 NOTE — PROGRESS NOTES
PT DAILY TREATMENT NOTE - [facility] 2-15    Patient Name: Negar Mena  Date:2022  : 1960  [x]  Patient  Verified  Payor: Estefania Bella / Plan: Frankey Tippah / Product Type: HMO /    In time: 80  Out time: 7944  Total Treatment Time (min): 58  Total Timed Codes (min): 58  1:1 Treatment Time ( only): 58   Visit #:  2    Treatment Area: Neck muscle spasm [M62.838]    SUBJECTIVE  Pain Level (0-10 scale): 0/10  Any medication changes, allergies to medications, adverse drug reactions, diagnosis change, or new procedure performed?: [x] No    [] Yes (see summary sheet for update)  Subjective functional status/changes:   [] No changes reported  Patient reports her pain is random and happens at any moment, but she did wake up today feeling good     OBJECTIVE    43 min Therapeutic Exercise:  [] See flow sheet :   Rationale: increase ROM and increase strength to improve the patients ability to perform ADLs and reduce pain levels    15 min Manual Therapy: Bilateral cervical PA/rotational/lateral joint mobilizations (Grade III, C2-7); L shoulder PROM, all planes to tolerance; L glenohumeral inferior/AP joint mobilizations (Grade III); STM/TPR bilateral suboccipitals/L upper trapezius/levator scapula/scalenes/supraspinatus/infraspinatus/teres minor/subscapularis/pectoralis major/minor/latissimus dorsi; L pectoralis major MWM (pin + stretch with horizontal abduction at 120 degrees of shoulder elevation);  L subscapularis MWM (pin + stretch with shoulder ER); supine L scalenes MWM (pin + stretch with R cervical side bending, 10x); seated L first rib inferior mobilizations (Grade III)     Rationale: decrease pain, increase ROM, increase tissue extensibility and decrease trigger points to improve the patients ability to perform ADLs and reduce pain levels    With   [] TE   [] TA   [] Neuro   [] SC   [] other: Patient Education: [x] Review HEP    [] Progressed/Changed HEP based on:   [] positioning   [] body mechanics [] transfers   [] heat/ice application    [] other:      Other Objective/Functional Measures: none noted      Pain Level (0-10 scale) post treatment: 0/10    ASSESSMENT/Changes in Function:   Patient will require cues in order to properly perform therex. Trigger points found bilaterally in the scalenes and upper trap. Focused on improving shoulder stability. Tolerated all therex well, will continue to progress as tolerated. Patient will continue to benefit from skilled PT services to modify and progress therapeutic interventions, address functional mobility deficits, address ROM deficits, address strength deficits, analyze and address soft tissue restrictions, analyze and cue movement patterns, analyze and modify body mechanics/ergonomics and assess and modify postural abnormalities to attain remaining goals. []  See Plan of Care  []  See progress note/recertification  []  See Discharge Summary         Progress towards goals / Updated goals:  Patient is progressing towards goals.      PLAN  []  Upgrade activities as tolerated     []  Continue plan of care  []  Update interventions per flow sheet       []  Discharge due to:_  []  Other:_      Radha Pack, PTA 2/22/2022

## 2022-02-25 ENCOUNTER — HOSPITAL ENCOUNTER (OUTPATIENT)
Dept: PHYSICAL THERAPY | Age: 62
Discharge: HOME | End: 2022-02-25
Payer: COMMERCIAL

## 2022-02-25 PROCEDURE — 97140 MANUAL THERAPY 1/> REGIONS: CPT

## 2022-02-25 PROCEDURE — 97110 THERAPEUTIC EXERCISES: CPT

## 2022-02-25 NOTE — PROGRESS NOTES
PT DAILY TREATMENT NOTE - [facility] 2-15    Patient Name: Tony Love  Date:2022  : 1960  [x]  Patient  Verified  Payor: Bandar Hernandez / Plan: Yeimy Horvath / Product Type: HMO /    In time: 556  Out time: 100  Total Treatment Time (min): 53 (patient required restroom break from 9:05-9:13am)  Total Timed Codes (min): 53  1:1 Treatment Time ( only): 30  Visit #:  3    Treatment Area: Neck muscle spasm [M62.838]    SUBJECTIVE  Pain Level (0-10 scale): 4 in L-sided neck  Any medication changes, allergies to medications, adverse drug reactions, diagnosis change, or new procedure performed?: [x] No    [] Yes (see summary sheet for update)  Subjective functional status/changes:   [] No changes reported    The patient reports that overall her neck and shoulders have been feeling good; she did wake up this morning with increased L-sided neck tightness/discomfort and what she attributes to a sinus headache. She notes the back of her R arm has been intermittently sorer than usual over the past few months; she has tried changing work positioning however has stayed about the same. OBJECTIVE    41 min Therapeutic Exercise:  [x] See flow sheet : Includes time spent educating patient on appropriate exercise interventions to improve bilateral shoulder/neck stability, to include performing clockwise/counter clockwise circles/writing ABCs in serratus punch position. Additionally, includes time spent educating patient on endurance bias to improve postural endurance with work tasks. Patient verbalizing and demonstrating understanding of provided education with 100% accuracy using teach back method.    Rationale: increase ROM, increase strength, improve coordination, improve balance and increase proprioception to improve the patients ability to sleep, transfer, and perform work tasks    12 min Manual Therapy: Bilateral cervical PA/rotational/lateral joint mobilizations (Grade III, C2-7); L shoulder PROM, all planes to tolerance; L glenohumeral inferior/AP joint mobilizations (Grade III); STM/TPR bilateral suboccipitals/L upper trapezius/levator scapula/scalenes/supraspinatus/infraspinatus/teres minor/subscapularis/pectoralis major/minor/latissimus dorsi; L pectoralis major MWM (pin + stretch with horizontal abduction at 120 degrees of shoulder elevation); L subscapularis MWM (pin + stretch with shoulder ER); supine L scalenes MWM (pin + stretch with R cervical side bending, 10x); seated L first rib inferior mobilizations (Grade III)     Rationale: decrease pain, increase ROM, increase tissue extensibility and decrease trigger points to improve the patients ability to sleep, transfer, and perform work tasks    With   [x] TE   [] TA   [] Neuro   [] SC   [] other: Patient Education: [x] Review HEP    [] Progressed/Changed HEP based on:   [] positioning   [] body mechanics   [] transfers   [] heat/ice application    [] other:      Other Objective/Functional Measures: None noted      Pain Level (0-10 scale) post treatment: 2 in L-sided neck. Patient noting abolishment of familiar headache post-manual intervention today. ASSESSMENT/Changes in Function:   The patient tolerated therapy visit well at this date. She demonstrates mildly improved L-sided upper trapezius/levator scapula/scalene muscle turgor with palpation/soft tissue mobilization, however noted decreased tolerance to first rib inferior mobilizations due to discomfort versus at initial evaluation. Patient requires multimodal cues to reduce cervical flexion during supine chin tucks. Noted rapid fatigue and requiring frequent tactile cues to maintain scapular retraction during sidelying shoulder external rotation, particularly with addition of third set to improve postural endurance. Patient demonstrates increased L upper trapezius recruitment with incorporation of L shoulder rhythmic stabilizations at wall due to decreased periscapular/glenohumeral stability.  Patient noting abolishment of familiar headache, significant reduction in L-sided neck pain post-session today. Continue to progress as tolerated. Patient will continue to benefit from skilled PT services to modify and progress therapeutic interventions, address functional mobility deficits, address ROM deficits, address strength deficits, analyze and address soft tissue restrictions, analyze and cue movement patterns, analyze and modify body mechanics/ergonomics and assess and modify postural abnormalities to attain remaining goals. [x]  See Plan of Care  []  See progress note/recertification  []  See Discharge Summary         Progress towards goals / Updated goals:    Short Term Goals: To be accomplished in 6-8 treatments: The patient will demonstrate understanding of and compliance with updated and progressive HEP toward improved participation in physical therapy plan of care. - Progressing              The patient will demonstrate ability to transfer to/from wheelchair to mat table 3/3 times without onset of familiar L-sided neck pain toward improved quality of life. - Progressing  Long Term Goals: To be accomplished in 12-16 treatments: The patient will demonstrate multiplanar bilateral UE strength increased by >= 1/2 MMT grade toward improved endurance with functional activities such as transfers and work tasks. - Progressing              The patient will demonstrate longus colli endurance test >= 38 seconds toward improved postural endurance with transfers and work tasks. - Progressing              The patient will score >= 57 on FOTO to demonstrate significantly improved subjective report of function. - Progressing  Frequency / Duration: Patient to be seen 2 times per week for 12-16 treatments.     PLAN  [x]  Upgrade activities as tolerated     [x]  Continue plan of care  [x]  Update interventions per flow sheet       []  Discharge due to:_  []  Other:_      Mendez Friday, PT, DPT 2/25/2022

## 2022-02-28 ENCOUNTER — PATIENT OUTREACH (OUTPATIENT)
Dept: INTERNAL MEDICINE CLINIC | Age: 62
End: 2022-02-28

## 2022-02-28 ENCOUNTER — OFFICE VISIT (OUTPATIENT)
Dept: INTERNAL MEDICINE CLINIC | Age: 62
End: 2022-02-28

## 2022-02-28 ENCOUNTER — HOSPITAL ENCOUNTER (OUTPATIENT)
Dept: PHYSICAL THERAPY | Age: 62
Discharge: HOME | End: 2022-02-28
Payer: COMMERCIAL

## 2022-02-28 DIAGNOSIS — R73.03 PREDIABETES: Primary | ICD-10-CM

## 2022-02-28 PROCEDURE — 97140 MANUAL THERAPY 1/> REGIONS: CPT

## 2022-02-28 PROCEDURE — 97110 THERAPEUTIC EXERCISES: CPT

## 2022-02-28 NOTE — PATIENT INSTRUCTIONS
Goals/Plan:  -Your A1c was 5.7% on 2/16/22, which is considered prediabetes  -continue with exercises from PT for right should and neck problem  -strive to start following the healthy plate meal plan being mindful of what is a carb (fruit, dairy, grains, starchy vegetables) and portion size. As a rule, 1/2 cup of a carb food is a serving size (15 grams). The guideline is up to 45 grams of carb per meal (3 meals per day) or 3 servings per meal.  A snack guideline is- 1 oz. protein serving and may add 1 carb serving (15 grams)  -refer to 1200 calorie meal plan  -start reading nutrition labels paying attention to serving size and total # of carbs in grams per serving  -refer to low and no carb snack idea handout  -follow up with Dr Ananda Murphy August 1st as scheduled  -watch the YouTube video \"Diabetes Made Simple For You\" for an easy to understand explanation of what diabetes/prediabetes is, what causes it, and some steps to controlling diabetes with diet, exercise, and taking medications if prescribed.     [email or web address]    -call Daniel Ricardo with any questions at [phone]

## 2022-02-28 NOTE — PROGRESS NOTES
PT DAILY TREATMENT NOTE - [facility] 2-15    Patient Name: Emre Clemens  Date:2022  : 1960  [x]  Patient  Verified  Payor: [place] Form / Plan: Tita Lopez / Product Type: HMO /    In time: 80  Out time: 1021  Total Treatment Time (min): 51  Total Timed Codes (min): 26  1:1 Treatment Time ( only): 26  Visit #:  4    Treatment Area: Neck muscle spasm [M62.838]    SUBJECTIVE  Pain Level (0-10 scale): 5 in L-sided neck  Any medication changes, allergies to medications, adverse drug reactions, diagnosis change, or new procedure performed?: [x] No    [] Yes (see summary sheet for update)  Subjective functional status/changes:   [] No changes reported  Patient reports she thinks she might have slept on her R side wrong since she is having a bit more pain. OBJECTIVE    41 min Therapeutic Exercise:  [x] See flow sheet : Includes time spent educating patient on appropriate exercise interventions to improve bilateral shoulder/neck stability, to include performing clockwise/counter clockwise circles/writing ABCs in serratus punch position. Additionally, includes time spent educating patient on endurance bias to improve postural endurance with work tasks. Patient verbalizing and demonstrating understanding of provided education with 100% accuracy using teach back method.    Rationale: increase ROM, increase strength, improve coordination, improve balance and increase proprioception to improve the patients ability to sleep, transfer, and perform work tasks    10 min Manual Therapy: Bilateral cervical PA/rotational/lateral joint mobilizations (Grade III, C2-7); L shoulder PROM, all planes to tolerance; L glenohumeral inferior/AP joint mobilizations (Grade III); STM/TPR bilateral suboccipitals/L upper trapezius/levator scapula/scalenes/supraspinatus/infraspinatus/teres minor/subscapularis/pectoralis major/minor/latissimus dorsi; L pectoralis major MWM (pin + stretch with horizontal abduction at 120 degrees of shoulder elevation); L subscapularis MWM (pin + stretch with shoulder ER); supine L scalenes MWM (pin + stretch with R cervical side bending, 10x); seated L first rib inferior mobilizations (Grade III)     Rationale: decrease pain, increase ROM, increase tissue extensibility and decrease trigger points to improve the patients ability to sleep, transfer, and perform work tasks    With   [x] TE   [] TA   [] Neuro   [] SC   [] other: Patient Education: [x] Review HEP    [] Progressed/Changed HEP based on:   [] positioning   [] body mechanics   [] transfers   [] heat/ice application    [] other:      Other Objective/Functional Measures: None noted      Pain Level (0-10 scale) post treatment: 3    ASSESSMENT/Changes in Function:   LUE tolerated all therex well, did have some discomfort in the R levator during more stability therex. Tolerated all therex well, will continue to progress as tolerated. Patient will continue to benefit from skilled PT services to modify and progress therapeutic interventions, address functional mobility deficits, address ROM deficits, address strength deficits, analyze and address soft tissue restrictions, analyze and cue movement patterns, analyze and modify body mechanics/ergonomics and assess and modify postural abnormalities to attain remaining goals. [x]  See Plan of Care  []  See progress note/recertification  []  See Discharge Summary         Progress towards goals / Updated goals:    Short Term Goals: To be accomplished in 6-8 treatments: The patient will demonstrate understanding of and compliance with updated and progressive HEP toward improved participation in physical therapy plan of care. - Progressing              The patient will demonstrate ability to transfer to/from wheelchair to mat table 3/3 times without onset of familiar L-sided neck pain toward improved quality of life. - Progressing  Long Term Goals:  To be accomplished in 12-16 treatments: The patient will demonstrate multiplanar bilateral UE strength increased by >= 1/2 MMT grade toward improved endurance with functional activities such as transfers and work tasks. - Progressing              The patient will demonstrate longus colli endurance test >= 38 seconds toward improved postural endurance with transfers and work tasks. - Progressing              The patient will score >= 57 on FOTO to demonstrate significantly improved subjective report of function. - Progressing  Frequency / Duration: Patient to be seen 2 times per week for 12-16 treatments.     PLAN  [x]  Upgrade activities as tolerated     [x]  Continue plan of care  [x]  Update interventions per flow sheet       []  Discharge due to:_  []  Other:_      Paz Cannon, PTA, DPT 2/28/2022

## 2022-02-28 NOTE — PROGRESS NOTES
Was asked by Dr. King Lowery to provide patient education for prediabetes. Last A1c was   Lab Results   Component Value Date/Time    Hemoglobin A1c 5.8 (H) 02/16/2022 12:00 AM    Hemoglobin A1c 5.7 (H) 12/21/2020 10:50 AM    Hemoglobin A1c 5.7 (H) 06/16/2020 01:13 PM     Pt attributes increase in A1c to being less active since working from home. Grief. Sporadic eating pattern since  passed away. Education was provided in office. Previous diabetes or pre diabetes education - none. Presentation/Accompanied by - in a wheelchair; pt has Spina bifida with arnold chiari malformation--follows with neurology    Medical History -    Patient Active Problem List   Diagnosis Code    Hypotension I95.9    Severe sepsis ([facility] [address] A41.9, R65.20    B12 deficiency E53.8    Severe obesity ([facility] [address] E66.01    Iron deficiency anemia D50.9    Neurogenic bladder N31.9    Spina bifida ([facility] [address] Q05.9    Mixed hyperlipidemia E78.2     Social History - lives alone. Her  passed away in 2019. 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 Diabetes History/Diabetes Family History - no history of diabetes in the family    Symptoms of high blood sugar - none    AADE 7 Self-Care Behaviors-    1) Healthy Eating/Food Recall- pt's  passed away and he did the cooking. Pt does not like to cook for one and eats a lot of processed and pre prepared food. She found herself throwing a lot of food out. She stated she is not a volume eater. She loves raw veggies. She lost weight in the past with Nutrisystem, but she wants a meal plan that is sustainable. BK - 7:00-coffee with 2% Lactaid and Equal and either two slices of toast with margarine or 1-2 Darcy Motto snack size sausage biscuits with or w/o applesauce. On the weekend she may have  Honey nut cheerios or cinnamon and brown sugar oatmeal pkt. If she goes out she may have eggs and estrada.  Once in a while will have Simply [place] OJ  LN - 12:30-1:00- Simple Truth Lunchables -cheese, ham, ritz crackers, chocolate Agustin Needle; or a tuna or cold cuts sandwich on white bread and small bag of cheetos or other chips, water or diet Dr Nicola Arrieta, V*  DN - before 5:00-Lean Cuisine or baked chicken by itself; may add a salad with light [place] Drsg  SN - mid AM may have a banana or grapes or mushtaq  PORFIRIO - water, diet soda, coffee    2) Being Active- pt is currently working with [facility] for a right shoulder and neck problem. She has exercises she does at home. Discussed upper body chair exercises    3) Self Monitoring Blood Glucose (SMBG)- not ordered    How to treat a low blood sugar -  n/a    4) Taking Medication- not ordered  Understanding - n/a    5) Problem Solving- pt is ready and willing to manage her high blood sugars by making adjustments to her treatment plan      6) Reducing Risk-   Medications -   Current Outpatient Medications on File Prior to Visit   Medication Sig Dispense Refill    cholecalciferol (VITAMIN D3) (5000 Units/125 mcg) tab tablet Take 1 Tablet by mouth daily. 90 Tablet 3    diclofenac potassium (CATAFLAM) 50 mg tablet Take 1 Tablet by mouth daily. 90 Tablet 3    lisinopriL (PRINIVIL, ZESTRIL) 10 mg tablet Take 1 Tablet by mouth daily. 90 Tablet 3    simvastatin (ZOCOR) 20 mg tablet TAKE 1 TABLET BY MOUTH EVERY DAY 90 Tablet 3    cyanocobalamin (VITAMIN B12) 1,000 mcg/mL injection INJECT 1 ML INTRAMUSCULARLY EVERY SEVEN DAYS. DO THIS FOR 4 WEEKS AND THEN MONTHLY THEREAFTER 4 mL 5    Linzess 72 mcg cap capsule Take 72 mcg by mouth daily. No current facility-administered medications on file prior to visit. Tobacco - does not use tobacco  Hypertension agent- takes  Hyperlipidemia agent- restarted about a two weeks ago  Antiplatelet agent- does not take     Discussed possible complications of uncontrolled diabetes ie fatigue, dehydration, damage to vital organs.     7) Healthy Coping-   Support system - Pt will take advantage of the support and education provided today and of the support of her health care team.    Barriers identified - wheelchair bound    Health Maintenance Due:  Health Maintenance Due   Topic Date Due    COVID-19 Vaccine (1) Never done    DTaP/Tdap/Td series (1 - Tdap) Never done    Shingrix Vaccine Age 50> (1 of 2) Never done    Cervical cancer screen  02/12/2020    Breast Cancer Screen Mammogram  06/05/2020    Flu Vaccine (1) 09/01/2021     Advance Care Planning - Advance Care Planning:   Does patient have an Advance Directive: yes, reviewed and current     Resources provided:    -Living With Type 2 Diabetes Create Your Plate section    -Label Reading Basics for Diabetes    -Carb Counting and Meal Planning    -Learning About Low-Fat Eating    -Low and No-Carb Snack Ideas for Diabetics    -List of apps designed to support weight loss and weight maintenance efforts    -Prediabetes: What is it and what can I do?    -14-Day 1200 Calorie Sample Diabetes Menu    Summary of top problems for patient -     1. Lack of activity due to spina bifida    2.    Self professed \"carboholic\" and sporadic eating pattern                                  [employer], Referrals, and Durable Medical Equipment - wheelchair    Plan / Pt Goals -   -continue with exercises from PT  -strive to follow the healthy plate meal plan, not going over 45 grams of carb per meal  -start reading nutrition labels for the total carb content  -refer to low and no carb snack idea handout  -follow up with Dr Ananda Murphy in August as scheduled  -call [place] with any questions    Future Appointments   Date Time Provider Wilmer Kapadia   3/3/2022  9:30 AM Lilly Phillip, PT [facility]   3/7/2022  9:30 AM CHANDLER Jain OPPT [facility]   3/10/2022  9:30 AM CHANDLER JainPT [facility]   3/14/2022  9:30 AM MACK Early OPPT [facility]   3/15/2022  1:00 PM [facility]   3/17/2022  9:30 AM Ez Maudlin, PT [facility]   3/21/2022  9:30 AM Ez Maudlin, PT [facility]   3/24/2022  9:30 AM Ez Maudlin, PT [facility]   3/28/2022  9:30 AM Ez Maudlin, PT [facility]   3/31/2022  9:30 AM Gary Maudlin, PT [facility]   6/24/2022  9:40 AM Grisel Rodas DO [facility] AMB   8/1/2022  3:30 PM Regino Stoddard DO [facility] BS AMB      Last Appointment My Department:  2/16/2022    Chart was routed to Dr. Teddy Armando.     Sussy Perez, RN, BSN, [employer] (Certified Diabetes Care and ), [facility], [address] [place] [address]  ([phone]; [phone])

## 2022-02-28 NOTE — PROGRESS NOTES
Was asked by Dr. Momo Lovelace to provide patient education for prediabetes. Last A1c was   Lab Results   Component Value Date/Time    Hemoglobin A1c 5.8 (H) 02/16/2022 12:00 AM    Hemoglobin A1c 5.7 (H) 12/21/2020 10:50 AM    Hemoglobin A1c 5.7 (H) 06/16/2020 01:13 PM     Pt attributes increase in A1c to being less active since working from home. Grief. Sporadic eating pattern since  passed away. Education was provided in office. Previous diabetes or pre diabetes education - none. Presentation/Accompanied by - in a wheelchair; pt has Spina bifida with arnold chiari malformation--follows with neurology    Medical History -    Patient Active Problem List   Diagnosis Code    Hypotension I95.9    Severe sepsis ([facility] [address] A41.9, R65.20    B12 deficiency E53.8    Severe obesity ([facility] [address] E66.01    Iron deficiency anemia D50.9    Neurogenic bladder N31.9    Spina bifida ([facility] [address] Q05.9    Mixed hyperlipidemia E78.2     Social History - lives alone. Her  passed away in 2019. 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 Diabetes History/Diabetes Family History - no history of diabetes in the family    Symptoms of high blood sugar - none    AADE 7 Self-Care Behaviors-    1) Healthy Eating/Food Recall- pt's  passed away and he did the cooking. Pt does not like to cook for one and eats a lot of processed and pre prepared food. She found herself throwing a lot of food out. She stated she is not a volume eater. She loves raw veggies. She lost weight in the past with Nutrisystem, but she wants a meal plan that is sustainable. BK - 7:00-coffee with 2% Lactaid and Equal and either two slices of toast with margarine or 1-2 Leonard Navneet snack size sausage biscuits with or w/o applesauce. On the weekend she may have  Honey nut cheerios or cinnamon and brown sugar oatmeal pkt. If she goes out she may have eggs and estrada.  Once in a while will have Simply [place] OJ  LN - 12:30-1:00- Simple Truth Lunchables -cheese, ham, ritz crackers, chocolate [place]; or a tuna or cold cuts sandwich on white bread and small bag of cheetos or other chips, water or diet Dr Myrna Khoury, V*  DN - before 5:00-Lean Cuisine or baked chicken by itself; may add a salad with light [place] Drsg  SN - mid AM may have a banana or grapes or mushtaq  PORFIRIO - water, diet soda, coffee    2) Being Active- pt is currently working with [facility] for a right shoulder and neck problem. She has exercises she does at home. Discussed upper body chair exercises    3) Self Monitoring Blood Glucose (SMBG)- not ordered    How to treat a low blood sugar -  n/a    4) Taking Medication- not ordered  Understanding - n/a    5) Problem Solving- pt is ready and willing to manage her high blood sugars by making adjustments to her treatment plan      6) Reducing Risk-   Medications -   Current Outpatient Medications on File Prior to Visit   Medication Sig Dispense Refill    cholecalciferol (VITAMIN D3) (5000 Units/125 mcg) tab tablet Take 1 Tablet by mouth daily. 90 Tablet 3    diclofenac potassium (CATAFLAM) 50 mg tablet Take 1 Tablet by mouth daily. 90 Tablet 3    lisinopriL (PRINIVIL, ZESTRIL) 10 mg tablet Take 1 Tablet by mouth daily. 90 Tablet 3    simvastatin (ZOCOR) 20 mg tablet TAKE 1 TABLET BY MOUTH EVERY DAY 90 Tablet 3    cyanocobalamin (VITAMIN B12) 1,000 mcg/mL injection INJECT 1 ML INTRAMUSCULARLY EVERY SEVEN DAYS. DO THIS FOR 4 WEEKS AND THEN MONTHLY THEREAFTER 4 mL 5    Linzess 72 mcg cap capsule Take 72 mcg by mouth daily. No current facility-administered medications on file prior to visit. Tobacco - does not use tobacco  Hypertension agent- takes  Hyperlipidemia agent- restarted about a two weeks ago  Antiplatelet agent- does not take     Discussed possible complications of uncontrolled diabetes ie fatigue, dehydration, damage to vital organs.     7) Healthy Coping-   Support system - Pt will take advantage of the support and education provided today and of the support of her health care team.    Barriers identified - wheelchair bound    Health Maintenance Due:  Health Maintenance Due   Topic Date Due    COVID-19 Vaccine (1) Never done    DTaP/Tdap/Td series (1 - Tdap) Never done    Shingrix Vaccine Age 50> (1 of 2) Never done    Cervical cancer screen  02/12/2020    Breast Cancer Screen Mammogram  06/05/2020    Flu Vaccine (1) 09/01/2021     Advance Care Planning - Advance Care Planning:   Does patient have an Advance Directive: yes, reviewed and current     Resources provided:    -Living With Type 2 Diabetes Create Your Plate section    -Label Reading Basics for Diabetes    -Carb Counting and Meal Planning (mailed booklet)    -Learning About Low-Fat Eating    -Low and No-Carb Snack Ideas for Diabetics    -List of apps designed to support weight loss and weight maintenance efforts    -Prediabetes: What is it and what can I do?    -14-Day 1200 Calorie Sample Diabetes Menu    Summary of top problems for patient -     1. Lack of activity due to spina bifida    2.    Self professed \"carboholic\" and sporadic eating pattern                                  [employer], Referrals, and Durable Medical Equipment - wheelchair    Plan / Pt Goals -   -continue with exercises from PT  -strive to follow the healthy plate meal plan, not going over 45 grams of carb per meal  -start reading nutrition labels for the total carb content  -refer to low and no carb snack idea handout  -follow up with Dr Casey Benavidez in August as scheduled  -call Kaela Villarreal with any questions    Future Appointments   Date Time Provider Wilmer Kapadia   3/3/2022  9:30 AM MACK Barron OPPT [facility]   3/7/2022  9:30 AM CHANDLER Celestin OPPT [facility]   3/10/2022  9:30 AM CHANDLER Celestin [facility]   3/14/2022  9:30 AM Ryan Rubio PT [facility]   3/15/2022  1:00 PM [facility] DEXA 1 [facility]   3/17/2022  9:30 AM Lelan Mems, PT [facility]   3/21/2022  9:30 AM Lelan Mems, PT [facility]   3/24/2022  9:30 AM Lelan Mems, PT [facility]   3/28/2022  9:30 AM Lelan Mems, PT [facility]   3/31/2022  9:30 AM Lelan Mems, PT [facility]   6/24/2022  9:40 AM Narda Rodas DO [facility] BS AMB   8/1/2022  3:30 PM Wenceslao Arriaza DO [facility] BS AMB      Last Appointment My Department:  2/16/2022    Chart was routed to Dr. Douglas Forward.     Omar Lawrence, RN, BSN, [address] [place] [address] (Certified Diabetes Care and ), [facility], [address] [place] [address]  ([phone]; [phone])

## 2022-02-28 NOTE — Clinical Note
Pt came in today for prediabetes education. Thanks for referral. Pt is going to try to be more active and cut back on carbs. She will call me with any questions before her next appt in August and I will check on her before that.

## 2022-02-28 NOTE — PATIENT INSTRUCTIONS
Goals/Plan:  -Your A1c was 5.7% on 2/16/22, which is considered prediabetes  -continue with exercises from PT for right should and neck problem  -strive to start following the healthy plate meal plan being mindful of what is a carb (fruit, dairy, grains, starchy vegetables) and portion size. As a rule, 1/2 cup of a carb food is a serving size (15 grams). The guideline is up to 45 grams of carb per meal (3 meals per day) or 3 servings per meal.  A snack guideline is- 1 oz. protein serving and may add 1 carb serving (15 grams)  -refer to 1200 calorie meal plan  -start reading nutrition labels paying attention to serving size and total # of carbs in grams per serving  -refer to low and no carb snack idea handout  -follow up with Dr Corean Peabody August 1st as scheduled  -watch the YouTube video \"Diabetes Made Simple For You\" for an easy to understand explanation of what diabetes/prediabetes is, what causes it, and some steps to controlling diabetes with diet, exercise, and taking medications if prescribed.     [email or web address]    -call Ana M Perkins with any questions at [phone]

## 2022-03-03 ENCOUNTER — HOSPITAL ENCOUNTER (OUTPATIENT)
Dept: PHYSICAL THERAPY | Age: 62
Discharge: HOME | End: 2022-03-03
Payer: COMMERCIAL

## 2022-03-03 ENCOUNTER — PATIENT OUTREACH (OUTPATIENT)
Dept: INTERNAL MEDICINE CLINIC | Age: 62
End: 2022-03-03

## 2022-03-03 PROCEDURE — 97112 NEUROMUSCULAR REEDUCATION: CPT

## 2022-03-03 PROCEDURE — 97140 MANUAL THERAPY 1/> REGIONS: CPT

## 2022-03-03 PROCEDURE — 97110 THERAPEUTIC EXERCISES: CPT

## 2022-03-03 NOTE — PROGRESS NOTES
PT DAILY TREATMENT NOTE - [facility] 2-15    Patient Name: Oumou Edwards  Date:3/3/2022  : 1960  [x]  Patient  Verified  Payor: Omar Chew / Plan: Betty Maharaj / Product Type: HMO /    In time: 8186  Out time: 1025  Total Treatment Time (min): 54  Total Timed Codes (min): 54  1:1 Treatment Time ( only): 47  Visit #:  5    Treatment Area: Neck muscle spasm [M62.838]    SUBJECTIVE  Pain Level (0-10 scale): 5 in L-sided neck  Any medication changes, allergies to medications, adverse drug reactions, diagnosis change, or new procedure performed?: [x] No    [] Yes (see summary sheet for update)  Subjective functional status/changes:   [] No changes reported    The patient reports onset of L-sided neck discomfort this morning upon waking up; she also has a sinus headache which is limiting her at arrival. She notes her neck pain tends to move from one side to the other. She feels she is improving overall since starting physical therapy services. OBJECTIVE    27 min Therapeutic Exercise:  [x] See flow sheet :    Rationale: increase ROM, increase strength, improve coordination, improve balance and increase proprioception to improve the patients ability to sleep, transfer, and perform work tasks    12 min Neuromuscular Re-education:  [x]  See flow sheet :   Rationale: increase ROM, increase strength, improve coordination, improve balance and increase proprioception  to improve the patients ability to sleep, transfer, and perform work tasks    15 min Manual Therapy: Bilateral cervical PA/rotational/lateral joint mobilizations (Grade III, C2-7); L shoulder PROM, all planes to tolerance; L glenohumeral inferior/AP joint mobilizations (Grade III); STM/TPR bilateral suboccipitals/L upper trapezius/levator scapula/scalenes/supraspinatus/infraspinatus/teres minor/subscapularis/pectoralis major/minor/latissimus dorsi; L pectoralis major MWM (pin + stretch with horizontal abduction at 120 degrees of shoulder elevation);  L subscapularis MWM (pin + stretch with shoulder ER); supine L scalenes MWM (pin + stretch with R cervical side bending, 10x); seated L first rib inferior mobilizations (Grade III)     Rationale: decrease pain, increase ROM, increase tissue extensibility and decrease trigger points to improve the patients ability to sleep, transfer, and perform work tasks    With   [x] TE   [] TA   [x] Neuro   [] SC   [] other: Patient Education: [x] Review HEP    [] Progressed/Changed HEP based on:   [] positioning   [] body mechanics   [] transfers   [] heat/ice application    [] other:      Other Objective/Functional Measures: 2 in L-sided neck with multiplanar cervical AROM post-manual intervention. Patient noting abolishment of familiar symptoms with test-retest post-neuromuscular re-education interventions. Pain Level (0-10 scale) post treatment: 2 in L-sided neck    ASSESSMENT/Changes in Function:   The patient tolerated therapy visit well at this date. She demonstrates mildly improved L-sided upper trapezius/levator scapula/scalene muscle turgor with palpation/soft tissue mobilization, however noted continued decreased tolerance to L cervical C3-4 lateral/rotational joint mobilizations and first rib inferior mobilizations. Patient requires multimodal cues to reduce cervical flexion during progression to seated chin tucks. Noted rapid fatigue and requiring frequent tactile cues to maintain scapular retraction during sidelying shoulder external rotation, particularly with load progression. Patient demonstrates mildly increased R > L upper trapezius recruitment with shoulder rhythmic stabilizations at wall due to decreased periscapular/glenohumeral stability. Noted difficulty maintaining bilateral shoulder ER positioning at wall during initiation of wall slides. Patient noting significant reduction in L-sided neck pain post-session today. Continue to progress as tolerated.   Patient will continue to benefit from skilled PT services to modify and progress therapeutic interventions, address functional mobility deficits, address ROM deficits, address strength deficits, analyze and address soft tissue restrictions, analyze and cue movement patterns, analyze and modify body mechanics/ergonomics and assess and modify postural abnormalities to attain remaining goals. [x]  See Plan of Care  []  See progress note/recertification  []  See Discharge Summary         Progress towards goals / Updated goals:    Short Term Goals: To be accomplished in 6-8 treatments: The patient will demonstrate understanding of and compliance with updated and progressive HEP toward improved participation in physical therapy plan of care. - Progressing              The patient will demonstrate ability to transfer to/from wheelchair to mat table 3/3 times without onset of familiar L-sided neck pain toward improved quality of life. - Progressing  Long Term Goals: To be accomplished in 12-16 treatments: The patient will demonstrate multiplanar bilateral UE strength increased by >= 1/2 MMT grade toward improved endurance with functional activities such as transfers and work tasks. - Progressing              The patient will demonstrate longus colli endurance test >= 38 seconds toward improved postural endurance with transfers and work tasks. - Progressing              The patient will score >= 57 on FOTO to demonstrate significantly improved subjective report of function. - Progressing  Frequency / Duration: Patient to be seen 2 times per week for 12-16 treatments.     PLAN  [x]  Upgrade activities as tolerated     [x]  Continue plan of care  [x]  Update interventions per flow sheet       []  Discharge due to:_  []  Other:_      Latanya Dural, PT, DPT 3/3/2022

## 2022-03-03 NOTE — PROGRESS NOTES
Goals Addressed                 This Visit's Progress     Patient verbalizes understanding of self -management goals of living with Pre-Diabetes.    On track     3/3/22-dkw  -pt called inquiring whether recipes come with the 1200 calorie 14 day sample diabetes menu as she is making a shopping list; advised pt there are no recipes  -advised pt to go to the [email or web address] for recipe ideas  -discussed reading nutrition labels again   -pt will call with any other questions    2/28/22-dkw  -pt was referred by  Dr. Darcy Severino for prediabetes education, which was initiated 2/28/22 for an a1c of 5.7% on 2/16/22  -continue with exercises from PT for rt should and neck problem  -strive to follow the healthy plate meal plan, not going over 45 grams of carb per meal  -start reading nutrition labels for the total carb content  -refer to low and no carb snack idea handout  -follow up with Dr Darcy Severino in August as scheduled  -call Tyrell Zhao with any questions  -mailed AVS to pt and sent in My Chart note  -will follow up in one month

## 2022-03-04 RX ORDER — RESVER/WINE/BFL/GRPSD/PC/C/POM 200MG-60MG
CAPSULE ORAL
Qty: 90 TABLET | Refills: 3 | Status: SHIPPED | OUTPATIENT
Start: 2022-03-04 | End: 2022-05-04

## 2022-03-07 ENCOUNTER — HOSPITAL ENCOUNTER (OUTPATIENT)
Dept: PHYSICAL THERAPY | Age: 62
Discharge: HOME | End: 2022-03-07
Payer: COMMERCIAL

## 2022-03-07 PROCEDURE — 97110 THERAPEUTIC EXERCISES: CPT

## 2022-03-07 PROCEDURE — 97140 MANUAL THERAPY 1/> REGIONS: CPT

## 2022-03-07 NOTE — PROGRESS NOTES
PT DAILY TREATMENT NOTE - [facility] 2-15    Patient Name: Leon Dubose  Date:3/7/2022  : 1960  [x]  Patient  Verified  Payor: Ailyn Gracia / Plan: Juan Pablo Martinez / Product Type: HMO /    In time:930 Out time: 1013  Total Treatment Time (min): 43  Total Timed Codes (min): 29  1:1 Treatment Time (MC only): 29  Visit #:  6    Treatment Area: Neck muscle spasm [M62.838]    SUBJECTIVE  Pain Level (0-10 scale): 4/10 in L-sided neck  Any medication changes, allergies to medications, adverse drug reactions, diagnosis change, or new procedure performed?: [x] No    [] Yes (see summary sheet for update)  Subjective functional status/changes:   [] No changes reported  Patient reports she is not sure what she did, but she has been having more discomfort in the L shoulder blade. OBJECTIVE    33 min Therapeutic Exercise:  [x] See flow sheet :    Rationale: increase ROM, increase strength, improve coordination, improve balance and increase proprioception to improve the patients ability to sleep, transfer, and perform work tasks     nt min Neuromuscular Re-education:  [x]  See flow sheet :   Rationale: increase ROM, increase strength, improve coordination, improve balance and increase proprioception  to improve the patients ability to sleep, transfer, and perform work tasks    10 min Manual Therapy: Bilateral cervical PA/rotational/lateral joint mobilizations (Grade III, C2-7); L shoulder PROM, all planes to tolerance; L glenohumeral inferior/AP joint mobilizations (Grade III); STM/TPR bilateral suboccipitals/L upper trapezius/levator scapula/scalenes/supraspinatus/infraspinatus/teres minor/subscapularis/pectoralis major/minor/latissimus dorsi; L pectoralis major MWM (pin + stretch with horizontal abduction at 120 degrees of shoulder elevation);  L subscapularis MWM (pin + stretch with shoulder ER); supine L scalenes MWM (pin + stretch with R cervical side bending, 10x); seated L first rib inferior mobilizations (Grade III) Rationale: decrease pain, increase ROM, increase tissue extensibility and decrease trigger points to improve the patients ability to sleep, transfer, and perform work tasks    With   [x] TE   [] TA   [x] Neuro   [] SC   [] other: Patient Education: [x] Review HEP    [] Progressed/Changed HEP based on:   [] positioning   [] body mechanics   [] transfers   [] heat/ice application    [] other:      Other Objective/Functional Measures: none noted    Pain Level (0-10 scale) post treatment: 2 in L-sided neck    ASSESSMENT/Changes in Function:   TTP along the L levator. Struggled with face pulls. Good control with prone back burners. Tolerated all therex, will continue to progress as tolerated. Patient will continue to benefit from skilled PT services to modify and progress therapeutic interventions, address functional mobility deficits, address ROM deficits, address strength deficits, analyze and address soft tissue restrictions, analyze and cue movement patterns, analyze and modify body mechanics/ergonomics and assess and modify postural abnormalities to attain remaining goals. [x]  See Plan of Care  []  See progress note/recertification  []  See Discharge Summary         Progress towards goals / Updated goals:    Short Term Goals: To be accomplished in 6-8 treatments: The patient will demonstrate understanding of and compliance with updated and progressive HEP toward improved participation in physical therapy plan of care. - Progressing              The patient will demonstrate ability to transfer to/from wheelchair to mat table 3/3 times without onset of familiar L-sided neck pain toward improved quality of life. - Progressing  Long Term Goals: To be accomplished in 12-16 treatments: The patient will demonstrate multiplanar bilateral UE strength increased by >= 1/2 MMT grade toward improved endurance with functional activities such as transfers and work tasks.  - Progressing The patient will demonstrate longus colli endurance test >= 38 seconds toward improved postural endurance with transfers and work tasks. - Progressing              The patient will score >= 57 on FOTO to demonstrate significantly improved subjective report of function. - Progressing  Frequency / Duration: Patient to be seen 2 times per week for 12-16 treatments.     PLAN  [x]  Upgrade activities as tolerated     [x]  Continue plan of care  [x]  Update interventions per flow sheet       []  Discharge due to:_  []  Other:_      Tashia Adrian PTA, DPT 3/7/2022

## 2022-03-10 ENCOUNTER — HOSPITAL ENCOUNTER (OUTPATIENT)
Dept: PHYSICAL THERAPY | Age: 62
Discharge: HOME | End: 2022-03-10
Payer: COMMERCIAL

## 2022-03-10 PROCEDURE — 97110 THERAPEUTIC EXERCISES: CPT

## 2022-03-10 PROCEDURE — 97140 MANUAL THERAPY 1/> REGIONS: CPT

## 2022-03-14 ENCOUNTER — HOSPITAL ENCOUNTER (INPATIENT)
Age: 62
LOS: 3 days | Discharge: HOME | DRG: 391 | End: 2022-03-18
Attending: EMERGENCY MEDICINE | Admitting: STUDENT IN AN ORGANIZED HEALTH CARE EDUCATION/TRAINING PROGRAM
Payer: COMMERCIAL

## 2022-03-14 DIAGNOSIS — R41.82 ACUTE ALTERATION IN MENTAL STATUS: ICD-10-CM

## 2022-03-14 DIAGNOSIS — R56.9 CONVULSIONS, UNSPECIFIED CONVULSION TYPE (HCC): ICD-10-CM

## 2022-03-14 DIAGNOSIS — K52.9 COLITIS: Primary | ICD-10-CM

## 2022-03-14 LAB
ALBUMIN SERPL-MCNC: 4.3 G/DL (ref 3.5–5)
ALBUMIN/GLOB SERPL: 1.1 {RATIO} (ref 1.1–2.2)
ALP SERPL-CCNC: 86 U/L (ref 45–117)
ALT SERPL-CCNC: 80 U/L (ref 12–78)
ANION GAP SERPL CALC-SCNC: 7 MMOL/L (ref 5–15)
AST SERPL-CCNC: 35 U/L (ref 15–37)
BILIRUB SERPL-MCNC: 1.2 MG/DL (ref 0.2–1)
BUN SERPL-MCNC: 17 MG/DL (ref 6–20)
BUN/CREAT SERPL: 26 (ref 12–20)
CALCIUM SERPL-MCNC: 9.6 MG/DL (ref 8.5–10.1)
CHLORIDE SERPL-SCNC: 102 MMOL/L (ref 97–108)
CO2 SERPL-SCNC: 25 MMOL/L (ref 21–32)
CREAT SERPL-MCNC: 0.65 MG/DL (ref 0.55–1.02)
GLOBULIN SER CALC-MCNC: 3.9 G/DL (ref 2–4)
GLUCOSE SERPL-MCNC: 195 MG/DL (ref 65–100)
POTASSIUM SERPL-SCNC: 4.1 MMOL/L (ref 3.5–5.1)
PROT SERPL-MCNC: 8.2 G/DL (ref 6.4–8.2)
SODIUM SERPL-SCNC: 134 MMOL/L (ref 136–145)

## 2022-03-14 PROCEDURE — 36415 COLL VENOUS BLD VENIPUNCTURE: CPT

## 2022-03-14 PROCEDURE — 85025 COMPLETE CBC W/AUTO DIFF WBC: CPT

## 2022-03-14 PROCEDURE — 96374 THER/PROPH/DIAG INJ IV PUSH: CPT

## 2022-03-14 PROCEDURE — 99285 EMERGENCY DEPT VISIT HI MDM: CPT

## 2022-03-14 PROCEDURE — 80053 COMPREHEN METABOLIC PANEL: CPT

## 2022-03-15 ENCOUNTER — APPOINTMENT (OUTPATIENT)
Dept: CT IMAGING | Age: 62
DRG: 391 | End: 2022-03-15
Attending: EMERGENCY MEDICINE
Payer: COMMERCIAL

## 2022-03-15 ENCOUNTER — APPOINTMENT (OUTPATIENT)
Dept: CT IMAGING | Age: 62
DRG: 391 | End: 2022-03-15
Attending: STUDENT IN AN ORGANIZED HEALTH CARE EDUCATION/TRAINING PROGRAM
Payer: COMMERCIAL

## 2022-03-15 PROBLEM — R51.9 SINUS HEADACHE: Status: ACTIVE | Noted: 2022-03-15

## 2022-03-15 PROBLEM — R41.82 ACUTE ALTERATION IN MENTAL STATUS: Status: ACTIVE | Noted: 2022-03-15

## 2022-03-15 PROBLEM — K52.9 COLITIS: Status: ACTIVE | Noted: 2022-03-15

## 2022-03-15 PROBLEM — G93.5 ARNOLD-CHIARI MALFORMATION, TYPE I (HCC): Status: ACTIVE | Noted: 2022-03-15

## 2022-03-15 PROBLEM — G82.20 PARAPLEGIA (HCC): Status: ACTIVE | Noted: 2022-03-15

## 2022-03-15 PROBLEM — R56.9 CONVULSION (HCC): Status: ACTIVE | Noted: 2022-03-15

## 2022-03-15 PROBLEM — G91.9 HYDROCEPHALUS (HCC): Status: ACTIVE | Noted: 2022-03-15

## 2022-03-15 PROBLEM — G44.209 TENSION VASCULAR HEADACHE: Status: ACTIVE | Noted: 2022-03-15

## 2022-03-15 LAB
APPEARANCE UR: ABNORMAL
BACTERIA URNS QL MICRO: ABNORMAL /HPF
BASOPHILS # BLD: 0 K/UL (ref 0–0.1)
BASOPHILS NFR BLD: 0 % (ref 0–1)
BILIRUB UR QL CFM: NEGATIVE
CAOX CRY URNS QL MICRO: ABNORMAL
COLOR UR: ABNORMAL
DIFFERENTIAL METHOD BLD: ABNORMAL
EOSINOPHIL # BLD: 0 K/UL (ref 0–0.4)
EOSINOPHIL NFR BLD: 0 % (ref 0–7)
EPITH CASTS URNS QL MICRO: ABNORMAL /LPF
ERYTHROCYTE [DISTWIDTH] IN BLOOD BY AUTOMATED COUNT: 12.1 % (ref 11.5–14.5)
GLUCOSE UR STRIP.AUTO-MCNC: NEGATIVE MG/DL
HCT VFR BLD AUTO: 45.9 % (ref 35–47)
HGB BLD-MCNC: 15.1 G/DL (ref 11.5–16)
HGB UR QL STRIP: NEGATIVE
IMM GRANULOCYTES # BLD AUTO: 0.2 K/UL (ref 0–0.04)
IMM GRANULOCYTES NFR BLD AUTO: 1 % (ref 0–0.5)
KETONES UR QL STRIP.AUTO: 15 MG/DL
LACTATE BLD-SCNC: 3.23 MMOL/L (ref 0.4–2)
LACTATE BLD-SCNC: 3.37 MMOL/L (ref 0.4–2)
LEUKOCYTE ESTERASE UR QL STRIP.AUTO: ABNORMAL
LYMPHOCYTES # BLD: 0.8 K/UL (ref 0.8–3.5)
LYMPHOCYTES NFR BLD: 5 % (ref 12–49)
MCH RBC QN AUTO: 31.3 PG (ref 26–34)
MCHC RBC AUTO-ENTMCNC: 32.9 G/DL (ref 30–36.5)
MCV RBC AUTO: 95 FL (ref 80–99)
MONOCYTES # BLD: 1 K/UL (ref 0–1)
MONOCYTES NFR BLD: 6 % (ref 5–13)
NEUTS SEG # BLD: 14.3 K/UL (ref 1.8–8)
NEUTS SEG NFR BLD: 88 % (ref 32–75)
NITRITE UR QL STRIP.AUTO: POSITIVE
NRBC # BLD: 0 K/UL (ref 0–0.01)
NRBC BLD-RTO: 0 PER 100 WBC
PH UR STRIP: 5 [PH] (ref 5–8)
PLATELET # BLD AUTO: 317 K/UL (ref 150–400)
PMV BLD AUTO: 9.4 FL (ref 8.9–12.9)
PROT UR STRIP-MCNC: ABNORMAL MG/DL
RBC # BLD AUTO: 4.83 M/UL (ref 3.8–5.2)
RBC #/AREA URNS HPF: ABNORMAL /HPF (ref 0–5)
RBC MORPH BLD: ABNORMAL
SP GR UR REFRACTOMETRY: 1.02 (ref 1–1.03)
UA: UC IF INDICATED,UAUC: ABNORMAL
UROBILINOGEN UR QL STRIP.AUTO: 1 EU/DL (ref 0.2–1)
WBC # BLD AUTO: 16.3 K/UL (ref 3.6–11)
WBC URNS QL MICRO: ABNORMAL /HPF (ref 0–4)

## 2022-03-15 PROCEDURE — 81001 URINALYSIS AUTO W/SCOPE: CPT

## 2022-03-15 PROCEDURE — 74011250636 HC RX REV CODE- 250/636: Performed by: STUDENT IN AN ORGANIZED HEALTH CARE EDUCATION/TRAINING PROGRAM

## 2022-03-15 PROCEDURE — 65270000029 HC RM PRIVATE

## 2022-03-15 PROCEDURE — 95819 EEG AWAKE AND ASLEEP: CPT | Performed by: PSYCHIATRY & NEUROLOGY

## 2022-03-15 PROCEDURE — 74011000250 HC RX REV CODE- 250: Performed by: STUDENT IN AN ORGANIZED HEALTH CARE EDUCATION/TRAINING PROGRAM

## 2022-03-15 PROCEDURE — 36415 COLL VENOUS BLD VENIPUNCTURE: CPT

## 2022-03-15 PROCEDURE — 87040 BLOOD CULTURE FOR BACTERIA: CPT

## 2022-03-15 PROCEDURE — 95816 EEG AWAKE AND DROWSY: CPT | Performed by: PSYCHIATRY & NEUROLOGY

## 2022-03-15 PROCEDURE — 83605 ASSAY OF LACTIC ACID: CPT

## 2022-03-15 PROCEDURE — 70450 CT HEAD/BRAIN W/O DYE: CPT

## 2022-03-15 PROCEDURE — 74011250637 HC RX REV CODE- 250/637: Performed by: PSYCHIATRY & NEUROLOGY

## 2022-03-15 PROCEDURE — 74011250637 HC RX REV CODE- 250/637: Performed by: STUDENT IN AN ORGANIZED HEALTH CARE EDUCATION/TRAINING PROGRAM

## 2022-03-15 PROCEDURE — 99223 1ST HOSP IP/OBS HIGH 75: CPT | Performed by: PSYCHIATRY & NEUROLOGY

## 2022-03-15 PROCEDURE — 74011000636 HC RX REV CODE- 636: Performed by: EMERGENCY MEDICINE

## 2022-03-15 PROCEDURE — 74011250636 HC RX REV CODE- 250/636: Performed by: EMERGENCY MEDICINE

## 2022-03-15 PROCEDURE — 74177 CT ABD & PELVIS W/CONTRAST: CPT

## 2022-03-15 RX ORDER — POLYETHYLENE GLYCOL 3350 17 G/17G
17 POWDER, FOR SOLUTION ORAL DAILY PRN
Status: DISCONTINUED | OUTPATIENT
Start: 2022-03-15 | End: 2022-03-18 | Stop reason: HOSPADM

## 2022-03-15 RX ORDER — LORAZEPAM 2 MG/ML
1-2 INJECTION INTRAMUSCULAR
Status: DISCONTINUED | OUTPATIENT
Start: 2022-03-15 | End: 2022-03-18 | Stop reason: HOSPADM

## 2022-03-15 RX ORDER — ACETAMINOPHEN 650 MG/1
650 SUPPOSITORY RECTAL
Status: DISCONTINUED | OUTPATIENT
Start: 2022-03-15 | End: 2022-03-18 | Stop reason: HOSPADM

## 2022-03-15 RX ORDER — TOPIRAMATE 25 MG/1
25 TABLET ORAL 2 TIMES DAILY
Status: DISCONTINUED | OUTPATIENT
Start: 2022-03-15 | End: 2022-03-17

## 2022-03-15 RX ORDER — ENOXAPARIN SODIUM 100 MG/ML
40 INJECTION SUBCUTANEOUS DAILY
Status: DISCONTINUED | OUTPATIENT
Start: 2022-03-15 | End: 2022-03-18 | Stop reason: HOSPADM

## 2022-03-15 RX ORDER — SODIUM CHLORIDE 0.9 % (FLUSH) 0.9 %
5-40 SYRINGE (ML) INJECTION AS NEEDED
Status: DISCONTINUED | OUTPATIENT
Start: 2022-03-15 | End: 2022-03-18 | Stop reason: HOSPADM

## 2022-03-15 RX ORDER — ACETAMINOPHEN 325 MG/1
650 TABLET ORAL
Status: DISCONTINUED | OUTPATIENT
Start: 2022-03-15 | End: 2022-03-18 | Stop reason: HOSPADM

## 2022-03-15 RX ORDER — LISINOPRIL 10 MG/1
10 TABLET ORAL DAILY
Status: DISCONTINUED | OUTPATIENT
Start: 2022-03-15 | End: 2022-03-18 | Stop reason: HOSPADM

## 2022-03-15 RX ORDER — BUTALBITAL, ACETAMINOPHEN AND CAFFEINE 50; 325; 40 MG/1; MG/1; MG/1
2 TABLET ORAL
Status: DISCONTINUED | OUTPATIENT
Start: 2022-03-15 | End: 2022-03-16

## 2022-03-15 RX ORDER — METRONIDAZOLE 500 MG/100ML
500 INJECTION, SOLUTION INTRAVENOUS EVERY 8 HOURS
Status: DISCONTINUED | OUTPATIENT
Start: 2022-03-15 | End: 2022-03-18 | Stop reason: HOSPADM

## 2022-03-15 RX ORDER — LEVOFLOXACIN 5 MG/ML
750 INJECTION, SOLUTION INTRAVENOUS
Status: COMPLETED | OUTPATIENT
Start: 2022-03-15 | End: 2022-03-15

## 2022-03-15 RX ORDER — ATORVASTATIN CALCIUM 10 MG/1
10 TABLET, FILM COATED ORAL DAILY
Status: DISCONTINUED | OUTPATIENT
Start: 2022-03-15 | End: 2022-03-18 | Stop reason: HOSPADM

## 2022-03-15 RX ORDER — METRONIDAZOLE 500 MG/100ML
500 INJECTION, SOLUTION INTRAVENOUS ONCE
Status: COMPLETED | OUTPATIENT
Start: 2022-03-15 | End: 2022-03-15

## 2022-03-15 RX ORDER — ONDANSETRON 2 MG/ML
4 INJECTION INTRAMUSCULAR; INTRAVENOUS
Status: DISCONTINUED | OUTPATIENT
Start: 2022-03-15 | End: 2022-03-18 | Stop reason: HOSPADM

## 2022-03-15 RX ORDER — SODIUM CHLORIDE 9 MG/ML
100 INJECTION, SOLUTION INTRAVENOUS CONTINUOUS
Status: DISCONTINUED | OUTPATIENT
Start: 2022-03-15 | End: 2022-03-18 | Stop reason: HOSPADM

## 2022-03-15 RX ORDER — LEVOFLOXACIN 5 MG/ML
750 INJECTION, SOLUTION INTRAVENOUS EVERY 24 HOURS
Status: DISCONTINUED | OUTPATIENT
Start: 2022-03-16 | End: 2022-03-18 | Stop reason: HOSPADM

## 2022-03-15 RX ORDER — SODIUM CHLORIDE 0.9 % (FLUSH) 0.9 %
5-40 SYRINGE (ML) INJECTION EVERY 8 HOURS
Status: DISCONTINUED | OUTPATIENT
Start: 2022-03-15 | End: 2022-03-18 | Stop reason: HOSPADM

## 2022-03-15 RX ORDER — ONDANSETRON 4 MG/1
4 TABLET, ORALLY DISINTEGRATING ORAL
Status: DISCONTINUED | OUTPATIENT
Start: 2022-03-15 | End: 2022-03-18 | Stop reason: HOSPADM

## 2022-03-15 RX ADMIN — METRONIDAZOLE 500 MG: 500 INJECTION, SOLUTION INTRAVENOUS at 02:53

## 2022-03-15 RX ADMIN — METRONIDAZOLE 500 MG: 500 INJECTION, SOLUTION INTRAVENOUS at 19:49

## 2022-03-15 RX ADMIN — SODIUM CHLORIDE, PRESERVATIVE FREE 10 ML: 5 INJECTION INTRAVENOUS at 21:31

## 2022-03-15 RX ADMIN — ENOXAPARIN SODIUM 40 MG: 40 INJECTION SUBCUTANEOUS at 09:37

## 2022-03-15 RX ADMIN — ACETAMINOPHEN 325MG 650 MG: 325 TABLET ORAL at 09:37

## 2022-03-15 RX ADMIN — METRONIDAZOLE 500 MG: 500 INJECTION, SOLUTION INTRAVENOUS at 11:27

## 2022-03-15 RX ADMIN — SODIUM CHLORIDE 1000 ML: 9 INJECTION, SOLUTION INTRAVENOUS at 00:59

## 2022-03-15 RX ADMIN — IOPAMIDOL 100 ML: 755 INJECTION, SOLUTION INTRAVENOUS at 01:13

## 2022-03-15 RX ADMIN — ATORVASTATIN CALCIUM 10 MG: 10 TABLET, FILM COATED ORAL at 09:37

## 2022-03-15 RX ADMIN — SODIUM CHLORIDE 125 ML/HR: 9 INJECTION, SOLUTION INTRAVENOUS at 17:10

## 2022-03-15 RX ADMIN — SODIUM CHLORIDE 75 ML/HR: 9 INJECTION, SOLUTION INTRAVENOUS at 03:51

## 2022-03-15 RX ADMIN — SODIUM CHLORIDE, PRESERVATIVE FREE 10 ML: 5 INJECTION INTRAVENOUS at 05:54

## 2022-03-15 RX ADMIN — LEVOFLOXACIN 750 MG: 5 INJECTION, SOLUTION INTRAVENOUS at 03:52

## 2022-03-15 RX ADMIN — SODIUM CHLORIDE, PRESERVATIVE FREE 10 ML: 5 INJECTION INTRAVENOUS at 14:00

## 2022-03-15 NOTE — PROGRESS NOTES
Problem: Risk for Spread of Infection  Goal: Prevent transmission of infectious organism to others  Description: Prevent the transmission of infectious organisms to other patients, staff members, and visitors. Outcome: Progressing Towards Goal     Problem: Pressure Injury - Risk of  Goal: *Prevention of pressure injury  Description: Document Maritnez Scale and appropriate interventions in the flowsheet. Outcome: Progressing Towards Goal  Note: Pressure Injury Interventions:  Sensory Interventions: Float heels,Keep linens dry and wrinkle-free,Minimize linen layers         Activity Interventions: Pressure redistribution bed/mattress(bed type),Increase time out of bed    Mobility Interventions: Float heels,HOB 30 degrees or less    Nutrition Interventions: Document food/fluid/supplement intake,Discuss nutritional consult with provider    Friction and Shear Interventions: Minimize layers                Problem: Falls - Risk of  Goal: *Absence of Falls  Description: Document Robb Fall Risk and appropriate interventions in the flowsheet.   Outcome: Progressing Towards Goal  Note: Fall Risk Interventions:  Mobility Interventions: Patient to call before getting OOB              Elimination Interventions: Call light in reach,Toileting schedule/hourly rounds,Patient to call for help with toileting needs              Problem: Patient Education: Go to Patient Education Activity  Goal: Patient/Family Education  Outcome: Progressing Towards Goal

## 2022-03-15 NOTE — CONSULTS
Consult  REFERRED BY:  Elo Montoya III, DO    CHIEF COMPLAINT: Headache for the last month that is progressive      Subjective:     Annie Urena is a 62 y.o. right-handed  female, we are seeing as a new patient to me, at the request of the hospitalist for evaluation of new problem of headache and syncope more in the bifrontal head region described as a pressure sensation, and seems to be constant, and not going away but she can take Tylenol which will usually relieve it. She has had no nausea or vomiting, and no focal weakness and no sensory loss and no complaints of visual changes or fever or meningismus or trauma or any other cause for the headaches other than to worry about her sinuses which she feels might be more irritated. The patient has a CT scan that suggest Arnold-Chiari malformation type I, with crowding of the foramen magnum and hydrocephalus, but the patient has had this Arnold-Chiari malformation ever since birth, and had about 8 operations at [facility] repairing her spina bifida which is associated with his Arnold-Chiari malformation, and was previously evaluated by Dr. Pradip Smiley neurosurgery at [facility] in the 90s, and thought not to need a shunt for her hydrocephalus. She was followed by Dr. Delia Rodriguez for years at [facility], and recently saw Dr. Josephine Chisholm for shoulder problems. We are asked to evaluate because of the abnormal scan. The patient is having her sister bring in her old records to try to compare her previous scans with this scan, and we will go ahead and get an MRI of the brain and cervical spine to more fully evaluate her Arnold-Chiari malformation and see if there is any decompensation of her hydrocephalus. She normally is able to transfer on the bed or chair to her wheelchair, but is really not ambulatory, but able to stand to transfer partly. Patient also complains of neck pain in the back of her neck, slightly more on the left side as compared to the right. She denies any unusual physical activity or trauma. Past Medical History:   Diagnosis Date    COVID-19 05/2021    Hypercholesterolemia     Hypertension     Spina bifida [facility])       Past Surgical History:   Procedure Laterality Date    COLONOSCOPY N/A 5/1/2019    COLONOSCOPY performed by Helder Farley MD at [facility]  5/1/2019         HX OTHER SURGICAL  1890s    sacral wound flap repair     History reviewed. No pertinent family history.    Social History     Tobacco Use    Smoking status: Never Smoker    Smokeless tobacco: Never Used   Substance Use Topics    Alcohol use: Never         Current Facility-Administered Medications:     0.9% sodium chloride infusion, 125 mL/hr, IntraVENous, CONTINUOUS, Tami Garcia MD, Last Rate: 125 mL/hr at 03/15/22 1710, 125 mL/hr at 03/15/22 1710    [Held by provider] lisinopriL (PRINIVIL, ZESTRIL) tablet 10 mg, 10 mg, Oral, DAILY, Megan Garcia MD    atorvastatin (LIPITOR) tablet 10 mg, 10 mg, Oral, DAILY, Tami Garcia MD, 10 mg at 03/15/22 0937    sodium chloride (NS) flush 5-40 mL, 5-40 mL, IntraVENous, Q8H, Tami Garcia MD, 10 mL at 03/15/22 1400    sodium chloride (NS) flush 5-40 mL, 5-40 mL, IntraVENous, PRN, Megan Day MD    acetaminophen (TYLENOL) tablet 650 mg, 650 mg, Oral, Q6H PRN, 650 mg at 03/15/22 0937 **OR** acetaminophen (TYLENOL) suppository 650 mg, 650 mg, Rectal, Q6H PRN, Megan Garcia MD    polyethylene glycol (MIRALAX) packet 17 g, 17 g, Oral, DAILY PRN, Tami Garcia MD    ondansetron (ZOFRAN ODT) tablet 4 mg, 4 mg, Oral, Q8H PRN **OR** ondansetron (ZOFRAN) injection 4 mg, 4 mg, IntraVENous, Q6H PRN, Tami Garcia MD    enoxaparin (LOVENOX) injection 40 mg, 40 mg, SubCUTAneous, DAILY, Tami Garcia MD, 40 mg at 03/15/22 0937    [START ON 3/16/2022] levoFLOXacin (LEVAQUIN) 750 mg in D5W IVPB, 750 mg, IntraVENous, Q24H, Jacek Garcia MD    metroNIDAZOLE (FLAGYL) IVPB premix 500 mg, 500 mg, IntraVENous, Q8H, Tami Garcia MD, Last Rate: 100 mL/hr at 03/15/22 1127, 500 mg at 03/15/22 1127    topiramate (TOPAMAX) tablet 25 mg, 25 mg, Oral, BID, Maile Townsend MD    butalbital-acetaminophen-caffeine (FIORICET, ESGIC) -40 mg per tablet 2 Tablet, 2 Tablet, Oral, Q8H PRN, Maile Townsend MD    LORazepam (ATIVAN) injection 1-2 mg, 1-2 mg, IntraVENous, Q6H PRN, Maile Townsend MD        Allergies   Allergen Reactions    Sulfa (Sulfonamide Antibiotics) Nausea and Vomiting      MRI Results (most recent):  No results found for this or any previous visit. No results found for this or any previous visit. Review of Systems:  A comprehensive review of systems was negative except for: Constitutional: positive for fatigue and malaise  Musculoskeletal: positive for myalgias, arthralgias, stiff joints, neck pain and muscle weakness  Neurological: positive for headaches, paresthesia, coordination problems, gait problems and weakness  Behvioral/Psych: positive for anxiety   Vitals:    03/14/22 2248 03/15/22 0540 03/15/22 1128 03/15/22 1647   BP: 119/69 121/69 109/60 (!) 115/55   Pulse: 98 (!) 116 (!) 117 96   Resp: 17 18 18 18   Temp: 97.2 °F (36.2 °C) 99 °F (37.2 °C) 98.6 °F (37 °C) 98.2 °F (36.8 °C)   SpO2: 100% 98% 96% 95%   Weight: 140 lb (63.5 kg)      Height: 4' 6\" (1.372 m)        Objective:     I      NEUROLOGICAL EXAM:    Appearance: The patient is well developed, well nourished, provides a coherent history and is in no acute distress. Head shows some frontal prominence of the skull   Mental Status: Oriented to time, place and person, and the president, cognitive function is normal and speech is fluent and no aphasia or dysarthria. Mood and affect appropriate, but anxious. Cranial Nerves:   Intact visual fields. Fundi are benign, disc are flat, no lesions seen on funduscopy. ANN, EOM's full, no nystagmus, no ptosis. Facial sensation is normal. Corneal reflexes are not tested. Facial movement is symmetric. Hearing is normal bilaterally. Palate is midline with normal sternocleidomastoid and trapezius muscles are normal. Tongue is midline. Neck without meningismus or bruits  Temporal arteries are not tender or enlarged  TMJ areas are not tender on palpation   Motor:  4/5 strength in upper proximal and distal muscles, and patient with almost complete paralysis from L5 down in the legs bilaterally, and moderate severe weakness of the proximal pelvic girdle muscles bilaterally. Reduced bulk and tone in her leg muscles bilaterally distal greater than proximal. No fasciculations. Rapid alternating movement is symmetric in the upper extremities but decreased in the lower extremities bilaterally   Reflexes:   Deep tendon reflexes 2+/4 and symmetrical.  No babinski or clonus present   Sensory:   Abnormal to touch, pinprick and vibration and temperature in both legs to about knee level bilaterally. DSS is intact   Gait:  Not testable gait for patient's age. Tremor:   No tremor noted. Cerebellar:  Not testable abnormal cerebellar signs present on Romberg and tandem testing and finger-nose-finger exam.   Neurovascular:  Normal heart sounds and regular rhythm, peripheral pulses decreased, and no carotid bruits. Assessment:       ICD-10-CM ICD-9-CM    1.  Colitis  K52.9 558.9      Principal Problem:    Colitis (3/15/2022)    Active Problems:    B12 deficiency (5/3/2019)      Neurogenic bladder (9/10/2019)      Spina bifida ([address] (9/10/2019)      Arnold-Chiari malformation, type I ([address] (3/15/2022)      Hydrocephalus ([address] (3/15/2022)      Tension vascular headache (3/15/2022)      Sinus headache (3/15/2022)      Paraplegia ([address] (3/15/2022)        Plan:     Patient with complicated problem of spina bifida and Arnold-Chiari malformation type I, with increasing headaches, and obviously the concern is that she is having some type of progression or decompensation of her hydrocephalus, so we will check an MRI of the brain and cervical spine to evaluate her neck pain and headaches, and try to get her records to compare to her previous studies because a lot of these problems have been known for years. We will decide on further therapy after these tests, and will place her on Topamax to see if that helps the headaches and may help cut down some of the spinal fluid production and increased intracranial pressure if it is apparent. She has slight fullness of her optic nerves, cannot completely rule out early increase intracranial pressure.   We will follow carefully with you, obviously possibly a very serious neurological problem if she is decompensating    Signed By: Kye Sky MD     March 15, 2022       CC: Jag Garcia DO  FAX: [phone]

## 2022-03-15 NOTE — CONSULTS
Gastroenterology Attending Physician (for Nathaniel Amaya MD) attestation statement and comments. This patient was seen and examined by me in a face-to-face visit today. I reviewed the medical record including lab work, imaging and other provider notes. I confirmed the history as described above. I spoke to the patient, reviewed the medical record including lab work, imaging and other provider notes. I discussed this case in detail with Henry Casiano MD. I formulated an  assessment of this patient and developed a treatment plan. I agree with the above consultation note. I agree with the history, exam and assessment and plan as outlined in the note. I would like to add the followinyo F with acute onset diarrhea and generalized abd discomfort associated with rectal bleeding in setting of episodic hx constipation and prior colonic ischemia. She noted that her diarrhea began after she was manually disimpacted. She has tolerated clears here. Labs noted leukocytosis WBC 16.3, T,Bili 1.2, CT with severe colitis khushboo in the descending and sigmoid colon. Stool testing remains pending. Need to exclude infectious etiology, before frther evaluation for inflammatory colitis. Plan:  1) Check pending C.fid, fecal WBC, Cx  2) Continue IVF  3) Allow CLD  4) Continue Abx as quinolone and metronidazole for now. 5) Will follow and determine if colonoscopy indicated later.    Tawnya Villarreal MD            GI CONSULTATION NOTE  Sera [place], NP  [phone] NP in-hospital cell phone M-F until 4:30  After 5pm or on weekends, please call  for physician on call    NAME: Edward White      :    1960     MRN: [id number]   Attending: Petey Hung   Primary GI: Sumi Quezada  Date/Time:  3/15/2022 10:16 AM  Assessment:   Colitis: infectious vs inflammatory vs ischemia   History of left sided colitis  -Diarrhea post manual disimpaction at home followed by vagal response  - No abdominal pain at time of this consult, patient in good spirit  -CT abdomen/pelvis shows findings consistent with severe colitis most pronounced in the descending and sigmoid colon. Mesenteric vasculature is grossly patent. Mild enteritis and gastritis as well.    -Colonoscopy in 2019 with findings consistent with left-sided ulcerative colitis however path results negative for changes of chronicity and ischemic colitis was favored  - Labs 3/14/22: WBC 16.3, na 134, Bili 1.2, ALT 80, AST 35, Alk Phos 86    Bowel/bladder incontinence secondary to above    Plan:   - Eval stool for C. difficile, WBC, enteric panel - waiting on stool sample for labs to run.  - Cont IV fluid  - Cont empiric Levaquin and Flagyl  - Continue supportive treatment  - Okay to start clear liquid diet  - No plans for colonoscopy at this time until infectious etiology is cleared  Plan discussed with JT Neves. Subjective:     HISTORY OF PRESENT ILLNESS:     Manuelito Amaya is an 62 y.o. female who we are asked to see for complaint of colitis with associated diarrhea and abdominal pain. Had [facility] 66 2019 showing ischemic colitis. She states on this admission she has had issues with variable constipation and diarrhea. She denies rectal bleeding. She denies melena. With her issues of constipation she has a neighbor who is a nurse who helped disimpact her at home. This is what had initially had her sent to the ER. She had developed a vagal response and she states this has happened in the past. She alspo developed diarrhea post disimpaction. Again, no bleeding at that time. However she continued to have abdominal cramping, rectal pain, chills and generalized weakness/lethargy. She is currently NPO, denies abdominal pain.        Past Medical History:   Diagnosis Date    COVID-19 05/2021    Hypercholesterolemia     Hypertension     Spina bifida [facility])       Past Surgical History:   Procedure Laterality Date    COLONOSCOPY N/A 5/1/2019    COLONOSCOPY performed [facility]olo Riggs MD [facility] ENDOSCOPY    COLONOSCOPY,DIAGNOSTIC  5/1/2019         HX OTHER SURGICAL  1890s    sacral wound flap repair     Social History     Tobacco Use    Smoking status: Never Smoker    Smokeless tobacco: Never Used   Substance Use Topics    Alcohol use: Never      No family history on file. Allergies   Allergen Reactions    Sulfa (Sulfonamide Antibiotics) Nausea and Vomiting      Prior to Admission medications    Medication Sig Start Date End Date Taking? Authorizing Provider   Vitamin D3 125 mcg (5,000 unit) tab tablet TAKE 1 TABLET BY MOUTH DAILY 3/4/22   Lis THOMPSON III, DO   diclofenac potassium (CATAFLAM) 50 mg tablet Take 1 Tablet by mouth daily. 2/3/22   Lis THOMPSON III, DO   lisinopriL (PRINIVIL, ZESTRIL) 10 mg tablet Take 1 Tablet by mouth daily. 2/3/22   Lis THOMPSON III, DO   simvastatin (ZOCOR) 20 mg tablet TAKE 1 TABLET BY MOUTH EVERY DAY 2/3/22   Lis THOMPSON III, DO   cyanocobalamin (VITAMIN B12) 1,000 mcg/mL injection INJECT 1 ML INTRAMUSCULARLY EVERY SEVEN DAYS. DO THIS FOR 4 WEEKS AND THEN MONTHLY THEREAFTER 1/14/22   Jose D Le III, DO   Linzess 72 mcg cap capsule Take 72 mcg by mouth daily.  7/2/21   Provider, Historical       Patient Active Problem List   Diagnosis Code    Hypotension I95.9    Severe sepsis ([facility] [address] A41.9, R65.20    B12 deficiency E53.8    Severe obesity ([facility] [address] E66.01    Iron deficiency anemia D50.9    Neurogenic bladder N31.9    Spina bifida ([facility] [address] Q05.9    Mixed hyperlipidemia E78.2    Colitis K52.9       REVIEW OF SYSTEMS:    Constitutional: negative fever, negative chills, negative weight loss  Eyes:   negative visual changes  ENT:   negative sore throat, tongue or lip swelling   Respiratory:  negative cough, negative dyspnea  Cards:  negative for chest pain, palpitations, lower extremity edema  GI:   See HPI  :  negative for frequency, dysuria  Integument:  negative for rash and pruritus  Heme:  negative for easy bruising and gum/nose bleeding  Musculoskel: negative for myalgias,  back pain and muscle weakness  Neuro: negative for headaches, dizziness, vertigo  Psych: negative for feelings of anxiety, depression     Pertinent Positives:  See HPI    Objective:   VITALS:    Visit Vitals  /69   Pulse (!) 116   Temp 99 °F (37.2 °C)   Resp 18   Ht 4' 6\" (1.372 m)   Wt 63.5 kg (140 lb)   SpO2 98%   BMI 33.76 kg/m²       PHYSICAL EXAM:   General:          Alert, WD, WN, cooperative, no distress, appears stated age. Head:               Normocephalic, without obvious abnormality, atraumatic. Eyes:               Conjunctivae clear and pale, anicteric sclerae. Pupils are equal  Nose:               Nares normal. No drainage or sinus tenderness. Throat:             Lips, mucosa, and tongue normal.  No Thrush  Neck:               Supple, symmetrical,  no adenopathy, thyroid: non tender  Back:               Symmetric,  No CVA tenderness. Lungs:             CTA bilaterally. No wheezing/rhonchi/rales. Chest wall:      No Accessory muscle use. Heart:              Regular rate and rhythm,  no murmur, rub or gallop. Abdomen:        Soft, Epigastric tenderness on palpation. Not distended. Bowel sounds normal. No masses  Extremities:     Atraumatic, No cyanosis. No edema. No clubbing  Skin:                Texture, turgor normal. No rashes/lesions/jaundice  Lymph:            Cervical, supraclavicular normal.  Psych:             Good insight. Not depressed. Not anxious or agitated. Neurologic:      EOMs intact. No facial asymmetry. No aphasia or slurred speech. Normal                        strength, A/O X 3.      LAB DATA REVIEWED:    Recent Results (from the past 24 hour(s))   CBC WITH AUTOMATED DIFF    Collection Time: 03/14/22 11:14 PM   Result Value Ref Range    WBC 16.3 (H) 3.6 - 11.0 K/uL    RBC 4.83 3.80 - 5.20 M/uL    HGB 15.1 11.5 - 16.0 g/dL    HCT 45.9 35.0 - 47.0 %    MCV 95.0 80.0 - 99.0 FL    MCH 31.3 26.0 - 34.0 PG    MCHC 32.9 30.0 - 36.5 g/dL    RDW 12.1 11.5 - 14.5 %    PLATELET 100 953 - 310 K/uL    MPV 9.4 8.9 - 12.9 FL    NRBC 0.0 0  WBC    ABSOLUTE NRBC 0.00 0.00 - 0.01 K/uL    NEUTROPHILS 88 (H) 32 - 75 %    LYMPHOCYTES 5 (L) 12 - 49 %    MONOCYTES 6 5 - 13 %    EOSINOPHILS 0 0 - 7 %    BASOPHILS 0 0 - 1 %    IMMATURE GRANULOCYTES 1 (H) 0.0 - 0.5 %    ABS. NEUTROPHILS 14.3 (H) 1.8 - 8.0 K/UL    ABS. LYMPHOCYTES 0.8 0.8 - 3.5 K/UL    ABS. MONOCYTES 1.0 0.0 - 1.0 K/UL    ABS. EOSINOPHILS 0.0 0.0 - 0.4 K/UL    ABS. BASOPHILS 0.0 0.0 - 0.1 K/UL    ABS. IMM. GRANS. 0.2 (H) 0.00 - 0.04 K/UL    DF SMEAR SCANNED      RBC COMMENTS NORMOCYTIC, NORMOCHROMIC     METABOLIC PANEL, COMPREHENSIVE    Collection Time: 03/14/22 11:14 PM   Result Value Ref Range    Sodium 134 (L) 136 - 145 mmol/L    Potassium 4.1 3.5 - 5.1 mmol/L    Chloride 102 97 - 108 mmol/L    CO2 25 21 - 32 mmol/L    Anion gap 7 5 - 15 mmol/L    Glucose 195 (H) 65 - 100 mg/dL    BUN 17 6 - 20 MG/DL    Creatinine 0.65 0.55 - 1.02 MG/DL    BUN/Creatinine ratio 26 (H) 12 - 20      GFR est AA >60 >60 ml/min/1.73m2    GFR est non-AA >60 >60 ml/min/1.73m2    Calcium 9.6 8.5 - 10.1 MG/DL    Bilirubin, total 1.2 (H) 0.2 - 1.0 MG/DL    ALT (SGPT) 80 (H) 12 - 78 U/L    AST (SGOT) 35 15 - 37 U/L    Alk.  phosphatase 86 45 - 117 U/L    Protein, total 8.2 6.4 - 8.2 g/dL    Albumin 4.3 3.5 - 5.0 g/dL    Globulin 3.9 2.0 - 4.0 g/dL    A-G Ratio 1.1 1.1 - 2.2     POC LACTIC ACID    Collection Time: 03/15/22 12:26 AM   Result Value Ref Range    Lactic Acid (POC) 3.37 (HH) 0.40 - 2.00 mmol/L   URINALYSIS W/ REFLEX CULTURE    Collection Time: 03/15/22 12:50 AM    Specimen: Miscellaneous sample; Urine    Urine specimen   Result Value Ref Range    Color DARK YELLOW      Appearance TURBID (A) CLEAR      Specific gravity 1.021 1.003 - 1.030      pH (UA) 5.0 5.0 - 8.0      Protein TRACE (A) NEG mg/dL    Glucose Negative NEG mg/dL    Ketone 15 (A) NEG mg/dL    Blood Negative NEG      Urobilinogen 1.0 0.2 - 1.0 EU/dL    Nitrites Positive (A) NEG      Leukocyte Esterase SMALL (A) NEG      WBC 0-4 0 - 4 /hpf    RBC 0-5 0 - 5 /hpf    Epithelial cells MODERATE (A) FEW /lpf    Bacteria 2+ (A) NEG /hpf    UA:UC IF INDICATED CULTURE NOT INDICATED BY UA RESULT CNI      CA Oxalate crystals 1+ (A) NEG   BILIRUBIN, CONFIRM    Collection Time: 03/15/22 12:50 AM   Result Value Ref Range    Bilirubin UA, confirm Negative NEG     POC LACTIC ACID    Collection Time: 03/15/22  2:53 AM   Result Value Ref Range    Lactic Acid (POC) 3.23 (HH) 0.40 - 2.00 mmol/L   CULTURE, BLOOD, PAIRED    Collection Time: 03/15/22  2:55 AM    Specimen: Blood   Result Value Ref Range    Special Requests: NO SPECIAL REQUESTS      Culture result: NO GROWTH AFTER 5 HOURS         IMAGING RESULTS:  I have personally reviewed the imaging reports      Total time spent with patient: 50 minutes ________________________________________________________________________  Care Plan discussed with:  Patient y   Family  n   RN n              Consultant:  n     CT ABD PEL w CONT  3/15/2022:    CLINICAL HISTORY: abd pain, diarrhea, leukocytosis, hx ischemic colitis  INDICATION: abd pain, diarrhea, leukocytosis, hx ischemic colitis  COMPARISON: 4/28/2019. CONTRAST: 100  ml Isovue 370  TECHNIQUE:   Multislice helical CT was performed of the abdomen and pelvis following  uneventful rapid bolus intravenous contrast administration. Oral contrast was  not administered. Contiguous 5 mm axial images were reconstructed and lung and  soft tissue windows were generated. Coronal and sagittal reformations were  generated. CT dose reduction was achieved through use of a standardized  protocol tailored for this examination and automatic exposure control for dose  modulation.       FINDINGS:  LUNG BASES:No mass lesion or effusion. LIVER: Hepatic steatosis. There is no intrahepatic duct dilatation. There is no  hepatic parenchymal mass.  Hepatic enhancement pattern is within normal limits. Portal vein is patent. GALLBLADDER:  No dilatation or wall thickening. SPLEEN/PANCREAS: No mass. There is no pancreatic duct dilatation. There is no  evidence of splenomegaly. ADRENALS/KIDNEYS: No mass. There is no hydronephrosis. There is no renal mass. There is no perinephric mass. STOMACH: No dilatation or wall thickening. COLON AND SMALL BOWEL: There is severe sigmoid colonic and descending colonic  wall thickening. Milder ascending and transverse colonic wall thickening. Hyperemia of small bowel mucosa. There is hyperemia of gastric and small bowel  mucosa as well. No significant small bowel wall thickening. The abdominal aorta  is widely patent. Celiac and SMA are patent. There is no free intraperitoneal  air. There is no evidence of incarceration or obstruction. No mesenteric  adenopathy. PERITONEUM: Unremarkable. APPENDIX: Unremarkable. BLADDER/REPRODUCTIVE ORGANS: Circumferential bladder wall thickening may be due  to chronic neurogenic bladder. RETROPERITONEUM: Unremarkable. The abdominal aorta is normal in caliber. No  aneurysm. No retroperitoneal adenopathy. OSSEOUS STRUCTURES: There is scoliotic change and spina bifida.     IMPRESSION  Imaging findings consistent with a severe colitis. Most pronounced in the  descending and sigmoid colon. Infectious and inflammatory etiologies should be  considered. Mesenteric vasculature is grossly patent. Mild enteritis and gastritis as well. Spina bifida. .   Please see above for additional nonemergent incidental findings.        ________________________________________________________________________    ___________________________________________________  Consulting Provider: Brooks Vásquez NP      3/15/2022  10:16 AM

## 2022-03-15 NOTE — PROGRESS NOTES
MRI Pending:    Need completed online [employer] MRI screening form. Sign and fax to [phone]. Call [phone] once faxed.

## 2022-03-15 NOTE — ED PROVIDER NOTES
EMERGENCY DEPARTMENT HISTORY AND PHYSICAL EXAM      Date: 3/14/2022  Patient Name: Edward White    History of Presenting Illness     Chief Complaint   Patient presents with    Diarrhea     patient states she was constipated for about 5 days, called RN friend to come over to assist in relieving via manual extraction. Patient now reports watery diarrhea, dizziness, and weakness. Patient states this has happened to her before years ago, and she became severely dehydrated. History Provided By: Patient    HPI: Edward White, 62 y.o. female with PMHx significant for hyperlipidemia, hypertension, spina bifida with chronic urinary and stool incontinence presents to the ED with abdominal cramping and diarrhea. Patient reports she had been constipated for the last several days. Tonight she felt like she was unable to have a bowel movement and there was hard stool present. She called her friend who is an RN over who manually disimpacted patient, however after that patient started having severe abdominal cramping and diarrhea. She has been incontinent of loose stool since then. She also started having chest discomfort and palpitations and felt like she might pass out. Patient has history of prolapsed rectum and anal fissure. She reports chills and sweats and has been shaky since the diarrhea started. Patient had an episode in 2019 where she started having severe diarrhea and was admitted for colitis. States her symptoms today are similar. Denies any blood in her urine. She self caths at home. Denies blood in her stool. PCP: Jeanmarie Mackey, DO    No current facility-administered medications on file prior to encounter. Current Outpatient Medications on File Prior to Encounter   Medication Sig Dispense Refill    Vitamin D3 125 mcg (5,000 unit) tab tablet TAKE 1 TABLET BY MOUTH DAILY 90 Tablet 3    diclofenac potassium (CATAFLAM) 50 mg tablet Take 1 Tablet by mouth daily.  90 Tablet 3    lisinopriL (PRINIVIL, ZESTRIL) 10 mg tablet Take 1 Tablet by mouth daily. 90 Tablet 3    simvastatin (ZOCOR) 20 mg tablet TAKE 1 TABLET BY MOUTH EVERY DAY 90 Tablet 3    cyanocobalamin (VITAMIN B12) 1,000 mcg/mL injection INJECT 1 ML INTRAMUSCULARLY EVERY SEVEN DAYS. DO THIS FOR 4 WEEKS AND THEN MONTHLY THEREAFTER 4 mL 5    Linzess 72 mcg cap capsule Take 72 mcg by mouth daily. Past History     Past Medical History:  Past Medical History:   Diagnosis Date    COVID-19 05/2021    Hypercholesterolemia     Hypertension     Spina [facility]ida [facility])        Past Surgical History:  Past Surgical History:   Procedure Laterality Date    COLONOSCOPY N/A 5/1/2019    COLONOSCOPY performed by Alexandrea Brown MD at [facility]  5/1/2019         HX OTHER SURGICAL  1890s    sacral wound flap repair       Family History:  No family history on file. Social History:  Social History     Tobacco Use    Smoking status: Never Smoker    Smokeless tobacco: Never Used   Substance Use Topics    Alcohol use: Never    Drug use: Never       Allergies: Allergies   Allergen Reactions    Sulfa (Sulfonamide Antibiotics) Nausea and Vomiting         Review of Systems   Review of Systems   Constitutional: Negative for chills and fever. HENT: Negative for congestion, rhinorrhea and sore throat. Eyes: Negative for visual disturbance. Respiratory: Negative for cough, chest tightness, shortness of breath and wheezing. Cardiovascular: Positive for chest pain and palpitations. Negative for leg swelling. Gastrointestinal: Positive for abdominal pain, constipation and diarrhea. Negative for blood in stool, nausea and vomiting. Genitourinary: Negative for dysuria, flank pain and hematuria. Musculoskeletal: Negative for back pain, myalgias and neck pain. Skin: Negative for rash and wound. Neurological: Negative for dizziness, syncope, light-headedness and headaches.    Psychiatric/Behavioral: Negative for confusion. The patient is not nervous/anxious. All other systems reviewed and are negative. Physical Exam   General appearance - well nourished, well appearing, and in no distress  Eyes - pupils equal and reactive, extraocular eye movements intact  ENT - mucous membranes moist, pharynx normal without lesions  Neck - supple, no significant adenopathy; non-tender to palpation  Chest - clear to auscultation, no wheezes, rales or rhonchi; non-tender to palpation  Heart - normal rate and regular rhythm, S1 and S2 normal, no murmurs noted  Abdomen - soft, generalized abdominal tenderness, no rebound or guarding nondistended, no masses or organomegaly  Musculoskeletal - no joint tenderness, deformity or swelling; normal ROM  Extremities - peripheral pulses normal, no pedal edema  Skin - normal coloration and turgor, no rashes  Neurological - alert, oriented x3, normal speech, paraplegia  Diagnostic Study Results     Labs -     Recent Results (from the past 12 hour(s))   CBC WITH AUTOMATED DIFF    Collection Time: 03/14/22 11:14 PM   Result Value Ref Range    WBC 16.3 (H) 3.6 - 11.0 K/uL    RBC 4.83 3.80 - 5.20 M/uL    HGB 15.1 11.5 - 16.0 g/dL    HCT 45.9 35.0 - 47.0 %    MCV 95.0 80.0 - 99.0 FL    MCH 31.3 26.0 - 34.0 PG    MCHC 32.9 30.0 - 36.5 g/dL    RDW 12.1 11.5 - 14.5 %    PLATELET 081 605 - 594 K/uL    MPV 9.4 8.9 - 12.9 FL    NRBC 0.0 0  WBC    ABSOLUTE NRBC 0.00 0.00 - 0.01 K/uL    NEUTROPHILS 88 (H) 32 - 75 %    LYMPHOCYTES 5 (L) 12 - 49 %    MONOCYTES 6 5 - 13 %    EOSINOPHILS 0 0 - 7 %    BASOPHILS 0 0 - 1 %    IMMATURE GRANULOCYTES 1 (H) 0.0 - 0.5 %    ABS. NEUTROPHILS 14.3 (H) 1.8 - 8.0 K/UL    ABS. LYMPHOCYTES 0.8 0.8 - 3.5 K/UL    ABS. MONOCYTES 1.0 0.0 - 1.0 K/UL    ABS. EOSINOPHILS 0.0 0.0 - 0.4 K/UL    ABS. BASOPHILS 0.0 0.0 - 0.1 K/UL    ABS. IMM.  GRANS. 0.2 (H) 0.00 - 0.04 K/UL    DF SMEAR SCANNED      RBC COMMENTS NORMOCYTIC, NORMOCHROMIC     METABOLIC PANEL, COMPREHENSIVE    Collection Time: 03/14/22 11:14 PM   Result Value Ref Range    Sodium 134 (L) 136 - 145 mmol/L    Potassium 4.1 3.5 - 5.1 mmol/L    Chloride 102 97 - 108 mmol/L    CO2 25 21 - 32 mmol/L    Anion gap 7 5 - 15 mmol/L    Glucose 195 (H) 65 - 100 mg/dL    BUN 17 6 - 20 MG/DL    Creatinine 0.65 0.55 - 1.02 MG/DL    BUN/Creatinine ratio 26 (H) 12 - 20      GFR est AA >60 >60 ml/min/1.73m2    GFR est non-AA >60 >60 ml/min/1.73m2    Calcium 9.6 8.5 - 10.1 MG/DL    Bilirubin, total 1.2 (H) 0.2 - 1.0 MG/DL    ALT (SGPT) 80 (H) 12 - 78 U/L    AST (SGOT) 35 15 - 37 U/L    Alk. phosphatase 86 45 - 117 U/L    Protein, total 8.2 6.4 - 8.2 g/dL    Albumin 4.3 3.5 - 5.0 g/dL    Globulin 3.9 2.0 - 4.0 g/dL    A-G Ratio 1.1 1.1 - 2.2     POC LACTIC ACID    Collection Time: 03/15/22 12:26 AM   Result Value Ref Range    Lactic Acid (POC) 3.37 (HH) 0.40 - 2.00 mmol/L       Radiologic Studies -   CT ABD PELV W CONT   Final Result   Imaging findings consistent with a severe colitis. Most pronounced in the   descending and sigmoid colon. Infectious and inflammatory etiologies should be   considered. Mesenteric vasculature is grossly patent. Mild enteritis and gastritis as well. Spina bifida. .    Please see above for additional nonemergent incidental findings. CT HEAD WO CONT    (Results Pending)     CT Results  (Last 48 hours)    None        CXR Results  (Last 48 hours)    None            Medical Decision Making   I am the first provider for this patient. I reviewed the vital signs, available nursing notes, past medical history, past surgical history, family history and social history. Vital Signs-Reviewed the patient's vital signs.   Patient Vitals for the past 12 hrs:   Temp Pulse Resp BP SpO2   03/14/22 2248 97.2 °F (36.2 °C) 98 17 119/69 100 %           Records Reviewed: Nursing Notes and Old Medical Records    Provider Notes (Medical Decision Making):   Differential diagnosis: Dehydration, electrolyte abnormality, gastroenteritis, ischemic bowel  We will check CBC, CMP, lactate, UA, abdominal pelvis CT. Will treat with IV fluids    ED Course:   Initial assessment performed. The patients presenting problems have been discussed, and they are in agreement with the care plan formulated and outlined with them. I have encouraged them to ask questions as they arise throughout their visit. Progress Notes:  ED Course as of 03/15/22 0835   Tue Mar 15, 2022   0135 Labs and CT reviewed. Lactate is elevated at 3.37. White count is elevated at 16.3. Chemistries are remarkable for increased BUN/creatinine ratio, glucose is elevated at 195, sodium is 134, and ALT is 80. Bilirubin also noted to be elevated at 1.2. CT scan shows severe colitis, mild enteritis and gastritis. [AO]   0144 Case discussed with  (hospitalist) who will see and admit the patient. [AO]      ED Course User Index  [AO] Elsy Redman MD       Disposition:  Admit to hospitalist        Diagnosis     Clinical Impression:   1.  Colitis

## 2022-03-15 NOTE — REMOTE MONITORING
Spoke with primary RN Pranay Sherwood regarding 3 and 6 hour Sepsis bundle.     Blanka Gates MSN, RN, [address] [place] [address] RN   Call back # [phone]

## 2022-03-15 NOTE — PROGRESS NOTES
Hospitalist Progress Note    NAME: Timothy Madrid   :  1960   MRN:  [id number]       Assessment / Plan:  History of colitis felt to be from left-sided ulcerative colitis    -continue  IV fluid to improve perfusion  -continue  empiric Levaquin and Flagyl  -Send stool for C. difficile, WBC, enteric panel  -GI consult      Paraplegia secondary to spina bifida,   Bowel/bladder incontinence secondary to above  Hx of chiari malformation  -Supportive treatment  -Head CT show  Partly visualized Chiari I malformation with CSF crowding at the foramen magnum,  and moderate ventriculomegaly   -Complaining from headache chronic, neurology was consulted     Bacteriuria  -UAwas 2+ bacteria, positive nitrates and small leukocyte esterase. No pyuria  -On Levaquin for colitis     Hypertension  Hyperlipidemia  -Hold antihypertensive for now  -Continue statin     Arthritis, shoulder  -PRN tylenol     Vit D and Vit B 12 deficiency      30.0 - 39.9 Obese / Body mass index is 33.76 kg/m². Estimated discharge date:   Barriers:    Code status: Full  Prophylaxis: Lovenox  Recommended Disposition: Home w/Family     Subjective:     Chief Complaint / Reason for Physician Visit  \"\". Discussed with RN events overnight. Patient seen and examined, stated feel better, neurology was consulted     Review of Systems:  Symptom Y/N Comments  Symptom Y/N Comments   Fever/Chills n   Chest Pain n    Poor Appetite    Edema     Cough    Abdominal Pain n    Sputum    Joint Pain     SOB/ALVARADO    Pruritis/Rash     Nausea/vomit n   Tolerating PT/OT     Diarrhea    Tolerating Diet     Constipation    Other       Could NOT obtain due to:      Objective:     VITALS:   Last 24hrs VS reviewed since prior progress note.  Most recent are:  Patient Vitals for the past 24 hrs:   Temp Pulse Resp BP SpO2   03/15/22 1128 98.6 °F (37 °C) (!) 117 18 109/60 96 %   03/15/22 0540 99 °F (37.2 °C) (!) 116 18 121/69 98 %   22 2248 97.2 °F (36.2 °C) 98 17 119/69 100 %       Intake/Output Summary (Last 24 hours) at 3/15/2022 1439  Last data filed at 3/15/2022 0804  Gross per 24 hour   Intake 226.67 ml   Output 300 ml   Net -73.33 ml        I had a face to face encounter and independently examined this patient on 3/15/2022, as outlined below:  PHYSICAL EXAM:  General: WD, WN. Alert, cooperative, no acute distress    EENT:  EOMI. Anicteric sclerae. MMM  Resp:  CTA bilaterally, no wheezing or rales. No accessory muscle use  CV:  Regular  rhythm,  No edema  GI:  Soft, Non distended, Non tender. +Bowel sounds  Neurologic:  Alert and oriented X 3, normal speech,   Psych:   Good insight. Not anxious nor agitated  Skin:  No rashes. No jaundice    Reviewed most current lab test results and cultures  YES  Reviewed most current radiology test results   YES  Review and summation of old records today    NO  Reviewed patient's current orders and MAR    YES  PMH/ reviewed - no change compared to H&P  ________________________________________________________________________  Care Plan discussed with:    Comments   Patient y    Family      RN y    Care Manager     Consultant                        Multidiciplinary team rounds were held today with , nursing, pharmacist and clinical coordinator. Patient's plan of care was discussed; medications were reviewed and discharge planning was addressed. ________________________________________________________________________  Total NON critical care TIME: 35   Minutes    Total CRITICAL CARE TIME Spent:   Minutes non procedure based      Comments   >50% of visit spent in counseling and coordination of care y    ________________________________________________________________________  Concepcion Hawkins MD     Procedures: see electronic medical records for all procedures/Xrays and details which were not copied into this note but were reviewed prior to creation of Plan.       LABS:  I reviewed today's most current labs and imaging studies. Pertinent labs include:  Recent Labs     03/14/22  2314   WBC 16.3*   HGB 15.1   HCT 45.9        Recent Labs     03/14/22  2314   *   K 4.1      CO2 25   *   BUN 17   CREA 0.65   CA 9.6   ALB 4.3   TBILI 1.2*   ALT 80*       Signed:  Narda Yin MD

## 2022-03-15 NOTE — H&P
Hospitalist Admission Note    NAME: Antoni Hernandes   :  1960   MRN:  [id number]     Date/Time:  3/15/2022 3:03 AM    Patient PCP: Mikki Montiel, DO  ______________________________________________________________________  Given the patient's current clinical presentation, I have a high level of concern for decompensation if discharged from the emergency department. Complex decision making was performed, which includes reviewing the patient's available past medical records, laboratory results, and x-ray films. My assessment of this patient's clinical condition and my plan of care is as follows. Assessment / Plan:    Colitis: infectious vs ischemic vs inflammatory  History of colitis felt to be from left-sided ulcerative colitis  -Presents with diarrhea  -WBC 16 with left shift  -LA 3.3  -CT abdomen/pelvis shows findings consistent with severe colitis most pronounced in the descending and sigmoid colon. Mesenteric vasculature is grossly patent. Mild enteritis and gastritis as well.    -Patient has had Colonoscopy in 2019 with findings consistent with left-sided ulcerative colitis however path results negative for changes of chronicity and ischemic colitis was favored  -Admit to telemetry floor  -Start IV fluid to improve perfusion  -Start empiric Levaquin and Flagyl  -Send stool for C. difficile, WBC, enteric panel  -Trend lactic acid. follow blood culture  -GI consult     Paraplegia secondary to spina bifida,   Bowel/bladder incontinence secondary to above  Hx of chiari malformation  -Supportive treatment  -Head CT (pt complains of headaches and neck pain)    Bacteriuria  -UAwas 2+ bacteria, positive nitrates and small leukocyte esterase.   No pyuria  -On Levaquin for colitis    Hypertension  Hyperlipidemia  -Hold antihypertensive for now  -Continue statin    Arthritis, shoulder  -PRN tylenol    Vit D and Vit B 12 deficiency    Code Status: Full code  Surrogate Decision Maker: her sister Max Edwards ([phone])    DVT Prophylaxis: Lovenox  GI Prophylaxis: not indicated    Baseline: wheel-chair bound      Subjective:   CHIEF COMPLAINT: Diarrhea, rectal and abdominal pain    HISTORY OF PRESENT ILLNESS:     Prasanna Molina is a 62 y.o.  female with past medical history significant for HTN, HLD,  paraplegia secondary to spina bifida, bowel/bladder incontinence, and chronic constipation who presents with diarrhea post manual bowel disimpaction. Patient states that she has chornic constipation and gets vagal symptoms from rectal pressure. Notes earlier today she was having rectal pressure, low BP, shaking/chills, and elevated HR which she associated with vagal response from rectal pressure. She called her friend who is RN for manual disimpaction. Patient also notes abdominal cramp. After the manual disimpaction she started having water diarrhea, multiple episodes, non-bloody. She continued to have abdominal cramp, rectal pain, chills, and generalized weakness/lethargy which prompted her to come to the ED. Patient denies any fever, nausea, vomiting, or urinary symptoms apart from her chronic urinary incontinence. Patient has history of colitis back in 2019 for which she underwent colonoscopy. Scope findings were consistent with left-sided ulcerative colitis however path results negative for changes of chronicity and ischemic colitis was favored    We were asked to admit for work up and evaluation of the above problems.      Past Medical History:   Diagnosis Date    COVID-19 05/2021    Hypercholesterolemia     Hypertension     Spina bifida [facility])         Past Surgical History:   Procedure Laterality Date    COLONOSCOPY N/A 5/1/2019    COLON[facility]COPY performed by Luz [facility] at [facility]  5/1/2019         HX OTHER SURGICAL  1890s    sacral wound flap repair       Social History     Tobacco Use    Smoking status: Never Smoker    Smokeless tobacco: Never Used   Substance Use Topics    Alcohol use: Never        No family history on file. Allergies   Allergen Reactions    Sulfa (Sulfonamide Antibiotics) Nausea and Vomiting        Prior to Admission medications    Medication Sig Start Date End Date Taking? Authorizing Provider   Vitamin D3 125 mcg (5,000 unit) tab tablet TAKE 1 TABLET BY MOUTH DAILY 3/4/22   Tawana Forester B III, DO   diclofenac potassium (CATAFLAM) 50 mg tablet Take 1 Tablet by mouth daily. 2/3/22   Tawana Forester B III, DO   lisinopriL (PRINIVIL, ZESTRIL) 10 mg tablet Take 1 Tablet by mouth daily. 2/3/22   Tawana Forester B III, DO   simvastatin (ZOCOR) 20 mg tablet TAKE 1 TABLET BY MOUTH EVERY DAY 2/3/22   Tawana Forester B III, DO   cyanocobalamin (VITAMIN B12) 1,000 mcg/mL injection INJECT 1 ML INTRAMUSCULARLY EVERY SEVEN DAYS. DO THIS FOR 4 WEEKS AND THEN MONTHLY THEREAFTER 1/14/22   Kaylyn Le III, DO   Linzess 72 mcg cap capsule Take 72 mcg by mouth daily. 7/2/21   Provider, Historical       REVIEW OF SYSTEMS:     I am not able to complete the review of systems because:    The patient is intubated and sedated    The patient has altered mental status due to his acute medical problems    The patient has baseline aphasia from prior stroke(s)    The patient has baseline dementia and is not reliable historian    The patient is in acute medical distress and unable to provide information           Total of 12 systems reviewed as follows:       POSITIVE= underlined text  Negative = text not underlined  General:  fever, chills, sweats, generalized weakness, weight loss/gain,      loss of appetite   Eyes:    blurred vision, eye pain, loss of vision, double vision  ENT:    rhinorrhea, pharyngitis   Respiratory:   cough, sputum production, SOB, ALVARADO, wheezing, pleuritic pain   Cardiology:   chest pain, palpitations, orthopnea, PND, edema, syncope   Gastrointestinal:  abdominal pain , N/V, diarrhea, dysphagia, constipation, bleeding, rectal pain  Genitourinary:  frequency, urgency, dysuria, hematuria, incontinence   Muskuloskeletal :  arthralgia, myalgia, back pain  Hematology:  easy bruising, nose or gum bleeding, lymphadenopathy   Dermatological: rash, ulceration, pruritis, color change / jaundice  Endocrine:   hot flashes or polydipsia   Neurological:  headache, dizziness, confusion, focal weakness, paresthesia,     Speech difficulties, memory loss, gait difficulty  Psychological: Feelings of anxiety, depression, agitation    Objective:   VITALS:    Visit Vitals  /69   Pulse 98   Temp 97.2 °F (36.2 °C)   Resp 17   Ht 4' 6\" (1.372 m)   Wt 63.5 kg (140 lb)   SpO2 100%   BMI 33.76 kg/m²       PHYSICAL EXAM:    General:    Alert, cooperative, no distress, appears stated age. HEENT: Atraumatic, anicteric sclerae, pink conjunctivae     No oral ulcers, mucosa moist, throat clear, dentition fair  Neck:  Supple, symmetrical,  thyroid: non tender  Lungs:   Clear to auscultation bilaterally. No Wheezing or Rhonchi. No rales. Chest wall:  No tenderness  No Accessory muscle use. Heart:   Regular  rhythm,  No  murmur   No edema  Abdomen:   Soft, non-tender. Not distended. Bowel sounds normal  Extremities: No cyanosis. No clubbing,      Skin turgor normal, Capillary refill normal, Radial dial pulse 2+  Skin:     Not pale. Not Jaundiced  No rashes   Psych:  Good insight. Not depressed. Not anxious or agitated. Neurologic: EOMs intact. No facial asymmetry. No aphasia or slurred speech.  Strength of RUE and LUE 5/5, LLE, RLE 1/5, Alert and oriented X 4.     _______________________________________________________________________  Care Plan discussed with:    Comments   Patient x    Family  x Sister/ mPOA   RN     Care Manager                    Consultant:      _______________________________________________________________________  Expected  Disposition:   Home with Family    HH/PT/OT/RN x   SNF/LTC    GEO ________________________________________________________________________  TOTAL TIME:  54 Minutes    Critical Care Provided     Minutes non procedure based      Comments    x Reviewed previous records   >50% of visit spent in counseling and coordination of care x Discussion with patient and/or family and questions answered       ________________________________________________________________________  Signed: Jerald Talbert MD    Procedures: see electronic medical records for all procedures/Xrays and details which were not copied into this note but were reviewed prior to creation of Plan. LAB DATA REVIEWED:    Recent Results (from the past 24 hour(s))   CBC WITH AUTOMATED DIFF    Collection Time: 03/14/22 11:14 PM   Result Value Ref Range    WBC 16.3 (H) 3.6 - 11.0 K/uL    RBC 4.83 3.80 - 5.20 M/uL    HGB 15.1 11.5 - 16.0 g/dL    HCT 45.9 35.0 - 47.0 %    MCV 95.0 80.0 - 99.0 FL    MCH 31.3 26.0 - 34.0 PG    MCHC 32.9 30.0 - 36.5 g/dL    RDW 12.1 11.5 - 14.5 %    PLATELET 944 415 - 907 K/uL    MPV 9.4 8.9 - 12.9 FL    NRBC 0.0 0  WBC    ABSOLUTE NRBC 0.00 0.00 - 0.01 K/uL    NEUTROPHILS 88 (H) 32 - 75 %    LYMPHOCYTES 5 (L) 12 - 49 %    MONOCYTES 6 5 - 13 %    EOSINOPHILS 0 0 - 7 %    BASOPHILS 0 0 - 1 %    IMMATURE GRANULOCYTES 1 (H) 0.0 - 0.5 %    ABS. NEUTROPHILS 14.3 (H) 1.8 - 8.0 K/UL    ABS. LYMPHOCYTES 0.8 0.8 - 3.5 K/UL    ABS. MONOCYTES 1.0 0.0 - 1.0 K/UL    ABS. EOSINOPHILS 0.0 0.0 - 0.4 K/UL    ABS. BASOPHILS 0.0 0.0 - 0.1 K/UL    ABS. IMM.  GRANS. 0.2 (H) 0.00 - 0.04 K/UL    DF SMEAR SCANNED      RBC COMMENTS NORMOCYTIC, NORMOCHROMIC     METABOLIC PANEL, COMPREHENSIVE    Collection Time: 03/14/22 11:14 PM   Result Value Ref Range    Sodium 134 (L) 136 - 145 mmol/L    Potassium 4.1 3.5 - 5.1 mmol/L    Chloride 102 97 - 108 mmol/L    CO2 25 21 - 32 mmol/L    Anion gap 7 5 - 15 mmol/L    Glucose 195 (H) 65 - 100 mg/dL    BUN 17 6 - 20 MG/DL    Creatinine 0.65 0.55 - 1.02 MG/DL BUN/Creatinine ratio 26 (H) 12 - 20      GFR est AA >60 >60 ml/min/1.73m2    GFR est non-AA >60 >60 ml/min/1.73m2    Calcium 9.6 8.5 - 10.1 MG/DL    Bilirubin, total 1.2 (H) 0.2 - 1.0 MG/DL    ALT (SGPT) 80 (H) 12 - 78 U/L    AST (SGOT) 35 15 - 37 U/L    Alk.  phosphatase 86 45 - 117 U/L    Protein, total 8.2 6.4 - 8.2 g/dL    Albumin 4.3 3.5 - 5.0 g/dL    Globulin 3.9 2.0 - 4.0 g/dL    A-G Ratio 1.1 1.1 - 2.2     POC LACTIC ACID    Collection Time: 03/15/22 12:26 AM   Result Value Ref Range    Lactic Acid (POC) 3.37 (HH) 0.40 - 2.00 mmol/L   URINALYSIS W/ REFLEX CULTURE    Collection Time: 03/15/22 12:50 AM    Specimen: Miscellaneous sample; Urine    Urine specimen   Result Value Ref Range    Color DARK YELLOW      Appearance TURBID (A) CLEAR      Specific gravity 1.021 1.003 - 1.030      pH (UA) 5.0 5.0 - 8.0      Protein TRACE (A) NEG mg/dL    Glucose Negative NEG mg/dL    Ketone 15 (A) NEG mg/dL    Blood Negative NEG      Urobilinogen 1.0 0.2 - 1.0 EU/dL    Nitrites Positive (A) NEG      Leukocyte Esterase SMALL (A) NEG      WBC 0-4 0 - 4 /hpf    RBC 0-5 0 - 5 /hpf    Epithelial cells MODERATE (A) FEW /lpf    Bacteria 2+ (A) NEG /hpf    UA:UC IF INDICATED CULTURE NOT INDICATED BY UA RESULT CNI      CA Oxalate crystals 1+ (A) NEG   BILIRUBIN, CONFIRM    Collection Time: 03/15/22 12:50 AM   Result Value Ref Range    Bilirubin UA, confirm Negative NEG     POC LACTIC ACID    Collection Time: 03/15/22  2:53 AM   Result Value Ref Range    Lactic Acid (POC) 3.23 (HH) 0.40 - 2.00 mmol/L

## 2022-03-16 ENCOUNTER — ANESTHESIA EVENT (OUTPATIENT)
Dept: ENDOSCOPY | Age: 62
DRG: 391 | End: 2022-03-16
Payer: COMMERCIAL

## 2022-03-16 ENCOUNTER — APPOINTMENT (OUTPATIENT)
Dept: PHYSICAL THERAPY | Age: 62
End: 2022-03-16
Payer: COMMERCIAL

## 2022-03-16 ENCOUNTER — APPOINTMENT (OUTPATIENT)
Dept: MRI IMAGING | Age: 62
DRG: 391 | End: 2022-03-16
Attending: PSYCHIATRY & NEUROLOGY
Payer: COMMERCIAL

## 2022-03-16 LAB
ANION GAP SERPL CALC-SCNC: 4 MMOL/L (ref 5–15)
BASOPHILS # BLD: 0 K/UL (ref 0–0.1)
BASOPHILS NFR BLD: 0 % (ref 0–1)
BUN SERPL-MCNC: 4 MG/DL (ref 6–20)
BUN/CREAT SERPL: 11 (ref 12–20)
CALCIUM SERPL-MCNC: 8.3 MG/DL (ref 8.5–10.1)
CHLORIDE SERPL-SCNC: 113 MMOL/L (ref 97–108)
CO2 SERPL-SCNC: 23 MMOL/L (ref 21–32)
COVID-19 RAPID TEST, COVR: NOT DETECTED
CREAT SERPL-MCNC: 0.35 MG/DL (ref 0.55–1.02)
DIFFERENTIAL METHOD BLD: ABNORMAL
EOSINOPHIL # BLD: 0.1 K/UL (ref 0–0.4)
EOSINOPHIL NFR BLD: 1 % (ref 0–7)
ERYTHROCYTE [DISTWIDTH] IN BLOOD BY AUTOMATED COUNT: 12.5 % (ref 11.5–14.5)
ERYTHROCYTE [SEDIMENTATION RATE] IN BLOOD: 31 MM/HR (ref 0–30)
GLUCOSE SERPL-MCNC: 102 MG/DL (ref 65–100)
HCT VFR BLD AUTO: 33.8 % (ref 35–47)
HGB BLD-MCNC: 11.1 G/DL (ref 11.5–16)
IMM GRANULOCYTES # BLD AUTO: 0 K/UL (ref 0–0.04)
IMM GRANULOCYTES NFR BLD AUTO: 0 % (ref 0–0.5)
LYMPHOCYTES # BLD: 1.9 K/UL (ref 0.8–3.5)
LYMPHOCYTES NFR BLD: 35 % (ref 12–49)
MCH RBC QN AUTO: 31.5 PG (ref 26–34)
MCHC RBC AUTO-ENTMCNC: 32.8 G/DL (ref 30–36.5)
MCV RBC AUTO: 96 FL (ref 80–99)
MONOCYTES # BLD: 0.5 K/UL (ref 0–1)
MONOCYTES NFR BLD: 8 % (ref 5–13)
NEUTS SEG # BLD: 3.1 K/UL (ref 1.8–8)
NEUTS SEG NFR BLD: 55 % (ref 32–75)
NRBC # BLD: 0 K/UL (ref 0–0.01)
NRBC BLD-RTO: 0 PER 100 WBC
PLATELET # BLD AUTO: 225 K/UL (ref 150–400)
PMV BLD AUTO: 9.3 FL (ref 8.9–12.9)
POTASSIUM SERPL-SCNC: 3.8 MMOL/L (ref 3.5–5.1)
RBC # BLD AUTO: 3.52 M/UL (ref 3.8–5.2)
SODIUM SERPL-SCNC: 140 MMOL/L (ref 136–145)
SOURCE, COVRS: NORMAL
WBC # BLD AUTO: 5.6 K/UL (ref 3.6–11)

## 2022-03-16 PROCEDURE — 72156 MRI NECK SPINE W/O & W/DYE: CPT

## 2022-03-16 PROCEDURE — 36415 COLL VENOUS BLD VENIPUNCTURE: CPT

## 2022-03-16 PROCEDURE — 85025 COMPLETE CBC W/AUTO DIFF WBC: CPT

## 2022-03-16 PROCEDURE — 86038 ANTINUCLEAR ANTIBODIES: CPT

## 2022-03-16 PROCEDURE — A9576 INJ PROHANCE MULTIPACK: HCPCS | Performed by: INTERNAL MEDICINE

## 2022-03-16 PROCEDURE — 74011250637 HC RX REV CODE- 250/637: Performed by: NURSE PRACTITIONER

## 2022-03-16 PROCEDURE — 74011250636 HC RX REV CODE- 250/636: Performed by: STUDENT IN AN ORGANIZED HEALTH CARE EDUCATION/TRAINING PROGRAM

## 2022-03-16 PROCEDURE — 74011250636 HC RX REV CODE- 250/636: Performed by: INTERNAL MEDICINE

## 2022-03-16 PROCEDURE — 99233 SBSQ HOSP IP/OBS HIGH 50: CPT | Performed by: PSYCHIATRY & NEUROLOGY

## 2022-03-16 PROCEDURE — 80048 BASIC METABOLIC PNL TOTAL CA: CPT

## 2022-03-16 PROCEDURE — 74011000250 HC RX REV CODE- 250: Performed by: STUDENT IN AN ORGANIZED HEALTH CARE EDUCATION/TRAINING PROGRAM

## 2022-03-16 PROCEDURE — 85652 RBC SED RATE AUTOMATED: CPT

## 2022-03-16 PROCEDURE — 74011250637 HC RX REV CODE- 250/637: Performed by: INTERNAL MEDICINE

## 2022-03-16 PROCEDURE — 87635 SARS-COV-2 COVID-19 AMP PRB: CPT

## 2022-03-16 PROCEDURE — 74011000250 HC RX REV CODE- 250: Performed by: INTERNAL MEDICINE

## 2022-03-16 PROCEDURE — 65270000029 HC RM PRIVATE

## 2022-03-16 PROCEDURE — 70553 MRI BRAIN STEM W/O & W/DYE: CPT

## 2022-03-16 PROCEDURE — 74011250637 HC RX REV CODE- 250/637: Performed by: STUDENT IN AN ORGANIZED HEALTH CARE EDUCATION/TRAINING PROGRAM

## 2022-03-16 RX ORDER — POLYETHYLENE GLYCOL 3350, SODIUM SULFATE ANHYDROUS, SODIUM BICARBONATE, SODIUM CHLORIDE, POTASSIUM CHLORIDE 236; 22.74; 6.74; 5.86; 2.97 G/4L; G/4L; G/4L; G/4L; G/4L
4000 POWDER, FOR SOLUTION ORAL ONCE
Status: DISCONTINUED | OUTPATIENT
Start: 2022-03-16 | End: 2022-03-16 | Stop reason: SDUPTHER

## 2022-03-16 RX ORDER — FLUTICASONE PROPIONATE 50 MCG
2 SPRAY, SUSPENSION (ML) NASAL
Status: DISCONTINUED | OUTPATIENT
Start: 2022-03-16 | End: 2022-03-18 | Stop reason: HOSPADM

## 2022-03-16 RX ORDER — BISACODYL 5 MG
10 TABLET, DELAYED RELEASE (ENTERIC COATED) ORAL EVERY 6 HOURS
Status: COMPLETED | OUTPATIENT
Start: 2022-03-16 | End: 2022-03-16

## 2022-03-16 RX ORDER — POLYETHYLENE GLYCOL 3350, SODIUM SULFATE ANHYDROUS, SODIUM BICARBONATE, SODIUM CHLORIDE, POTASSIUM CHLORIDE 236; 22.74; 6.74; 5.86; 2.97 G/4L; G/4L; G/4L; G/4L; G/4L
4000 POWDER, FOR SOLUTION ORAL ONCE
Status: COMPLETED | OUTPATIENT
Start: 2022-03-16 | End: 2022-03-16

## 2022-03-16 RX ORDER — BISACODYL 5 MG
10 TABLET, DELAYED RELEASE (ENTERIC COATED) ORAL
Status: COMPLETED | OUTPATIENT
Start: 2022-03-16 | End: 2022-03-16

## 2022-03-16 RX ADMIN — ATORVASTATIN CALCIUM 10 MG: 10 TABLET, FILM COATED ORAL at 09:51

## 2022-03-16 RX ADMIN — SODIUM CHLORIDE, PRESERVATIVE FREE 10 ML: 5 INJECTION INTRAVENOUS at 05:15

## 2022-03-16 RX ADMIN — METRONIDAZOLE 500 MG: 500 INJECTION, SOLUTION INTRAVENOUS at 20:08

## 2022-03-16 RX ADMIN — BISACODYL 10 MG: 5 TABLET, COATED ORAL at 11:46

## 2022-03-16 RX ADMIN — ENOXAPARIN SODIUM 40 MG: 40 INJECTION SUBCUTANEOUS at 09:51

## 2022-03-16 RX ADMIN — METRONIDAZOLE 500 MG: 500 INJECTION, SOLUTION INTRAVENOUS at 11:46

## 2022-03-16 RX ADMIN — LEVOFLOXACIN 750 MG: 5 INJECTION, SOLUTION INTRAVENOUS at 03:08

## 2022-03-16 RX ADMIN — SODIUM CHLORIDE 100 ML/HR: 9 INJECTION, SOLUTION INTRAVENOUS at 20:05

## 2022-03-16 RX ADMIN — ACETAMINOPHEN 325MG 650 MG: 325 TABLET ORAL at 03:07

## 2022-03-16 RX ADMIN — BISACODYL 10 MG: 5 TABLET, COATED ORAL at 17:53

## 2022-03-16 RX ADMIN — FLUTICASONE PROPIONATE 2 SPRAY: 50 SPRAY, METERED NASAL at 09:52

## 2022-03-16 RX ADMIN — POLYETHYLENE GLYCOL 3350, SODIUM SULFATE ANHYDROUS, SODIUM BICARBONATE, SODIUM CHLORIDE, POTASSIUM CHLORIDE 4000 ML: 236; 22.74; 6.74; 5.86; 2.97 POWDER, FOR SOLUTION ORAL at 16:27

## 2022-03-16 RX ADMIN — SODIUM CHLORIDE 125 ML/HR: 9 INJECTION, SOLUTION INTRAVENOUS at 03:09

## 2022-03-16 RX ADMIN — SODIUM CHLORIDE, PRESERVATIVE FREE 10 ML: 5 INJECTION INTRAVENOUS at 23:17

## 2022-03-16 RX ADMIN — GADOTERIDOL 13 ML: 279.3 INJECTION, SOLUTION INTRAVENOUS at 10:41

## 2022-03-16 RX ADMIN — METRONIDAZOLE 500 MG: 500 INJECTION, SOLUTION INTRAVENOUS at 03:08

## 2022-03-16 NOTE — PROGRESS NOTES
Received notification from bedside RN about patient with regards to: congestion, requesting Flonase  VS: /67, , RR 18, O2 sat 97% on RA    Intervention given: Flonase nasal spray PRN ordered

## 2022-03-16 NOTE — PROGRESS NOTES
Transition of Care Plan:    RUR: 12% low risk   Disposition: home   Follow up appointments: PCP and specialist as indicated  DME needed: Pt uses a wheelchair at baseline  Transportation at Discharge: sister to transport   Mackenzie Hunt or ambrosio to access home:  Pt has access       IM Medicare Letter: N/a  Is patient a BCPI-A Bundle:  N/a         If yes, was Bundle Letter given?:    Is patient a  and connected with the [place]? N/a               If yes, was [employer] transfer form completed and [facility] notified? Caregiver Contact:twila omer (Sister)   [phone]  Discharge Caregiver contacted prior to discharge? CM to contact if Pt desires   Care Conference needed?:  Not indicated                    Reason for Admission:  Colitis                      RUR Score:  12% low                     Plan for utilizing home health:     Not indicated at this time      PCP: First and Last name:  Jeanmarie Mackey,      Name of Practice:    Are you a current patient: Yes/No: yes    Approximate date of last visit: less than a month ago    Can you participate in a virtual visit with your PCP:  Yes                    Current Advanced Directive/Advance Care Plan: Full Code      Healthcare Decision Maker:   Click here to complete [address] including selection of the Healthcare Decision Maker Relationship (ie \"Primary\")             Primary Decision Maker: Justin Gilman Sister - [phone]    Secondary Decision Maker: Raqueljesenia Alicia - Other Relative - [phone]                  Transition of Care Plan:        CM met with Pt at bedside to complete initial assessment. Pt appeared alert and oriented x4 during initial assessment. Pt is a 62year old female admitted to [facility] on 3/15/22 for Colitis. Pt is reported to live alone in an apartment home. Pt uses a wheelchair at baseline. Pt reports to drive. Pt anticipates her sister transporting her home at discharge.     Care Management Interventions  Mode of Transport at Discharge:  Other (see comment)  Transition of Care Consult (CM Consult): Discharge Planning  Support Systems: Other Family Member(s)  Confirm Follow Up Transport: Self  Discharge Location  Patient Expects to be Discharged to[de-identified] [address], [place], [facility] [address] [facility]

## 2022-03-16 NOTE — PROGRESS NOTES
GI PROGRESS NOTE  Danish Portillo NP  [phone] NP in-hospital cell phone M-F until 4:30  After 5pm or on weekends, please call  for physician on call    NAME:Jessica Davidson MRW:[id number]   ATTG: Sallie Renteria MD Primary GI: Dr. Oxana Hull  PCP: Wenceslao Arriaza,   Date/Time:  3/16/2022 11:08 AM     Reason for following: Rectal bleeding/colitis  Assessment:     Colitis: infectious vs inflammatory vs ischemia   History of left sided colitis  -Diarrhea post manual disimpaction at home followed by vagal response  - No abdominal pain at time of this consult, patient in good spirit  -CT abdomen/pelvis shows findings consistent with severe colitis most pronounced in the descending and sigmoid colon.  Mesenteric vasculature is grossly patent.  Mild enteritis and gastritis as well.     -Colonoscopy in 2019 with findings consistent with left-sided ulcerative colitis however path results negative for changes of chronicity and ischemic colitis was favored  - Labs 3/14/22: WBC 16.3   - Labs 3/16/22 WBC 5.6, Hgb 11.1    Bowel/bladder incontinence secondary to above     Plan:     - CLN scheduled for 3/17/22 1230 hours - complete prep as ordered. - Prep Go[employer]teley to start at 1700 3/16/22. Bisacodyl x 2 10 mg 1200 and 1800  - Clear liquids today  - NPO 0000 3/17/22.  - Eval stool for C. difficile, WBC, enteric panel - waiting on stool sample for labs to run.  - Cont empiric Levaquin and Flagyl  - Continue supportive treatment  Plan discussed with Dr. Megan Busch. This patient was seen and examined by me in a face-to-face visit today. I reviewed the medical record including lab work, imaging and other provider notes. I confirmed the interval history as described above. I spoke to the patient, discussing our findings and plans. I discussed this case in detail with CHAYO Jones. I formulated an updated  assessment of this patient and guided our treatment plan. I agree with the above progress note.  I agree with the history, exam and assessment and plan as outlined in the note. I would like to add the followinyo F with ongoing intermittent rectal bleeding associated with diarrhea and abnl CT showing sigmoid/desc colon colitis. No stool studies completed. Will undertake Colonoscopy with biopsy tomorrow following Golytely PRep. NPO after MN. Mary Chairez MD  Subjective:   Discussed with RN events overnight. Reporting rectal discomfort with movement and BM. Complaint Y/N Description   Abdominal Pain n    Hematemesis n    Hematochezia n BRBPR yes this AM 3/16/22   Melena n    Constipation n    Diarrhea n    Dyspepsia n    Dysphagia n    Jaundiced n    Nausea/vomiting n      Review of Systems:  Symptom Y/N Comments  Symptom Y/N Comments   Fever/Chills n   Chest Pain n    Cough n   Headaches n    Sputum n   Joint Pain n    SOB/ALVARADO n   Pruritis/Rash n    Tolerating Diet y clears  Other n      Could NOT obtain due to: NA     Objective:   VITALS:   Last 24hrs VS reviewed since prior progress note. Most recent are:  Visit Vitals  /67 (BP 1 Location: Left upper arm, BP Patient Position: At rest)   Pulse 100   Temp 98.8 °F (37.1 °C)   Resp 18   Ht 4' 6\" (1.372 m)   Wt 63.5 kg (140 lb)   SpO2 97%   BMI 33.76 kg/m²       Intake/Output Summary (Last 24 hours) at 3/16/2022 1108  Last data filed at 3/16/2022 0018  Gross per 24 hour   Intake    Output 1350 ml   Net -1350 ml     PHYSICAL EXAM:  General: WD, WN. Alert, cooperative, no acute distress    HEENT: NC, Atraumatic. Anicteric sclerae. Lungs:  CTA Bilaterally. No Wheezing/Rhonchi/Rales. Heart:  Regular  rhythm,  No murmur, No Rubs, No Gallops  Abdomen: Soft, Non distended, Non tender. +Bowel sounds, no HSM  Extremities: Spina bifida Hx  Neurologic:  Alert and oriented X 3. No acute neurological distress   Psych:   Good insight. Not anxious nor agitated.     Lab and Radiology Data Reviewed: (see below)    Medications Reviewed: (see below)  PMH/SH reviewed - no change compared to H&P  ________________________________________________________________________  Total time spent with patient: 15 minutes ________________________________________________________________________  Care Plan discussed with:  Patient y   Family  y - Sister Gabriella Yue CARVAJAL y              Consultant:  sonal Vásquez NP     Procedures: see electronic medical records for all procedures/Xrays and details which were not copied into this note but were reviewed prior to creation of Plan. LABS:  Recent Labs     03/16/22  0304 03/14/22  2314   WBC 5.6 16.3*   HGB 11.1* 15.1   HCT 33.8* 45.9    317     Recent Labs     03/16/22  0304 03/14/22  2314    134*   K 3.8 4.1   * 102   CO2 23 25   BUN 4* 17   CREA 0.35* 0.65   * 195*   CA 8.3* 9.6     Recent Labs     03/14/22  2314   AP 86   TP 8.2   ALB 4.3   GLOB 3.9     No results for input(s): INR, PTP, APTT, INREXT in the last 72 hours. No results for input(s): FE, TIBC, PSAT, FERR in the last 72 hours. Lab Results   Component Value Date/Time    Folate 20.6 04/30/2019 12:59 AM     No results for input(s): PH, PCO2, PO2 in the last 72 hours. No results for input(s): CPK, CKMB in the last 72 hours.     No lab exists for component: TROPONINI  Lab Results   Component Value Date/Time    Color DARK YELLOW 03/15/2022 12:50 AM    Appearance TURBID (A) 03/15/2022 12:50 AM    Specific gravity 1.021 03/15/2022 12:50 AM    pH (UA) 5.0 03/15/2022 12:50 AM    Protein TRACE (A) 03/15/2022 12:50 AM    Glucose Negative 03/15/2022 12:50 AM    Ketone 15 (A) 03/15/2022 12:50 AM    Urobilinogen 1.0 03/15/2022 12:50 AM    Nitrites Positive (A) 03/15/2022 12:50 AM    Leukocyte Esterase SMALL (A) 03/15/2022 12:50 AM    Epithelial cells MODERATE (A) 03/15/2022 12:50 AM    Bacteria 2+ (A) 03/15/2022 12:50 AM    WBC 0-4 03/15/2022 12:50 AM    RBC 0-5 03/15/2022 12:50 AM       MEDICATIONS:  Current Facility-Administered Medications   Medication Dose Route Frequency    fluticasone propionate (FLONASE) 50 mcg/actuation nasal spray 2 Spray  2 Spray Both Nostrils DAILY PRN    0.9% sodium chloride infusion  125 mL/hr IntraVENous CONTINUOUS    [Held by provider] lisinopriL (PRINIVIL, ZESTRIL) tablet 10 mg  10 mg Oral DAILY    atorvastatin (LIPITOR) tablet 10 mg  10 mg Oral DAILY    sodium chloride (NS) flush 5-40 mL  5-40 mL IntraVENous Q8H    sodium chloride (NS) flush 5-40 mL  5-40 mL IntraVENous PRN    acetaminophen (TYLENOL) tablet 650 mg  650 mg Oral Q6H PRN    Or    acetaminophen (TYLENOL) suppository 650 mg  650 mg Rectal Q6H PRN    polyethylene glycol (MIRALAX) packet 17 g  17 g Oral DAILY PRN    ondansetron (ZOFRAN ODT) tablet 4 mg  4 mg Oral Q8H PRN    Or    ondansetron (ZOFRAN) injection 4 mg  4 mg IntraVENous Q6H PRN    enoxaparin (LOVENOX) injection 40 mg  40 mg SubCUTAneous DAILY    levoFLOXacin (LEVAQUIN) 750 mg in D5W IVPB  750 mg IntraVENous Q24H    metroNIDAZOLE (FLAGYL) IVPB premix 500 mg  500 mg IntraVENous Q8H    topiramate (TOPAMAX) tablet 25 mg  25 mg Oral BID    butalbital-acetaminophen-caffeine (FIORICET, ESGIC) -40 mg per tablet 2 Tablet  2 Tablet Oral Q8H PRN    LORazepam (ATIVAN) injection 1-2 mg  1-2 mg IntraVENous Q6H PRN

## 2022-03-16 NOTE — PROGRESS NOTES
Pt had rectal bleeding   Calling ON call for [facility] [phone] AS PER PERFECT SERVE  MD Bessy Solano on the floor and verbally notified. He stated pt was having same rectal bleeding on admission. Tech placed patient in breif  Pt has yet to have any stool  Pt is unaware of intermittent rectal bleeding  External Hemorids do not appear to be bleeding or inflamed  Call bell in reach  Will continue to monitor.

## 2022-03-16 NOTE — PROGRESS NOTES
Hospitalist Progress Note    NAME: Edward White   :  1960   MRN:  [id number]       Assessment / Plan:  History of colitis felt to be from left-sided ulcerative colitis    -continue  IV fluid to improve perfusion  -continue  empiric Levaquin and Flagyl  -Send stool for C. difficile, WBC, enteric panel  -GI consult      Paraplegia secondary to spina bifida,   Bowel/bladder incontinence secondary to above  Hx of chiari malformation  -Supportive treatment  -Head CT show  Partly visualized Chiari I malformation with CSF crowding at the foramen magnum,  and moderate ventriculomegaly   -Complaining from headache chronic, neurology was consulted  -- MRI brain was done and show      1. 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 2. Stable moderate hydrocephalus without periventricular edema. 3. No acute intracranial abnormality    Bacteriuria  -UAwas 2+ bacteria, positive nitrates and small leukocyte esterase. No pyuria  -On Levaquin for colitis     Hypertension  Hyperlipidemia  -Hold antihypertensive for now  -Continue statin     Arthritis, shoulder  -PRN tylenol     Vit D and Vit B 12 deficiency      30.0 - 39.9 Obese / Body mass index is 33.76 kg/m². Estimated discharge date:   Barriers:    Code status: Full  Prophylaxis: Lovenox  Recommended Disposition: Home w/Family     Subjective:     Chief Complaint / Reason for Physician Visit  \"\". Discussed with RN events overnight.   Patient seen and examined, stated feel better, neurology was consulted     Review of Systems:  Symptom Y/N Comments  Symptom Y/N Comments   Fever/Chills n   Chest Pain n    Poor Appetite    Edema     Cough    Abdominal Pain n    Sputum    Joint Pain     SOB/ALVARADO    Pruritis/Rash     Nausea/vomit n   Tolerating PT/OT     Diarrhea    Tolerating Diet     Constipation    Other       Could NOT obtain due to: Objective:     VITALS:   Last 24hrs VS reviewed since prior progress note. Most recent are:  Patient Vitals for the past 24 hrs:   Temp Pulse Resp BP SpO2   03/15/22 2323 98.8 °F (37.1 °C) 100 18 119/67 97 %   03/15/22 1948 98.1 °F (36.7 °C) 98 18 (!) 104/50 96 %   03/15/22 1647 98.2 °F (36.8 °C) 96 18 (!) 115/55 95 %       Intake/Output Summary (Last 24 hours) at 3/16/2022 1313  Last data filed at 3/16/2022 0018  Gross per 24 hour   Intake    Output 1350 ml   Net -1350 ml        I had a face to face encounter and independently examined this patient on 3/16/2022, as outlined below:  PHYSICAL EXAM:  General: WD, WN. Alert, cooperative, no acute distress    EENT:  EOMI. Anicteric sclerae. MMM  Resp:  CTA bilaterally, no wheezing or rales. No accessory muscle use  CV:  Regular  rhythm,  No edema  GI:  Soft, Non distended, Non tender. +Bowel sounds  Neurologic:  Alert and oriented X 3, normal speech,   Psych:   Good insight. Not anxious nor agitated  Skin:  No rashes. No jaundice    Reviewed most current lab test results and cultures  YES  Reviewed most current radiology test results   YES  Review and summation of old records today    NO  Reviewed patient's current orders and MAR    YES  PMH/ reviewed - no change compared to H&P  ________________________________________________________________________  Care Plan discussed with:    Comments   Patient y    Family      RN y    Care Manager     Consultant                        Multidiciplinary team rounds were held today with , nursing, pharmacist and clinical coordinator. Patient's plan of care was discussed; medications were reviewed and discharge planning was addressed.      ________________________________________________________________________  Total NON critical care TIME: 35   Minutes    Total CRITICAL CARE TIME Spent:   Minutes non procedure based      Comments   >50% of visit spent in counseling and coordination of care y ________________________________________________________________________  Kala Leger MD     Procedures: see electronic medical records for all procedures/Xrays and details which were not copied into this note but were reviewed prior to creation of Plan. LABS:  I reviewed today's most current labs and imaging studies. Pertinent labs include:  Recent Labs     03/16/22 0304 03/14/22  2314   WBC 5.6 16.3*   HGB 11.1* 15.1   HCT 33.8* 45.9    317     Recent Labs     03/16/22 0304 03/14/22  2314    134*   K 3.8 4.1   * 102   CO2 23 25   * 195*   BUN 4* 17   CREA 0.35* 0.65   CA 8.3* 9.6   ALB  --  4.3   TBILI  --  1.2*   ALT  --  80*       Signed:  Kala Leger MD

## 2022-03-16 NOTE — PROCEDURES
[address]  EEG    Name:  Blanquita Berrios  MR#:  [id number]  :  1960  ACCOUNT #:  [de-identified]  DATE OF SERVICE:  03/15/2022      CLINICAL INDICATION:  The patient with complaints of headache in the frontal region. The patient with known Arnold-Chiari malformation and hydrocephalus. EEG to rule out cortical abnormality, rule out seizures, rule out structural brain injury, rule out increased intracranial pressure or encephalopathy. EEG CLASSIFICATION:  On this patient is normal, awake and asleep. DESCRIPTION OF THE RECORD:  The background of this recording contains a posteriorly located occipital alpha rhythm of 9-10 Hz that did attenuate some with eye opening. Throughout the recording, there were no clear areas of focal slowing or spike or spike-and-wave discharges seen. During this study, the patient did enter brief states of sleep with K complexes and sleep spindles seen in the central head regions. The patient had photic stimulation performed that produced a mild driving response in the posterior head region. Hyperventilation was not performed. INTERPRETATION:  This is essentially normal electroencephalogram with the patient awake and sleep showing no clear focal abnormalities or epileptiform activity. Clinical correlation recommended.         Philip Russell MD      [id number]  D:  03/15/2022 20:34  T:  2022 3:22  JOB #:  [id number]  CC:  Melvin Gar MD

## 2022-03-16 NOTE — PROGRESS NOTES
Consult  REFERRED BY:  Jacek Magana III, DO    CHIEF COMPLAINT: Headache for the last month that is progressive      Subjective:     Shellie Gracia is a 62 y.o. right-handed  female, we are seeing as a new patient to me, at the request of the hospitalist for evaluation of new problem of headache and syncope more in the bifrontal head region described as a pressure sensation, and seems to be constant, and not going away but she can take Tylenol which will usually relieve it. She has had no nausea or vomiting, and no focal weakness and no sensory loss and no complaints of visual changes or fever or meningismus or trauma or any other cause for the headaches other than to worry about her sinuses which she feels might be more irritated. The patient has a CT scan that suggest Arnold-Chiari malformation type I, with crowding of the foramen magnum and hydrocephalus, but the patient has had this Arnold-Chiari malformation ever since birth, and had about 8 operations at [facility] repairing her spina bifida which is associated with his Arnold-Chiari malformation, and was previously evaluated by Dr. Alla Medina neurosurgery at [facility] in the 90s, and thought not to need a shunt for her hydrocephalus. She was followed by Dr. Orquidea Barajas for years at [facility], and recently saw Dr. Adolfo Souza for shoulder problems. We are asked to evaluate because of the abnormal scan. The patient is having her sister bring in her old records to try to compare her previous scans with this scan, and we will go ahead and get an MRI of the brain and cervical spine to more fully evaluate her Arnold-Chiari malformation and see if there is any decompensation of her hydrocephalus. She normally is able to transfer on the bed or chair to her wheelchair, but is really not ambulatory, but able to stand to transfer partly. Patient also complains of neck pain in the back of her neck, slightly more on the left side as compared to the right. She denies any unusual physical activity or trauma. Patient's MRI scans were stable, showing type I Arnold-Chiari malformation, but no progression of hydrocephalus, and it all seems stable, and the MRI of the cervical spine just showed mild to moderate degenerative arthritis but no significant cord signal abnormality or other problems. Patient had mild headache this morning which to Tylenol relieved, and she is on the Topamax for headache prevention and we will leave the dose the same for now, and see how she does tomorrow but could increase it to 50 mg twice daily thereafter. She slept well last night. The sister will bring in her previous records, they did not do it today. Past Medical History:   Diagnosis Date    COVID-19 05/2021    Hypercholesterolemia     Hypertension     Spina bifida [facility])       Past Surgical History:   Procedure Laterality Date    COLONOSCOPY N/A 5/1/2019    COLONOSCOPY performed by Efrem Werner MD at [facility]  5/1/2019         HX OTHER SURGICAL  1890s    sacral wound flap repair     History reviewed. No pertinent family history.    Social History     Tobacco Use    Smoking status: Never Smoker    Smokeless tobacco: Never Used   Substance Use Topics    Alcohol use: Never         Current Facility-Administered Medications:     fluticasone propionate (FLONASE) 50 mcg/actuation nasal spray 2 Spray, 2 Spray, Both Nostrils, DAILY PRN, Salabao, Ying, NP, 2 Spray at 03/16/22 0952    bisacodyL (DULCOLAX) tablet 10 mg, 10 mg, Oral, Q6H, Nadia Epifanio D, NP, 10 mg at 03/16/22 1146    PEG 3350-Electrolytes (GO-LYTELY) SUSPENSION 4,000 mL, 4,000 mL, Oral, ONCE, Antonio Pena MD, 4,000 mL at 03/16/22 1627    0.9% sodium chloride infusion, 100 mL/hr, IntraVENous, CONTINUOUS, Xavier Doss MD, Last Rate: 100 mL/hr at 03/16/22 1140, 100 mL/hr at 03/16/22 1140    [Held by provider] lisinopriL (PRINIVIL, ZESTRIL) tablet 10 mg, 10 mg, Oral, DAILY, Paz Cannon MD    atorvastatin (LIPITOR) tablet 10 mg, 10 mg, Oral, DAILY, Tami Garcia MD, 10 mg at 03/16/22 0951    sodium chloride (NS) flush 5-40 mL, 5-40 mL, IntraVENous, Q8H, Tami Garcia MD, 10 mL at 03/16/22 0515    sodium chloride (NS) flush 5-40 mL, 5-40 mL, IntraVENous, PRN, Jessica Remy MD    acetaminophen (TYLENOL) tablet 650 mg, 650 mg, Oral, Q6H PRN, 650 mg at 03/16/22 0307 **OR** acetaminophen (TYLENOL) suppository 650 mg, 650 mg, Rectal, Q6H PRN, Jessica Garcia MD    polyethylene glycol (MIRALAX) packet 17 g, 17 g, Oral, DAILY PRN, Jessica Garcia MD    ondansetron (ZOFRAN ODT) tablet 4 mg, 4 mg, Oral, Q8H PRN **OR** ondansetron (ZOFRAN) injection 4 mg, 4 mg, IntraVENous, Q6H PRN, Tami Garcia MD    enoxaparin (LOVENOX) injection 40 mg, 40 mg, SubCUTAneous, DAILY, Tami Garcia MD, 40 mg at 03/16/22 0951    levoFLOXacin (LEVAQUIN) 750 mg in D5W IVPB, 750 mg, IntraVENous, Q24H, Tami Garcia MD, Last Rate: 100 mL/hr at 03/16/22 0308, 750 mg at 03/16/22 0308    metroNIDAZOLE (FLAGYL) IVPB premix 500 mg, 500 mg, IntraVENous, Q8H, Tami Garcia MD, Last Rate: 100 mL/hr at 03/16/22 1146, 500 mg at 03/16/22 1146    topiramate (TOPAMAX) tablet 25 mg, 25 mg, Oral, BID, Teresa Dover MD    LORazepam (ATIVAN) injection 1-2 mg, 1-2 mg, IntraVENous, Q6H PRN, Teresa Dover MD        Allergies   Allergen Reactions    Sulfa (Sulfonamide Antibiotics) Nausea and Vomiting      MRI Results (most recent):  Results from Hospital Encounter encounter on 03/14/22    MRI CERV SPINE W WO CONT    Narrative  EXAM: MRI CERV SPINE W WO CONT    INDICATION: myelopathy. COMPARISON: None    TECHNIQUE: MR imaging of the cervical spine was performed using the following  sequences: sagittal T1, T2, STIR;  axial T2, T1 prior to and following contrast  administration.     CONTRAST: 13 mL of ProHance. FINDINGS:  There is normal alignment of the cervical spine. Vertebral body heights are  maintained. Marrow signal is unremarkable. The craniocervical junction show low-lying cerebellar tonsils as detailed in  Brain MRI report. The course, caliber, and signal intensity of the spinal cord  are normal. There is no pathologic intrathecal enhancement. The paraspinal soft  tissues are unremarkable. C2-C3: No herniation or stenosis. C3-C4: No herniation or stenosis. C4-C5: No herniation or stenosis. C5-C6: Moderate spinal stenosis. Diffuse disc bulge. Patent foramina. C6-C7: No herniation or stenosis. C7-T1: No herniation or stenosis. Impression  Cervical degenerative disc disease with C5-6 moderate spinal  stenosis. No acute finding. Results from [place] encounter on 03/14/22    MRI CERV SPINE W WO CONT    Narrative  EXAM: MRI CERV SPINE W WO CONT    INDICATION: myelopathy. COMPARISON: None    TECHNIQUE: MR imaging of the cervical spine was performed using the following  sequences: sagittal T1, T2, STIR;  axial T2, T1 prior to and following contrast  administration. CONTRAST: 13 mL of ProHance. FINDINGS:  There is normal alignment of the cervical spine. Vertebral body heights are  maintained. Marrow signal is unremarkable. The craniocervical junction show low-lying cerebellar tonsils as detailed in  Brain MRI report. The course, caliber, and signal intensity of the spinal cord  are normal. There is no pathologic intrathecal enhancement. The paraspinal soft  tissues are unremarkable. C2-C3: No herniation or stenosis. C3-C4: No herniation or stenosis. C4-C5: No herniation or stenosis. C5-C6: Moderate spinal stenosis. Diffuse disc bulge. Patent foramina. C6-C7: No herniation or stenosis. C7-T1: No herniation or stenosis. Impression  Cervical degenerative disc disease with C5-6 moderate spinal  stenosis. No acute finding.     Review of Systems:  A comprehensive review of systems was negative except for: Constitutional: positive for fatigue and malaise  Musculoskeletal: positive for myalgias, arthralgias, stiff joints, neck pain and muscle weakness  Neurological: positive for headaches, paresthesia, coordination problems, gait problems and weakness  Behvioral/Psych: positive for anxiety   Vitals:    03/15/22 1647 03/15/22 1948 03/15/22 2323 03/16/22 1403   BP: (!) 115/55 (!) 104/50 119/67 116/65   Pulse: 96 98 100    Resp: 18 18 18 18   Temp: 98.2 °F (36.8 °C) 98.1 °F (36.7 °C) 98.8 °F (37.1 °C) 98.1 °F (36.7 °C)   SpO2: 95% 96% 97% 99%   Weight:       Height:         Objective:     I      NEUROLOGICAL EXAM:    Appearance: The patient is well developed, well nourished, provides a coherent history and is in no acute distress. Head shows some frontal prominence of the skull   Mental Status: Oriented to time, place and person, and the president, cognitive function is normal and speech is fluent and no aphasia or dysarthria. Mood and affect appropriate, but anxious. Cranial Nerves:   Intact visual fields. Fundi are benign, disc are flat, no lesions seen on funduscopy. ANN, EOM's full, no nystagmus, no ptosis. Facial sensation is normal. Corneal reflexes are not tested. Facial movement is symmetric. Hearing is normal bilaterally. Palate is midline with normal sternocleidomastoid and trapezius muscles are normal. Tongue is midline. Neck without meningismus or bruits  Temporal arteries are not tender or enlarged  TMJ areas are not tender on palpation   Motor:  4/5 strength in upper proximal and distal muscles, and patient with almost complete paralysis from L5 down in the legs bilaterally, and moderate severe weakness of the proximal pelvic girdle muscles bilaterally. Reduced bulk and tone in her leg muscles bilaterally distal greater than proximal. No fasciculations.   Rapid alternating movement is symmetric in the upper extremities but decreased in the lower extremities bilaterally   Reflexes:   Deep tendon reflexes 2+/4 and symmetrical.  No babinski or clonus present   Sensory:   Abnormal to touch, pinprick and vibration and temperature in both legs to about knee level bilaterally. DSS is intact   Gait:  Not testable gait for patient's age. Tremor:   No tremor noted. Cerebellar:  Not testable abnormal cerebellar signs present on Romberg and tandem testing and finger-nose-finger exam.   Neurovascular:  Normal heart sounds and regular rhythm, peripheral pulses decreased, and no carotid bruits. Assessment:       ICD-10-CM ICD-9-CM    1. Colitis  K52.9 558.9    2. Acute alteration in mental status  R41.82 780.97    3. Convulsions, unspecified convulsion type ([address]  R56.9 780.39      Principal Problem:    Colitis (3/15/2022)    Active Problems:    B12 deficiency (5/3/2019)      Neurogenic bladder (9/10/2019)      Spina bifida ([address] (9/10/2019)      Arnold-Chiari malformation, type I ([address] (3/15/2022)      Hydrocephalus ([address] (3/15/2022)      Tension vascular headache (3/15/2022)      Sinus headache (3/15/2022)      Paraplegia ([address] (3/15/2022)      Acute alteration in mental status (3/15/2022)      Convulsion ([address] (3/15/2022)        Plan:     Patient with complicated problem of spina bifida and Arnold-Chiari malformation type I, with increasing headaches, and obviously the concern is that she is having some type of progression or decompensation of her hydrocephalus, so we will check an MRI of the brain and cervical spine to evaluate her neck pain and headaches, and try to get her records to compare to her previous studies because a lot of these problems have been known for years. We will decide on further therapy after these tests, and will place her on Topamax to see if that helps the headaches and may help cut down some of the spinal fluid production and increased intracranial pressure if it is apparent.   She has slight fullness of her optic nerves, cannot completely rule out early increase intracranial pressure. Patient had mild headache this morning which to Tylenol relieved, and she is on the Topamax for headache prevention and we will leave the dose the same for now, and see how she does tomorrow but could increase it to 50 mg twice daily thereafter. She slept well last night. The sister will bring in her previous records, they did not do it today.       Signed By: Billy Michael MD     March 16, 2022       CC: Delmy Smalls DO  FAX: [phone]

## 2022-03-16 NOTE — PROGRESS NOTES
Problem: Risk for Spread of Infection  Goal: Prevent transmission of infectious organism to others  Description: Prevent the transmission of infectious organisms to other patients, staff members, and visitors. Outcome: Progressing Towards Goal     Problem: Pressure Injury - Risk of  Goal: *Prevention of pressure injury  Description: Document Martinez Scale and appropriate interventions in the flowsheet.   Outcome: Progressing Towards Goal  Note: Pressure Injury Interventions:  Sensory Interventions: Minimize linen layers,Float heels         Activity Interventions: Pressure redistribution bed/mattress(bed type),Increase time out of bed    Mobility Interventions: HOB 30 degrees or less,Float heels    Nutrition Interventions: Document food/fluid/supplement intake,Discuss nutritional consult with provider    Friction and Shear Interventions: Minimize layers                Problem: Patient Education: Go to Patient Education Activity  Goal: Patient/Family Education  Outcome: Progressing Towards Goal     Problem: Patient Education: Go to Patient Education Activity  Goal: Patient/Family Education  Outcome: Progressing Towards Goal

## 2022-03-17 ENCOUNTER — ANESTHESIA (OUTPATIENT)
Dept: ENDOSCOPY | Age: 62
DRG: 391 | End: 2022-03-17
Payer: COMMERCIAL

## 2022-03-17 LAB
ANA SER QL: NEGATIVE
ANION GAP SERPL CALC-SCNC: 6 MMOL/L (ref 5–15)
BASOPHILS # BLD: 0 K/UL (ref 0–0.1)
BASOPHILS NFR BLD: 0 % (ref 0–1)
BUN SERPL-MCNC: 2 MG/DL (ref 6–20)
BUN/CREAT SERPL: 7 (ref 12–20)
CALCIUM SERPL-MCNC: 8.2 MG/DL (ref 8.5–10.1)
CHLORIDE SERPL-SCNC: 114 MMOL/L (ref 97–108)
CO2 SERPL-SCNC: 23 MMOL/L (ref 21–32)
CREAT SERPL-MCNC: 0.3 MG/DL (ref 0.55–1.02)
DIFFERENTIAL METHOD BLD: ABNORMAL
EOSINOPHIL # BLD: 0.1 K/UL (ref 0–0.4)
EOSINOPHIL NFR BLD: 1 % (ref 0–7)
ERYTHROCYTE [DISTWIDTH] IN BLOOD BY AUTOMATED COUNT: 11.9 % (ref 11.5–14.5)
GLUCOSE SERPL-MCNC: 104 MG/DL (ref 65–100)
HCT VFR BLD AUTO: 31.7 % (ref 35–47)
HGB BLD-MCNC: 10.5 G/DL (ref 11.5–16)
IMM GRANULOCYTES # BLD AUTO: 0 K/UL (ref 0–0.04)
IMM GRANULOCYTES NFR BLD AUTO: 1 % (ref 0–0.5)
LYMPHOCYTES # BLD: 1.9 K/UL (ref 0.8–3.5)
LYMPHOCYTES NFR BLD: 37 % (ref 12–49)
MCH RBC QN AUTO: 32.2 PG (ref 26–34)
MCHC RBC AUTO-ENTMCNC: 33.1 G/DL (ref 30–36.5)
MCV RBC AUTO: 97.2 FL (ref 80–99)
MONOCYTES # BLD: 0.5 K/UL (ref 0–1)
MONOCYTES NFR BLD: 9 % (ref 5–13)
NEUTS SEG # BLD: 2.6 K/UL (ref 1.8–8)
NEUTS SEG NFR BLD: 52 % (ref 32–75)
NRBC # BLD: 0 K/UL (ref 0–0.01)
NRBC BLD-RTO: 0 PER 100 WBC
PLATELET # BLD AUTO: 213 K/UL (ref 150–400)
PMV BLD AUTO: 9.5 FL (ref 8.9–12.9)
POTASSIUM SERPL-SCNC: 3.4 MMOL/L (ref 3.5–5.1)
RBC # BLD AUTO: 3.26 M/UL (ref 3.8–5.2)
SODIUM SERPL-SCNC: 143 MMOL/L (ref 136–145)
WBC # BLD AUTO: 5.1 K/UL (ref 3.6–11)

## 2022-03-17 PROCEDURE — 36415 COLL VENOUS BLD VENIPUNCTURE: CPT

## 2022-03-17 PROCEDURE — 74011250636 HC RX REV CODE- 250/636: Performed by: NURSE ANESTHETIST, CERTIFIED REGISTERED

## 2022-03-17 PROCEDURE — 74011250637 HC RX REV CODE- 250/637: Performed by: PSYCHIATRY & NEUROLOGY

## 2022-03-17 PROCEDURE — 2709999900 HC NON-CHARGEABLE SUPPLY: Performed by: INTERNAL MEDICINE

## 2022-03-17 PROCEDURE — 74011000250 HC RX REV CODE- 250: Performed by: NURSE ANESTHETIST, CERTIFIED REGISTERED

## 2022-03-17 PROCEDURE — 85025 COMPLETE CBC W/AUTO DIFF WBC: CPT

## 2022-03-17 PROCEDURE — 74011250636 HC RX REV CODE- 250/636: Performed by: INTERNAL MEDICINE

## 2022-03-17 PROCEDURE — 80048 BASIC METABOLIC PNL TOTAL CA: CPT

## 2022-03-17 PROCEDURE — 0DBP8ZX EXCISION OF RECTUM, VIA NATURAL OR ARTIFICIAL OPENING ENDOSCOPIC, DIAGNOSTIC: ICD-10-PCS | Performed by: INTERNAL MEDICINE

## 2022-03-17 PROCEDURE — 74011250637 HC RX REV CODE- 250/637: Performed by: STUDENT IN AN ORGANIZED HEALTH CARE EDUCATION/TRAINING PROGRAM

## 2022-03-17 PROCEDURE — 76060000032 HC ANESTHESIA 0.5 TO 1 HR: Performed by: INTERNAL MEDICINE

## 2022-03-17 PROCEDURE — 0DBK8ZX EXCISION OF ASCENDING COLON, VIA NATURAL OR ARTIFICIAL OPENING ENDOSCOPIC, DIAGNOSTIC: ICD-10-PCS | Performed by: INTERNAL MEDICINE

## 2022-03-17 PROCEDURE — 65270000029 HC RM PRIVATE

## 2022-03-17 PROCEDURE — 74011250637 HC RX REV CODE- 250/637: Performed by: INTERNAL MEDICINE

## 2022-03-17 PROCEDURE — 0DBN8ZX EXCISION OF SIGMOID COLON, VIA NATURAL OR ARTIFICIAL OPENING ENDOSCOPIC, DIAGNOSTIC: ICD-10-PCS | Performed by: INTERNAL MEDICINE

## 2022-03-17 PROCEDURE — 99233 SBSQ HOSP IP/OBS HIGH 50: CPT | Performed by: PSYCHIATRY & NEUROLOGY

## 2022-03-17 PROCEDURE — 88305 TISSUE EXAM BY PATHOLOGIST: CPT

## 2022-03-17 PROCEDURE — 74011000250 HC RX REV CODE- 250: Performed by: STUDENT IN AN ORGANIZED HEALTH CARE EDUCATION/TRAINING PROGRAM

## 2022-03-17 PROCEDURE — 76040000007: Performed by: INTERNAL MEDICINE

## 2022-03-17 PROCEDURE — 74011250636 HC RX REV CODE- 250/636: Performed by: STUDENT IN AN ORGANIZED HEALTH CARE EDUCATION/TRAINING PROGRAM

## 2022-03-17 PROCEDURE — 77030021593 HC FCPS BIOP ENDOSC BSC -A: Performed by: INTERNAL MEDICINE

## 2022-03-17 RX ORDER — ATROPINE SULFATE 0.1 MG/ML
0.5 INJECTION INTRAVENOUS
Status: DISCONTINUED | OUTPATIENT
Start: 2022-03-17 | End: 2022-03-17 | Stop reason: HOSPADM

## 2022-03-17 RX ORDER — BUTALBITAL, ACETAMINOPHEN AND CAFFEINE 50; 325; 40 MG/1; MG/1; MG/1
2 TABLET ORAL
Status: DISCONTINUED | OUTPATIENT
Start: 2022-03-17 | End: 2022-03-18 | Stop reason: HOSPADM

## 2022-03-17 RX ORDER — SODIUM CHLORIDE 0.9 % (FLUSH) 0.9 %
5-40 SYRINGE (ML) INJECTION EVERY 8 HOURS
Status: DISCONTINUED | OUTPATIENT
Start: 2022-03-17 | End: 2022-03-18 | Stop reason: HOSPADM

## 2022-03-17 RX ORDER — FLUMAZENIL 0.1 MG/ML
0.2 INJECTION INTRAVENOUS
Status: DISCONTINUED | OUTPATIENT
Start: 2022-03-17 | End: 2022-03-17 | Stop reason: HOSPADM

## 2022-03-17 RX ORDER — PROPOFOL 10 MG/ML
INJECTION, EMULSION INTRAVENOUS AS NEEDED
Status: DISCONTINUED | OUTPATIENT
Start: 2022-03-17 | End: 2022-03-17 | Stop reason: HOSPADM

## 2022-03-17 RX ORDER — LIDOCAINE HYDROCHLORIDE 20 MG/ML
INJECTION, SOLUTION EPIDURAL; INFILTRATION; INTRACAUDAL; PERINEURAL AS NEEDED
Status: DISCONTINUED | OUTPATIENT
Start: 2022-03-17 | End: 2022-03-17 | Stop reason: HOSPADM

## 2022-03-17 RX ORDER — POTASSIUM CHLORIDE 20 MEQ/1
20 TABLET, EXTENDED RELEASE ORAL
Status: COMPLETED | OUTPATIENT
Start: 2022-03-17 | End: 2022-03-17

## 2022-03-17 RX ORDER — DEXTROMETHORPHAN/PSEUDOEPHED 2.5-7.5/.8
1.2 DROPS ORAL
Status: DISCONTINUED | OUTPATIENT
Start: 2022-03-17 | End: 2022-03-17 | Stop reason: HOSPADM

## 2022-03-17 RX ORDER — NALOXONE HYDROCHLORIDE 0.4 MG/ML
0.4 INJECTION, SOLUTION INTRAMUSCULAR; INTRAVENOUS; SUBCUTANEOUS
Status: DISCONTINUED | OUTPATIENT
Start: 2022-03-17 | End: 2022-03-17 | Stop reason: HOSPADM

## 2022-03-17 RX ORDER — TOPIRAMATE 25 MG/1
50 TABLET ORAL 2 TIMES DAILY
Status: DISCONTINUED | OUTPATIENT
Start: 2022-03-17 | End: 2022-03-18

## 2022-03-17 RX ORDER — SODIUM CHLORIDE 0.9 % (FLUSH) 0.9 %
5-40 SYRINGE (ML) INJECTION AS NEEDED
Status: DISCONTINUED | OUTPATIENT
Start: 2022-03-17 | End: 2022-03-18 | Stop reason: HOSPADM

## 2022-03-17 RX ORDER — EPINEPHRINE 0.1 MG/ML
1 INJECTION INTRACARDIAC; INTRAVENOUS
Status: DISCONTINUED | OUTPATIENT
Start: 2022-03-17 | End: 2022-03-17 | Stop reason: HOSPADM

## 2022-03-17 RX ORDER — SODIUM CHLORIDE 9 MG/ML
75 INJECTION, SOLUTION INTRAVENOUS CONTINUOUS
Status: DISCONTINUED | OUTPATIENT
Start: 2022-03-17 | End: 2022-03-17

## 2022-03-17 RX ORDER — MIDAZOLAM HYDROCHLORIDE 1 MG/ML
.25-5 INJECTION, SOLUTION INTRAMUSCULAR; INTRAVENOUS
Status: DISCONTINUED | OUTPATIENT
Start: 2022-03-17 | End: 2022-03-17 | Stop reason: HOSPADM

## 2022-03-17 RX ORDER — FENTANYL CITRATE 50 UG/ML
25 INJECTION, SOLUTION INTRAMUSCULAR; INTRAVENOUS
Status: DISCONTINUED | OUTPATIENT
Start: 2022-03-17 | End: 2022-03-17 | Stop reason: HOSPADM

## 2022-03-17 RX ADMIN — ENOXAPARIN SODIUM 40 MG: 40 INJECTION SUBCUTANEOUS at 16:35

## 2022-03-17 RX ADMIN — PROPOFOL 50 MG: 10 INJECTION, EMULSION INTRAVENOUS at 13:12

## 2022-03-17 RX ADMIN — METRONIDAZOLE 500 MG: 500 INJECTION, SOLUTION INTRAVENOUS at 18:42

## 2022-03-17 RX ADMIN — PROPOFOL 50 MG: 10 INJECTION, EMULSION INTRAVENOUS at 13:16

## 2022-03-17 RX ADMIN — SODIUM CHLORIDE, PRESERVATIVE FREE 10 ML: 5 INJECTION INTRAVENOUS at 05:02

## 2022-03-17 RX ADMIN — PROPOFOL 50 MG: 10 INJECTION, EMULSION INTRAVENOUS at 13:21

## 2022-03-17 RX ADMIN — SODIUM CHLORIDE 100 ML/HR: 9 INJECTION, SOLUTION INTRAVENOUS at 10:06

## 2022-03-17 RX ADMIN — POTASSIUM CHLORIDE 20 MEQ: 20 TABLET, EXTENDED RELEASE ORAL at 16:35

## 2022-03-17 RX ADMIN — METRONIDAZOLE 500 MG: 500 INJECTION, SOLUTION INTRAVENOUS at 10:06

## 2022-03-17 RX ADMIN — SODIUM CHLORIDE, PRESERVATIVE FREE 10 ML: 5 INJECTION INTRAVENOUS at 22:40

## 2022-03-17 RX ADMIN — SODIUM CHLORIDE 75 ML/HR: 9 INJECTION, SOLUTION INTRAVENOUS at 12:37

## 2022-03-17 RX ADMIN — TOPIRAMATE 25 MG: 25 TABLET, FILM COATED ORAL at 10:06

## 2022-03-17 RX ADMIN — TOPIRAMATE 50 MG: 25 TABLET, FILM COATED ORAL at 18:42

## 2022-03-17 RX ADMIN — LIDOCAINE HYDROCHLORIDE 40 MG: 20 INJECTION, SOLUTION EPIDURAL; INFILTRATION; INTRACAUDAL; PERINEURAL at 13:08

## 2022-03-17 RX ADMIN — LEVOFLOXACIN 750 MG: 5 INJECTION, SOLUTION INTRAVENOUS at 04:46

## 2022-03-17 RX ADMIN — METRONIDAZOLE 500 MG: 500 INJECTION, SOLUTION INTRAVENOUS at 03:42

## 2022-03-17 RX ADMIN — ACETAMINOPHEN 325MG 650 MG: 325 TABLET ORAL at 06:03

## 2022-03-17 RX ADMIN — PROPOFOL 50 MG: 10 INJECTION, EMULSION INTRAVENOUS at 13:08

## 2022-03-17 RX ADMIN — ATORVASTATIN CALCIUM 10 MG: 10 TABLET, FILM COATED ORAL at 10:06

## 2022-03-17 RX ADMIN — SODIUM CHLORIDE, PRESERVATIVE FREE 10 ML: 5 INJECTION INTRAVENOUS at 16:35

## 2022-03-17 NOTE — ROUTINE PROCESS
TRANSFER - OUT REPORT:    Verbal report given to [facility] RN on [employer]  being transferred to [facility](unit) for routine progression of care       Report consisted of patients Situation, Background, Assessment and   Recommendations(SBAR). Information from the following report(s) Procedure Summary was reviewed with the receiving nurse. Opportunity for questions and clarification was provided.

## 2022-03-17 NOTE — PROCEDURES
NAME:  Oumou Edwards   :   1960   MRN:   [id number]     Date/Time:  3/17/2022 1:33 PM    Colonoscopy Operative Report    Procedure Type:   Colonoscopy with biopsy     Indications:     REctal bleeding  Pre-operative Diagnosis: see indication above  Post-operative Diagnosis:  See findings below    :  Kellee Ragland MD  Primary Gastroenterologist: Prashant Medina MD;  Referring Provider: - Edvin Cote MD;-Radha Le III, DO    Exam:  Airway: clear, no airway problems anticipated  Heart: RRR, without gallops or rubs  Lungs: clear bilaterally without wheezes, crackles, or rhonchi  Abdomen: soft, nontender, nondistended, bowel sounds present  Mental Status: awake, alert and oriented to person, place and time    Sedation:  MAC anesthesia Propofol 200mg IV  Procedure Details:  After informed consent was obtained with all risks and benefits of procedure explained and preoperative exam completed, the patient was taken to the endoscopy suite and placed in the left lateral decubitus position. Upon sequential sedation as per above, a digital rectal exam was performed demonstrating internal hemorrhoids. The Olympus videocolonoscope  was inserted in the rectum and carefully advanced to the cecum, which was identified by the ileocecal valve and appendiceal orifice. The quality of preparation was adequate. The colonoscope was slowly withdrawn with careful evaluation between folds. Retroflexion in the rectum was completed demonstrating internal hemorrhoids. Findings:     -Minimal erythema of sigmoid and descending colon from 20-40cm; biopsied  -Remainder of colon is unremarkable; biopsied are obtained in the ascending colon and the rectum separately  -Small grade 1 internal hemorrhoid    Specimen Removed:  #1 asc colon; #2 sigmoid and descending colon; #3 rectum  Complications: None. EBL:  None.     Impression:    -Minimal erythema of sigmoid and descending colon from 20-40cm; biopsied  -Remainder of colon is unremarkable; biopsied are obtained in the ascending colon and the rectum separately  -Small grade 1 internal hemorrhoids    Recommendations: -  -Await pathology. ,   -Resume diet. ,   -No more GI intervention planned. ,   -Pt can follow-up with primary care doctor. ,   -We will contact patient regarding her biopsy results. ,   -No further antibiotics needed for noted colon findings.     -Resume normal medication(s). -You will receive a letter about the biopsy results in about 10 days. You may be asked to call your doctor's office for the results.     -Will sign off. Discharge Disposition:  To room after recovery in Endoscopy.     Stormy Lynn MD

## 2022-03-17 NOTE — PROGRESS NOTES
Pt requesting tylenol for c/o HA, message via perfect serve to SOREN Rivera [facility] NP for clarification if Ok to administer PO with small sip H2O as pt is NPO for colonoscopy, message read at 0591 and message sent to this RN \"yes', will administer as per previous order.

## 2022-03-17 NOTE — PROGRESS NOTES
Hospitalist Progress Note    NAME: Fatoumata Rowan   :  1960   MRN:  [id number]       Assessment / Plan:  History of colitis felt to be from left-sided ulcerative colitis    -continue  IV fluid to improve perfusion  -continue  empiric Levaquin and Flagyl  - stool for C. Difficile negative   -GI consult   -- plan for colonoscopy      Paraplegia secondary to spina bifida,   Bowel/bladder incontinence secondary to above  Hx of chiari malformation  -Supportive treatment  -Head CT show  Partly visualized Chiari I malformation with CSF crowding at the foramen magnum,  and moderate ventriculomegaly   -Complaining from headache chronic, neurology was consulted  -- MRI brain was done and show      1. 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 2. Stable moderate hydrocephalus without periventricular edema. 3. No acute intracranial abnormality    Bacteriuria  -UAwas 2+ bacteria, positive nitrates and small leukocyte esterase. No pyuria  -On Levaquin for colitis     Hypertension  Hyperlipidemia  -Hold antihypertensive for now  -Continue statin     Arthritis, shoulder  -PRN tylenol     Vit D and Vit B 12 deficiency      30.0 - 39.9 Obese / Body mass index is 33.76 kg/m². Estimated discharge date:   Barriers:    Code status: Full  Prophylaxis: Lovenox  Recommended Disposition: Home w/Family     Subjective:     Chief Complaint / Reason for Physician Visit  \"\". Discussed with RN events overnight.   Patient seen and examined, stated feel better, neurology was consulted     Review of Systems:  Symptom Y/N Comments  Symptom Y/N Comments   Fever/Chills n   Chest Pain n    Poor Appetite    Edema     Cough    Abdominal Pain n    Sputum    Joint Pain     SOB/ALVARADO    Pruritis/Rash     Nausea/vomit n   Tolerating PT/OT     Diarrhea    Tolerating Diet     Constipation    Other       Could NOT obtain due to:      Objective:     VITALS:   Last 24hrs VS reviewed since prior progress note. Most recent are:  Patient Vitals for the past 24 hrs:   Temp Pulse Resp BP SpO2   03/17/22 0912 98.1 °F (36.7 °C) 88 18 (!) 102/51 99 %   03/17/22 0420  86 18     03/17/22 0320 98.8 °F (37.1 °C) 86 17 (!) 146/69 98 %   03/17/22 0123 98.2 °F (36.8 °C) 90 17 (!) 104/41 97 %   03/16/22 2010  91 18     03/16/22 1947 98.2 °F (36.8 °C) 90 18 137/83 99 %   03/16/22 1403 98.1 °F (36.7 °C)  18 116/65 99 %       Intake/Output Summary (Last 24 hours) at 3/17/2022 1224  Last data filed at 3/17/2022 5435  Gross per 24 hour   Intake 3350 ml   Output    Net 3350 ml        I had a face to face encounter and independently examined this patient on 3/17/2022, as outlined below:  PHYSICAL EXAM:  General: WD, WN. Alert, cooperative, no acute distress    EENT:  EOMI. Anicteric sclerae. MMM  Resp:  CTA bilaterally, no wheezing or rales. No accessory muscle use  CV:  Regular  rhythm,  No edema  GI:  Soft, Non distended, Non tender. +Bowel sounds  Neurologic:  Alert and oriented X 3, normal speech,   Psych:   Good insight. Not anxious nor agitated  Skin:  No rashes. No jaundice    Reviewed most current lab test results and cultures  YES  Reviewed most current radiology test results   YES  Review and summation of old records today    NO  Reviewed patient's current orders and MAR    YES  PMH/SH reviewed - no change compared to H&P  ________________________________________________________________________  Care Plan discussed with:    Comments   Patient y    Family      RN y    Care Manager     Consultant                        Multidiciplinary team rounds were held today with , nursing, pharmacist and clinical coordinator. Patient's plan of care was discussed; medications were reviewed and discharge planning was addressed.      ________________________________________________________________________  Total NON critical care TIME: 35 Minutes    Total CRITICAL CARE TIME Spent:   Minutes non procedure based      Comments   >50% of visit spent in counseling and coordination of care y    ________________________________________________________________________  Reche Blizzard, MD     Procedures: see electronic medical records for all procedures/Xrays and details which were not copied into this note but were reviewed prior to creation of Plan. LABS:  I reviewed today's most current labs and imaging studies. Pertinent labs include:  Recent Labs     03/17/22 0426 03/16/22  0304 03/14/22  2314   WBC 5.1 5.6 16.3*   HGB 10.5* 11.1* 15.1   HCT 31.7* 33.8* 45.9    225 317     Recent Labs     03/17/22 0426 03/16/22  0304 03/14/22  2314    140 134*   K 3.4* 3.8 4.1   * 113* 102   CO2 23 23 25   * 102* 195*   BUN 2* 4* 17   CREA 0.30* 0.35* 0.65   CA 8.2* 8.3* 9.6   ALB  --   --  4.3   TBILI  --   --  1.2*   ALT  --   --  80*       Signed:  Reche Blizzard, MD

## 2022-03-17 NOTE — PROGRESS NOTES
Consult  REFERRED BY:  Lorenzo Trent III, DO    CHIEF COMPLAINT: Headache for the last month that is progressive      Subjective:     Eden Kan is a 62 y.o. right-handed  female, we are seeing as a new patient to me, at the request of the hospitalist for evaluation of new problem of headache and syncope more in the bifrontal head region described as a pressure sensation, and seems to be constant, and not going away but she can take Tylenol which will usually relieve it. She has had no nausea or vomiting, and no focal weakness and no sensory loss and no complaints of visual changes or fever or meningismus or trauma or any other cause for the headaches other than to worry about her sinuses which she feels might be more irritated. The patient has a CT scan that suggest Arnold-Chiari malformation type I, with crowding of the foramen magnum and hydrocephalus, but the patient has had this Arnold-Chiari malformation ever since birth, and had about 8 operations at [facility] repairing her spina bifida which is associated with his Arnold-Chiari malformation, and was previously evaluated by Dr. Nimesh Borrego neurosurgery at [facility] in the 90s, and thought not to need a shunt for her hydrocephalus. She was followed by Dr. Mikael Styles for years at [facility], and recently saw Dr. Cynda Epley for shoulder problems. We are asked to evaluate because of the abnormal scan. The patient is having her sister bring in her old records to try to compare her previous scans with this scan, and we will go ahead and get an MRI of the brain and cervical spine to more fully evaluate her Arnold-Chiari malformation and see if there is any decompensation of her hydrocephalus. She normally is able to transfer on the bed or chair to her wheelchair, but is really not ambulatory, but able to stand to transfer partly. Patient also complains of neck pain in the back of her neck, slightly more on the left side as compared to the right. She denies any unusual physical activity or trauma. Patient's MRI scans were stable, showing type I Arnold-Chiari malformation, but no progression of hydrocephalus, and it all seems stable, and the MRI of the cervical spine just showed mild to moderate degenerative arthritis but no significant cord signal abnormality or other problems. Patient has worsening headache this morning and there is probably related to not eating and a colonoscopy prep and the weather, and she wants the Fioricet back so we reordered that, and she will try that after the colonoscopy, and she is on the Topamax for headache prevention and we will increase it to 50 mg twice daily thereafter. She did not sleep well last night either. Her sister brought in her records and just shows 1 MRIs showing ventriculomegaly by her previous 1 and the one we just did yesterday, and that does not appear to be any major change. Past Medical History:   Diagnosis Date    COVID-19 05/2021    Hypercholesterolemia     Hypertension     Spina bifida [facility])       Past Surgical History:   Procedure Laterality Date    COLONOSCOPY N/A 5/1/2019    COL[facility] at [facility]  5/1/2019         HX OTHER SURGICAL  1890s    sacral wound flap repair     History reviewed. No pertinent family history.    Social History     Tobacco Use    Smoking status: Never Smoker    Smokeless tobacco: Never Used   Substance Use Topics    Alcohol use: Never         Current Facility-Administered Medications:     butalbital-acetaminophen-caffeine (FIORICET, ESGIC) -40 mg per tablet 2 Tablet, 2 Tablet, Oral, Q8H PRN, Laverne Figueroa MD    fluticasone propionate (FLONASE) 50 mcg/actuation nasal spray 2 Spray, 2 Spray, Both Nostrils, DAILY PRN, Ying Genao NP, 2 Spray at 03/16/22 0989    0.9% sodium chloride infusion, 100 mL/hr, IntraVENous, CONTINUOUS, Yolette Neves MD, Last Rate: 100 mL/hr at 03/17/22 1006, 100 mL/hr at 03/17/22 1006    [Held by provider] lisinopriL (PRINIVIL, ZESTRIL) tablet 10 mg, 10 mg, Oral, DAILY, Constantin Garcia MD    atorvastatin (LIPITOR) tablet 10 mg, 10 mg, Oral, DAILY, Tami Garcia MD, 10 mg at 03/17/22 1006    sodium chloride (NS) flush 5-40 mL, 5-40 mL, IntraVENous, Q8H, Tami Garcia MD, 10 mL at 03/17/22 0502    sodium chloride (NS) flush 5-40 mL, 5-40 mL, IntraVENous, PRN, Constantin Rolon MD    acetaminophen (TYLENOL) tablet 650 mg, 650 mg, Oral, Q6H PRN, 650 mg at 03/17/22 0603 **OR** acetaminophen (TYLENOL) suppository 650 mg, 650 mg, Rectal, Q6H PRN, Constantin Garcia MD    polyethylene glycol (MIRALAX) packet 17 g, 17 g, Oral, DAILY PRN, Tmai Garcia MD    ondansetron (ZOFRAN ODT) tablet 4 mg, 4 mg, Oral, Q8H PRN **OR** ondansetron (ZOFRAN) injection 4 mg, 4 mg, IntraVENous, Q6H PRN, Tami Garcia MD    enoxaparin (LOVENOX) injection 40 mg, 40 mg, SubCUTAneous, DAILY, Tami Garcia MD, 40 mg at 03/16/22 0951    levoFLOXacin (LEVAQUIN) 750 mg in D5W IVPB, 750 mg, IntraVENous, Q24H, Constantin Garcia MD, Stopped at 03/17/22 0616    metroNIDAZOLE (FLAGYL) IVPB premix 500 mg, 500 mg, IntraVENous, Q8H, Tami Garcia MD, Last Rate: 100 mL/hr at 03/17/22 1006, 500 mg at 03/17/22 1006    topiramate (TOPAMAX) tablet 25 mg, 25 mg, Oral, BID, Debbrah Speak, MD, 25 mg at 03/17/22 1006    LORazepam (ATIVAN) injection 1-2 mg, 1-2 mg, IntraVENous, Q6H PRN, Trenton Verdugo MD        Allergies   Allergen Reactions    Sulfa (Sulfonamide Antibiotics) Nausea and Vomiting      MRI Results (most recent):  Results from Hospital Encounter encounter on 03/14/22    MRI CERV SPINE W WO CONT    Narrative  EXAM: MRI CERV SPINE W WO CONT    INDICATION: myelopathy.     COMPARISON: None    TECHNIQUE: MR imaging of the cervical spine was performed using the following  sequences: sagittal T1, T2, STIR; axial T2, T1 prior to and following contrast  administration. CONTRAST: 13 mL of ProHance. FINDINGS:  There is normal alignment of the cervical spine. Vertebral body heights are  maintained. Marrow signal is unremarkable. The craniocervical junction show low-lying cerebellar tonsils as detailed in  Brain MRI report. The course, caliber, and signal intensity of the spinal cord  are normal. There is no pathologic intrathecal enhancement. The paraspinal soft  tissues are unremarkable. C2-C3: No herniation or stenosis. C3-C4: No herniation or stenosis. C4-C5: No herniation or stenosis. C5-C6: Moderate spinal stenosis. Diffuse disc bulge. Patent foramina. C6-C7: No herniation or stenosis. C7-T1: No herniation or stenosis. Impression  Cervical degenerative disc disease with C5-6 moderate spinal  stenosis. No acute finding. Results from [place] encounter on 03/14/22    MRI CERV SPINE W WO CONT    Narrative  EXAM: MRI CERV SPINE W WO CONT    INDICATION: myelopathy. COMPARISON: None    TECHNIQUE: MR imaging of the cervical spine was performed using the following  sequences: sagittal T1, T2, STIR;  axial T2, T1 prior to and following contrast  administration. CONTRAST: 13 mL of ProHance. FINDINGS:  There is normal alignment of the cervical spine. Vertebral body heights are  maintained. Marrow signal is unremarkable. The craniocervical junction show low-lying cerebellar tonsils as detailed in  Brain MRI report. The course, caliber, and signal intensity of the spinal cord  are normal. There is no pathologic intrathecal enhancement. The paraspinal soft  tissues are unremarkable. C2-C3: No herniation or stenosis. C3-C4: No herniation or stenosis. C4-C5: No herniation or stenosis. C5-C6: Moderate spinal stenosis. Diffuse disc bulge. Patent foramina. C6-C7: No herniation or stenosis. C7-T1: No herniation or stenosis.     Impression  Cervical degenerative disc disease with C5-6 moderate spinal  stenosis. No acute finding. Review of Systems:  A comprehensive review of systems was negative except for: Constitutional: positive for fatigue and malaise  Musculoskeletal: positive for myalgias, arthralgias, stiff joints, neck pain and muscle weakness  Neurological: positive for headaches, paresthesia, coordination problems, gait problems and weakness  Behvioral/Psych: positive for anxiety   Vitals:    03/17/22 0123 03/17/22 0320 03/17/22 0420 03/17/22 0912   BP: (!) 104/41 (!) 146/69  (!) 102/51   Pulse: 90 86 86 88   Resp: 17 17 18 18   Temp: 98.2 °F (36.8 °C) 98.8 °F (37.1 °C)  98.1 °F (36.7 °C)   SpO2: 97% 98%  99%   Weight:       Height:         Objective:     I      NEUROLOGICAL EXAM:    Appearance: The patient is well developed, well nourished, provides a coherent history and is in no acute distress. Head shows some frontal prominence of the skull   Mental Status: Oriented to time, place and person, and the president, cognitive function is normal and speech is fluent and no aphasia or dysarthria. Mood and affect appropriate, but anxious. Cranial Nerves:   Intact visual fields. Fundi are benign, disc are flat, no lesions seen on funduscopy. ANN, EOM's full, no nystagmus, no ptosis. Facial sensation is normal. Corneal reflexes are not tested. Facial movement is symmetric. Hearing is normal bilaterally. Palate is midline with normal sternocleidomastoid and trapezius muscles are normal. Tongue is midline. Neck without meningismus or bruits  Temporal arteries are not tender or enlarged  TMJ areas are not tender on palpation   Motor:  4/5 strength in upper proximal and distal muscles, and patient with almost complete paralysis from L5 down in the legs bilaterally, and moderate severe weakness of the proximal pelvic girdle muscles bilaterally. Reduced bulk and tone in her leg muscles bilaterally distal greater than proximal. No fasciculations.   Rapid alternating movement is symmetric in the upper extremities but decreased in the lower extremities bilaterally   Reflexes:   Deep tendon reflexes 2+/4 and symmetrical.  No babinski or clonus present   Sensory:   Abnormal to touch, pinprick and vibration and temperature in both legs to about knee level bilaterally. DSS is intact   Gait:  Not testable gait for patient's age. Tremor:   No tremor noted. Cerebellar:  Not testable abnormal cerebellar signs present on Romberg and tandem testing and finger-nose-finger exam.   Neurovascular:  Normal heart sounds and regular rhythm, peripheral pulses decreased, and no carotid bruits. Assessment:       ICD-10-CM ICD-9-CM    1. Colitis  K52.9 558.9    2. Acute alteration in mental status  R41.82 780.97    3. Convulsions, unspecified convulsion type ([address]  R56.9 780.39      Principal Problem:    Colitis (3/15/2022)    Active Problems:    B12 deficiency (5/3/2019)      Neurogenic bladder (9/10/2019)      Spina bifida ([address] (9/10/2019)      Arnold-Chiari malformation, type I ([address] (3/15/2022)      Hydrocephalus ([address] (3/15/2022)      Tension vascular headache (3/15/2022)      Sinus headache (3/15/2022)      Paraplegia ([address] (3/15/2022)      Acute alteration in mental status (3/15/2022)      Convulsion ([address] (3/15/2022)        Plan:     Patient with complicated problem of spina bifida and Arnold-Chiari malformation type I, with increasing headaches, and obviously the concern is that she is having some type of progression or decompensation of her hydrocephalus, so we will check an MRI of the brain and cervical spine to evaluate her neck pain and headaches, and try to get her records to compare to her previous studies because a lot of these problems have been known for years.   Patient has worsening headache this morning and there is probably related to not eating and a colonoscopy prep and the weather, and she wants the Fioricet back so we reordered that, and she will try that after the colonoscopy, and she is on the Topamax for headache prevention and we will increase it to 50 mg twice daily thereafter. She did not sleep well last night either. Her sister brought in her records and just shows 1 MRIs showing ventriculomegaly by her previous 1 and the one we just did yesterday, and that does not appear to be any major change.     Signed By: Yara Buenrostro MD     March 17, 2022       CC: Karina Geronimo DO  FAX: [phone]

## 2022-03-17 NOTE — ANESTHESIA POSTPROCEDURE EVALUATION
Procedure(s):  COLONOSCOPY  COLON BIOPSY. total IV anesthesia, MAC    Anesthesia Post Evaluation        Patient location during evaluation: PACU  Note status: Adequate. Level of consciousness: responsive to verbal stimuli and sleepy but conscious  Pain management: satisfactory to patient  Airway patency: patent  Anesthetic complications: no  Cardiovascular status: acceptable  Respiratory status: acceptable  Hydration status: acceptable  Comments: +Post-Anesthesia Evaluation and Assessment    Patient: Annie Urena MRN: [id number]  SSN: [id number]   YOB: 1960  Age: 62 y.o. Sex: female      Cardiovascular Function/Vital Signs    BP (!) 101/56   Pulse 85   Temp 36.7 °C (98.1 °F)   Resp 22   Ht 4' 6\" (1.372 m)   Wt 63.5 kg (140 lb)   SpO2 98%   BMI 33.76 kg/m²     Patient is status post Procedure(s):  COLONOSCOPY  COLON BIOPSY. Nausea/Vomiting: Controlled. Postoperative hydration reviewed and adequate. Pain:  Pain Scale 1: Numeric (0 - 10) (03/17/22 1335)  Pain Intensity 1: 0 (03/17/22 1335)   Managed. Neurological Status: At baseline. Mental Status and Level of Consciousness: Arousable. Pulmonary Status:   O2 Device: None (Room air) (03/17/22 1335)   Adequate oxygenation and airway patent. Complications related to anesthesia: None    Post-anesthesia assessment completed. No concerns. Signed By: Wilman Tello MD    3/17/2022  Post anesthesia nausea and vomiting:  controlled      INITIAL Post-op Vital signs:   Vitals Value Taken Time   /54 03/17/22 1342   Temp     Pulse 77 03/17/22 1343   Resp 18 03/17/22 1343   SpO2 100 % 03/17/22 1343   Vitals shown include unvalidated device data.

## 2022-03-17 NOTE — PROGRESS NOTES
CM: Rebeca Cramer is currently working with pt. CM reviewed pt's charts. Pt is known to be independent and will return home with family support at the time of d/c. CM will verify with clinical team to determine if additional services and/or recommendations are needed at the time of d/c.  Pt's family will assist with transport at the time of d/c. CM will continue to follow and enter referrals as deemed necessary.   CM will place follow up appointment on [address] [place] [address], [address] [place] [address]

## 2022-03-17 NOTE — ANESTHESIA PREPROCEDURE EVALUATION
Relevant Problems   NEUROLOGY   (+) Convulsion (HCC)   (+) Sinus headache   (+) Tension vascular headache      ENDOCRINE   (+) Severe obesity (HCC)      HEMATOLOGY   (+) Iron deficiency anemia       Anesthetic History   No history of anesthetic complications            Review of Systems / Medical History  Patient summary reviewed, nursing notes reviewed and pertinent labs reviewed    Pulmonary  Within defined limits                 Neuro/Psych   Within defined limits           Cardiovascular    Hypertension: well controlled          Hyperlipidemia    Exercise tolerance: >4 METS     GI/Hepatic/Renal  Within defined limits              Endo/Other        Obesity  Pertinent negatives: No morbid obesity   Other Findings   Comments: Colitis   Sepsis     Spina bifida     Covid-19 5/19/2021         Physical Exam    Airway  Mallampati: III  TM Distance: 4 - 6 cm  Neck ROM: normal range of motion   Mouth opening: Normal     Cardiovascular  Regular rate and rhythm,  S1 and S2 normal,  no murmur, click, rub, or gallop             Dental    Dentition: Upper dentition intact and Lower dentition intact     Pulmonary  Breath sounds clear to auscultation               Abdominal  GI exam deferred       Other Findings            Anesthetic Plan    ASA: 3  Anesthesia type: total IV anesthesia and MAC          Induction: Intravenous  Anesthetic plan and risks discussed with: Patient

## 2022-03-17 NOTE — H&P
See prior Consultation Note. The patient was reexamined. No interval change in the last 72hrs. Proceed with procedure(s) with deep sedation or conscious sedation as clinically indicated.

## 2022-03-18 ENCOUNTER — APPOINTMENT (OUTPATIENT)
Dept: PHYSICAL THERAPY | Age: 62
End: 2022-03-18
Payer: COMMERCIAL

## 2022-03-18 VITALS
SYSTOLIC BLOOD PRESSURE: 126 MMHG | TEMPERATURE: 97.8 F | WEIGHT: 140 LBS | OXYGEN SATURATION: 98 % | DIASTOLIC BLOOD PRESSURE: 69 MMHG | BODY MASS INDEX: 32.4 KG/M2 | RESPIRATION RATE: 15 BRPM | HEIGHT: 55 IN | HEART RATE: 91 BPM

## 2022-03-18 PROBLEM — A41.9 SEVERE SEPSIS (HCC): Status: ACTIVE | Noted: 2019-04-28

## 2022-03-18 PROBLEM — R65.20 SEVERE SEPSIS (HCC): Status: ACTIVE | Noted: 2019-04-28

## 2022-03-18 PROBLEM — E78.2 MIXED HYPERLIPIDEMIA: Status: ACTIVE | Noted: 2019-09-10

## 2022-03-18 LAB
ANION GAP SERPL CALC-SCNC: 6 MMOL/L (ref 5–15)
BUN SERPL-MCNC: 6 MG/DL (ref 6–20)
BUN/CREAT SERPL: 15 (ref 12–20)
CALCIUM SERPL-MCNC: 8.6 MG/DL (ref 8.5–10.1)
CHLORIDE SERPL-SCNC: 114 MMOL/L (ref 97–108)
CO2 SERPL-SCNC: 22 MMOL/L (ref 21–32)
CREAT SERPL-MCNC: 0.4 MG/DL (ref 0.55–1.02)
ERYTHROCYTE [DISTWIDTH] IN BLOOD BY AUTOMATED COUNT: 11.9 % (ref 11.5–14.5)
GLUCOSE BLD STRIP.AUTO-MCNC: 145 MG/DL (ref 65–117)
GLUCOSE SERPL-MCNC: 138 MG/DL (ref 65–100)
HCT VFR BLD AUTO: 33.5 % (ref 35–47)
HGB BLD-MCNC: 11.3 G/DL (ref 11.5–16)
MAGNESIUM SERPL-MCNC: 1.9 MG/DL (ref 1.6–2.4)
MCH RBC QN AUTO: 31.7 PG (ref 26–34)
MCHC RBC AUTO-ENTMCNC: 33.7 G/DL (ref 30–36.5)
MCV RBC AUTO: 94.1 FL (ref 80–99)
NRBC # BLD: 0 K/UL (ref 0–0.01)
NRBC BLD-RTO: 0 PER 100 WBC
PLATELET # BLD AUTO: 208 K/UL (ref 150–400)
PMV BLD AUTO: 9.1 FL (ref 8.9–12.9)
POTASSIUM SERPL-SCNC: 3.6 MMOL/L (ref 3.5–5.1)
RBC # BLD AUTO: 3.56 M/UL (ref 3.8–5.2)
SERVICE CMNT-IMP: ABNORMAL
SODIUM SERPL-SCNC: 142 MMOL/L (ref 136–145)
WBC # BLD AUTO: 6.8 K/UL (ref 3.6–11)

## 2022-03-18 PROCEDURE — 82962 GLUCOSE BLOOD TEST: CPT

## 2022-03-18 PROCEDURE — 83735 ASSAY OF MAGNESIUM: CPT

## 2022-03-18 PROCEDURE — 36415 COLL VENOUS BLD VENIPUNCTURE: CPT

## 2022-03-18 PROCEDURE — 85027 COMPLETE CBC AUTOMATED: CPT

## 2022-03-18 PROCEDURE — 74011250637 HC RX REV CODE- 250/637: Performed by: STUDENT IN AN ORGANIZED HEALTH CARE EDUCATION/TRAINING PROGRAM

## 2022-03-18 PROCEDURE — 74011000250 HC RX REV CODE- 250: Performed by: STUDENT IN AN ORGANIZED HEALTH CARE EDUCATION/TRAINING PROGRAM

## 2022-03-18 PROCEDURE — 99232 SBSQ HOSP IP/OBS MODERATE 35: CPT | Performed by: PSYCHIATRY & NEUROLOGY

## 2022-03-18 PROCEDURE — 74011250636 HC RX REV CODE- 250/636: Performed by: STUDENT IN AN ORGANIZED HEALTH CARE EDUCATION/TRAINING PROGRAM

## 2022-03-18 PROCEDURE — 80048 BASIC METABOLIC PNL TOTAL CA: CPT

## 2022-03-18 RX ADMIN — METRONIDAZOLE 500 MG: 500 INJECTION, SOLUTION INTRAVENOUS at 11:56

## 2022-03-18 RX ADMIN — METRONIDAZOLE 500 MG: 500 INJECTION, SOLUTION INTRAVENOUS at 03:59

## 2022-03-18 RX ADMIN — LEVOFLOXACIN 750 MG: 5 INJECTION, SOLUTION INTRAVENOUS at 05:00

## 2022-03-18 RX ADMIN — ATORVASTATIN CALCIUM 10 MG: 10 TABLET, FILM COATED ORAL at 08:28

## 2022-03-18 RX ADMIN — SODIUM CHLORIDE, PRESERVATIVE FREE 10 ML: 5 INJECTION INTRAVENOUS at 05:02

## 2022-03-18 NOTE — PROGRESS NOTES
Called to pt's bedside, pt c/o new onset numbness periorbital and orally, pt denies SOB or difficulty swallowing at this time, A. Berneda Rinne NP made aware via perfect serve, orders for lab entered per NP.

## 2022-03-18 NOTE — PROGRESS NOTES
Discharge medications reviewed with patient and appropriate educational materials and side effects teaching were provided. Discussed with the patient and all questioned fully answered. Bilateral PIV's removed and indwelling lopez catheter removed. I have reviewed discharge instructions with the patient. The patient verbalized understanding.

## 2022-03-18 NOTE — DISCHARGE SUMMARY
Hospitalist Discharge Summary     Patient ID:  Eden Kan  [id number]  28 y.o.  1960  3/14/2022    PCP on record: Grisel Alvarez DO    Admit date: 3/14/2022  Discharge date and time: 3/18/2022    DISCHARGE DIAGNOSIS:    Colitis: infectious vs ischemic vs inflammatory    CONSULTATIONS:  IP CONSULT TO GASTROENTEROLOGY  IP CONSULT TO NEUROLOGY    Excerpted HPI from H&P of Ally Scott MD:    Eden Kan is a 62 y.o.  female with past medical history significant for HTN, HLD,  paraplegia secondary to spina bifida, bowel/bladder incontinence, and chronic constipation who presents with diarrhea post manual bowel disimpaction. Patient states that she has chornic constipation and gets vagal symptoms from rectal pressure. Notes earlier today she was having rectal pressure, low BP, shaking/chills, and elevated HR which she associated with vagal response from rectal pressure. She called her friend who is RN for manual disimpaction. Patient also notes abdominal cramp. After the manual disimpaction she started having water diarrhea, multiple episodes, non-bloody. She continued to have abdominal cramp, rectal pain, chills, and generalized weakness/lethargy which prompted her to come to the ED. Patient denies any fever, nausea, vomiting, or urinary symptoms apart from her chronic urinary incontinence.   ______________________________________________________________________  DISCHARGE SUMMARY/HOSPITAL COURSE:  for full details see H&P, daily progress notes, labs, consult notes. History of colitis felt to be from left-sided ulcerative colitis     -continue  IV fluid to improve perfusion  -continue  empiric Levaquin and Flagyl  - stool for C.  Difficile negative   -GI consult   -- plan for colonoscopy      Paraplegia secondary to spina bifida,   Bowel/bladder incontinence secondary to above  Hx of chiari malformation  -Supportive treatment  -Head CT show  Partly visualized Chiari I malformation with CSF crowding at the foramen magnum,  and moderate ventriculomegaly   -Complaining from headache chronic, neurology was consulted  -- MRI brain was done and show       1. 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 2. Stable moderate hydrocephalus without periventricular edema. 3. No acute intracranial abnormality     Bacteriuria  -UAwas 2+ bacteria, positive nitrates and small leukocyte esterase.  No pyuria  -On Levaquin for colitis     Hypertension  Hyperlipidemia  -Hold antihypertensive for now  -Continue statin     Arthritis, shoulder  -PRN tylenol     Vit D and Vit B 12 deficiency         _______________________________________________________________________  Patient seen and examined by me on discharge day. Pertinent Findings:  Gen:    Not in distress  Chest: Clear lungs  CVS:   Regular rhythm. No edema  Abd:  Soft, not distended, not tender  Neuro:  Alert,   _______________________________________________________________________  DISCHARGE MEDICATIONS:   Current Discharge Medication List      CONTINUE these medications which have NOT CHANGED    Details   Vitamin D3 125 mcg (5,000 unit) tab tablet TAKE 1 TABLET BY MOUTH DAILY  Qty: 90 Tablet, Refills: 3      diclofenac potassium (CATAFLAM) 50 mg tablet Take 1 Tablet by mouth daily. Qty: 90 Tablet, Refills: 3      simvastatin (ZOCOR) 20 mg tablet TAKE 1 TABLET BY MOUTH EVERY DAY  Qty: 90 Tablet, Refills: 3      cyanocobalamin (VITAMIN B12) 1,000 mcg/mL injection INJECT 1 ML INTRAMUSCULARLY EVERY SEVEN DAYS. DO THIS FOR 4 WEEKS AND THEN MONTHLY THEREAFTER  Qty: 4 mL, Refills: 5      Linzess 72 mcg cap capsule Take 72 mcg by mouth daily.          STOP taking these medications       lisinopriL (PRINIVIL, ZESTRIL) 10 mg tablet Comments:   Reason for Stopping:                 Patient Follow Up Instructions: Activity: Activity as tolerated  Diet: Regular Diet  Wound Care: None needed    Follow-up with PCP in 5 days. Follow-up tests/labs     Follow-up Information     Follow up With Specialties Details Why Jean Andre DO Internal Medicine   [address]  [address]  [address] [phone]      Enoch Kang DO Internal Medicine In 5 days  [address]  [address]      Sridevi De MD Gastroenterology In 3 weeks  [address]  [address] [facility]  [phone]          ________________________________________________________________    Risk of deterioration: Moderate    Condition at Discharge:  Stable  __________________________________________________________________    Disposition  Home with family, no needs    ____________________________________________________________________    Code Status: Full Code  ___________________________________________________________________      Total time in minutes spent coordinating this discharge (includes going over instructions, follow-up, prescriptions, and preparing report for sign off to her PCP) :  >30 minutes    Signed:   Taty Griffith MD

## 2022-03-18 NOTE — PROGRESS NOTES
[facility] follow-up PCP transitional care appointment has been scheduled with Dr. Choco Castillo on 3/23/22 at 1130. Pending patient discharge. Bob Chaparro, Care Management Specialist       Attempted to schedule hospital follow up PCP appointment. Sent a message to PCP office to find patient an appointment. Awaiting callback from PCP office.  [facility]

## 2022-03-18 NOTE — DISCHARGE INSTRUCTIONS
Patient Education        Colitis: Care Instructions  Your Care Instructions  Colitis is the medical term for swelling (inflammation) of the intestine. It can be caused by different things, such as an infection or loss of blood flow in the intestine. Other causes are problems like Crohn's disease or ulcerative colitis. Symptoms may include fever, diarrhea that may be bloody, or belly pain. Sometimes symptoms go away without treatment. But you may need treatment or more tests, such as blood tests or a stool test. Or you may need imaging tests like a CT scan or a colonoscopy. In some cases, the doctor may want to test a sample of tissue from the intestine. This test is called a biopsy. The doctor has checked you carefully, but problems can develop later. If you notice any problems or new symptoms, get medical treatment right away. Follow-up care is a key part of your treatment and safety. Be sure to make and go to all appointments, and call your doctor if you are having problems. It's also a good idea to know your test results and keep a list of the medicines you take. How can you care for yourself at home? · Rest until you feel better. · Your doctor may recommend that you eat bland foods. These include rice, dry toast or crackers, bananas, and applesauce. · To prevent dehydration, drink plenty of fluids. Choose water and other clear liquids until you feel better. If you have kidney, heart, or liver disease and have to limit fluids, talk with your doctor before you increase the amount of fluids you drink. · Be safe with medicines. Take your medicines exactly as prescribed. Call your doctor if you think you are having a problem with your medicine. You will get more details on the specific medicines your doctor prescribes. When should you call for help? Call 911 anytime you think you may need emergency care.  For example, call if:    · You passed out (lost consciousness).     · Your stools are maroon or very bloody. Call your doctor now or seek immediate medical care if:    · You have new or worse belly pain.     · You have a fever.     · You are vomiting.     · You cannot pass stools or gas.     · You have new or more blood in your stools. Watch closely for changes in your health, and be sure to contact your doctor if:    · You have new or worse symptoms.     · You are losing weight.     · You do not get better as expected. Where can you learn more? Go to http://www.gray.com/  Enter [id number] in the search box to learn more about \"Colitis: Care Instructions. \"  Current as of: September 8, 2021               Content Version: 13.2  © 2006-2022 Healthwise, [employer]. Care instructions adapted under license by [employer] (which disclaims liability or warranty for this information). If you have questions about a medical condition or this instruction, always ask your healthcare professional. [address] any warranty or liability for your use of this information.

## 2022-03-18 NOTE — PROGRESS NOTES
Spiritual Care Assessment/Progress Note  [facility]      NAME: Cesar Monaco      MRN: [id number]  AGE: 62 y.o.  SEX: female  Jain Affiliation: Tala Ty   Language: English     3/18/2022     Total Time (in minutes): 15     Spiritual Assessment begun in [facility] 2 MED TELE through conversation with:         [x]Patient        [] Family    [] Friend(s)        Reason for Consult: Initial/Spiritual assessment, patient floor     Spiritual beliefs: (Please include comment if needed)     [x] Identifies with a jess tradition: [employer]ala [place]         [x] Supp[facility]            [] Claims no spiritual orientation:           [] Seeking spiritual identity:                [] Adheres to an individual form of spirituality:           [] Not able to assess:                           Identified resources for coping:      [] Prayer                               [] Music                  [] Guided Imagery     [x] Family/friends                 [] Pet visits     [] Devotional reading                         [] Unknown     [] Other:                                              Interventions offered during this visit: (See comments for more details)    Patient Interventions: Affirmation of emotions/emotional suffering,Affirmation of jess,Catharsis/review of pertinent events in supportive environment,Coping skills reviewed/reinforced,Initial/Spiritual assessment, patient floor,Iconic (affirming the presence of God/Higher Power)           Plan of Care:     [x] Support spiritual and/or cultural needs    [] Support AMD and/or advance care planning process      [] Support grieving process   [] Coordinate Rites and/or Rituals    [] Coordination with community clergy   [] No spiritual needs identified at this time   [] Detailed Plan of Care below (See Comments)  [] Make referral to Music Therapy  [] Make referral to Pet Therapy     [] Make referral to Addiction services  [] Make referral to [facility]  [] Make referral to Spiritual Care Partner  [] No future visits requested        [x] Contact Spiritual Care for further referrals     Comments:     Initial Spiritual Care Assessment visit for Ms. Staci Robles in 2141. Reviewed the chart notes before visit. Patient was alert, no family was present during the visit. Patient shared about her medical progress, shared that she is going home today. She has very supportive family and Anabaptism, her  visited her everyday during her admission, several [employer] members called and checked her. She also expressed her satisfaction with [facility]'s care, she has good experience with [facility] when her  passed at [facility].  affirmed her multi-layered support system and celebrated her discharge. Advised of  availability. She was thankful.     [id number] Merged with [facility] Nikole [place] Th.M, Marily 608 Provider   Paging Service [phone])

## 2022-03-18 NOTE — PROGRESS NOTES
Consult  REFERRED BY:  Steve Gong III, DO    CHIEF COMPLAINT: Headache for the last month that is progressive      Subjective:     London Mcpherson is a 62 y.o. right-handed  female, we are seeing as a new patient to me, at the request of the hospitalist for evaluation of new problem of headache and syncope more in the bifrontal head region described as a pressure sensation, and seems to be constant, and not going away but she can take Tylenol which will usually relieve it. She has had no nausea or vomiting, and no focal weakness and no sensory loss and no complaints of visual changes or fever or meningismus or trauma or any other cause for the headaches other than to worry about her sinuses which she feels might be more irritated. The patient has a CT scan that suggest Arnold-Chiari malformation type I, with crowding of the foramen magnum and hydrocephalus, but the patient has had this Arnold-Chiari malformation ever since birth, and had about 8 operations at [facility] repairing her spina bifida which is associated with his Arnold-Chiari malformation, and was previously evaluated by Dr. Aminah Odom neurosurgery at [facility] in the 90s, and thought not to need a shunt for her hydrocephalus. She was followed by Dr. Alex Neves for years at [facility], and recently saw Dr. Rosario Adjutant for shoulder problems. We are asked to evaluate because of the abnormal scan. The patient is having her sister bring in her old records to try to compare her previous scans with this scan, and we will go ahead and get an MRI of the brain and cervical spine to more fully evaluate her Arnold-Chiari malformation and see if there is any decompensation of her hydrocephalus. She normally is able to transfer on the bed or chair to her wheelchair, but is really not ambulatory, but able to stand to transfer partly. Patient also complains of neck pain in the back of her neck, slightly more on the left side as compared to the right. She denies any unusual physical activity or trauma. Patient's MRI scans were stable, showing type I Arnold-Chiari malformation, but no progression of hydrocephalus, and it all seems stable, and the MRI of the cervical spine just showed mild to moderate degenerative arthritis but no significant cord signal abnormality or other problems. Patient has worsening headache this morning and there is probably related to not eating and a colonoscopy prep and the weather, and she wants the Fioricet back so we reordered that. Her sister brought in her records and just shows 1 MRIs showing ventriculomegaly by her previous 1 and the one we just did yesterday, and that does not appear to be any major change. Patient had paresthesias after she took her Topamax dose last night, so we will discontinue that, and reassured her that that should get better with time. We will follow up in the office in 48 weeks and she can return to see her physician Dr. Alex Lockett then, call if we can help, will follow as needed for now. Past Medical History:   Diagnosis Date    COVID-19 05/2021    Hypercholesterolemia     Hypertension     Spina bifida [facility])       Past Surgical History:   Procedure Laterality Date    COLONOSCOPY N/A 5/1/2019    COLONOSCOPY performed by Reginald Mancilla [facility] at [facility]OSCOPY    COLONOSCOPY N/A 3/17/2022    COLONOSCOPY [facility]elvina An [facility] at [facility]  5/1/2019         COLONOSCOPY,DIAGNOSTIC  3/17/2022         HX OTHER SURGICAL  1890s    sacral wound flap repair     History reviewed. No pertinent family history.    Social History     Tobacco Use    Smoking status: Never Smoker    Smokeless tobacco: Never Used   Substance Use Topics    Alcohol use: Never         Current Facility-Administered Medications:     sodium chloride (NS) flush 5-40 mL, 5-40 mL, IntraVENous, Q8H, Antonio Tuttle MD    sodium chloride (NS) flush 5-40 mL, 5-40 mL, IntraVENous, PRN, Etelvina An MD  [address] [place] [address] butalbital-acetaminophen-caffeine (FIORICET, ESGIC) -40 mg per tablet 2 Tablet, 2 Tablet, Oral, Q8H PRN, Shea Nevarez MD    fluticasone propionate (FLONASE) 50 mcg/actuation nasal spray 2 Spray, 2 Spray, Both Nostrils, DAILY PRN, Servin Gumaro, CHAYO He, 2 Spray at 03/16/22 0952    0.9% sodium chloride infusion, 100 mL/hr, IntraVENous, CONTINUOUS, Philip Watts MD, Last Rate: 100 mL/hr at 03/17/22 1006, 100 mL/hr at 03/17/22 1006    [Held by provider] lisinopriL (PRINIVIL, ZESTRIL) tablet 10 mg, 10 mg, Oral, DAILY, Chato Garcia MD    atorvastatin (LIPITOR) tablet 10 mg, 10 mg, Oral, DAILY, Tami Garcia MD, 10 mg at 03/18/22 0828    sodium chloride (NS) flush 5-40 mL, 5-40 mL, IntraVENous, Q8H, Tami Garcia MD, 10 mL at 03/18/22 0502    sodium chloride (NS) flush 5-40 mL, 5-40 mL, IntraVENous, PRN, Chato Dobbs MD    acetaminophen (TYLENOL) tablet 650 mg, 650 mg, Oral, Q6H PRN, 650 mg at 03/17/22 0603 **OR** acetaminophen (TYLENOL) suppository 650 mg, 650 mg, Rectal, Q6H PRN, Chato Garcia MD    polyethylene glycol (MIRALAX) packet 17 g, 17 g, Oral, DAILY PRN, Tami Garcia MD    ondansetron (ZOFRAN ODT) tablet 4 mg, 4 mg, Oral, Q8H PRN **OR** ondansetron (ZOFRAN) injection 4 mg, 4 mg, IntraVENous, Q6H PRN, Tami Garcia MD    enoxaparin (LOVENOX) injection 40 mg, 40 mg, SubCUTAneous, DAILY, Tami Garcia MD, 40 mg at 03/17/22 1635    levoFLOXacin (LEVAQUIN) 750 mg in D5W IVPB, 750 mg, IntraVENous, Q24H, Tami Garcia MD, Last Rate: 100 mL/hr at 03/18/22 0500, 750 mg at 03/18/22 0500    metroNIDAZOLE (FLAGYL) IVPB premix 500 mg, 500 mg, IntraVENous, Q8H, Tami Garcia MD, Last Rate: 100 mL/hr at 03/18/22 0359, 500 mg at 03/18/22 0359    LORazepam (ATIVAN) injection 1-2 mg, 1-2 mg, IntraVENous, Q6H PRN, Shea Nevarez MD        Allergies   Allergen Reactions    Sulfa (Sulfonamide Antibiotics) Nausea and Vomiting      MRI Results (most recent):  Results from Hospital Encounter encounter on 03/14/22    MRI CERV SPINE W WO CONT    Narrative  EXAM: MRI CERV SPINE W WO CONT    INDICATION: myelopathy. COMPARISON: None    TECHNIQUE: MR imaging of the cervical spine was performed using the following  sequences: sagittal T1, T2, STIR;  axial T2, T1 prior to and following contrast  administration. CONTRAST: 13 mL of ProHance. FINDINGS:  There is normal alignment of the cervical spine. Vertebral body heights are  maintained. Marrow signal is unremarkable. The craniocervical junction show low-lying cerebellar tonsils as detailed in  Brain MRI report. The course, caliber, and signal intensity of the spinal cord  are normal. There is no pathologic intrathecal enhancement. The paraspinal soft  tissues are unremarkable. C2-C3: No herniation or stenosis. C3-C4: No herniation or stenosis. C4-C5: No herniation or stenosis. C5-C6: Moderate spinal stenosis. Diffuse disc bulge. Patent foramina. C6-C7: No herniation or stenosis. C7-T1: No herniation or stenosis. Impression  Cervical degenerative disc disease with C5-6 moderate spinal  stenosis. No acute finding. Results from [place] encounter on 03/14/22    MRI CERV SPINE W WO CONT    Narrative  EXAM: MRI CERV SPINE W WO CONT    INDICATION: myelopathy. COMPARISON: None    TECHNIQUE: MR imaging of the cervical spine was performed using the following  sequences: sagittal T1, T2, STIR;  axial T2, T1 prior to and following contrast  administration. CONTRAST: 13 mL of ProHance. FINDINGS:  There is normal alignment of the cervical spine. Vertebral body heights are  maintained. Marrow signal is unremarkable. The craniocervical junction show low-lying cerebellar tonsils as detailed in  Brain MRI report.  The course, caliber, and signal intensity of the spinal cord  are normal. There is no pathologic intrathecal enhancement. The paraspinal soft  tissues are unremarkable. C2-C3: No herniation or stenosis. C3-C4: No herniation or stenosis. C4-C5: No herniation or stenosis. C5-C6: Moderate spinal stenosis. Diffuse disc bulge. Patent foramina. C6-C7: No herniation or stenosis. C7-T1: No herniation or stenosis. Impression  Cervical degenerative disc disease with C5-6 moderate spinal  stenosis. No acute finding. Review of Systems:  A comprehensive review of systems was negative except for: Constitutional: positive for fatigue and malaise  Musculoskeletal: positive for myalgias, arthralgias, stiff joints, neck pain and muscle weakness  Neurological: positive for headaches, paresthesia, coordination problems, gait problems and weakness  Behvioral/Psych: positive for anxiety   Vitals:    03/17/22 2015 03/17/22 2355 03/18/22 0450 03/18/22 0825   BP: 102/61 (!) 121/53 122/63 126/69   Pulse: 82 97 72 91   Resp: 19 18 18    Temp: 98.5 °F (36.9 °C) 98.7 °F (37.1 °C) 98.4 °F (36.9 °C)    SpO2: 98% 98% 96% 98%   Weight:       Height:         Objective:     I      NEUROLOGICAL EXAM:    Appearance: The patient is well developed, well nourished, provides a coherent history and is in no acute distress. Head shows some frontal prominence of the skull   Mental Status: Oriented to time, place and person, and the president, cognitive function is normal and speech is fluent and no aphasia or dysarthria. Mood and affect appropriate, but anxious. Cranial Nerves:   Intact visual fields. Fundi are benign, disc are flat, no lesions seen on funduscopy. ANN, EOM's full, no nystagmus, no ptosis. Facial sensation is normal. Corneal reflexes are not tested. Facial movement is symmetric. Hearing is normal bilaterally. Palate is midline with normal sternocleidomastoid and trapezius muscles are normal. Tongue is midline.   Neck without meningismus or bruits  Temporal arteries are not tender or enlarged  TMJ areas are not tender on palpation   Motor:  4/5 strength in upper proximal and distal muscles, and patient with almost complete paralysis from L5 down in the legs bilaterally, and moderate severe weakness of the proximal pelvic girdle muscles bilaterally. Reduced bulk and tone in her leg muscles bilaterally distal greater than proximal. No fasciculations. Rapid alternating movement is symmetric in the upper extremities but decreased in the lower extremities bilaterally   Reflexes:   Deep tendon reflexes 2+/4 and symmetrical.  No babinski or clonus present   Sensory:   Abnormal to touch, pinprick and vibration and temperature in both legs to about knee level bilaterally. DSS is intact   Gait:  Not testable gait for patient's age. Tremor:   No tremor noted. Cerebellar:  Not testable abnormal cerebellar signs present on Romberg and tandem testing and finger-nose-finger exam.   Neurovascular:  Normal heart sounds and regular rhythm, peripheral pulses decreased, and no carotid bruits. Assessment:       ICD-10-CM ICD-9-CM    1. Colitis  K52.9 558.9    2. Acute alteration in mental status  R41.82 780.97    3.  Convulsions, unspecified convulsion type ([address]  R56.9 780.39      Principal Problem:    Colitis (3/15/2022)    Active Problems:    B12 deficiency (5/3/2019)      Neurogenic bladder (9/10/2019)      Spina bifida ([address] (9/10/2019)      Arnold-Chiari malformation, type I ([address] (3/15/2022)      Hydrocephalus ([address] (3/15/2022)      Tension vascular headache (3/15/2022)      Sinus headache (3/15/2022)      Paraplegia ([address] (3/15/2022)      Acute alteration in mental status (3/15/2022)      Convulsion ([address] (3/15/2022)        Plan:     Patient with complicated problem of spina bifida and Arnold-Chiari malformation type I, with increasing headaches, and obviously the concern is that she is having some type of progression or decompensation of her hydrocephalus, so we will check an MRI of the brain and cervical spine to evaluate her neck pain and headaches, and try to get her records to compare to her previous studies because a lot of these problems have been known for years. Her sister brought in her records and just shows 1 MRIs showing ventriculomegaly by her previous 1 and the one we just did yesterday, and that does not appear to be any major change. MRI of the cervical spine was also stable, showing no surgical disease  Patient had paresthesias after she took her Topamax dose last night, so we will discontinue that, and reassured her that that should get better with time. We will follow up in the office in 48 weeks and she can return to see her physician Dr. Rosario Adjutant then, call if we can help, will follow as needed for now.     Signed By: Jessica Kan MD     March 18, 2022       CC: Clarissa Weeks DO  FAX: [phone]

## 2022-03-19 PROBLEM — Q05.9 SPINA BIFIDA (HCC): Status: ACTIVE | Noted: 2019-09-10

## 2022-03-19 PROBLEM — I95.9 HYPOTENSION: Status: ACTIVE | Noted: 2019-04-28

## 2022-03-19 PROBLEM — N31.9 NEUROGENIC BLADDER: Status: ACTIVE | Noted: 2019-09-10

## 2022-03-19 PROBLEM — E53.8 B12 DEFICIENCY: Status: ACTIVE | Noted: 2019-05-03

## 2022-03-19 PROBLEM — D50.9 IRON DEFICIENCY ANEMIA: Status: ACTIVE | Noted: 2019-09-10

## 2022-03-20 PROBLEM — E66.01 SEVERE OBESITY (HCC): Status: ACTIVE | Noted: 2019-09-10

## 2022-03-20 LAB
BACTERIA SPEC CULT: NORMAL
SERVICE CMNT-IMP: NORMAL

## 2022-03-21 ENCOUNTER — PATIENT OUTREACH (OUTPATIENT)
Dept: CASE MANAGEMENT | Age: 62
End: 2022-03-21

## 2022-03-21 ENCOUNTER — HOSPITAL ENCOUNTER (OUTPATIENT)
Dept: PHYSICAL THERAPY | Age: 62
End: 2022-03-21
Payer: COMMERCIAL

## 2022-03-21 NOTE — PROGRESS NOTES
Care Transitions Initial Call    Call within 2 business days of discharge: Yes     Patient: Andre Benavides Patient : 1960 MRN: [id number]    Last Discharge [address]       Complaint Diagnosis Description Type Department Provider    3/14/22 Diarrhea Colitis . .. ED to Hosp-Admission (Discharged) (ADMIT) Dez Cano MD; Leobardo Poe. .. Was this an external facility discharge? No Discharge Facility: n/a    Challenges to be reviewed by the provider   Additional needs identified to be addressed with provider: yes  medications- lisinopril was stopped, patient was asked to keep log of BP to bring to her appt. She also will bring BP cuff/machine to check for accuarcy and to see if your nurse can assist in her finding the log kept automatically via the machine.   - patient reported she is not taking Linzess, she is taking miralax now,  would patient benefit from every day or every other day bowel regimen?  - she also is not taking diclofenac potassium 50mg b/c she stated the cardiologist didn't want her to take it        Method of communication with provider : chart routing    Discussed COVID-19 related testing which was available at this time. Test results were negative. Patient informed of results, if available? Did not inform patient at time of this call but patient has access to my chart     Advance Care Planning:   Does patient have an Advance Directive:  yes; reviewed and current     Inpatient Readmission Risk score: Unplanned Readmit Risk Score: 10.9 ( )    Was this a readmission?  no   Patient stated reason for the admission: n/a    Patients top risk factors for readmission: lack of knowledge about disease, medical condition-patient reports no \"happy medium\" between diarrhea and constipation neurogenic bladder, PMH of sepsis, spina bifida, patient is paraplegic  and multiple health system providers   Interventions to address risk factors: Obtained and reviewed discharge summary and/or continuity of care documents    Care Transition Nurse (CTN) contacted the patient by telephone to perform post hospital discharge assessment. Verified name and  with patient as identifiers. Provided introduction to self, and explanation of the CTN role. CTN reviewed discharge instructions, medical action plan and red flags with patient who verbalized understanding. Were discharge instructions available to patient? yes. Reviewed appropriate site of care based on symptoms and resources available to patient including: PCP. Patient given an opportunity to ask questions and does not have any further questions or concerns at this time. The patient agrees to contact the PCP office for questions related to their healthcare. Medication reconciliation was performed with patient, who verbalizes understanding of administration of home medications. Advised obtaining a 90-day supply of all daily and as-needed medications. Referral to Pharm D needed: no     Home Health/Outpatient orders at discharge: declined a need at this time     [employer] [employer] ordered at discharge:n/a    Covid Risk Education    Educated patient about risk for severe COVID-19 due to risk factors according to CDC guidelines. CTN reviewed discharge instructions, medical action plan and red flag symptoms with the patient who verbalized understanding. Discussed COVID vaccination status: yes. Education provided on COVID-19 vaccination as appropriate. Discussed exposure protocols and quarantine with CDC Guidelines. Patient was given an opportunity to verbalize any questions and concerns and agrees to contact CTN or health care provider for questions related to their healthcare. Was patient discharged with a pulse oximeter? n/a      Discussed follow-up appointments. If no appointment was previously scheduled, appointment scheduling offered: yes. Is follow up appointment scheduled within 7 days of discharge? yes.    [facility] follow up appointment(s):   Future Appointments   Date Time Provider Wilmer Kapadia   3/23/2022 11:30 AM Jackie Lobo,  [facility] BS AMB   3/24/2022  9:30 AM Ajay Richardre, PT [facility]   3/28/2022  9:30 AM Ajay Richardre, PT [facility]   3/31/2022  9:30 AM Ajay Hilaryre, PT [facility]   6/24/2022  9:40 AM uNnu Rodas,  [facility] BS AMB   8/1/2022  3:30 PM Cory Le III, DO [facility] BS AMB     Non-BS follow up appointment(s): GI 4/21 (soonest available appt)     Plan for follow-up call in 5-7 days based on severity of symptoms and risk factors. Plan for next call: symptom management-assess s/s  and follow up appointment-confirm attendance  CTN provided contact information for future needs. Goals Addressed                 This Visit's Progress     Prevent complications post hospitalization. 03/22/22    - patient will attend PCP appt on 3/23 (this appt is in person) and patient is able to drive herself   - patient will see GI on 4/21, she stated this was first available appt   - she sees dr. Paul Donahue for cardiology and will see next on 5/11. She stated no issues getting to this appointments and she will attend   - patient stated Left arm where her IV was has a minimal amount of bruising and swelling. Patient will use ice 20 minutes off at minimum and 20 minutes on a maximum. She will report any further increases in swelling/bruising to CTN or MD   - patient stated, \" I don't have a happy medium between constipation and diarrhea\" she is not taking Linzess but has started Miralax. She reported she is not taking Linzess b/c \"it makes me feel weird, I have felt dizzy\" Patient has agreed to start keeping a log of her bowel movements and to contact MD if she does not have a BM at minimum every 72 hours. - patient will continue to go to outpatient PT 2x a week   - patient reports she does not check her glucose at home, she is working with JULIO Basurto CDE,   - patient will continue to keep log of carb and sugar intake  - patient reports that she mostly orders her groceries online and it has been a challenge for her to read food labels when ordering online, therefore, patient goal will be to read/look at food labels prior to making food selection choices. - patient reported CHARLEELeola Coronado provided her with list of apps to keep track of carbs and sugar effectively, if she can't find the list she will notify CTN  - she reported that she does self IC every 3-4 hours. She was able to verbalize the red flag s/s of a UTI and will contact MD if they arise.   - she also is not taking diclofenac potassium 50mg b/c she stated the cardiologist didn't want her to take it but stated she is not having pain and will notify MD if pain does arise again. Patient will contact Md/CTN if red flag s/s arise. CTN to f/u ~ 7-10 days.  AR     -                   STOP taking these medications         lisinopriL (PRINIVIL, ZESTRIL) 10 mg tablet Comments:   Reason for Stopping:

## 2022-03-22 RX ORDER — POLYETHYLENE GLYCOL 3350 17 G/17G
17 POWDER, FOR SOLUTION ORAL DAILY
COMMUNITY
End: 2022-08-01

## 2022-03-23 ENCOUNTER — OFFICE VISIT (OUTPATIENT)
Dept: INTERNAL MEDICINE CLINIC | Age: 62
End: 2022-03-23
Payer: COMMERCIAL

## 2022-03-23 VITALS
BODY MASS INDEX: 32.4 KG/M2 | RESPIRATION RATE: 18 BRPM | SYSTOLIC BLOOD PRESSURE: 126 MMHG | WEIGHT: 140 LBS | TEMPERATURE: 96.9 F | OXYGEN SATURATION: 98 % | HEART RATE: 87 BPM | DIASTOLIC BLOOD PRESSURE: 76 MMHG | HEIGHT: 55 IN

## 2022-03-23 DIAGNOSIS — G82.20 PARAPLEGIA (HCC): Primary | ICD-10-CM

## 2022-03-23 DIAGNOSIS — N31.9 NEUROGENIC BLADDER: ICD-10-CM

## 2022-03-23 DIAGNOSIS — Q07.01 CHIARI MALFORMATION TYPE II (HCC): ICD-10-CM

## 2022-03-23 DIAGNOSIS — M25.522 LEFT ELBOW PAIN: ICD-10-CM

## 2022-03-23 DIAGNOSIS — Q05.9 SPINA BIFIDA, UNSPECIFIED HYDROCEPHALUS PRESENCE, UNSPECIFIED SPINAL REGION (HCC): ICD-10-CM

## 2022-03-23 DIAGNOSIS — I10 ESSENTIAL HYPERTENSION: ICD-10-CM

## 2022-03-23 DIAGNOSIS — R09.81 NASAL SINUS CONGESTION: ICD-10-CM

## 2022-03-23 DIAGNOSIS — K59.00 CONSTIPATION, UNSPECIFIED CONSTIPATION TYPE: ICD-10-CM

## 2022-03-23 PROCEDURE — 99214 OFFICE O/P EST MOD 30 MIN: CPT | Performed by: INTERNAL MEDICINE

## 2022-03-23 RX ORDER — AZELASTINE 1 MG/ML
1 SPRAY, METERED NASAL 2 TIMES DAILY
Qty: 1 EACH | Refills: 2 | Status: SHIPPED | OUTPATIENT
Start: 2022-03-23 | End: 2022-05-04

## 2022-03-23 RX ORDER — LISINOPRIL 5 MG/1
5 TABLET ORAL DAILY
Qty: 90 TABLET | Refills: 3 | Status: ON HOLD | OUTPATIENT
Start: 2022-03-23 | End: 2022-09-30

## 2022-03-23 RX ORDER — PLECANATIDE 3 MG/1
3 TABLET ORAL DAILY
Qty: 90 TABLET | Refills: 3 | Status: SHIPPED | OUTPATIENT
Start: 2022-03-23

## 2022-03-23 NOTE — PATIENT INSTRUCTIONS
Constipation: Care Instructions  Overview     Constipation means that you have a hard time passing stools (bowel movements). People pass stools from 3 times a day to once every 3 days. What is normal for you may be different. Constipation may occur with pain in the rectum and cramping. The pain may get worse when you try to pass stools. Sometimes there are small amounts of bright red blood on toilet paper or the surface of stools. This is because of enlarged veins near the rectum (hemorrhoids). A few changes in your diet and lifestyle may help you avoid ongoing constipation. Your doctor may also prescribe medicine to help loosen your stool. Some medicines can cause constipation. These include pain medicines and antidepressants. Tell your doctor about all the medicines you take. Your doctor may want to make a medicine change to ease your symptoms. Follow-up care is a key part of your treatment and safety. Be sure to make and go to all appointments, and call your doctor if you are having problems. It's also a good idea to know your test results and keep a list of the medicines you take. How can you care for yourself at home? · Drink plenty of fluids. If you have kidney, heart, or liver disease and have to limit fluids, talk with your doctor before you increase the amount of fluids you drink. · Include high-fiber foods in your diet each day. These include fruits, vegetables, beans, and whole grains. · Get at least 30 minutes of exercise on most days of the week. Walking is a good choice. You also may want to do other activities, such as running, swimming, cycling, or playing tennis or team sports. · Take a fiber supplement, such as Citrucel or Metamucil, every day. Read and follow all instructions on the label. · Schedule time each day for a bowel movement. A daily routine may help. Take your time having a bowel movement, but don't sit for more than 10 minutes at a time.  And don't strain too much.  · Support your feet with a small step stool when you sit on the toilet. This helps flex your hips and places your pelvis in a squatting position. · Your doctor may recommend an over-the-counter laxative to relieve your constipation. Examples are Milk of Magnesia and MiraLax. Read and follow all instructions on the label. Do not use laxatives on a long-term basis. When should you call for help? Call your doctor now or seek immediate medical care if:    · You have new or worse belly pain.     · You have new or worse nausea or vomiting.     · You have blood in your stools. Watch closely for changes in your health, and be sure to contact your doctor if:    · Your constipation is getting worse.     · You do not get better as expected. Where can you learn more? Go to http://www.miguel.com/  Enter P343 in the search box to learn more about \"Constipation: Care Instructions. \"  Current as of: July 1, 2021               Content Version: 13.2  © 2006-2022 [employer]. Care instructions adapted under license by [employer] (which disclaims liability or warranty for this information). If you have questions about a medical condition or this instruction, always ask your healthcare professional. [place] [address] any warranty or liability for your use of this information.

## 2022-03-23 NOTE — PROGRESS NOTES
Kendrick Mims is a 62 y.o. female who presents for evaluation of hospital follow up. Last seen by me feb 3, 2022 in [facility]. Was inpt [facility] march 14-18 due to constipation and resultant colitis. Was cleaned out, and had colonoscopy that was ok. Biopsies all normal.   She has been home now for 5 days, and has not yet had a bm. Despite paraplegic, she is able to sense/feel when she is ready to move her bowels, and does not feel ready yet. Urinating fine. Was given rx for linzess by gi before her hospitalization, and she took it once, and it made her sob, so she stopped taking it. Just starting taking miralax once daily yesterday, and stopped taking her stool softeners. Also complains of some left elbow pain, onset while in hospital, and she had an IV in that arm.       ROS:  Constitutional: negative for fevers, chills, anorexia and weight loss  Eyes:   negative for visual disturbance and irritation  ENT:   negative for tinnitus,sore throat,nasal congestion,ear pain,hoarseness  Respiratory:  negative for cough, hemoptysis, dyspnea,wheezing  CV:   negative for chest pain, palpitations, lower extremity edema  GI:   negative for nausea, vomiting, diarrhea, abdominal pain,melena  Genitourinary: negative for frequency, dysuria and hematuria  Musculoskel: negative for myalgias, arthralgias, back pain, muscle weakness, joint pain  Neurological:  negative for headaches, dizziness, focal weakness, numbness  Psychiatric:     Negative for depression or anxiety      Past Medical History:   Diagnosis Date    COVID-19 05/2021    Hypercholesterolemia     Hypertension     Spina bifida [facility])        Past Surgical History:   Procedure Laterality Date    COLONOSCOPY N/A 5/1/2019    COLONOSCOPY performed by Mert Diaz MD at hospitals ENDOSCOPY    COLONOSCOPY N/A 3/17/2022    COLON[facility] Brigitte Hancock [facility]  5/1/2019         COLONOSCOPY,DIAGNOSTIC  3/17/2022         HX COLONOSCOPY  03/2022    HX OTHER SURGICAL  1890s    sacral wound flap repair       History reviewed. No pertinent family history. Social History     Socioeconomic History    Marital status:      Spouse name: Not on file    Number of children: Not on file    Years of education: Not on file    Highest education level: Not on file   Occupational History    Not on file   Tobacco Use    Smoking status: Never Smoker    Smokeless tobacco: Never Used   Vaping Use    Vaping Use: Never used   Substance and Sexual Activity    Alcohol use: Never    Drug use: Never    Sexual activity: Not Currently   Other Topics Concern    Not on file   Social History Narrative    Not on file     Social Determinants of Health     Financial Resource Strain:     Difficulty of Paying Living Expenses: Not on file   Food Insecurity:     Worried About [address] in the Last Year: Not on file    Amos of Food in the Last Year: Not on file   Transportation Needs:     Lack of Transportation (Medical): Not on file    Lack of Transportation (Non-Medical):  Not on file   Physical Activity:     Days of Exercise per Week: Not on file    Minutes of Exercise per Session: Not on file   Stress:     Feeling of Stress : Not on file   Social Connections:     Frequency of Communication with Friends and Family: Not on file    Frequency of Social Gatherings with Friends and Family: Not on file    Attends Rastafarian Services: Not on file    Active Member of Clubs or Organizations: Not on file    Attends Club or Organization Meetings: Not on file    Marital Status: Not on file   Intimate Partner Violence:     Fear of Current or Ex-Partner: Not on file    Emotionally Abused: Not on file    Physically Abused: Not on file    Sexually Abused: Not on file   Housing Stability:     Unable to Pay for Housing in the Last Year: Not on file    Number of [place] in the Last Year: Not on file    Unstable Housing in the Last Year: Not on file            Visit Vitals  /77 (BP 1 Location: Left upper arm, BP Patient Position: Sitting)   Pulse 87   Temp 96.9 °F (36.1 °C) (Temporal)   Resp 18   Ht 4' 6\" (1.372 m)   Wt 140 lb (63.5 kg)   LMP 08/01/2018 (LMP Unknown)   SpO2 98%   BMI 33.76 kg/m²       Physical Examination:   General - Well appearing female. In w/c. Nontoxic, nad. HEENT - PERRL, TM no erythema/opacification, normal nasal turbinates, no oropharyngeal erythema or exudate, MMM  Neck - supple, no bruits, no thyroidomegaly, no lymphadenopathy  Pulm - clear to auscultation bilaterally  Cardio - RRR, normal S1 S2, no murmur  Abd - soft, nontender, no masses, no HSM  Extrem - no edema, +2 distal pulses. Left elbow arm looks fine. No swelling, no erythema, no pain with palpation  Neuro-  No focal deficits, CN intact     Assessment/Plan:    1. Constipation--rx to start trulance. Continue with miralax for now, but hopefully can stop soon. 2.  htn--rx to resume lisinpril 5 mg  3. Nasal sinus congestion--rx for astelin spray  4. Paraplegic with neurogenic bladder--  5. Spina bifida with arnold roslyn malformation--CT scan stable  6.   Left elbow pain--advised to ice area for 15 minutes bid x 5 days, then as needed    rtc for regular appt        Brook Llanes III, DO

## 2022-03-23 NOTE — PROGRESS NOTES
1. \"Have you been to the ER, urgent care clinic since your last visit? Hospitalized since your last visit? \" yes    2. \"Have you seen or consulted any other health care providers outside of the [address] [place] [address] since your last visit? \"  no  3. For patients aged 39-70: Has the patient had a colonoscopy / FIT/ Cologuard? yes      If the patient is female:    4. For patients aged 41-77: Has the patient had a mammogram within the past 2 years? no      5. For patients aged 21-65: Has the patient had a pap smear?  no

## 2022-03-24 ENCOUNTER — HOSPITAL ENCOUNTER (OUTPATIENT)
Dept: PHYSICAL THERAPY | Age: 62
Discharge: HOME | End: 2022-03-24
Payer: COMMERCIAL

## 2022-03-24 PROBLEM — G93.5 ARNOLD-CHIARI MALFORMATION, TYPE I (HCC): Status: ACTIVE | Noted: 2022-03-15

## 2022-03-24 PROBLEM — R51.9 SINUS HEADACHE: Status: ACTIVE | Noted: 2022-03-15

## 2022-03-24 PROBLEM — G91.9 HYDROCEPHALUS (HCC): Status: ACTIVE | Noted: 2022-03-15

## 2022-03-24 PROBLEM — K52.9 COLITIS: Status: ACTIVE | Noted: 2022-03-15

## 2022-03-24 PROBLEM — R56.9 CONVULSION (HCC): Status: ACTIVE | Noted: 2022-03-15

## 2022-03-24 PROBLEM — G82.20 PARAPLEGIA (HCC): Status: ACTIVE | Noted: 2022-03-15

## 2022-03-24 PROBLEM — R41.82 ACUTE ALTERATION IN MENTAL STATUS: Status: ACTIVE | Noted: 2022-03-15

## 2022-03-24 PROBLEM — G44.209 TENSION VASCULAR HEADACHE: Status: ACTIVE | Noted: 2022-03-15

## 2022-03-24 PROCEDURE — 97140 MANUAL THERAPY 1/> REGIONS: CPT

## 2022-03-24 PROCEDURE — 97110 THERAPEUTIC EXERCISES: CPT

## 2022-03-24 PROCEDURE — 97535 SELF CARE MNGMENT TRAINING: CPT

## 2022-03-24 NOTE — PROGRESS NOTES
[facility] [address]. Physical Therapy  [address] [facility]  Phone: [phone] Fax: [phone]    Progress Note    Name: London Mcpherson   : 1960   MD: Clarissa Weeks     Treatment Diagnosis: Neck muscle spasm [M62.838]  Start of Care: 22    Visits from Start of Care: 8  Missed Visits: 1    Summary of Care: Therapy has included therapeutic exercise, therapeutic activity, manual techniques, self-management strategies, and neuromuscular re-education to improve multiplanar cervical/L shoulder A/PROM, L UE/periscapular strength/stability, and functional endurance/independence due to subacute-onset L-sided neck pain and intermittent L UE \"aching\"/hand numbness. Assessment / Recommendations: The patient is a 57 year old female who has participated in 8 skilled physical therapy visits due to subacute-onset L-sided neck pain and intermittent L UE \"aching\"/hand numbness. She was recently admitted to ED due to colitis, which has moderately limited plan of care to this point. She demonstrates continued however improving signs and symptoms consistent with movement coordination and muscle power impairments secondary to rotator cuff dysfunction, including increased multiplanar L UE strength, increased deep neck flexor and postural endurance (longus colli endurance test = 13.5 seconds), and improved scapulohumeral movement patterns including mildly increased L > R accessory muscle recruitment with transfers and multiplanar overhead shoulder AROM. Patient demonstrates new-onset L UE \"aching\" and swelling at lateral elbow; signs and symptoms consistent with brachioradialis/biceps/wrist extensor mass irritation and patient noting improvement in symptoms post-assessment and manual intervention. The patient scored 61 on FOTO, demonstrating significantly improved subjective report of function over physical therapy plan of care.  She has met 2/5 physical therapy goals and demonstrates slow, steady or expected progress toward additional goals at this time. The patient would benefit from continued skilled physical therapy services to improve symptom management and safety/endurance/independence with functional tasks such as transfers and work activities. Short Term Goals: To be accomplished in 6-8 treatments:               ETL patient will demonstrate understanding of and compliance with updated and progressive HEP toward improved participation in physical therapy plan of care. - Intermittently Met              The patient will demonstrate ability to transfer to/from wheelchair to mat table 3/3 times without onset of familiar L-sided neck pain toward improved quality of life. - [address] be accomplished in 12-16 treatments:               QCA patient will demonstrate multiplanar bilateral UE strength increased by >= 1/2 MMT grade toward improved endurance with functional activities such as transfers and work tasks. - Progressing              The patient will demonstrate longus colli endurance test >= 38 seconds toward improved postural endurance with transfers and work tasks. - Progressing              The patient will score >= 57 on FOTO to demonstrate significantly improved subjective report of function. - Met  Frequency / Duration: Patient to be seen 1-2 times per week for 3-6 treatments.     Omar Spring, PT, DPT 3/24/2022

## 2022-03-24 NOTE — PROGRESS NOTES
PT DAILY TREATMENT NOTE - [facility] 2-15    Patient Name: Ata Dang  Date:3/24/2022  : 1960  [x]  Patient  Verified  Payor: Tulio Verdugo / Plan: Mortimer Laity / Product Type: HMO /    In time:  Out time:   Total Treatment Time (min): 46 (patient required restroom break from 09:34am-09:40am)  Total Timed Codes (min): 46  1:1 Treatment Time ( [address] only): 46  Visit #:  8    Treatment Area: Neck muscle spasm [M62.838]    SUBJECTIVE  Pain Level (0-10 scale): 2/10 in L-sided neck and L elbow  Any medication changes, allergies to medications, adverse drug reactions, diagnosis change, or new procedure performed?: [x] No    [] Yes (see summary sheet for update)  Subjective functional status/changes:   [] No changes reported    The patient reports she was in the hospital all of last week due to bout of colitis; she feels she is now having some difficulty regaining her energy. Her L elbow swelled up while in the hospital; she has been icing it on recommendation of her PCP, however relief from ice is only temporary and her elbow continues to bother her and swell randomly, particularly elbow flexion. She notes she took Tylenol this morning, which has helped. She was unsure what to do regarding L elbow pain and so held her home exercises since returning home status post hospital admission. OBJECTIVE    Other Observations: Patient requires increased time to complete transfers and bed mobility throughout re-assessment at this date  Gait and Functional Mobility: The patient arrives self-propelling herself in manual wheelchair at a level of (I), demonstrating increased bilateral accessory muscle recruitment and decreased bilateral shoulder extension  Palpation: Moderately tender to palpation at L proximal scalenes/first rib/bilateral supraspinatus/infraspinatus/teres minor/L levator scapula/rhomboids near attachment at medial scapular border.  Noted significant tenderness to palpation along L brachioradialis, distal biceps, and wrist extensor mass.                                                     Cervical AROM:                                                                       R                      L                        Flexion                                     55                                              Extension                                51                                              Side Bending                           32 (noted \"stretching\")                        36 (noted \"popping\")                 Rotation                                   74                   80 (noted \"stiffness\")                        A/PROM Shoulder:                Right                           Left              IR                                 Hand to T12           Hand to T10 (L shoulder and elbow p!)              ER                               Hand to T4             Hand to T3 (noted elbow p!)     Joint Mobility Assessment: Cervical: WNL to PA/bilateral lateral bending/rotational joint mobilizations (C2-7)                                                  Glenohumeral: WNL to L AP/inferior joint mobilizations     Flexibility: Noted grossly decreased bilateral pectoralis major/minor/latissimus dorsi/subscapularis muscle length in seated and mat table positions     UPPER QUARTER                                                     MUSCLE STRENGTH  KEY                                                                                         R                      L  0 - No Contraction                               Flexion                         4+                    4- (L UE p!)  1 - Trace                                              Extension (elbow)       4-                     4  2 - Poor                                               Abduction                    4                       4+ (p!)  3 - Fair                                                 IR                                 5                      5  4 - Good ER                               4+                    4 (noted L shoulder p!)  5 - Normal                         Special Tests: Speed's: (+) on L                            Yergason: (+) on L                            Longus Colli Endurance Test: 13.5 seconds                            Mill's/Maudsley Tests: (-) on L    21 min Therapeutic Exercise:  [x] See flow sheet : Includes time spent on re-assessment tests and measures   Rationale: increase ROM, increase strength, improve coordination, improve balance and increase proprioception to improve the patients ability to sleep, transfer, and perform work tasks    10 min Self Care/Home Management:  []  See flow sheet : Includes time spent reviewing recent events and educating patient regarding appropriate home management strategies to manage L elbow pain and swelling, to include icing/compression and active movement. Recommended patient follow up with PCP for potential orthopedic referral should symptoms not improve over next 4-5 days with appropriate home management. Patient verbalizing and demonstrating understanding of provided education with 100% accuracy using teach back method. Rationale: increase ROM, increase strength, improve coordination and increase proprioception  to improve the patients ability to self-manage current condition    15 min Manual Therapy: Bilateral cervical PA/rotational/lateral joint mobilizations (Grade III, C2-7); L shoulder PROM, all planes to tolerance; L glenohumeral inferior/AP joint mobilizations (Grade III); STM/TPR bilateral suboccipitals/L upper trapezius/levator scapula/scalenes/supraspinatus/infraspinatus/teres minor/subscapularis/pectoralis major/minor/latissimus dorsi/brachioradialis/wrist extensor mass; L pectoralis major MWM (pin + stretch with horizontal abduction at 120 degrees of shoulder elevation);  L subscapularis MWM (pin + stretch with shoulder ER); supine L scalexuan MWM (pin + stretch with R cervical side bending AROM, 10x); L wrist extensor mass MWM (pin + stretch with L wrist flexion/extension AROM, 20x)   Rationale: decrease pain, increase ROM, increase tissue extensibility and decrease trigger points to improve the patients ability to sleep, transfer, and perform work tasks    With   [x] TE   [] TA   [] Neuro   [x] SC   [] other: Patient Education: [x] Review HEP    [] Progressed/Changed HEP based on:   [] positioning   [] body mechanics   [] transfers   [] heat/ice application    [] other:      Other Objective/Functional Measures: FOTO = 59    Pain Level (0-10 scale) post treatment: 1 in L-sided neck/elbow    ASSESSMENT/Changes in Function:   The patient is a 62year old female who has participated in 8 skilled physical therapy visits due to subacute-onset L-sided neck pain and intermittent L UE \"aching\"/hand numbness. She was recently admitted to ED due to colitis, which has moderately limited plan of care to this point. She demonstrates continued however improving signs and symptoms consistent with movement coordination and muscle power impairments secondary to rotator cuff dysfunction, including increased multiplanar L UE strength, increased deep neck flexor and postural endurance (longus colli endurance test = 13.5 seconds), and improved scapulohumeral movement patterns including mildly increased L > R accessory muscle recruitment with transfers and multiplanar overhead shoulder AROM. Patient demonstrates new-onset L UE \"aching\" and swelling at lateral elbow; signs and symptoms consistent with brachioradialis/biceps/wrist extensor mass irritation and patient noting improvement in symptoms post-assessment and manual intervention. The patient scored 61 on FOTO, demonstrating significantly improved subjective report of function over physical therapy plan of care.  She has met 2/5 physical therapy goals and demonstrates slow, steady or expected progress toward additional goals at this time. The patient would benefit from continued skilled physical therapy services to improve symptom management and safety/endurance/independence with functional tasks such as transfers and work activities. Patient will continue to benefit from skilled PT services to modify and progress therapeutic interventions, address functional mobility deficits, address ROM deficits, address strength deficits, analyze and address soft tissue restrictions, analyze and cue movement patterns, analyze and modify body mechanics/ergonomics and assess and modify postural abnormalities to attain remaining goals. []  See Plan of Care  [x]  See progress note/recertification  []  See Discharge Summary         Progress towards goals / Updated goals:    Short Term Goals: To be accomplished in 6-8 treatments: The patient will demonstrate understanding of and compliance with updated and progressive HEP toward improved participation in physical therapy plan of care. - Intermittently Met              The patient will demonstrate ability to transfer to/from wheelchair to mat table 3/3 times without onset of familiar L-sided neck pain toward improved quality of life. - Progressing  Long Term Goals: To be accomplished in 12-16 treatments: The patient will demonstrate multiplanar bilateral UE strength increased by >= 1/2 MMT grade toward improved endurance with functional activities such as transfers and work tasks. - Progressing              The patient will demonstrate longus colli endurance test >= 38 seconds toward improved postural endurance with transfers and work tasks. - Progressing              The patient will score >= 57 on FOTO to demonstrate significantly improved subjective report of function. - Met  Frequency / Duration: Patient to be seen 1-2 times per week for 3-6 treatments.     PLAN  [x]  Upgrade activities as tolerated     [x]  Continue plan of care  [x]  Update interventions per flow sheet       []  Discharge due to:_  []  Other:_      Hortensia Severs, PT, DPT 3/24/2022

## 2022-03-28 ENCOUNTER — HOSPITAL ENCOUNTER (OUTPATIENT)
Dept: PHYSICAL THERAPY | Age: 62
Discharge: HOME | End: 2022-03-28
Payer: COMMERCIAL

## 2022-03-28 PROCEDURE — 97140 MANUAL THERAPY 1/> REGIONS: CPT

## 2022-03-28 PROCEDURE — 97110 THERAPEUTIC EXERCISES: CPT

## 2022-03-28 PROCEDURE — 97535 SELF CARE MNGMENT TRAINING: CPT

## 2022-03-28 NOTE — PROGRESS NOTES
PT DAILY TREATMENT NOTE - [facility] 2-15    Patient Name: Amanda Pop  Date:3/28/2022  : 1960  [x]  Patient  Verified  Payor: Ramon Delgado / Plan: Hilda Mess / Product Type: HMO /    In time: 3765 Out time: 1017  Total Treatment Time (min): 53  Total Timed Codes (min): 53  1:1 Treatment Time ( only): 53  Visit #:  9    Treatment Area: Neck muscle spasm [M62.838]    SUBJECTIVE  Pain Level (0-10 scale): 6/10 in L-sided neck and L UE  Any medication changes, allergies to medications, adverse drug reactions, diagnosis change, or new procedure performed?: [x] No    [] Yes (see summary sheet for update)  Subjective functional status/changes:   [] No changes reported    The patient reports she slept poorly last night, and woke up with increased L-sided neck/shoulder/arm pain. She took two Tylenol approximately two hours ago, however these do not seem to have helped symptoms to this point. She continues to have L elbow swelling and some discomfort, particularly when resting versus moving, however this seems to be slowly improving overall. She notes she has been slowly working her way back into her home exercises since last week's hospital stay. OBJECTIVE    30 min Therapeutic Exercise:  [x] See flow sheet :   Rationale: increase ROM, increase strength, improve coordination, improve balance and increase proprioception to improve the patients ability to sleep, transfer, and perform work tasks    10 min Self Care/Home Management:  []  See flow sheet : Includes time spent reviewing recent events and educating patient regarding appropriate home management strategies to manage neck and L elbow pain and swelling, to include icing/compression, active movement, and sleep positioning with towel roll under neck in sidelying position to improve cervical neutral. Patient verbalizing and demonstrating understanding of provided education with 100% accuracy using teach back method.    Rationale: increase ROM, increase strength, improve coordination and increase proprioception  to improve the patients ability to self-manage current condition    13 min Manual Therapy: Bilateral cervical PA/rotational/lateral joint mobilizations (Grade III, C2-7); L shoulder PROM, all planes to tolerance; L glenohumeral inferior/AP joint mobilizations (Grade III); STM/TPR bilateral suboccipitals/L upper trapezius/levator scapula/scalenes/supraspinatus/infraspinatus/teres minor/subscapularis/pectoralis major/minor/latissimus dorsi; L pectoralis major MWM (pin + stretch with horizontal abduction at 120 degrees of shoulder elevation); L subscapularis MWM (pin + stretch with shoulder ER); seated L first rib inferior joint mobilization (Grade III)   Rationale: decrease pain, increase ROM, increase tissue extensibility and decrease trigger points to improve the patients ability to sleep, transfer, and perform work tasks    With   [x] TE   [] TA   [] Neuro   [x] SC   [] other: Patient Education: [x] Review HEP    [] Progressed/Changed HEP based on:   [] positioning   [] body mechanics   [] transfers   [] heat/ice application    [] other:      Other Objective/Functional Measures: 3 in L-sided neck with multiplanar cervical AROM post-manual intervention    Pain Level (0-10 scale) post treatment: 2 in L-sided neck/UE with multiplanar cervical AROM post-session today    ASSESSMENT/Changes in Function:   The patient tolerated therapy visit well at this date. She demonstrates mildly increased L-sided upper trapezius/levator scapula/scalene muscle turgor with palpation/soft tissue mobilization, and noted continued decreased tolerance to L cervical C3-4 lateral/rotational joint mobilizations and first rib inferior mobilizations. Patient demonstrates mildly increased R > L upper trapezius recruitment with shoulder rhythmic stabilizations at wall 120 > 90 degrees of shoulder flexion due to decreased periscapular/glenohumeral stability.  Noted difficulty maintaining bilateral shoulder ER positioning at wall during wall slides/wall stars; patient reports increased L shoulder pain with L shoulder AROM during wall stars, improved tolerance when stabilizing for R UE. Patient noting significant reduction in L-sided neck/L UE pain post-session today. Continue to progress as tolerated. Patient will continue to benefit from skilled PT services to modify and progress therapeutic interventions, address functional mobility deficits, address ROM deficits, address strength deficits, analyze and address soft tissue restrictions, analyze and cue movement patterns, analyze and modify body mechanics/ergonomics and assess and modify postural abnormalities to attain remaining goals. [x]  See Plan of Care  []  See progress note/recertification  []  See Discharge Summary         Progress towards goals / Updated goals:    Short Term Goals: To be accomplished in 6-8 treatments: The patient will demonstrate understanding of and compliance with updated and progressive HEP toward improved participation in physical therapy plan of care. - Intermittently Met              The patient will demonstrate ability to transfer to/from wheelchair to mat table 3/3 times without onset of familiar L-sided neck pain toward improved quality of life. - Progressing  Long Term Goals: To be accomplished in 12-16 treatments: The patient will demonstrate multiplanar bilateral UE strength increased by >= 1/2 MMT grade toward improved endurance with functional activities such as transfers and work tasks. - Progressing              The patient will demonstrate longus colli endurance test >= 38 seconds toward improved postural endurance with transfers and work tasks. - Progressing              The patient will score >= 57 on FOTO to demonstrate significantly improved subjective report of function. - Met  Frequency / Duration: Patient to be seen 1-2 times per week for 3-6 treatments.     PLAN  [x] Upgrade activities as tolerated     [x]  Continue plan of care  [x]  Update interventions per flow sheet       []  Discharge due to:_  []  Other:_      Chip Engel, PT, DPT 3/28/2022

## 2022-03-31 ENCOUNTER — HOSPITAL ENCOUNTER (OUTPATIENT)
Dept: PHYSICAL THERAPY | Age: 62
Discharge: HOME | End: 2022-03-31
Payer: COMMERCIAL

## 2022-03-31 PROCEDURE — 97140 MANUAL THERAPY 1/> REGIONS: CPT

## 2022-03-31 PROCEDURE — 97110 THERAPEUTIC EXERCISES: CPT

## 2022-03-31 NOTE — PROGRESS NOTES
PT DAILY TREATMENT NOTE - [facility] 2-15    Patient Name: Amanda Pop  Date:3/31/2022  : 1960  [x]  Patient  Verified  Payor: Ramon Delgado / Plan: Hilda Landa / Product Type: HMO /    In time: 5816 Out time: 1020  Total Treatment Time (min): 46  Total Timed Codes (min): 46  1:1 Treatment Time ([facility] only): 55  Visit #:  10    Treatment Area: Neck muscle spasm [M62.838]    SUBJECTIVE  Pain Level (0-10 scale): 3/10 in L-sided neck  Any medication changes, allergies to medications, adverse drug reactions, diagnosis change, or new procedure performed?: [x] No    [] Yes (see summary sheet for update)  Subjective functional status/changes:   [] No changes reported    The patient reports her neck has been feeling better overall; she notes less discomfort at arrival today. She has been taking diclofenac over the past few days, which has improved L elbow pain and swelling. She feels she is approximately 75% back to prior level of function. OBJECTIVE    36 min Therapeutic Exercise:  [x] See flow sheet :   Rationale: increase ROM, increase strength, improve coordination, improve balance and increase proprioception to improve the patients ability to sleep, transfer, and perform work tasks    10 min Manual Therapy: Bilateral cervical PA/rotational/lateral joint mobilizations (Grade III, C2-7); L shoulder PROM, all planes to tolerance; L glenohumeral inferior/AP joint mobilizations (Grade III); STM/TPR bilateral suboccipitals/L upper trapezius/levator scapula/scalenes/supraspinatus/infraspinatus/teres minor/subscapularis/pectoralis major/minor/latissimus dorsi; L pectoralis major MWM (pin + stretch with horizontal abduction at 120 degrees of shoulder elevation);  L subscapularis MWM (pin + stretch with shoulder ER); seated L first rib inferior joint mobilization (Grade III)   Rationale: decrease pain, increase ROM, increase tissue extensibility and decrease trigger points to improve the patients ability to sleep, transfer, and perform work tasks    With   [x] TE   [] TA   [] Neuro   [] SC   [] other: Patient Education: [x] Review HEP    [] Progressed/Changed HEP based on:   [] positioning   [] body mechanics   [] transfers   [] heat/ice application    [] other:      Other Objective/Functional Measures: 1 in L-sided neck with multiplanar cervical AROM post-manual intervention    Pain Level (0-10 scale) post treatment: 1 in L-sided neck/UE with multiplanar cervical AROM post-session today    ASSESSMENT/Changes in Function:   The patient tolerated therapy visit well at this date. She demonstrates mildly increased L-sided upper trapezius/levator scapula/scalene muscle turgor with palpation/soft tissue mobilization, and noted continued decreased tolerance to L cervical C3-4 lateral/rotational joint mobilizations and first rib inferior mobilizations. Patient demonstrates decreased R > L trunk rotation with open book stretch. Noted difficulty maintaining bilateral shoulder ER positioning at wall during wall slides/wall stars; patient reports mildly increased L shoulder pain with L shoulder AROM at 90 degrees of elevation (9 o'clock position) during wall stars, improved tolerance when stabilizing for R UE. Patient noting significant reduction in L-sided neck/L UE pain post-session today. Continue to progress as tolerated. Patient will continue to benefit from skilled PT services to modify and progress therapeutic interventions, address functional mobility deficits, address ROM deficits, address strength deficits, analyze and address soft tissue restrictions, analyze and cue movement patterns, analyze and modify body mechanics/ergonomics and assess and modify postural abnormalities to attain remaining goals. [x]  See Plan of Care  []  See progress note/recertification  []  See Discharge Summary         Progress towards goals / Updated goals:    Short Term Goals: To be accomplished in 6-8 treatments:                The patient will demonstrate understanding of and compliance with updated and progressive HEP toward improved participation in physical therapy plan of care. - Intermittently Met              The patient will demonstrate ability to transfer to/from wheelchair to mat table 3/3 times without onset of familiar L-sided neck pain toward improved quality of life. - Progressing  Long Term Goals: To be accomplished in 12-16 treatments: The patient will demonstrate multiplanar bilateral UE strength increased by >= 1/2 MMT grade toward improved endurance with functional activities such as transfers and work tasks. - Progressing              The patient will demonstrate longus colli endurance test >= 38 seconds toward improved postural endurance with transfers and work tasks. - Progressing              The patient will score >= 57 on FOTO to demonstrate significantly improved subjective report of function. - Met  Frequency / Duration: Patient to be seen 1-2 times per week for 3-6 treatments.     PLAN  [x]  Upgrade activities as tolerated     [x]  Continue plan of care  [x]  Update interventions per flow sheet       []  Discharge due to:_  []  Other:_      Elena Allan PT, DPT 3/31/2022

## 2022-04-05 ENCOUNTER — PATIENT OUTREACH (OUTPATIENT)
Dept: CASE MANAGEMENT | Age: 62
End: 2022-04-05

## 2022-04-05 NOTE — PROGRESS NOTES
Care Transitions Follow Up Call      Care Transition Nurse (CTN) contacted the patient by telephone to follow up after admission on 3/14/2022  Verified name and  with patient as identifiers. Addressed changes since last contact: none  Follow up appointment completed? yes. Was follow up appointment scheduled within 7 days of discharge? yes. CTN reviewed discharge instructions, medical action plan and red flags with patient and discussed any barriers to care and/or understanding of plan of care after discharge. Discussed appropriate site of care based on symptoms and resources available to patient including: PCP and Specialist. The patient agrees to contact the PCP office for questions related to their healthcare. [facility] follow up appointment(s):   Future Appointments   Date Time Provider Wilmer Kapadia   2022  9:30 AM MACK Stout [facility]   2022  8:30 AM MACK Stout [facility]   2022  9:40 AM Alka Rodas DO [facility] BS AMB   2022  3:30 PM Joselito Saleh DO [facility] AMB     [facility] follow up appointment(s):  Dr. Madiha Corral in 2022    CTN provided contact information for future needs. Plan for follow-up call in 7-10 days based on severity of symptoms and risk factors. Plan for next call: symptom management-assess BM        Goals Addressed                 This Visit's Progress     Prevent complications post hospitalization. 22    - patient will attend PCP appt on 3/23 (this appt is in person) and patient is able to drive herself   - patient will see GI on , she stated this was first available appt   - she sees dr. Leny Cantrell for cardiology and will see next on . She stated no issues getting to this appointments and she will attend   - patient stated Left arm where her IV was has a minimal amount of bruising and swelling. Patient will use ice 20 minutes off at minimum and 20 minutes on a maximum.  She will report any further increases in swelling/bruising to CTN or MD   - patient stated, \" I don't have a happy medium between constipation and diarrhea\" she is not taking Linzess but has started Miralax. She reported she is not taking Linzess b/c \"it makes me feel weird, I have felt dizzy\" Patient has agreed to start keeping a log of her bowel movements and to contact MD if she does not have a BM at minimum every 72 hours. - patient will continue to go to outpatient PT 2x a week   - patient reports she does not check her glucose at home, she is working with JULIO Coronado CDE,   - patient will continue to keep log of carb and sugar intake  - patient reports that she mostly orders her groceries online and it has been a challenge for her to read food labels when ordering online, therefore, patient goal will be to read/look at food labels prior to making food selection choices. - patient reported JULIO Coronado provided her with list of apps to keep track of carbs and sugar effectively, if she can't find the list she will notify CTN  - she reported that she does self IC every 3-4 hours. She was able to verbalize the red flag s/s of a UTI and will contact MD if they arise.   - she also is not taking diclofenac potassium 50mg b/c she stated the cardiologist didn't want her to take it but stated she is not having pain and will notify MD if pain does arise again. Patient will contact Md/CTN if red flag s/s arise. CTN to f/u ~ 7-10 days. AR       04/05/22   Patient sted she will see GI around 4/20, but she isn't sure of exact date. She agreed to keep a log of her bowel movements and take to her appointments. She reported her last bowel movement was soft and not firm/solid. She will take meds as prescribed.  AR  -

## 2022-04-06 ENCOUNTER — HOSPITAL ENCOUNTER (OUTPATIENT)
Dept: PHYSICAL THERAPY | Age: 62
Discharge: HOME | End: 2022-04-06
Payer: COMMERCIAL

## 2022-04-06 PROCEDURE — 97110 THERAPEUTIC EXERCISES: CPT

## 2022-04-06 NOTE — PROGRESS NOTES
PT DAILY TREATMENT NOTE - [facility] 2-15    Patient Name: Latanya Gage  Date:2022  : 1960  [x]  Patient  Verified  Payor: Anita Burns / Plan: Dilcia Cui / Product Type: HMO /    In time: 485 Out time: 1028  Total Treatment Time (min): 49  Total Timed Codes (min): 49  1:1 Treatment Time ( only): 33  Visit #:  11    Treatment Area: Neck muscle spasm [M62.838]    SUBJECTIVE  Pain Level (0-10 scale): 0  Any medication changes, allergies to medications, adverse drug reactions, diagnosis change, or new procedure performed?: [x] No    [] Yes (see summary sheet for update)  Subjective functional status/changes:   [] No changes reported    The patient reports she continues to have \"good and bad\" days related to her neck; today she is feeling good. She trialed performing all of her neck and shoulder exercises through yesterday which went well and took her approximately 45 minutes. OBJECTIVE    49 min Therapeutic Exercise:  [x] See flow sheet :   Rationale: increase ROM, increase strength, improve coordination, improve balance and increase proprioception to improve the patients ability to sleep, transfer, and perform work tasks    NT min Manual Therapy: Bilateral cervical PA/rotational/lateral joint mobilizations (Grade III, C2-7); L shoulder PROM, all planes to tolerance; L glenohumeral inferior/AP joint mobilizations (Grade III); STM/TPR bilateral suboccipitals/L upper trapezius/levator scapula/scalenes/supraspinatus/infraspinatus/teres minor/subscapularis/pectoralis major/minor/latissimus dorsi; L pectoralis major MWM (pin + stretch with horizontal abduction at 120 degrees of shoulder elevation);  L subscapularis MWM (pin + stretch with shoulder ER); seated L first rib inferior joint mobilization (Grade III)   Rationale: decrease pain, increase ROM, increase tissue extensibility and decrease trigger points to improve the patients ability to sleep, transfer, and perform work tasks    With   [x] TE   [] TA   [] Neuro   [] SC   [] other: Patient Education: [x] Review HEP    [] Progressed/Changed HEP based on:   [] positioning   [] body mechanics   [] transfers   [] heat/ice application    [] other:      Other Objective/Functional Measures: None noted    Pain Level (0-10 scale) post treatment: 0    ASSESSMENT/Changes in Function:   The patient tolerated therapy visit well at this date. Noted continued difficulty maintaining bilateral shoulder ER positioning at wall during wall slides/wall stars; patient reports no significant L shoulder pain with L shoulder AROM at 90 degrees of elevation (9 o'clock position) during wall stars today. Patient demonstrates rapid muscular fatigue due to serratus anterior and rotator cuff endurance deficits during supine serratus punch + ABCs/circles. Noted significant core stability challenge with progression of face pulls to seated unsupported at mat table. Patient demonstrates increased bilateral accessory muscle recruitment with band-resisted shoulder horizontal abduction, improved with tactile cues for scapular retraction. Patient noting significant fatigue, no symptoms post-session today. Continue to progress as tolerated. Patient will continue to benefit from skilled PT services to modify and progress therapeutic interventions, address functional mobility deficits, address ROM deficits, address strength deficits, analyze and address soft tissue restrictions, analyze and cue movement patterns, analyze and modify body mechanics/ergonomics and assess and modify postural abnormalities to attain remaining goals. [x]  See Plan of Care  []  See progress note/recertification  []  See Discharge Summary         Progress towards goals / Updated goals:    Short Term Goals: To be accomplished in 6-8 treatments: The patient will demonstrate understanding of and compliance with updated and progressive HEP toward improved participation in physical therapy plan of care.  - Intermittently Met              The patient will demonstrate ability to transfer to/from wheelchair to mat table 3/3 times without onset of familiar L-sided neck pain toward improved quality of life. - Progressing  Long Term Goals: To be accomplished in 12-16 treatments: The patient will demonstrate multiplanar bilateral UE strength increased by >= 1/2 MMT grade toward improved endurance with functional activities such as transfers and work tasks. - Progressing              The patient will demonstrate longus colli endurance test >= 38 seconds toward improved postural endurance with transfers and work tasks. - Progressing              The patient will score >= 57 on FOTO to demonstrate significantly improved subjective report of function. - Met  Frequency / Duration: Patient to be seen 1-2 times per week for 3-6 treatments.     PLAN  [x]  Upgrade activities as tolerated     [x]  Continue plan of care  [x]  Update interventions per flow sheet       []  Discharge due to:_  []  Other:_      Ernestine Ham, PT, DPT 4/6/2022

## 2022-04-08 ENCOUNTER — HOSPITAL ENCOUNTER (OUTPATIENT)
Dept: PHYSICAL THERAPY | Age: 62
Discharge: HOME | End: 2022-04-08
Payer: COMMERCIAL

## 2022-04-08 PROCEDURE — 97110 THERAPEUTIC EXERCISES: CPT

## 2022-04-08 PROCEDURE — 97140 MANUAL THERAPY 1/> REGIONS: CPT

## 2022-04-08 NOTE — PROGRESS NOTES
PT DAILY TREATMENT NOTE - [facility] 2-15    Patient Name: Zari White  Date:2022  : 1960  [x]  Patient  Verified  Payor: Stacie Matson / Plan: Kristie Malcolm / Product Type: HMO /    In time:  Out time: 931  Total Treatment Time (min): 48 (patient required restroom break from [id number])  Total Timed Codes (min): 48  1:1 Treatment Time ( [address] only): 33  Visit #:  12    Treatment Area: Neck muscle spasm [M62.838]    SUBJECTIVE  Pain Level (0-10 scale): 5 in R-sided neck  Any medication changes, allergies to medications, adverse drug reactions, diagnosis change, or new procedure performed?: [x] No    [] Yes (see summary sheet for update)  Subjective functional status/changes:   [] No changes reported    The patient reports she woke up with increased R-sided neck pain this morning, which surprised her. She wonders if this is because she was less active and used her mouse more at her desk for work yesterday.     OBJECTIVE    38 min Therapeutic Exercise:  [x] See flow sheet :   Rationale: increase ROM, increase strength, improve coordination, improve balance and increase proprioception to improve the patients ability to sleep, transfer, and perform work tasks    10 min Manual Therapy: Bilateral cervical PA/rotational/lateral joint mobilizations (Grade III, C2-7); R shoulder PROM, all planes to tolerance; R glenohumeral inferior/AP joint mobilizations (Grade III); STM/TPR bilateral suboccipitals/R upper trapezius/levator scapula/scalenes/supraspinatus/infraspinatus/teres minor/subscapularis/pectoralis major/minor/latissimus dorsi; R pectoralis major MWM (pin + stretch with horizontal abduction at 120 degrees of shoulder elevation); R subscapularis MWM (pin + stretch with shoulder ER); seated R first rib inferior joint mobilization (Grade III)   Rationale: decrease pain, increase ROM, increase tissue extensibility and decrease trigger points to improve the patients ability to sleep, transfer, and perform work tasks    With   [x] TE   [] TA   [] Neuro   [] SC   [] other: Patient Education: [x] Review HEP    [] Progressed/Changed HEP based on:   [] positioning   [] body mechanics   [] transfers   [] heat/ice application    [] other:      Other Objective/Functional Measures: None noted    Pain Level (0-10 scale) post treatment: 2 in R-sided neck    ASSESSMENT/Changes in Function:   The patient tolerated therapy visit well at this date. Patient continues to report significant symptom relief with test-retest pre-post manual intervention. Patient demonstrates rapid muscular fatigue due to serratus anterior and rotator cuff endurance deficits during supine alternating serratus punch + ABCs/circles. Noted focal symptoms along R rhomboids which improved post-manual intervention. Patient noting significant fatigue, minimal symptoms post-session today. Continue to progress as tolerated. Patient will continue to benefit from skilled PT services to modify and progress therapeutic interventions, address functional mobility deficits, address ROM deficits, address strength deficits, analyze and address soft tissue restrictions, analyze and cue movement patterns, analyze and modify body mechanics/ergonomics and assess and modify postural abnormalities to attain remaining goals. [x]  See Plan of Care  []  See progress note/recertification  []  See Discharge Summary         Progress towards goals / Updated goals:    Short Term Goals: To be accomplished in 6-8 treatments: The patient will demonstrate understanding of and compliance with updated and progressive HEP toward improved participation in physical therapy plan of care. - Intermittently Met              The patient will demonstrate ability to transfer to/from wheelchair to mat table 3/3 times without onset of familiar L-sided neck pain toward improved quality of life. - Progressing  Long Term Goals: To be accomplished in 12-16 treatments:                The patient will demonstrate multiplanar bilateral UE strength increased by >= 1/2 MMT grade toward improved endurance with functional activities such as transfers and work tasks. - Progressing              The patient will demonstrate longus colli endurance test >= 38 seconds toward improved postural endurance with transfers and work tasks. - Progressing              The patient will score >= 57 on FOTO to demonstrate significantly improved subjective report of function. - Met  Frequency / Duration: Patient to be seen 1-2 times per week for 3-6 treatments.     PLAN  [x]  Upgrade activities as tolerated     [x]  Continue plan of care  [x]  Update interventions per flow sheet       []  Discharge due to:_  []  Other:_      Debbie Christy, PT, DPT 4/8/2022

## 2022-04-11 ENCOUNTER — PATIENT OUTREACH (OUTPATIENT)
Dept: INTERNAL MEDICINE CLINIC | Age: 62
End: 2022-04-11

## 2022-04-11 NOTE — PROGRESS NOTES
Goals Addressed                 This Visit's Progress     Patient verbalizes understanding of self -management goals of living with Pre-Diabetes.         4/11/22-dkw  -it is noted that pt is in [place] period for ED [facility] admission 3/14/2022 - 3/18/2022 for colitis/constipation  -pt has OP PT, and upcoming appts with Pablo Sorenson, neuro Dr Carli John and Dr Parul Dominguez 8/1/22  -plan to reach out to pt after [place] period ends, in 1-2 weeks    3/3/22-dkw  -pt called inquiring whether recipes come with the 1200 calorie 14 day sample diabetes menu as she is making a shopping list; advised pt there are no recipes  -advised pt to go to the [email or web address] for recipe ideas  -discussed reading nutrition labels again   -pt will call with any other questions    2/28/22-dkw  -pt was referred by  Dr. Parul Dominguez for prediabetes education, which was initiated 2/28/22 for an a1c of 5.7% on 2/16/22  -continue with exercises from PT for rt should and neck problem  -strive to follow the healthy plate meal plan, not going over 45 grams of carb per meal  -start reading nutrition labels for the total carb content  -refer to low and no carb snack idea handout  -follow up with Dr Parul Dominguez in August as scheduled  -call Viki Magaña with any questions  -mailed AVS to pt and sent in My Chart note  -will follow up in one month

## 2022-04-13 ENCOUNTER — HOSPITAL ENCOUNTER (OUTPATIENT)
Dept: PHYSICAL THERAPY | Age: 62
Discharge: HOME | End: 2022-04-13
Payer: COMMERCIAL

## 2022-04-13 PROCEDURE — 97110 THERAPEUTIC EXERCISES: CPT

## 2022-04-13 PROCEDURE — 97140 MANUAL THERAPY 1/> REGIONS: CPT

## 2022-04-13 NOTE — PROGRESS NOTES
PT DAILY TREATMENT NOTE - [facility] 2-15    Patient Name: Earline Seen  Date:2022  : 1960  [x]  Patient  Verified  Payor: Yudi Macias / Plan: Deon Nicholas / Product Type: HMO /    In time: 205 Out time: 255  Total Treatment Time (min): 50  Total Timed Codes (min): 26  1:1 Treatment Time ( only): 26  Visit #:  13    Treatment Area: Neck muscle spasm [M62.838]    SUBJECTIVE  Pain Level (0-10 scale): 4 in R-sided neck  Any medication changes, allergies to medications, adverse drug reactions, diagnosis change, or new procedure performed?: [x] No    [] Yes (see summary sheet for update)  Subjective functional status/changes:   [] No changes reported  Patient reports no major changes since last visit    OBJECTIVE    40 min Therapeutic Exercise:  [x] See flow sheet :   Rationale: increase ROM, increase strength, improve coordination, improve balance and increase proprioception to improve the patients ability to sleep, transfer, and perform work tasks    10 min Manual Therapy: Bilateral cervical PA/rotational/lateral joint mobilizations (Grade III, C2-7); R shoulder PROM, all planes to tolerance; R glenohumeral inferior/AP joint mobilizations (Grade III); STM/TPR bilateral suboccipitals/R upper trapezius/levator scapula/scalenes/supraspinatus/infraspinatus/teres minor/subscapularis/pectoralis major/minor/latissimus dorsi; R pectoralis major MWM (pin + stretch with horizontal abduction at 120 degrees of shoulder elevation); R subscapularis MWM (pin + stretch with shoulder ER); seated R first rib inferior joint mobilization (Grade III)   Rationale: decrease pain, increase ROM, increase tissue extensibility and decrease trigger points to improve the patients ability to sleep, transfer, and perform work tasks    With   [x] TE   [] TA   [] Neuro   [] SC   [] other: Patient Education: [x] Review HEP    [] Progressed/Changed HEP based on:   [] positioning   [] body mechanics   [] transfers   [] heat/ice application [] other:      Other Objective/Functional Measures: None noted    Pain Level (0-10 scale) post treatment: 2 in R-sided neck    ASSESSMENT/Changes in Function:   Patient demonstrated greater restrictions along the R levator. Tolerated all interventions, will continue to progress as tolerated. Patient will continue to benefit from skilled PT services to modify and progress therapeutic interventions, address functional mobility deficits, address ROM deficits, address strength deficits, analyze and address soft tissue restrictions, analyze and cue movement patterns, analyze and modify body mechanics/ergonomics and assess and modify postural abnormalities to attain remaining goals. [x]  See Plan of Care  []  See progress note/recertification  []  See Discharge Summary         Progress towards goals / Updated goals:    Short Term Goals: To be accomplished in 6-8 treatments: The patient will demonstrate understanding of and compliance with updated and progressive HEP toward improved participation in physical therapy plan of care. - Intermittently Met              The patient will demonstrate ability to transfer to/from wheelchair to mat table 3/3 times without onset of familiar L-sided neck pain toward improved quality of life. - Progressing  Long Term Goals: To be accomplished in 12-16 treatments: The patient will demonstrate multiplanar bilateral UE strength increased by >= 1/2 MMT grade toward improved endurance with functional activities such as transfers and work tasks. - Progressing              The patient will demonstrate longus colli endurance test >= 38 seconds toward improved postural endurance with transfers and work tasks. - Progressing              The patient will score >= 57 on FOTO to demonstrate significantly improved subjective report of function. - Met  Frequency / Duration: Patient to be seen 1-2 times per week for 3-6 treatments.     PLAN  [x]  Upgrade activities as tolerated     [x]  Continue plan of care  [x]  Update interventions per flow sheet       []  Discharge due to:_  []  Other:_      Hayward Spurling, PTA, DPT 4/13/2022

## 2022-04-15 ENCOUNTER — HOSPITAL ENCOUNTER (OUTPATIENT)
Dept: PHYSICAL THERAPY | Age: 62
Discharge: HOME | End: 2022-04-15
Payer: COMMERCIAL

## 2022-04-15 PROCEDURE — 97140 MANUAL THERAPY 1/> REGIONS: CPT

## 2022-04-15 PROCEDURE — 97530 THERAPEUTIC ACTIVITIES: CPT

## 2022-04-15 NOTE — PROGRESS NOTES
PT DAILY TREATMENT NOTE - [facility] 2-15    Patient Name: Patti Pope  Date:4/15/2022  : 1960  [x]  Patient  Verified  Payor: Jerry  / Plan: Amy Hall / Product Type: HMO /    In time: 798 Out time: 930  Total Treatment Time (min): 56  Total Timed Codes (min): 56  1:1 Treatment Time ( only): 36  Visit #:  14    Treatment Area: Neck muscle spasm [M62.838]    SUBJECTIVE  Pain Level (0-10 scale): 5 in L-sided neck/UE  Any medication changes, allergies to medications, adverse drug reactions, diagnosis change, or new procedure performed?: [x] No    [] Yes (see summary sheet for update)  Subjective functional status/changes:   [] No changes reported    The patient reports her R-sided neck was hurting somewhat this morning, however overshadowed by L-sided neck and arm pain and tingling. She notes she was more sedentary than usual yesterday. OBJECTIVE    33 min Therapeutic Exercise:  [x] See flow sheet :   Rationale: increase ROM, increase strength, improve coordination, improve balance and increase proprioception to improve the patients ability to sleep, transfer, and perform work tasks    13 min Therapeutic Activity:  []  See flow sheet : Includes time spent educating patient on progressive fitness options for maintaining strength and stability gains post-physical therapy plan of care. Patient verbalizing and demonstrating understanding of provided education with 100% accuracy using teach back method.    Rationale: increase ROM, increase strength, improve coordination, improve balance and increase proprioception  to improve the patients ability to sleep, transfer, and perform work tasks    10 min Manual Therapy: Bilateral cervical PA/rotational/lateral joint mobilizations (Grade III, C2-7); L shoulder PROM, all planes to tolerance; L glenohumeral inferior/AP joint mobilizations (Grade III); STM/TPR bilateral suboccipitals/L upper trapezius/levator scapula/scalenes/supraspinatus/infraspinatus/teres minor/subscapularis/pectoralis major/minor/latissimus dorsi; L pectoralis major MWM (pin + stretch with horizontal abduction at 120 degrees of shoulder elevation); L subscapularis MWM (pin + stretch with shoulder ER); supine L first rib inferior joint mobilization (Grade III)   Rationale: decrease pain, increase ROM, increase tissue extensibility and decrease trigger points to improve the patients ability to sleep, transfer, and perform work tasks    With   [x] TE   [x] TA   [] Neuro   [] SC   [] other: Patient Education: [x] Review HEP    [] Progressed/Changed HEP based on:   [] positioning   [] body mechanics   [] transfers   [] heat/ice application    [] other:      Other Objective/Functional Measures: None noted    Pain Level (0-10 scale) post treatment: 1 in L-sided neck/UE    ASSESSMENT/Changes in Function:   The patient tolerated therapy visit well at this date. Noted continued increased L first rib elevation, reproducing familiar symptoms with inferior joint mobilization. Patient continues to report significant symptom relief with test-retest pre-post manual intervention. Patient noting reduction in L elbow discomfort with pronation/supination dumbbell strengthening. Patient demonstrates rapid muscular fatigue due to serratus anterior and rotator cuff endurance deficits during supine serratus punch + ABCs/circles. Patient noting significant fatigue, minimal symptoms post-session today. Continue to progress as tolerated. Patient will continue to benefit from skilled PT services to modify and progress therapeutic interventions, address functional mobility deficits, address ROM deficits, address strength deficits, analyze and address soft tissue restrictions, analyze and cue movement patterns, analyze and modify body mechanics/ergonomics and assess and modify postural abnormalities to attain remaining goals.      [x]  See Plan of Care  []  See progress note/recertification  []  See Discharge Summary Progress towards goals / Updated goals:    Short Term Goals: To be accomplished in 6-8 treatments: The patient will demonstrate understanding of and compliance with updated and progressive HEP toward improved participation in physical therapy plan of care. - Intermittently Met              The patient will demonstrate ability to transfer to/from wheelchair to mat table 3/3 times without onset of familiar L-sided neck pain toward improved quality of life. - Progressing  Long Term Goals: To be accomplished in 12-16 treatments: The patient will demonstrate multiplanar bilateral UE strength increased by >= 1/2 MMT grade toward improved endurance with functional activities such as transfers and work tasks. - Progressing              The patient will demonstrate longus colli endurance test >= 38 seconds toward improved postural endurance with transfers and work tasks. - Progressing              The patient will score >= 57 on FOTO to demonstrate significantly improved subjective report of function. - Met  Frequency / Duration: Patient to be seen 1-2 times per week for 3-6 treatments.     PLAN  [x]  Upgrade activities as tolerated     [x]  Continue plan of care  [x]  Update interventions per flow sheet       []  Discharge due to:_  []  Other:_      Fe Thomas, PT, DPT 4/15/2022

## 2022-04-18 ENCOUNTER — PATIENT OUTREACH (OUTPATIENT)
Dept: CASE MANAGEMENT | Age: 62
End: 2022-04-18

## 2022-04-19 ENCOUNTER — PATIENT MESSAGE (OUTPATIENT)
Dept: INTERNAL MEDICINE CLINIC | Age: 62
End: 2022-04-19

## 2022-04-19 NOTE — PROGRESS NOTES
Patient has graduated from the Transitions of Care Coordination  program on 04/19/22. Patient/family has the ability to self-manage at this time Care management goals have been completed. Patient was not referred to the [facility] team for further management. Goals Addressed                 This Visit's Progress     COMPLETED: Prevent complications post hospitalization. 03/22/22    - patient will attend PCP appt on 3/23 (this appt is in person) and patient is able to drive herself   - patient will see GI on 4/21, she stated this was first available appt   - she sees dr. Wing Peterson for cardiology and will see next on 5/11. She stated no issues getting to this appointments and she will attend   - patient stated Left arm where her IV was has a minimal amount of bruising and swelling. Patient will use ice 20 minutes off at minimum and 20 minutes on a maximum. She will report any further increases in swelling/bruising to CTN or MD   - patient stated, \" I don't have a happy medium between constipation and diarrhea\" she is not taking Linzess but has started Miralax. She reported she is not taking Linzess b/c \"it makes me feel weird, I have felt dizzy\" Patient has agreed to start keeping a log of her bowel movements and to contact MD if she does not have a BM at minimum every 72 hours. - patient will continue to go to outpatient PT 2x a week   - patient reports she does not check her glucose at home, she is working with JULIO Coronado CDE,   - patient will continue to keep log of carb and sugar intake  - patient reports that she mostly orders her groceries online and it has been a challenge for her to read food labels when ordering online, therefore, patient goal will be to read/look at food labels prior to making food selection choices. - patient reported JULIO Coronado provided her with list of apps to keep track of carbs and sugar effectively, if she can't find the list she will notify CTN  - she reported that she does self IC every 3-4 hours. She was able to verbalize the red flag s/s of a UTI and will contact MD if they arise.   - she also is not taking diclofenac potassium 50mg b/c she stated the cardiologist didn't want her to take it but stated she is not having pain and will notify MD if pain does arise again. Patient will contact Md/CTN if red flag s/s arise. CTN to f/u ~ 7-10 days. AR       04/05/22   Patient sted she will see GI around 4/20, but she isn't sure of exact date. She agreed to keep a log of her bowel movements and take to her appointments. She reported her last bowel movement was soft and not firm/solid. She will take meds as prescribed. AR  -     04/19/22  Patient did make appt with Elizabeth Matos to review her medications, which she will attend. She will also see GI on 4/21. Patient will continue to participate in PT. She reports no red flag s/s. She is continuing to keep log of her BM, which she will take to her GI appt. She wants to continue PT 2 days a week but she was informed they might drop it down to 2 days a week, patient will ask PT office if that is due to insurance or if she can continue with 2 days a week. Patient would like a  but [facility] [id number] [facility] is out of her budget. Advised patient to check with the [facility] and to contact her insurance company to see if they happen to provide any of those types of services, she is also checking with Tashi Coronado notified that EMMANUEL period ended. Patient has Care Transition Nurse's contact information for any further questions, concerns, or needs.   Patients upcoming visits:    Future Appointments   Date Time Provider Wilmer Kapadia   4/20/2022  8:30 AM Cabrera Betancourt, CHANDLER [facility]   4/22/2022  8:30 AM Monty Cabot, PT [facility]   4/27/2022  9:30 AM Monty Cabot, PT [facility]   4/29/2022  8:30 AM Monty Cabot, PT [facility]   5/4/2022  1:15 PM Zahra Valera Arianne Patricio [facility] BS AMB   6/24/2022  9:40 AM Harry Rodas DO [facility] BS AM[facility]   8/1/2022  3:30 PM Leila Le III, DO [facility] BS AMB

## 2022-04-20 ENCOUNTER — HOSPITAL ENCOUNTER (OUTPATIENT)
Dept: PHYSICAL THERAPY | Age: 62
Discharge: HOME | End: 2022-04-20
Payer: COMMERCIAL

## 2022-04-20 PROCEDURE — 97140 MANUAL THERAPY 1/> REGIONS: CPT

## 2022-04-20 PROCEDURE — 97110 THERAPEUTIC EXERCISES: CPT

## 2022-04-20 NOTE — PROGRESS NOTES
PT DAILY TREATMENT NOTE - [facility] 2-15    Patient Name: Kelsey Mckeon  Date:2022  : 1960  [x]  Patient  Verified  Payor: Louis Pierce / Plan: Valerie Tang / Product Type: HMO /    In time: 492 Out time: 923  Total Treatment Time (min): 51  Total Timed Codes (min): 33  1:1 Treatment Time ( only): 33  Visit #:  15    Treatment Area: Neck muscle spasm [M62.838]    SUBJECTIVE  Pain Level (0-10 scale): 4 in L-sided neck/UE  Any medication changes, allergies to medications, adverse drug reactions, diagnosis change, or new procedure performed?: [x] No    [] Yes (see summary sheet for update)  Subjective functional status/changes:   [] No changes reported  Patient reports she is feeling a bit more stiff today, but attributes that more so to the weather. OBJECTIVE    31 min Therapeutic Exercise:  [x] See flow sheet :   Rationale: increase ROM, increase strength, improve coordination, improve balance and increase proprioception to improve the patients ability to sleep, transfer, and perform work tasks    10 min Therapeutic Activity:  []  See flow sheet : Includes time spent educating patient on progressive fitness options for maintaining strength and stability gains post-physical therapy plan of care. Patient verbalizing and demonstrating understanding of provided education with 100% accuracy using teach back method.    Rationale: increase ROM, increase strength, improve coordination, improve balance and increase proprioception  to improve the patients ability to sleep, transfer, and perform work tasks    10 min Manual Therapy: Bilateral cervical PA/rotational/lateral joint mobilizations (Grade III, C2-7); L shoulder PROM, all planes to tolerance; L glenohumeral inferior/AP joint mobilizations (Grade III); STM/TPR bilateral suboccipitals/L upper trapezius/levator scapula/scalenes/supraspinatus/infraspinatus/teres minor/subscapularis/pectoralis major/minor/latissimus dorsi; L pectoralis major MWM (pin + stretch with horizontal abduction at 120 degrees of shoulder elevation); L subscapularis MWM (pin + stretch with shoulder ER); supine L first rib inferior joint mobilization (Grade III)   Rationale: decrease pain, increase ROM, increase tissue extensibility and decrease trigger points to improve the patients ability to sleep, transfer, and perform work tasks    With   [x] TE   [x] TA   [] Neuro   [] SC   [] other: Patient Education: [x] Review HEP    [] Progressed/Changed HEP based on:   [] positioning   [] body mechanics   [] transfers   [] heat/ice application    [] other:      Other Objective/Functional Measures: None noted    Pain Level (0-10 scale) post treatment: 2 in L-sided neck/UE    ASSESSMENT/Changes in Function:   Patient continues to struggle with increased trigger points along the L levator. Tolerated all interventions well, will continue to progress as tolerated. Patient will continue to benefit from skilled PT services to modify and progress therapeutic interventions, address functional mobility deficits, address ROM deficits, address strength deficits, analyze and address soft tissue restrictions, analyze and cue movement patterns, analyze and modify body mechanics/ergonomics and assess and modify postural abnormalities to attain remaining goals. [x]  See Plan of Care  []  See progress note/recertification  []  See Discharge Summary         Progress towards goals / Updated goals:    Short Term Goals: To be accomplished in 6-8 treatments: The patient will demonstrate understanding of and compliance with updated and progressive HEP toward improved participation in physical therapy plan of care. - Intermittently Met              The patient will demonstrate ability to transfer to/from wheelchair to mat table 3/3 times without onset of familiar L-sided neck pain toward improved quality of life. - Progressing  Long Term Goals: To be accomplished in 12-16 treatments:                The patient will demonstrate multiplanar bilateral UE strength increased by >= 1/2 MMT grade toward improved endurance with functional activities such as transfers and work tasks. - Progressing              The patient will demonstrate longus colli endurance test >= 38 seconds toward improved postural endurance with transfers and work tasks. - Progressing              The patient will score >= 57 on FOTO to demonstrate significantly improved subjective report of function. - Met  Frequency / Duration: Patient to be seen 1-2 times per week for 3-6 treatments.     PLAN  [x]  Upgrade activities as tolerated     [x]  Continue plan of care  [x]  Update interventions per flow sheet       []  Discharge due to:_  []  Other:_      Navi Mamta, PTA, DPT 4/20/2022

## 2022-04-22 ENCOUNTER — HOSPITAL ENCOUNTER (OUTPATIENT)
Dept: PHYSICAL THERAPY | Age: 62
Discharge: HOME | End: 2022-04-22
Payer: COMMERCIAL

## 2022-04-22 PROCEDURE — 97110 THERAPEUTIC EXERCISES: CPT

## 2022-04-22 PROCEDURE — 97140 MANUAL THERAPY 1/> REGIONS: CPT

## 2022-04-22 NOTE — PROGRESS NOTES
PT DAILY TREATMENT NOTE - [facility] 2-15    Patient Name: Guerrero Quezada  Date:2022  : 1960  [x]  Patient  Verified  Payor: Carissa Neves / Plan: [email or web address] / Product Type: HMO /    In time:  Out time: 930  Total Treatment Time (min): 54  Total Timed Codes (min): 54  1:1 Treatment Time ( only): 34  Visit #:  16    Treatment Area: Neck muscle spasm [M62.838]    SUBJECTIVE  Pain Level (0-10 scale): 3-4 in L-sided neck/UE  Any medication changes, allergies to medications, adverse drug reactions, diagnosis change, or new procedure performed?: [x] No    [] Yes (see summary sheet for update)  Subjective functional status/changes:   [] No changes reported    The patient reports she has been doing well overall; she has had increased L shoulder/arm/elbow discomfort since this morning. She worked longer hours Wednesday and wonders whether this contributed. Additionally, she notes increased L elbow swelling today. OBJECTIVE    Other Observations: Patient requires increased time to complete transfers and bed mobility throughout re-assessment at this date  Gait and Functional Mobility: The patient arrives self-propelling herself in manual wheelchair at a level of (I), demonstrating increased bilateral accessory muscle recruitment and decreased bilateral shoulder extension  Palpation: Moderately tender to palpation at L proximal scalenes/first rib/supraspinatus/infraspinatus/teres minor/levator scapula.  Noted no tenderness to palpation along L brachioradialis, distal biceps, and wrist extensor mass.                                                     Cervical AROM:                                                                       R                      L                        Flexion                                     34 (L-sided neck \"pull\")                                              Extension                                57                                              Side Bending                           38 (L-sided neck \"pull\")                        32 (R-sided neck \"pull\")                 Rotation                                   82                 81                      A/PROM Shoulder:                Right                           Left              GQ                                 CSMW to S06           JAHP to T8 (L elbow p!)              ER                               Hand to T5             Hand to T4     Joint Mobility Assessment: Cervical: WNL to PA/bilateral lateral bending/rotational joint mobilizations (C2-7)                                                  Glenohumeral: WNL to L AP/inferior joint mobilizations     Flexibility: Noted grossly decreased bilateral pectoralis major/minor/latissimus dorsi/subscapularis muscle length in seated and mat table positions     UPPER QUARTER                                                     MUSCLE STRENGTH  KEY                                                                                         R                      L  0 - No Contraction                               Flexion                         4+                    4  1 - Trace                                              Extension (elbow)       4-                     4  2 - Poor                                               Abduction                    4                       4+  3 - Fair                                                 IR                                 5                      5  4 - YCGQ                                              ZG                               1+                    4+ (noted L superior shoulder p!)  5 - Normal                         Special Tests: Lottie: (+) on L                            Longus Colli Endurance Test: 24.6 seconds                            ULNTT: Overtly (+) with distal sensitizer (cervical side bending) for L median nerve; (-) for L radial/ulnar nerves    39 min Therapeutic Exercise:  [x] See flow sheet : Includes time spent on re-assessment tests and measures   Rationale: increase ROM, increase strength, improve coordination, improve balance and increase proprioception to improve the patients ability to sleep, transfer, and perform work tasks    15 min Manual Therapy: Bilateral cervical PA/rotational/lateral joint mobilizations (Grade III, C2-7); L shoulder PROM, all planes to tolerance; L glenohumeral inferior/AP joint mobilizations (Grade III); STM/TPR bilateral suboccipitals/L upper trapezius/levator scapula/scalenes/supraspinatus/infraspinatus/teres minor/subscapularis/pectoralis major/minor/latissimus dorsi; L pectoralis major MWM (pin + stretch with horizontal abduction at 120 degrees of shoulder elevation);  L subscapularis MWM (pin + stretch with shoulder ER); supine L first rib inferior joint mobilization (Grade III)   Rationale: decrease pain, increase ROM, increase tissue extensibility and decrease trigger points to improve the patients ability to sleep, transfer, and perform work tasks    With   [x] TE   [x] TA   [] Neuro   [] SC   [] other: Patient Education: [x] Review HEP    [] Progressed/Changed HEP based on:   [] positioning   [] body mechanics   [] transfers   [] heat/ice application    [] other:      Other Objective/Functional Measures: Unable to complete FOTO at this time due to system-wide technical difficulties; plan to complete at subsequent follow up visit    Pain Level (0-10 scale) post treatment: 2 in L-sided neck/UE    ASSESSMENT/Changes in Function:   The patient is a 62year old female who has participated in 12 skilled physical therapy visits due to subacute-onset L-sided neck pain and intermittent L UE \"aching\"/hand numbness. She demonstrates continued however improving signs and symptoms consistent with movement coordination and muscle power impairments secondary to rotator cuff dysfunction, including (+) L median nerve tension test, increased multiplanar L UE strength, increased deep neck flexor and postural endurance (longus colli endurance test = 24.6 seconds), and improved scapulohumeral movement patterns including mildly increased L > R accessory muscle recruitment with transfers and multiplanar overhead shoulder AROM. Patient demonstrates improved however continued L UE \"aching\" and swelling at lateral elbow; no tenderness to palpation along L brachioradialis/biceps/wrist extensor mass today and patient noting improvement in symptoms post-assessment and manual intervention. She has met 2/5 physical therapy goals and demonstrates slow, steady progress toward additional goals at this time. The patient would benefit from continued skilled physical therapy services at reduced frequency to improve symptom management and safety/endurance/independence with functional tasks such as transfers and work activities; patient plans to transition to independent fitness/personal training program after discharge from rehabilitation services to maintain long-term health and gains. The patient tolerated therapy visit well at this date. Noted continued increased L first rib elevation, reproducing familiar symptoms with inferior joint mobilization. Patient continues to report significant symptom relief with test-retest pre-post manual intervention. Patient demonstrates rapid muscular fatigue due to serratus anterior and rotator cuff endurance deficits during supine serratus punch + ABCs/circles. Patient requires modification to armrest support with introduction of L median nerve glides due to onset of L shoulder pain. Patient noting significant fatigue, significant decrease in familiar symptoms post-session today. Continue to progress as tolerated.    Patient will continue to benefit from skilled PT services to modify and progress therapeutic interventions, address functional mobility deficits, address ROM deficits, address strength deficits, analyze and address soft tissue restrictions, analyze and cue movement patterns, analyze and modify body mechanics/ergonomics and assess and modify postural abnormalities to attain remaining goals. []  See Plan of Care  [x]  See progress note/recertification  []  See Discharge Summary         Progress towards goals / Updated goals:    Short Term Goals: To be accomplished in 6-8 treatments: The patient will demonstrate understanding of and compliance with updated and progressive HEP toward improved participation in physical therapy plan of care. - Met              The patient will demonstrate ability to transfer to/from wheelchair to mat table 3/3 times without onset of familiar L-sided neck pain toward improved quality of life. - Progressing  Long Term Goals: To be accomplished in 12-16 treatments: The patient will demonstrate multiplanar bilateral UE strength increased by >= 1/2 MMT grade toward improved endurance with functional activities such as transfers and work tasks. - Progressing              The patient will demonstrate longus colli endurance test >= 38 seconds toward improved postural endurance with transfers and work tasks. - Progressing              The patient will score >= 57 on FOTO to demonstrate significantly improved subjective report of function. - Met  Frequency / Duration: Patient to be seen 1 time per week for 2-4 treatments.     PLAN  [x]  Upgrade activities as tolerated     [x]  Continue plan of care  [x]  Update interventions per flow sheet       []  Discharge due to:_  []  Other:_      Cathrine Ahumada, PT, DPT 4/22/2022

## 2022-04-22 NOTE — PROGRESS NOTES
[address] Physical Therapy  [address] [facility] ([facility]), [address] [facility], [address] [place] [address] Drive  Phone: [phone] Fax: [phone]    Progress Note    Name: Lisy Piedra   : 1960   MD: Robinson Hoang     Treatment Diagnosis: Neck muscle spasm [M62.838]  Start of Care: 22    Visits from Start of Care: 16  Missed Visits: 1    Summary of Care: Therapy has included therapeutic exercise, therapeutic activity, manual techniques, self-management strategies, and neuromuscular re-education to improve multiplanar cervical/L shoulder A/PROM, L UE/periscapular strength/stability, and functional endurance/independence due to subacute-onset L-sided neck pain and intermittent L UE \"aching\"/hand numbness. Assessment / Recommendations: The patient is a 57 year old female who has participated in 12 skilled physical therapy visits due to subacute-onset L-sided neck pain and intermittent L UE \"aching\"/hand numbness. She demonstrates continued however improving signs and symptoms consistent with movement coordination and muscle power impairments secondary to rotator cuff dysfunction, including (+) L median nerve tension test, increased multiplanar L UE strength, increased deep neck flexor and postural endurance (longus colli endurance test = 24.6 seconds), and improved scapulohumeral movement patterns including mildly increased L > R accessory muscle recruitment with transfers and multiplanar overhead shoulder AROM. Patient demonstrates improved however continued L UE \"aching\" and swelling at lateral elbow; no tenderness to palpation along L brachioradialis/biceps/wrist extensor mass today and patient noting improvement in symptoms post-assessment and manual intervention.  She has met 2/5 physical therapy goals and demonstrates slow, steady progress toward additional goals at this time. The patient would benefit from continued skilled physical therapy services at reduced frequency to improve symptom management and safety/endurance/independence with functional tasks such as transfers and work activities; patient plans to transition to independent fitness/personal training program after discharge from rehabilitation services to maintain long-term health and gains. Short Term Goals: To be accomplished in 6-8 treatments:               YJT patient will demonstrate understanding of and compliance with updated and progressive HEP toward improved participation in physical therapy plan of care. - Met              The patient will demonstrate ability to transfer to/from wheelchair to mat table 3/3 times without onset of familiar L-sided neck pain toward improved quality of life. - [address] be accomplished in 12-16 treatments:               ABN patient will demonstrate multiplanar bilateral UE strength increased by >= 1/2 MMT grade toward improved endurance with functional activities such as transfers and work tasks. - Progressing              The patient will demonstrate longus colli endurance test >= 38 seconds toward improved postural endurance with transfers and work tasks. - Progressing              The patient will score >= 57 on FOTO to demonstrate significantly improved subjective report of function. - Met  Frequency / Duration: Patient to be seen 1 time per week for 2-4 treatments.     Giancarlo Smart, PT, DPT 4/22/2022

## 2022-04-28 ENCOUNTER — HOSPITAL ENCOUNTER (OUTPATIENT)
Dept: PHYSICAL THERAPY | Age: 62
Discharge: HOME | End: 2022-04-28
Payer: COMMERCIAL

## 2022-04-28 PROCEDURE — 97110 THERAPEUTIC EXERCISES: CPT

## 2022-04-28 PROCEDURE — 97140 MANUAL THERAPY 1/> REGIONS: CPT

## 2022-04-28 NOTE — PROGRESS NOTES
PT DAILY TREATMENT NOTE - [facility] 2-15    Patient Name: Rufina Hurd  Date:2022  : 1960  [x]  Patient  Verified  Payor: Amita Roblero / Plan: Kaylee Barajas / Product Type: HMO /    In time: 320 Out time: 416  Total Treatment Time (min): 56  Total Timed Codes (min): 56  1:1 Treatment Time (MC only): 64  Visit #:  17    Treatment Area: Neck muscle spasm [M62.838]    SUBJECTIVE  Pain Level (0-10 scale): 3-4 in L-sided neck/UE  Any medication changes, allergies to medications, adverse drug reactions, diagnosis change, or new procedure performed?: [x] No    [] Yes (see summary sheet for update)  Subjective functional status/changes:   [] No changes reported  Patient reports her back has been giving her significant discomfort today and yesterday. She was stuck in her apartment yesterday due to her apartment paving the parking lot. OBJECTIVE    46 min Therapeutic Exercise:  [x] See flow sheet :   Rationale: increase ROM, increase strength, improve coordination, improve balance and increase proprioception to improve the patients ability to sleep, transfer, and perform work tasks    nt min Therapeutic Activity:  []  See flow sheet : Includes time spent educating patient on progressive fitness options for maintaining strength and stability gains post-physical therapy plan of care. Patient verbalizing and demonstrating understanding of provided education with 100% accuracy using teach back method.    Rationale: increase ROM, increase strength, improve coordination, improve balance and increase proprioception  to improve the patients ability to sleep, transfer, and perform work tasks    10 min Manual Therapy: Bilateral cervical PA/rotational/lateral joint mobilizations (Grade III, C2-7); L shoulder PROM, all planes to tolerance; L glenohumeral inferior/AP joint mobilizations (Grade III); STM/TPR bilateral suboccipitals/L upper trapezius/levator scapula/scalenes/supraspinatus/infraspinatus/teres minor/subscapularis/pectoralis major/minor/latissimus dorsi; L pectoralis major MWM (pin + stretch with horizontal abduction at 120 degrees of shoulder elevation); L subscapularis MWM (pin + stretch with shoulder ER); supine L first rib inferior joint mobilization (Grade III)   Rationale: decrease pain, increase ROM, increase tissue extensibility and decrease trigger points to improve the patients ability to sleep, transfer, and perform work tasks    With   [x] TE   [x] TA   [] Neuro   [] SC   [] other: Patient Education: [x] Review HEP    [] Progressed/Changed HEP based on:   [] positioning   [] body mechanics   [] transfers   [] heat/ice application    [] other:      Other Objective/Functional Measures: None noted    Pain Level (0-10 scale) post treatment: 3 in L-sided neck/UE    ASSESSMENT/Changes in Function:   Patient continues to struggle with increased trigger points along the L levator. Fatigued more with supine ABCs. Tolerated all interventions, will continue to progress as tolerated. Patient will continue to benefit from skilled PT services to modify and progress therapeutic interventions, address functional mobility deficits, address ROM deficits, address strength deficits, analyze and address soft tissue restrictions, analyze and cue movement patterns, analyze and modify body mechanics/ergonomics and assess and modify postural abnormalities to attain remaining goals. [x]  See Plan of Care  []  See progress note/recertification  []  See Discharge Summary         Progress towards goals / Updated goals:    Short Term Goals: To be accomplished in 6-8 treatments: The patient will demonstrate understanding of and compliance with updated and progressive HEP toward improved participation in physical therapy plan of care.  - Intermittently Met              The patient will demonstrate ability to transfer to/from wheelchair to mat table 3/3 times without onset of familiar L-sided neck pain toward improved quality of life. - Progressing  Long Term Goals: To be accomplished in 12-16 treatments: The patient will demonstrate multiplanar bilateral UE strength increased by >= 1/2 MMT grade toward improved endurance with functional activities such as transfers and work tasks. - Progressing              The patient will demonstrate longus colli endurance test >= 38 seconds toward improved postural endurance with transfers and work tasks. - Progressing              The patient will score >= 57 on FOTO to demonstrate significantly improved subjective report of function. - Met  Frequency / Duration: Patient to be seen 1-2 times per week for 3-6 treatments.     PLAN  [x]  Upgrade activities as tolerated     [x]  Continue plan of care  [x]  Update interventions per flow sheet       []  Discharge due to:_  []  Other:_      Sarah Guadalupe PTA, DPT 4/28/2022

## 2022-04-29 ENCOUNTER — APPOINTMENT (OUTPATIENT)
Dept: PHYSICAL THERAPY | Age: 62
End: 2022-04-29
Payer: COMMERCIAL

## 2022-05-02 NOTE — PROGRESS NOTES
Pharmacy Progress Note - Comprehensive Medication Review    S/O: Ms. Marta Mendoza is a 62 y.o. female , referred by Ghazal Fowler DO , with a PMH of Spina bifida, paraplegia, neurogenic bladder, Hx of colitis, vitamin B12 deficiency, HLD,  who was seen virtually today for a comprehensive medication review. PHI verified. Patient would like to know if any of current medications could be worsening constipation.     - Recent [facility] hospitalization in March d/t constipation and colitis. She did not tolerate linzess - caused SOB. Was switched to Trulance 3 mg daily. Tolerating med so far  - Endorses to taking Miralax 17 gm daily   - Endorses to drinking ~three 16 oz container of fluids daily   - No longer taking stool softener - previously was docusate     - Takes Lisinopril 5 mg daily HS  - Takes simvastatin 20 mg daily HS    - Participating in PT. Takes diclofenac 50 mg daily HS. May take APAP 500 mg PRN pain. - Denies NSAID use. - Reports to receiving vitamin B12 injection Monthly   - No longer taking vitamin D3 or Azelastine nasal spray    Wt Readings from Last 3 Encounters:   03/23/22 140 lb (63.5 kg)   03/14/22 140 lb (63.5 kg)   09/24/21 140 lb (63.5 kg)     BP Readings from Last 3 Encounters:   03/23/22 126/76   03/18/22 126/69   02/03/22 112/73     Pulse Readings from Last 3 Encounters:   03/23/22 87   03/18/22 91   02/03/22 91       Past Medical History:   Diagnosis Date    COVID-19 05/2021    Hypercholesterolemia     Hypertension     Spina bifida ([facility] [address]      Allergies   Allergen Reactions    Sulfa (Sulfonamide Antibiotics) Nausea and Vomiting    Topiramate Other (comments)      tingling sensation in hands and face       Current Outpatient Medications   Medication Sig    lisinopriL (PRINIVIL, ZESTRIL) 5 mg tablet Take 1 Tablet by mouth daily.  azelastine (ASTELIN) 137 mcg (0.1 %) nasal spray 1 [place] by Both Nostrils route two (2) times a day.  Use in each nostril as directed    plecanatide (Trulance) 3 mg tab Take 3 mg by mouth daily.  polyethylene glycol (Miralax) 17 gram/dose powder Take 17 g by mouth daily.  Vitamin D3 125 mcg (5,000 unit) tab tablet TAKE 1 TABLET BY MOUTH DAILY (Patient not taking: Reported on 3/23/2022)    diclofenac potassium (CATAFLAM) 50 mg tablet Take 1 Tablet by mouth daily. (Patient not taking: Reported on 3/22/2022)    simvastatin (ZOCOR) 20 mg tablet TAKE 1 TABLET BY MOUTH EVERY DAY (Patient not taking: Reported on 3/23/2022)    cyanocobalamin (VITAMIN B12) 1,000 mcg/mL injection INJECT 1 ML INTRAMUSCULARLY EVERY SEVEN DAYS. DO THIS FOR 4 WEEKS AND THEN MONTHLY THEREAFTER (Patient not taking: Reported on 3/23/2022)     No current facility-administered medications for this visit. Lab Results   Component Value Date/Time    Sodium 142 03/18/2022 12:56 AM    Potassium 3.6 03/18/2022 12:56 AM    Chloride 114 (H) 03/18/2022 12:56 AM    CO2 22 03/18/2022 12:56 AM    Anion gap 6 03/18/2022 12:56 AM    Glucose 138 (H) 03/18/2022 12:56 AM    BUN 6 03/18/2022 12:56 AM    Creatinine 0.40 (L) 03/18/2022 12:56 AM    BUN/Creatinine ratio 15 03/18/2022 12:56 AM    GFR est AA >60 03/18/2022 12:56 AM    GFR est non-AA >60 03/18/2022 12:56 AM    Calcium 8.6 03/18/2022 12:56 AM       Lab Results   Component Value Date/Time    WBC 6.8 03/18/2022 12:56 AM    HGB 11.3 (L) 03/18/2022 12:56 AM    HCT 33.5 (L) 03/18/2022 12:56 AM    PLATELET 690 75/41/7414 12:56 AM    MCV 94.1 03/18/2022 12:56 AM       Lab Results   Component Value Date/Time    Hemoglobin A1c 5.8 (H) 02/16/2022 12:00 AM     Estimated Creatinine Clearance: 55.2 mL/min (A) (by C-G formula based on SCr of 0.4 mg/dL (L)). The ASCVD Risk score (Rufina Torrez, et al., 2013) failed to calculate for the following reasons: The valid total cholesterol range is 130 to 320 mg/dL    A/P:     Medication Adherence/Access:  - Medication reconciliation was completed during the visit.   - Pt is compliant to current medication regimen. Recommendations:  - No DDI or pharmacological medications that may worsen patient's constipation. Consider addition of stool softener, such as docusate, to current Trulance 3 mg and Miralax daily therapy  - Maintain hydration. Change(s): Vitamin B12 frequency     Addition(s): APAP    Deletion(s):  Medications Discontinued During This Encounter   Medication Reason    azelastine (ASTELIN) 137 mcg (0.1 %) nasal spray Not A Current Medication    Vitamin D3 125 mcg (5,000 unit) tab tablet Not A Current Medication      Updated med table:  Current Outpatient Medications   Medication Sig    acetaminophen (TYLENOL) 500 mg tablet Take 500 mg by mouth every six (6) hours as needed for Pain.  lisinopriL (PRINIVIL, ZESTRIL) 5 mg tablet Take 1 Tablet by mouth daily.  plecanatide (Trulance) 3 mg tab Take 3 mg by mouth daily.  diclofenac potassium (CATAFLAM) 50 mg tablet Take 1 Tablet by mouth daily.  simvastatin (ZOCOR) 20 mg tablet TAKE 1 TABLET BY MOUTH EVERY DAY    cyanocobalamin (VITAMIN B12) 1,000 mcg/mL injection INJECT 1 ML INTRAMUSCULARLY EVERY SEVEN DAYS. DO THIS FOR 4 WEEKS AND THEN MONTHLY THEREAFTER (Patient taking differently: 1,000 mcg by IntraMUSCular route every thirty (30) days.)    polyethylene glycol (Miralax) 17 gram/dose powder Take 17 g by mouth daily. Indications: constipation    Vitamin D3 125 mcg (5,000 unit) tab tablet TAKE 1 TABLET BY MOUTH DAILY (Patient not taking: Reported on 3/23/2022)     No current facility-administered medications for this visit. Patient verbalized understanding of the information presented and all of the patients questions were answered. Patient advised to call the office with any additional questions or concerns. Follow-up: Notifications of recommendations will be sent to Dr. Jose Armando Mayo DO for review.     Thank you for the consult,  Patria Rock, PharmD, BCACP, [address] [facility] 1 Only     CPA in place:  Yes   Recommendation Provided To: Patient/Caregiver: 4 via Virtual Visit   Intervention Detail: CMR/TIP Completed, Discontinued Rx: 2, reason: Therapy Complete and New Rx: 1, reason: Patient Preference   Intervention Accepted By: Patient/Caregiver: 4   Time Spent (min): 30

## 2022-05-03 ENCOUNTER — PATIENT OUTREACH (OUTPATIENT)
Dept: INTERNAL MEDICINE CLINIC | Age: 62
End: 2022-05-03

## 2022-05-03 NOTE — PROGRESS NOTES
Goals Addressed                 This Visit's Progress     Patient verbalizes understanding of self -management goals of living with Pre-Diabetes.         5/3/22-dkw  -called pt and she was on a zoom call and she will call this CDCES back    4/11/22-dkw  -it is noted that pt is in [place] period for ED [facility] admission 3/14/2022 - 3/18/2022 for colitis/constipation  -pt has OP PT, and upcoming appts with Azul Lazo, neuro Dr Summer Renee and Dr Gurjit Abad 8/1/22  -plan to reach out to pt after [place] period ends, in 1-2 weeks    3/3/22-dkw  -pt called inquiring whether recipes come with the 1200 calorie 14 day sample diabetes menu as she is making a shopping list; advised pt there are no recipes  -advised pt to go to the [email or web address] for recipe ideas  -discussed reading nutrition labels again   -pt will call with any other questions    2/28/22-dkw  -pt was referred by  Dr. Gurjit Abad for prediabetes education, which was initiated 2/28/22 for an a1c of 5.7% on 2/16/22  -continue with exercises from PT for rt should and neck problem  -strive to follow the healthy plate meal plan, not going over 45 grams of carb per meal  -start reading nutrition labels for the total carb content  -refer to low and no carb snack idea handout  -follow up with Dr Gurjit Abad in August as scheduled  -call Rufina Mendoza with any questions  -mailed AVS to pt and sent in My Chart note  -will follow up in one month

## 2022-05-04 ENCOUNTER — TELEPHONE (OUTPATIENT)
Dept: INTERNAL MEDICINE CLINIC | Age: 62
End: 2022-05-04

## 2022-05-04 ENCOUNTER — VIRTUAL VISIT (OUTPATIENT)
Dept: INTERNAL MEDICINE CLINIC | Age: 62
End: 2022-05-04

## 2022-05-04 ENCOUNTER — PATIENT OUTREACH (OUTPATIENT)
Dept: INTERNAL MEDICINE CLINIC | Age: 62
End: 2022-05-04

## 2022-05-04 ENCOUNTER — HOSPITAL ENCOUNTER (OUTPATIENT)
Dept: PHYSICAL THERAPY | Age: 62
Discharge: HOME | End: 2022-05-04
Payer: COMMERCIAL

## 2022-05-04 DIAGNOSIS — Z71.89 ENCOUNTER FOR MEDICATION REVIEW AND COUNSELING: Primary | ICD-10-CM

## 2022-05-04 PROCEDURE — 97140 MANUAL THERAPY 1/> REGIONS: CPT

## 2022-05-04 PROCEDURE — 97110 THERAPEUTIC EXERCISES: CPT

## 2022-05-04 RX ORDER — ACETAMINOPHEN 500 MG
500 TABLET ORAL
COMMUNITY

## 2022-05-04 NOTE — PROGRESS NOTES
PT DAILY TREATMENT NOTE - [facility] 2-15    Patient Name: Ry Pendleton  Date:2022  : 1960  [x]  Patient  Verified  Payor: Reij Wheat / Plan: Tanvir Grewal / Product Type: HMO /    In time: 80 Out time: 1030  Total Treatment Time (min): 60  Total Timed Codes (min): 28  1:1 Treatment Time ( only): 28  Visit #:  18    Treatment Area: Neck muscle spasm [M62.838]    SUBJECTIVE  Pain Level (0-10 scale): 5-6 in L-sided neck/UE  Any medication changes, allergies to medications, adverse drug reactions, diagnosis change, or new procedure performed?: [x] No    [] Yes (see summary sheet for update)  Subjective functional status/changes:   [] No changes reported  Patient reports she is a bit more sore today which she thinks it was because she had thrown a potato into the woods with locked up her shoulder. After that she was stuck in the chair for two days straight.      OBJECTIVE           Modality rationale: decrease pain and increase tissue extensibility to improve the patients ability to perform ADLs and reduce pain levels    Min Type Additional Details     [] Estim: []Att   []Unatt        []TENS instruct                  []IFC  []Premod   []NMES                     []Other:  []w/US   []w/ice   []w/heat  Position:  Location:     []  Traction: [] Cervical       []Lumbar                       [] Prone          []Supine                       []Intermittent   []Continuous Lbs:  [] before manual  [] after manual  []w/heat     []  Ultrasound: []Continuous   [] Pulsed at:                           []1MHz   []3MHz Location:  W/cm2:     [] Paraffin         Location:   []w/heat    8 []  Ice     [x]  Heat  []  Ice massage Position: supine  Location: upper back     []  Laser  []  Other: Position:  Location:        []  Vasopneumatic Device Pressure:       [] lo [] med [] hi   Temperature:       [x] Skin assessment post-treatment:  [x]intact []redness- no adverse reaction    []redness  adverse reaction:     42 min Therapeutic Exercise:  [x] See flow sheet :   Rationale: increase ROM, increase strength, improve coordination, improve balance and increase proprioception to improve the patients ability to sleep, transfer, and perform work tasks    nt min Therapeutic Activity:  []  See flow sheet : Includes time spent educating patient on progressive fitness options for maintaining strength and stability gains post-physical therapy plan of care. Patient verbalizing and demonstrating understanding of provided education with 100% accuracy using teach back method. Rationale: increase ROM, increase strength, improve coordination, improve balance and increase proprioception  to improve the patients ability to sleep, transfer, and perform work tasks    10 min Manual Therapy: Bilateral cervical PA/rotational/lateral joint mobilizations (Grade III, C2-7); L shoulder PROM, all planes to tolerance; L glenohumeral inferior/AP joint mobilizations (Grade III); STM/TPR bilateral suboccipitals/L upper trapezius/levator scapula/scalenes/supraspinatus/infraspinatus/teres minor/subscapularis/pectoralis major/minor/latissimus dorsi; L pectoralis major MWM (pin + stretch with horizontal abduction at 120 degrees of shoulder elevation); L subscapularis MWM (pin + stretch with shoulder ER); supine L first rib inferior joint mobilization (Grade III)   Rationale: decrease pain, increase ROM, increase tissue extensibility and decrease trigger points to improve the patients ability to sleep, transfer, and perform work tasks    With   [x] TE   [x] TA   [] Neuro   [] SC   [] other: Patient Education: [x] Review HEP    [] Progressed/Changed HEP based on:   [] positioning   [] body mechanics   [] transfers   [] heat/ice application    [] other:      Other Objective/Functional Measures: None noted    Pain Level (0-10 scale) post treatment: 3 in L-sided neck/UE    ASSESSMENT/Changes in Function:    TTP along the R levator and upper trap.  Fatigued quickly with no money. Tolerated all interventions, will continue to progress as tolerated. Patient will continue to benefit from skilled PT services to modify and progress therapeutic interventions, address functional mobility deficits, address ROM deficits, address strength deficits, analyze and address soft tissue restrictions, analyze and cue movement patterns, analyze and modify body mechanics/ergonomics and assess and modify postural abnormalities to attain remaining goals. [x]  See Plan of Care  []  See progress note/recertification  []  See Discharge Summary         Progress towards goals / Updated goals:    Short Term Goals: To be accomplished in 6-8 treatments: The patient will demonstrate understanding of and compliance with updated and progressive HEP toward improved participation in physical therapy plan of care. - Intermittently Met              The patient will demonstrate ability to transfer to/from wheelchair to mat table 3/3 times without onset of familiar L-sided neck pain toward improved quality of life. - Progressing  Long Term Goals: To be accomplished in 12-16 treatments: The patient will demonstrate multiplanar bilateral UE strength increased by >= 1/2 MMT grade toward improved endurance with functional activities such as transfers and work tasks. - Progressing              The patient will demonstrate longus colli endurance test >= 38 seconds toward improved postural endurance with transfers and work tasks. - Progressing              The patient will score >= 57 on FOTO to demonstrate significantly improved subjective report of function. - Met  Frequency / Duration: Patient to be seen 1-2 times per week for 3-6 treatments.     PLAN  [x]  Upgrade activities as tolerated     [x]  Continue plan of care  [x]  Update interventions per flow sheet       []  Discharge due to:_  []  Other:_      Astrid Holland PTA, DPT 5/4/2022

## 2022-05-04 NOTE — TELEPHONE ENCOUNTER
Pt came into the office wanting to discuss the telephone call from 5/3. [employer] was unavailable.  Pt requests call back

## 2022-05-04 NOTE — PROGRESS NOTES
Patient has graduated from the Complex Case Management  program on 5/4/22 for prediabetes. Patient/family has the ability to self-manage at this time. Care management goals have been completed. No further Ambulatory Care Manager follow up scheduled. Goals Addressed                 This Visit's Progress     COMPLETED: Patient verbalizes understanding of self -management goals of living with Pre-Diabetes. On track     5/4/22-dkw  -pt stated she stopped by after her PT appt downstairs to see if she could see Patsy Silverio earlier; her appt was changed to to Reji Daugherty [phone] with Esthela at 1:15  -pt stated she is doing pretty good; trying to eat healthier and still contemplating going on the [employer] diet again  -pt is paraplegic, but does wheelchair exercises; is currently seeing PT for neck muscle spasm  -discussed popular apps again to support weight loss as pt's desire is to lose weight  -she has nowhere to go though to get weighed due to wheelchair  -pt will follow up with Dr. Ruthy Keita in August and ask for an a1c recheck  -pt stated does not believe she needs further follow up for prediabetes  -CCM episode resolved at this time; can reopen as needed  1 hour ago (11:15 AM)        Pt came into the office wanting to discuss the telephone call from 5/3. Maryse Ruiz was unavailable.  Pt requests call back          5/3/22-dkw  -called pt and she was on a zoom call and she will call this CDCES back    4/11/22-dkw  -it is noted that pt is in [place] period for [facility] admission 3/14/2022 - 3/18/2022 for colitis/constipation  -pt has OP PT, and upcoming appts with Mendez Molina, neuro Dr Bell Paulino and Dr Ruthy Keita 8/1/22  -plan to reach out to pt after [place] period ends, in 1-2 weeks    3/3/22-dkw  -pt called inquiring whether recipes come with the 1200 calorie 14 day sample diabetes menu as she is making a shopping list; advised pt there are no recipes  -advised pt to go to the [email or web address] for recipe ideas  -discussed reading nutrition labels again   -pt will call with any other questions    2/28/22-dkw  -pt was referred by  Dr. David Shultz for prediabetes education, which was initiated 2/28/22 for an a1c of 5.7% on 2/16/22  -continue with exercises from PT for rt should and neck problem  -strive to follow the healthy plate meal plan, not going over 45 grams of carb per meal  -start reading nutrition labels for the total carb content  -refer to low and no carb snack idea handout  -follow up with Dr David Shultz in August as scheduled  -call Watt Ort with any questions  -mailed AVS to pt and sent in My Chart note  -will follow up in one month            Patient has [phone] [facility] contact information for any further questions, concerns, or needs.   Patients upcoming visits:    Future Appointments   Date Time Provider Wilmer Kapadia   5/11/2022  9:30 AM [facility] Nash [facility]   5/18/2022  9:30 AM [facility]amey Nash PT [facility]   5/25/2022  9:30 AM Jamey Nash PT [facility] O[facility]   6/24/2022  9:40 AM Davy Rodas DO [facility] BS AMB   8/1/2022  3:30 PM Génesis Le III, DO [facility] BS AMB

## 2022-05-11 ENCOUNTER — HOSPITAL ENCOUNTER (OUTPATIENT)
Dept: PHYSICAL THERAPY | Age: 62
Discharge: HOME | End: 2022-05-11
Payer: COMMERCIAL

## 2022-05-11 PROCEDURE — 97140 MANUAL THERAPY 1/> REGIONS: CPT

## 2022-05-11 PROCEDURE — 97110 THERAPEUTIC EXERCISES: CPT

## 2022-05-11 NOTE — PROGRESS NOTES
PT DAILY TREATMENT NOTE - [facility] 2-15    Patient Name: Renetta Hess  Date:2022  : 1960  [x]  Patient  Verified  Payor: /    In time: 7940 Out time: 1037  Total Treatment Time (min): 69  Total Timed Codes (min): 59  1:1 Treatment Time ([facility] only): 32  Visit #:  19    Treatment Area: Neck muscle spasm [M62.838]    SUBJECTIVE  Pain Level (0-10 scale): 7 in L-sided neck/UE  Any medication changes, allergies to medications, adverse drug reactions, diagnosis change, or new procedure performed?: [x] No    [] Yes (see summary sheet for update)  Subjective functional status/changes:   [] No changes reported    The patient reports she woke up with increased R upper back and shoulder pain this morning; she had a \"great day\" yesterday without much issue. She notes she slipped while transferring from her car to attend visit today, which may have contributed.      OBJECTIVE         Modality rationale: decrease pain and increase tissue extensibility to improve the patients ability to self-manage current condition    Min Type Additional Details     [] Estim: []Att   []Unatt        []TENS instruct                  []IFC  []Premod   []NMES                     []Other:  []w/US   []w/ice   []w/heat  Position:  Location:     []  Traction: [] Cervical       []Lumbar                       [] Prone          []Supine                       []Intermittent   []Continuous Lbs:  [] before manual  [] after manual  []w/heat     []  Ultrasound: []Continuous   [] Pulsed at:                           []1MHz   []3MHz Location:  W/cm2:     [] Paraffin         Location:   []w/heat    10 []  Ice     [x]  Heat  []  Ice massage Position: Seated  Location: R-sided upper back/shoulder     []  Laser  []  Other: Position:  Location:        []  Vasopneumatic Device Pressure:       [] lo [] med [] hi   Temperature:       [x] Skin assessment post-treatment:  [x]intact []redness- no adverse reaction    []redness  adverse reaction:     49 min Therapeutic Exercise:  [x] See flow sheet :   Rationale: increase ROM, increase strength, improve coordination, improve balance and increase proprioception to improve the patients ability to sleep, transfer, and perform work tasks    10 min Manual Therapy: R shoulder PROM, all planes to tolerance; R glenohumeral inferior/AP joint mobilizations (Grade III); STM/TPR bilateral suboccipitals/R upper trapezius/levator scapula/scalenes/supraspinatus/infraspinatus/teres minor/subscapularis/pectoralis major/minor/latissimus dorsi; R pectoralis major MWM (pin + stretch with horizontal abduction at 120 degrees of shoulder elevation); R subscapularis MWM (pin + stretch with shoulder ER); supine R first rib inferior joint mobilization (Grade III); sidelying R scapular framing/upward rotation (medial border); seated R levator scapula/upper trapezius MWM (pin + stretch with multiplanar cervical AROM, 10x each)   Rationale: decrease pain, increase ROM, increase tissue extensibility and decrease trigger points to improve the patients ability to sleep, transfer, and perform work tasks    With   [x] TE   [] TA   [] Neuro   [] SC   [] other: Patient Education: [x] Review HEP    [] Progressed/Changed HEP based on:   [] positioning   [] body mechanics   [] transfers   [] heat/ice application    [] other:      Other Objective/Functional Measures: None noted    Pain Level (0-10 scale) post treatment: 4 in R-sided upper back/shoulder    ASSESSMENT/Changes in Function:    The patient tolerated therapy visit well at this date. Noted decreased R first rib elevation, reproducing familiar symptoms with inferior joint mobilization and STM/TPR to R rhomboids. Patient continues to report significant symptom relief with test-retest (cervical AROM) pre-post manual intervention. Noted difficulty maintaining L > R UE ER throughout serratus wall slides/stars. Patient demonstrates increased L > R UE stability during rhythmic stabilizations at wall today.  Patient demonstrates rapid muscular fatigue due to serratus anterior and rotator cuff endurance deficits during supine serratus punch + ABCs/circles. Patient noting significant fatigue, significant reduction in familiar symptoms post-session today. Continue to progress as tolerated. Patient will continue to benefit from skilled PT services to modify and progress therapeutic interventions, address functional mobility deficits, address ROM deficits, address strength deficits, analyze and address soft tissue restrictions, analyze and cue movement patterns, analyze and modify body mechanics/ergonomics and assess and modify postural abnormalities to attain remaining goals. [x]  See Plan of Care  []  See progress note/recertification  []  See Discharge Summary         Progress towards goals / Updated goals:    Short Term Goals: To be accomplished in 6-8 treatments: The patient will demonstrate understanding of and compliance with updated and progressive HEP toward improved participation in physical therapy plan of care. - Intermittently Met              The patient will demonstrate ability to transfer to/from wheelchair to mat table 3/3 times without onset of familiar L-sided neck pain toward improved quality of life. - Progressing  Long Term Goals: To be accomplished in 12-16 treatments: The patient will demonstrate multiplanar bilateral UE strength increased by >= 1/2 MMT grade toward improved endurance with functional activities such as transfers and work tasks. - Progressing              The patient will demonstrate longus colli endurance test >= 38 seconds toward improved postural endurance with transfers and work tasks. - Progressing              The patient will score >= 57 on FOTO to demonstrate significantly improved subjective report of function. - Met  Frequency / Duration: Patient to be seen 1-2 times per week for 3-6 treatments.     PLAN  [x]  Upgrade activities as tolerated     [x] Continue plan of care  [x]  Update interventions per flow sheet       []  Discharge due to:_  []  Other:_      Ginacarlo Smart, PT, DPT 5/11/2022

## 2022-05-18 ENCOUNTER — HOSPITAL ENCOUNTER (OUTPATIENT)
Dept: PHYSICAL THERAPY | Age: 62
Discharge: HOME | End: 2022-05-18
Payer: COMMERCIAL

## 2022-05-25 ENCOUNTER — HOSPITAL ENCOUNTER (OUTPATIENT)
Dept: PHYSICAL THERAPY | Age: 62
Discharge: HOME | End: 2022-05-25
Payer: COMMERCIAL

## 2022-05-25 PROCEDURE — 97110 THERAPEUTIC EXERCISES: CPT

## 2022-05-25 PROCEDURE — 97140 MANUAL THERAPY 1/> REGIONS: CPT

## 2022-05-25 NOTE — PROGRESS NOTES
PT DAILY TREATMENT NOTE - [facility] 2-15    Patient Name: Mikaela Yi  Date:2022  : 1960  [x]  Patient  Verified  Payor: Destinee Painter / Plan: Wilberto Drape / Product Type: HMO /    In time:  Out time: 103  Total Treatment Time (min): 60  Total Timed Codes (min): 60  1:1 Treatment Time ( only): 47  Visit #:  20    Treatment Area: Neck muscle spasm [M62.838]    SUBJECTIVE  Pain Level (0-10 scale): 4 in R posterior shoulder  Any medication changes, allergies to medications, adverse drug reactions, diagnosis change, or new procedure performed?: [x] No    [] Yes (see summary sheet for update)  Subjective functional status/changes:   [] No changes reported    The patient reports she caught her pant leg at home, which pulled her out of her chair and required her to brace herself with both arms 1.5 weeks ago; she notes her R lower shoulder has been bothering her since 1-2 days ago and she wonders if she irritated it with this maneuver. Otherwise, she feels she is 90% improved related to current condition and feels she is managing well at home. She has checked into local personal training options and finds these too restrictive due to expense, but feels she is able to manage well at home.     OBJECTIVE    Other Observations: Patient requires increased time to complete transfers and bed mobility throughout re-assessment at this date  Gait and Functional Mobility: The patient arrives self-propelling herself in manual wheelchair at a level of Mod (I), demonstrating increased bilateral accessory muscle recruitment and decreased bilateral shoulder extension  Palpation: Mildly-moderately tender to palpation along R teres major/minor muscle bellies                                                     Cervical OXUR:                                                                       V                      L                        Flexion                                     52                                              Extension                                52                                              Side Bending                           28 (L-sided neck \"pull\")                        35 (R-sided neck \"pull\")                 Rotation                                   78                   81                      A/PROM Shoulder:                Right                           Left              IR                                 Hand to T10 (R anterior shoulder p!)           Hand to T8              ER                               Hand to T5             Hand to T4     Joint Mobility Assessment: Cervical: WNL to PA/bilateral lateral bending/rotational joint mobilizations (C2-7)                                                  Glenohumeral: WNL to L AP/inferior joint mobilizations     Flexibility: Noted grossly decreased bilateral pectoralis major/minor/latissimus dorsi/subscapularis muscle length in seated and mat table positions     UPPER QUARTER                                                     MUSCLE STRENGTH  KEY                                                                                         R                      L  0 - No Contraction                               Flexion                         4+                    4+  1 - Trace                                              Extension (elbow)       4-                     4  2 - Poor                                               Abduction                    4+ (R superior shoulder p!)                       4+ (L superior shoulder p!)  3 - Fair                                                 IR                                 5                      5  4 - MCGE                                              MQ                               9+                    2+  5 - Normal                         Special Tests: Longus Colli Endurance Test: 18.3 seconds                            ULNTT: MSK response for L medial/radial/ulnar nerves    50 min Therapeutic Exercise:  [x] See flow sheet : Includes time spent on re-assessment/discharge tests and measures, as well as time spent educating patient on importance of maintaining appropriate HEP progressions/maintenance at minimum 3-4x/week at home toward continued maintenance and gains. Patient verbalizing and demonstrating understanding of provided education with 100% accuracy using teach back method.    Rationale: increase ROM, increase strength, improve coordination, improve balance and increase proprioception to improve the patients ability to sleep, transfer, and perform work tasks    10 min Manual Therapy: R shoulder PROM, all planes to tolerance; R glenohumeral inferior/AP joint mobilizations (Grade III); STM/TPR bilateral suboccipitals/R upper trapezius/levator scapula/scalenes/supraspinatus/infraspinatus/teres minor/subscapularis/pectoralis major/minor/latissimus dorsi; R pectoralis major MWM (pin + stretch with horizontal abduction at 120 degrees of shoulder elevation); R subscapularis MWM (pin + stretch with shoulder ER); supine R first rib inferior joint mobilization (Grade III); sidelying R scapular framing/upward rotation (medial border); seated R levator scapula/upper trapezius MWM (pin + stretch with multiplanar cervical AROM, 10x each)   Rationale: decrease pain, increase ROM, increase tissue extensibility and decrease trigger points to improve the patients ability to sleep, transfer, and perform work tasks    With   [x] TE   [] TA   [] Neuro   [] SC   [] other: Patient Education: [x] Review HEP    [] Progressed/Changed HEP based on:   [] positioning   [] body mechanics   [] transfers   [] heat/ice application [] other:      Other Objective/Functional Measures: FOTO = 62    Pain Level (0-10 scale) post treatment: 2 in R-sided upper back/shoulder    ASSESSMENT/Changes in Function:    The patient is a 57 year old female who has participated in 20 skilled physical therapy visits due to subacute-onset L-sided neck pain and intermittent L UE \"aching\"/hand numbness. She demonstrates continued however improving signs and symptoms consistent with movement coordination and muscle power impairments secondary to rotator cuff dysfunction, including (-) L median nerve tension test, increased multiplanar L UE strength, increased deep neck flexor and postural endurance (longus colli endurance test = 18.3 seconds), and improved scapulohumeral movement patterns including mildly increased L > R accessory muscle recruitment with transfers and multiplanar overhead shoulder AROM. The patient scored 62 on FOTO, demonstrating significantly improved subjective report of function over physical therapy plan of care. She has met 4/5 physical therapy goals at this time. Due to demonstrated significant subjective and objective progress over physical therapy plan of care, 4/5 physical therapy goals met, and demonstrated independence with updated and progressive HEP as well as appropriate home management, the patient no longer requires skilled physical therapy services and is discharged to independent and progressive HEP/local fitness program at this date. []  See Plan of Care  []  See progress note/recertification  [x]  See Discharge Summary         Progress towards goals / Updated goals:    Short Term Goals: To be accomplished in 6-8 treatments: The patient will demonstrate understanding of and compliance with updated and progressive HEP toward improved participation in physical therapy plan of care.  - Met              The patient will demonstrate ability to transfer to/from wheelchair to mat table 3/3 times without onset of familiar L-sided neck pain toward improved quality of life. - Met  Long Term Goals: To be accomplished in 12-16 treatments: The patient will demonstrate multiplanar bilateral UE strength increased by >= 1/2 MMT grade toward improved endurance with functional activities such as transfers and work tasks. - Met              The patient will demonstrate longus colli endurance test >= 38 seconds toward improved postural endurance with transfers and work tasks.  - Not Met              The patient will score >= 57 on FOTO to demonstrate significantly improved subjective report of function. - Met    PLAN  []  Upgrade activities as tolerated     []  Continue plan of care  []  Update interventions per flow sheet       [x]  Discharge due to: Patient has met 4/5 physical therapy goals and demonstrates independence with updated HEP/home management at this time  []  Other:_      Day Rojas, PT, DPT 5/25/2022

## 2022-05-25 NOTE — PROGRESS NOTES
[address] [facility]  [address] ([address]), [address] [place]  Phone: [phone] Fax: [phone]    Discharge Summary  2-15    Patient name: Earline Fishman  : 1960  Provider#: [id number]  Referral source: Eddie Baldwin      Medical/Treatment Diagnosis: Neck muscle spasm [M62.838]     Prior Hospitalization: see medical history     Comorbidities: See Plan of Care  Prior Level of Function:See Plan of Care  Medications: Verified on Patient Summary List    Start of Care: 22      Onset Date: Approximately 2021   Visits from Start of Care: 20     Missed Visits: 3  Reporting Period : 22 to 22    ASSESSMENT/SUMMARY OF CARE: The patient is a 57 year old female who has participated in 20 skilled physical therapy visits due to subacute-onset L-sided neck pain and intermittent L UE \"aching\"/hand numbness. She demonstrates continued however improving signs and symptoms consistent with movement coordination and muscle power impairments secondary to rotator cuff dysfunction, including (-) L median nerve tension test, increased multiplanar L UE strength, increased deep neck flexor and postural endurance (longus colli endurance test = 18.3 seconds), and improved scapulohumeral movement patterns including mildly increased L > R accessory muscle recruitment with transfers and multiplanar overhead shoulder AROM. The patient scored 62 on FOTO, demonstrating significantly improved subjective report of function over physical therapy plan of care.  She has met 4/5 physical therapy goals at this time. Due to demonstrated significant subjective and objective progress over physical therapy plan of care, 4/5 physical therapy goals met, and demonstrated independence with updated and progressive HEP as well as appropriate home management, the patient no longer requires skilled physical therapy services and is discharged to independent and progressive HEP/local fitness program at this date. Short Term Goals: To be accomplished in 6-8 treatments:               VTW patient will demonstrate understanding of and compliance with updated and progressive HEP toward improved participation in physical therapy plan of care. - Met              The patient will demonstrate ability to transfer to/from wheelchair to mat table 3/3 times without onset of familiar L-sided neck pain toward improved quality of life. - Met  Long Term Goals: To be accomplished in 12-16 treatments:               LZG patient will demonstrate multiplanar bilateral UE strength increased by >= 1/2 MMT grade toward improved endurance with functional activities such as transfers and work tasks. - Met              The patient will demonstrate longus colli endurance test >= 38 seconds toward improved postural endurance with transfers and work tasks.  - Not Met              The patient will score >= 57 on FOTO to demonstrate significantly improved subjective report of function. - Met     RECOMMENDATIONS:  [x]Discontinue therapy: [x]Patient has reached or is progressing toward set goals      []Patient is non-compliant or has abdicated      []Due to lack of appreciable progress towards set goals      []Other    Erlin De La Rosa, PT, DPT 5/25/2022

## 2022-06-01 RX ORDER — CYANOCOBALAMIN 1000 UG/ML
1000 INJECTION, SOLUTION INTRAMUSCULAR; SUBCUTANEOUS
Qty: 4 ML | Refills: 2 | Status: SHIPPED | OUTPATIENT
Start: 2022-06-01

## 2022-06-01 NOTE — TELEPHONE ENCOUNTER
PCP: Amy Love DO    Last appt: 2022  Future Appointments   Date Time Provider Wilmer Kapadia   2022  9:40 AM Darek Rodas DO [facility] BS AMB   2022  3:30 PM Felice Le DO [facility] BS AMB       Requested Prescriptions     Pending Prescriptions Disp Refills    cyanocobalamin (VITAMIN B12) 1,000 mcg/mL injection 4 mL 2     Si mL by IntraMUSCular route every thirty (30) days.        Prior labs and Blood pressures:  BP Readings from Last 3 Encounters:   22 126/76   22 126/69   22 112/73     Lab Results   Component Value Date/Time    Sodium 142 2022 12:56 AM    Potassium 3.6 2022 12:56 AM    Chloride 114 (H) 2022 12:56 AM    CO2 22 2022 12:56 AM    Anion gap 6 2022 12:56 AM    Glucose 138 (H) 2022 12:56 AM    BUN 6 2022 12:56 AM    Creatinine 0.40 (L) 2022 12:56 AM    BUN/Creatinine ratio 15 2022 12:56 AM    GFR est AA >60 2022 12:56 AM    GFR est non-AA >60 2022 12:56 AM    Calcium 8.6 2022 12:56 AM     Lab Results   Component Value Date/Time    Hemoglobin A1c 5.8 (H) 2022 12:00 AM     Lab Results   Component Value Date/Time    Cholesterol, total 342 (H) 2022 12:00 AM    HDL Cholesterol 55 2022 12:00 AM    LDL, calculated 213 (H) 2022 12:00 AM    LDL, calculated 70 2020 01:13 PM    VLDL, calculated 74 (H) 2022 12:00 AM    VLDL, calculated 48 (H) 2020 01:13 PM    Triglyceride 357 (H) 2022 12:00 AM     Lab Results   Component Value Date/Time    VITAMIN D, 25-HYDROXY 12.0 (L) 2022 12:00 AM       Lab Results   Component Value Date/Time    TSH 1.460 2022 12:00 AM

## 2022-06-01 NOTE — TELEPHONE ENCOUNTER
Patient states she needs a call back in reference to seeing if Cyanocobalamin (VITAMIN B12) 1,000 mcg/mL injection can be ordered in a Pre-filled Injectible Pen & If so she needs to get this ordered. If Not patient is requesting refill for Vial & Needles be refilled. Patient states a detailed message can be left on her voice mail to be advised which can be done.  Thank you      Pharmacy is [facility]/ [address] on file

## 2022-06-06 ENCOUNTER — PATIENT OUTREACH (OUTPATIENT)
Dept: INTERNAL MEDICINE CLINIC | Age: 62
End: 2022-06-06

## 2022-06-23 NOTE — PROGRESS NOTES
Neurology Note    Patient ID:  Rufina Hurd  [id number]  80 y.o.  1960    Date of Consultation:  June 24, 2022        Assessment and Plan:    The patient is a pleasant 81-year-old female with a history of spina bifida, type II Chiari malformation, vitamin D deficiency, hypertension, dyslipidemia, and chronic shoulder tendinitis who presents with sensory symptoms and pain radiating down her left upper extremity. 1. Radiating left and right upper extremity pain/sensory disturbance: This is most likely musculoskeletal in origin. Her MRI cervical spine does reveal some degenerative disease, which is worse at C5-C6. She will continue with therapy, stretching, exercises. She has received a new manual chair which should provide her with better posturing. Depending on how things progress, will consider referral to a spinal surgeon for her neck but not at this time and continue with conservative management as strength is preserved. She will continue with anti-inflammatories and Tylenol. 2. History of Chiari malformation and spina bifida:  Her examination does not reveal any increasing signs of upper motor neuron dysfunction. She  Does have chronic incontinence for many years  Follow-up brain neuro imaging reveals no change from prior and no intervention would be necessary. I will follow clinically for the time being. 3.  Risk for vascular disease: She will continue on her blood pressure and medicine for dyslipidemia. Subjective: I have intermittent pain in my left shoulder and arm     History of Present Illness:   Rufina Hurd is a 62 y.o. female who returns to the neurology clinic at [facility] for an evaluation. I did not initially meet the patient on September 24, 2021. Please see my history of present illness, examination, and treatment plan from that day.   She does have a history of hypertension, spina bifida, Chiari malformation, vitamin D deficiency, dyslipidemia. She was referred for pain and sensory disturbance in her left upper extremity. She did have also a history of Chiari malformation and spina bifida. After that visit, the patient did have an EMG/nerve conduction study performed in October 2021. There was no evidence of a focal or generalized large fiber neuropathy, myopathy, or cervical radiculopathy. It was recommended she continue with physical therapy    In March 2022, the patient was [place] [facility] [place]. She has a history of chronic constipation. After digital stimulation, she started emptying, but then didn't feel well. She began to develop a pulsating heading in the back of her head. She was having headaches. She was seen by Dr. Corby Garrido for few days during that hospitalization. During that hospitalization, she did have follow-up MRIs which continue to show her Chiari malformation but no progression of hydrocephalus. She also had an MRI of her cervical spine which showed mild to moderate degenerative arthritis. She is still dealing with bowel issues. She does follow with GI closely. She can go a month without a bowel movement. Patient has been tried on a new medication    Since then, she has not had any further headaches. She denies any new numbness or tingling. She still does have some pain and discomfort in her shoulders that radiates into her elbows. She did get her new lightweight chair but has not been able to use it due to some modifications that are needed. These modifications are now scheduled. She did just finished physical therapy.       Past Medical History:   Diagnosis Date    COVID-19 05/2021    Hypercholesterolemia     Hypertension     Spina bifida [facility])         Past Surgical History:   Procedure Laterality Date    COLONOSCOPY N/A 5/1/2019    COLONOSCOPY performed by Fausto Connors MD at [facility] ENDOSCOPY    COLONOSCOPY N/A 3/17/2022    COLONOSCOPY performed by Omid Love MD [place] at [facility]  5/1/2019         COLONOSCOPY,DIAGNOSTIC  3/17/2022         HX COLONOSCOPY  03/2022    HX OTHER SURGICAL  1890s    sacral wound flap repair        Family History   Problem Relation Age of Onset    Other Mother         breast cancer, Aortic Dissection     Other Father         Bypass x5, shrinking brain     Other Sister         heart stents, asthma, no spleen       No family history of neuromuscular disease    Social History     Tobacco Use    Smoking status: Never Smoker    Smokeless tobacco: Never Used   Substance Use Topics    Alcohol use: Never        Allergies   Allergen Reactions    Sulfa (Sulfonamide Antibiotics) Nausea and Vomiting    Topiramate Other (comments)      tingling sensation in hands and face        Prior to Admission medications    Medication Sig Start Date End Date Taking? Authorizing Provider   cyanocobalamin (VITAMIN B12) 1,000 mcg/mL injection 1 mL by IntraMUSCular route every thirty (30) days. 6/1/22  Yes Vince Le III, DO   acetaminophen (TYLENOL) 500 mg tablet Take 500 mg by mouth every six (6) hours as needed for Pain. Yes Provider, Historical   lisinopriL (PRINIVIL, ZESTRIL) 5 mg tablet Take 1 Tablet by mouth daily. 3/23/22  Yes Vince Le III, DO   plecanatide (Trulance) 3 mg tab Take 3 mg by mouth daily. 3/23/22  Yes Vince Le III, DO   polyethylene glycol (Miralax) 17 gram/dose powder Take 17 g by mouth daily. Indications: constipation   Yes Provider, Historical   diclofenac potassium (CATAFLAM) 50 mg tablet Take 1 Tablet by mouth daily.  2/3/22  Yes Rashad Le III, DO   simvastatin (ZOCOR) 20 mg tablet TAKE 1 TABLET BY MOUTH EVERY DAY 2/3/22  Yes Vince Le III, DO   diclofenac EC (VOLTAREN) 25 mg EC tablet take 1 tablet by oral route  every day as needed  Patient not taking: Reported on 6/24/2022 5/3/21   Provider, Historical       Review of Systems:    General, constitutional: negative  Eyes, vision: negative  Ears, nose, throat: negative  Cardiovascular, heart: negative  Respiratory: negative  Gastrointestinal: negative  Genitourinary: negative  Musculoskeletal: negative  Skin and integumentary: negative  Psychiatric: negative  Endocrine: negative  Neurological: negative, except for HPI  Hematologic/lymphatic: negative  Allergy/immunology: negative    Objective:     Visit Vitals  /82 (BP 1 Location: Left arm, BP Patient Position: Sitting, BP Cuff Size: Adult)   Pulse 82   Resp 16   Ht 4' 6\" (1.372 m)   Wt 148 lb 6.4 oz (67.3 kg)   LMP 08/01/2018 (LMP Unknown)   SpO2 99%   BMI 35.78 kg/m²       Physical Exam:    General:  appears well nourished in no acute distress  Neck: no carotid bruits  Lungs: clear to auscultation  Heart:  no murmurs, regular rate  Lower extremity: peripheral pulses palpable and no edema  Skin: intact    Neurological exam:    Awake, alert, oriented to person, place and time  Recent and remote memory were normal  Attention and concentration were intact  Language was intact. There was no aphasia  Speech: no dysarthria  Fund of knowledge was preserved    Cranial nerves:   II-XII were tested    Perrrla  Visual fields were full  Eomi, no evidence of nystagmus  Facial sensation:  normal and symmetric  Facial motor: normal and symmetric  Hearing intact  SCM strength intact  Tongue: midline without fasciculations    Motor: Tone normal in her upper extremities but reduced in her lower extremities. Full range of motion in her shoulder. Mild tenderness to palpation in her shoulders. No evidence of fasciculations    Strength testing:   deltoid triceps biceps Wrist ext. Wrist flex. intrinsics Hip flex. Hip ext. Knee ext.   Knee flex Dorsi flex Plantar flex   Right 5 5 5 5 5 5 1 1 1 1 1 1   Left 5 5 5 5 5 5 1 1 1 1 1 1       Sensory:  Upper extremity: intact to pp, light touch, and vibration > 10 seconds  Lower extremity: She has decreased pinprick and vibration in her distal lower extremities which is chronic    Reflexes:    Right Left  Biceps  2 2  Triceps 2 2  Brachiorad. 2 2  Patella  - -  Achilles - -    Cerebellar testing:  no tremor apparent, finger/nose and kristi were intact    Gait: This was not assessed due to her weakness    Labs:     Lab Results   Component Value Date/Time    Hemoglobin A1c 5.8 (H) 02/16/2022 12:00 AM    Sodium 142 03/18/2022 12:56 AM    Potassium 3.6 03/18/2022 12:56 AM    Chloride 114 (H) 03/18/2022 12:56 AM    Glucose 138 (H) 03/18/2022 12:56 AM    BUN 6 03/18/2022 12:56 AM    Creatinine 0.40 (L) 03/18/2022 12:56 AM    Calcium 8.6 03/18/2022 12:56 AM    WBC 6.8 03/18/2022 12:56 AM    HCT 33.5 (L) 03/18/2022 12:56 AM    HGB 11.3 (L) 03/18/2022 12:56 AM    PLATELET 862 07/96/4310 12:56 AM       Imaging:    Results from Hospital Encounter encounter on 03/14/22    MRI CERV SPINE W WO CONT    Narrative  EXAM: MRI CERV SPINE W WO CONT    INDICATION: myelopathy. COMPARISON: None    TECHNIQUE: MR imaging of the cervical spine was performed using the following  sequences: sagittal T1, T2, STIR;  axial T2, T1 prior to and following contrast  administration. CONTRAST: 13 mL of ProHance. FINDINGS:  There is normal alignment of the cervical spine. Vertebral body heights are  maintained. Marrow signal is unremarkable. The craniocervical junction show low-lying cerebellar tonsils as detailed in  Brain MRI report. The course, caliber, and signal intensity of the spinal cord  are normal. There is no pathologic intrathecal enhancement. The paraspinal soft  tissues are unremarkable. C2-C3: No herniation or stenosis. C3-C4: No herniation or stenosis. C4-C5: No herniation or stenosis. C5-C6: Moderate spinal stenosis. Diffuse disc bulge. Patent foramina. C6-C7: No herniation or stenosis. C7-T1: No herniation or stenosis. Impression  Cervical degenerative disc disease with C5-6 moderate spinal  stenosis.  No acute finding. Results from [place] encounter on 03/14/22    CT HEAD WO CONT    Narrative  INDICATION: headache, hx of chiari malformation    EXAM:  HEAD CT WITHOUT CONTRAST    COMPARISON: None    TECHNIQUE:  Routine noncontrast axial head CT was performed. Sagittal and  coronal reconstructions were generated. CT dose reduction was achieved through use of a standardized protocol tailored  for this examination and automatic exposure control for dose modulation. FINDINGS:    There is moderate dilatation of the lateral, third and to a lesser degree fourth  ventricles, with downward projection of the cerebellar tonsils below the foramen  magnum. Wall not completely included on the examination, the cerebellar tonsils  extend at least 11 mm below the foramen magnum with CSF crowding. No midline  shift, acute infarction, or acute hemorrhage. No periventricular hypodensity to  suggest acute transependymal flow of CSF. Paranasal sinuses are clear. Small  amount of fluid right mastoid air cells. Impression  Partly visualized Chiari I malformation with CSF crowding at the foramen magnum,  and moderate ventriculomegaly as above. Correlation with any prior imaging would  be helpful. I did independently review the brain MRI from March 13, 2022. There was moderate spinal stenosis at C5-C6 with a diffuse disc bulge. I did independently review the brain MRI from March 16, 2022. She does have the new prior abnormalities associated with a Chiari II malformation. There was stable moderate hydrocephalus with out periventricular edema.          Patient Active Problem List   Diagnosis Code    Hypotension I95.9    Severe sepsis (HCC) A41.9, R65.20    B12 deficiency E53.8    Severe obesity ([address] E66.01    Iron deficiency anemia D50.9    Neurogenic bladder N31.9    Spina bifida ([address] Q05.9    Mixed hyperlipidemia E78.2    Colitis K52.9    Arnold-Chiari malformation, type I ([address] G93.5    Hydrocephalus ([facility] G91.9    Tension vascular headache G44.209    Sinus headache R51.9    Paraplegia (HCC) G82.20    Acute alteration in mental status R41.82    Convulsion ([facility] R56.9        The patient should return to clinic    Renewed medication: none today    I spent  34 minutes on the day of the encounter preparing the office visit by reviewing medical records, obtaining a history, performing examination, counseling and educating the patient on diagnosis, documenting in the clinical medical record, and coordinating the care for the patient. The patient had the ability to ask questions and all questions were answered.                Signed By:  Cristofer Christie DO FAAN    June 24, 2022

## 2022-06-24 ENCOUNTER — OFFICE VISIT (OUTPATIENT)
Dept: NEUROLOGY | Age: 62
End: 2022-06-24
Payer: COMMERCIAL

## 2022-06-24 VITALS
RESPIRATION RATE: 16 BRPM | HEIGHT: 55 IN | WEIGHT: 148.4 LBS | DIASTOLIC BLOOD PRESSURE: 82 MMHG | BODY MASS INDEX: 34.34 KG/M2 | HEART RATE: 82 BPM | SYSTOLIC BLOOD PRESSURE: 130 MMHG | OXYGEN SATURATION: 99 %

## 2022-06-24 DIAGNOSIS — R20.0 LEFT ARM NUMBNESS: ICD-10-CM

## 2022-06-24 DIAGNOSIS — G91.1 OBSTRUCTIVE HYDROCEPHALUS (HCC): ICD-10-CM

## 2022-06-24 DIAGNOSIS — Q07.01 CHIARI MALFORMATION TYPE II (HCC): Primary | ICD-10-CM

## 2022-06-24 DIAGNOSIS — M79.10 MUSCLE PAIN: ICD-10-CM

## 2022-06-24 DIAGNOSIS — M79.602 LEFT ARM PAIN: ICD-10-CM

## 2022-06-24 DIAGNOSIS — E66.01 SEVERE OBESITY (BMI 35.0-39.9) WITH COMORBIDITY (HCC): ICD-10-CM

## 2022-06-24 DIAGNOSIS — G82.20 PARAPLEGIA (HCC): ICD-10-CM

## 2022-06-24 PROCEDURE — 99214 OFFICE O/P EST MOD 30 MIN: CPT | Performed by: PSYCHIATRY & NEUROLOGY

## 2022-06-24 RX ORDER — DICLOFENAC SODIUM 25 MG/1
TABLET, DELAYED RELEASE ORAL
COMMUNITY
Start: 2021-05-03 | End: 2022-08-01

## 2022-06-24 NOTE — LETTER
6/24/2022    Patient: Kelsey Mckeon   YOB: 1960   Date of Visit: 6/24/2022     Mani De Souza III, DO  [address] [place] [address]  [place]    Dear Elyse Velarde DO,      Thank you for referring Ms. Kelsey Mckeon to [address] [place] [address] for evaluation. My notes for this consultation are attached. If you have questions, please do not hesitate to call me. I look forward to following your patient along with you.       Sincerely,    Harry Rodas DO

## 2022-08-01 ENCOUNTER — OFFICE VISIT (OUTPATIENT)
Dept: INTERNAL MEDICINE CLINIC | Age: 62
End: 2022-08-01
Payer: COMMERCIAL

## 2022-08-01 VITALS
HEART RATE: 86 BPM | SYSTOLIC BLOOD PRESSURE: 144 MMHG | WEIGHT: 148 LBS | RESPIRATION RATE: 14 BRPM | HEIGHT: 55 IN | TEMPERATURE: 98.2 F | BODY MASS INDEX: 34.25 KG/M2 | OXYGEN SATURATION: 98 % | DIASTOLIC BLOOD PRESSURE: 74 MMHG

## 2022-08-01 DIAGNOSIS — Q05.9 SPINA BIFIDA, UNSPECIFIED HYDROCEPHALUS PRESENCE, UNSPECIFIED SPINAL REGION (HCC): ICD-10-CM

## 2022-08-01 DIAGNOSIS — K59.00 CONSTIPATION, UNSPECIFIED CONSTIPATION TYPE: ICD-10-CM

## 2022-08-01 DIAGNOSIS — S46.911A STRAIN OF RIGHT SHOULDER, INITIAL ENCOUNTER: Primary | ICD-10-CM

## 2022-08-01 DIAGNOSIS — G82.20 PARAPLEGIA (HCC): ICD-10-CM

## 2022-08-01 DIAGNOSIS — Q07.01 CHIARI MALFORMATION TYPE II (HCC): ICD-10-CM

## 2022-08-01 DIAGNOSIS — I10 ESSENTIAL HYPERTENSION: ICD-10-CM

## 2022-08-01 DIAGNOSIS — N31.9 NEUROGENIC BLADDER: ICD-10-CM

## 2022-08-01 PROCEDURE — 99214 OFFICE O/P EST MOD 30 MIN: CPT | Performed by: INTERNAL MEDICINE

## 2022-08-01 RX ORDER — CELECOXIB 200 MG/1
200 CAPSULE ORAL
Qty: 60 CAPSULE | Refills: 5 | Status: SHIPPED | OUTPATIENT
Start: 2022-08-01

## 2022-08-01 NOTE — PROGRESS NOTES
1. \"Have you been to the ER, urgent care clinic since your last visit? Hospitalized since your last visit? \" No    2. \"Have you seen or consulted any other health care providers outside of the [address] [place] [address] since your last visit? \" No     3. For patients aged 39-70: Has the patient had a colonoscopy / FIT/ Cologuard? Yes - no Care Gap present      If the patient is female:    4. For patients aged 41-77: Has the patient had a mammogram within the past 2 years? Yes - no Care Gap present      5. For patients aged 21-65: Has the patient had a pap smear?  Yes - no Care Gap present

## 2022-08-01 NOTE — PROGRESS NOTES
Alexandria Donaldson is a 65 y.o. female who presents for evaluation of routine follow up. Last seen by me march23, 2022 in follow up for ED stay for colitis. She has overall done well since then. At last visit, I started her on lisinopril 10 mg. Cardio cut it in half, and then at next visit the stopped it. Low bp was 116/70, never had any symptoms of hypotension. Also gave her rx for trulance to help with her bowels. She has not used it very regularly though, as initially her bowels did move rather regularly. Went about 2 weeks without a bm, but over past few days she has moved numerous times. Having more osteoarthritis pains, would like to try something other than diclofenac. Would also like to resume PT for right shoulder pain. ROS:  Constitutional: negative for fevers, chills, anorexia and weight loss  Eyes:   negative for visual disturbance and irritation  ENT:   negative for tinnitus,sore throat,nasal congestion,ear pain,hoarseness  Respiratory:  negative for cough, hemoptysis, dyspnea,wheezing  CV:   negative for chest pain, palpitations, lower extremity edema  GI:   negative for nausea, vomiting, diarrhea, abdominal pain,melena  Genitourinary: negative for frequency, dysuria and hematuria  Musculoskel: negative for myalgias, arthralgias, back pain, muscle weakness.   ++right shoulder joint pain  Neurological:  negative for headaches, dizziness, focal weakness, numbness  Psychiatric:     Negative for depression or anxiety      Past Medical History:   Diagnosis Date    COVID-19 05/2021    Hypercholesterolemia     Hypertension     Spina bifida [facility])        Past Surgical History:   Procedure Laterality Date    COLONOSCOPY N/A 5/1/2019    COLONOSCOPY performed by Gustabo Nicole MD at [facility] ENDOSCOPY    COLONOSCOPY N/A 3/17/2022    COLONOSCOPY performed by Jerry Hare MD at [address]  5/1/2019         COLONOSCOPY,DIAGNOSTIC  3/17/2022         HX COLONOSCOPY  03/2022    HX OTHER SURGICAL  1890s    sacral wound flap repair       Family History   Problem Relation Age of Onset    Other Mother         breast cancer, Aortic Dissection     Other Father         Bypass x5, shrinking brain     Other Sister         heart stents, asthma, no spleen        Social History     Socioeconomic History    Marital status:      Spouse name: Not on file    Number of children: Not on file    Years of education: Not on file    Highest education level: Not on file   Occupational History    Not on file   Tobacco Use    Smoking status: Never    Smokeless tobacco: Never   Vaping Use    Vaping Use: Never used   Substance and Sexual Activity    Alcohol use: Never    Drug use: Never    Sexual activity: Not Currently   Other Topics Concern    Not on file   Social History Narrative    Not on file     Social Determinants of Health     Financial Resource Strain: Low Risk     Difficulty of Paying Living Expenses: Not hard at all   Food Insecurity: No Food Insecurity    Worried About Running Out of Food in the Last Year: Never true    Ran Out of Food in the Last Year: Never true   Transportation Needs: Not on file   Physical Activity: Not on file   Stress: Not on file   Social Connections: Not on file   Intimate Partner Violence: Not on file   Housing Stability: Not on file          Visit Vitals  BP (!) 144/74 (BP 1 Location: Left upper arm, BP Patient Position: Sitting)   Pulse 86   Temp 98.2 °F (36.8 °C) (Temporal)   Resp 14   Ht 4' 6\" (1.372 m)   Wt 148 lb (67.1 kg) Comment: last known weight   LMP 08/01/2018 (LMP Unknown)   SpO2 98%   BMI 35.68 kg/m²       Physical Examination:   General - Well appearing female.   In w/c  HEENT - PERRL, TM no erythema/opacification, normal nasal turbinates, no oropharyngeal erythema or exudate, MMM  Neck - supple, no bruits, no thyroidomegaly, no lymphadenopathy  Pulm - clear to auscultation bilaterally  Cardio - RRR, normal S1 S2, no murmur  Abd - soft, nontender, no masses, no HSM  Extrem - no edema, +2 distal pulses  Neuro-  No focal deficits, CN intact     Assessment/Plan:     Htn--resume lisinopril  Diffuse osteoarthritis--rx for celebrex.   Can also use voltaren gel  Constipated--suggested to use trulance if no BM after 48 hours  Right shoulder strain--referral to PT  Hyperlipids--on zocor  Pareplegic with neurogenic bladder--  Spina bifida with arnold chiari malformation--    Declines shingirx, tdap  Rtc 6 months for cpe        Eriberto Angeles III, DO

## 2022-08-05 ENCOUNTER — TELEPHONE (OUTPATIENT)
Dept: INTERNAL MEDICINE CLINIC | Age: 62
End: 2022-08-05

## 2022-08-05 NOTE — TELEPHONE ENCOUNTER
I called patient's pharmacy [facility] to check the status of Ms. Lindsey's RX Celecoxib. Vira 'VINICIO' . Stated PA was done by them @ [facility] just waiting on the response. I will still fill out the PA papers to make sure that all steps are done for the best interest of the patient. PA for drug Celecoxib pending as of 8/5/22.

## 2022-08-11 NOTE — TELEPHONE ENCOUNTER
Writer initiated PA for medication at this time. Medication received insurance approval starting 8/11/22 to 8/11/23. Writer contacted patient in regards to PA. Writer informed patient that she should be able to contact pharmacy in regards to fulfillment at this time. Patient states that she understands the information received and has no further questions or concerns at this time.

## 2022-08-11 NOTE — TELEPHONE ENCOUNTER
[facility] called to say that they have not gotten a request to start PA on this medication.       #[phone]

## 2022-08-15 ENCOUNTER — TELEPHONE (OUTPATIENT)
Dept: INTERNAL MEDICINE CLINIC | Age: 62
End: 2022-08-15

## 2022-08-15 NOTE — TELEPHONE ENCOUNTER
Pt is wanting to know if dosage of Celebrex should be   this high as is 200mg and also wants to know if should be taken as needed   or daily basis

## 2022-08-15 NOTE — TELEPHONE ENCOUNTER
Called patient. ID verified with Name and . Spoke with patient in regards to information received. Writer informed patient, per provider, \"As needed. If her arthritis is bothering her, then take it. If not having pain, then don't take it. \" Patient states that she understands the information received and has no further questions or concerns at this time.

## 2022-08-15 NOTE — TELEPHONE ENCOUNTER
----- Message from Maria Isabel Jones sent at 8/15/2022 10:28 AM EDT -----  Subject: Medication Problem    Medication: celecoxib (CELEBREX) 200 mg capsule  Dosage: Take 1 Capsule by mouth every twelve (12) hours as needed for   Pain.   Ordering Provider: antonio    Question/Problem: Pt is wanting to know if dosage of Celebrex should be   this high as is 200mg and also wants to know if should be taken as needed   or daily basis  Additional Information for Provider:     Pharmacy: [facility]/PHARMACY #[id number]- [id number], [address]    ---------------------------------------------------------------------------  --------------  Chase KOEHLER  [phone]; OK to leave message on voicemail  ---------------------------------------------------------------------------  --------------    SCRIPT ANSWERS  Relationship to Patient: Self

## 2022-09-25 ENCOUNTER — HOSPITAL ENCOUNTER (INPATIENT)
Age: 62
LOS: 5 days | Discharge: HOME HEALTH | DRG: 871 | End: 2022-09-30
Attending: EMERGENCY MEDICINE | Admitting: INTERNAL MEDICINE
Payer: COMMERCIAL

## 2022-09-25 ENCOUNTER — APPOINTMENT (OUTPATIENT)
Dept: CT IMAGING | Age: 62
DRG: 871 | End: 2022-09-25
Attending: EMERGENCY MEDICINE
Payer: COMMERCIAL

## 2022-09-25 ENCOUNTER — APPOINTMENT (OUTPATIENT)
Dept: GENERAL RADIOLOGY | Age: 62
DRG: 871 | End: 2022-09-25
Attending: EMERGENCY MEDICINE
Payer: COMMERCIAL

## 2022-09-25 DIAGNOSIS — A41.9 SEPTIC SHOCK (HCC): Primary | ICD-10-CM

## 2022-09-25 DIAGNOSIS — K51.00 PANCOLITIS (HCC): ICD-10-CM

## 2022-09-25 DIAGNOSIS — R65.21 SEPTIC SHOCK (HCC): Primary | ICD-10-CM

## 2022-09-25 DIAGNOSIS — Q05.9 SPINA BIFIDA WITHOUT HYDROCEPHALUS, UNSPECIFIED SPINAL REGION (HCC): ICD-10-CM

## 2022-09-25 LAB
ALBUMIN SERPL-MCNC: 4.8 G/DL (ref 3.5–5)
ALBUMIN/GLOB SERPL: 1.3 {RATIO} (ref 1.1–2.2)
ALP SERPL-CCNC: 91 U/L (ref 45–117)
ALT SERPL-CCNC: 97 U/L (ref 12–78)
ANION GAP SERPL CALC-SCNC: 14 MMOL/L (ref 5–15)
AST SERPL-CCNC: 43 U/L (ref 15–37)
BASE DEFICIT BLD-SCNC: 3.1 MMOL/L
BASOPHILS # BLD: 0 K/UL (ref 0–0.1)
BASOPHILS NFR BLD: 0 % (ref 0–1)
BILIRUB SERPL-MCNC: 1.3 MG/DL (ref 0.2–1)
BNP SERPL-MCNC: 46 PG/ML
BUN SERPL-MCNC: 17 MG/DL (ref 6–20)
BUN/CREAT SERPL: 24 (ref 12–20)
CA-I BLD-MCNC: 1.17 MMOL/L (ref 1.12–1.32)
CALCIUM SERPL-MCNC: 10.5 MG/DL (ref 8.5–10.1)
CHLORIDE BLD-SCNC: 106 MMOL/L (ref 100–108)
CHLORIDE SERPL-SCNC: 102 MMOL/L (ref 97–108)
CO2 BLD-SCNC: 21 MMOL/L (ref 19–24)
CO2 SERPL-SCNC: 19 MMOL/L (ref 21–32)
CREAT SERPL-MCNC: 0.71 MG/DL (ref 0.55–1.02)
CREAT UR-MCNC: 0.6 MG/DL (ref 0.6–1.3)
DIFFERENTIAL METHOD BLD: ABNORMAL
EOSINOPHIL # BLD: 0 K/UL (ref 0–0.4)
EOSINOPHIL NFR BLD: 0 % (ref 0–7)
ERYTHROCYTE [DISTWIDTH] IN BLOOD BY AUTOMATED COUNT: 11.9 % (ref 11.5–14.5)
GLOBULIN SER CALC-MCNC: 3.8 G/DL (ref 2–4)
GLUCOSE BLD STRIP.AUTO-MCNC: 176 MG/DL (ref 74–106)
GLUCOSE SERPL-MCNC: 174 MG/DL (ref 65–100)
HCO3 BLDA-SCNC: 21 MMOL/L
HCT VFR BLD AUTO: 47.7 % (ref 35–47)
HGB BLD-MCNC: 15.8 G/DL (ref 11.5–16)
IMM GRANULOCYTES # BLD AUTO: 0 K/UL (ref 0–0.04)
IMM GRANULOCYTES NFR BLD AUTO: 0 % (ref 0–0.5)
LACTATE BLD-SCNC: 3.51 MMOL/L (ref 0.4–2)
LYMPHOCYTES # BLD: 3.2 K/UL (ref 0.8–3.5)
LYMPHOCYTES NFR BLD: 29 % (ref 12–49)
MAGNESIUM SERPL-MCNC: 2.6 MG/DL (ref 1.6–2.4)
MCH RBC QN AUTO: 31 PG (ref 26–34)
MCHC RBC AUTO-ENTMCNC: 33.1 G/DL (ref 30–36.5)
MCV RBC AUTO: 93.7 FL (ref 80–99)
MONOCYTES # BLD: 0.4 K/UL (ref 0–1)
MONOCYTES NFR BLD: 4 % (ref 5–13)
NEUTS SEG # BLD: 7.2 K/UL (ref 1.8–8)
NEUTS SEG NFR BLD: 67 % (ref 32–75)
NRBC # BLD: 0 K/UL (ref 0–0.01)
NRBC BLD-RTO: 0 PER 100 WBC
PCO2 BLDV: 35.1 MMHG (ref 41–51)
PH BLDV: 7.39 [PH] (ref 7.32–7.42)
PLATELET # BLD AUTO: 417 K/UL (ref 150–400)
PMV BLD AUTO: 10.2 FL (ref 8.9–12.9)
PO2 BLDV: 52 MMHG (ref 25–40)
POTASSIUM BLD-SCNC: 5.9 MMOL/L (ref 3.5–5.5)
POTASSIUM SERPL-SCNC: 4.1 MMOL/L (ref 3.5–5.1)
PROT SERPL-MCNC: 8.6 G/DL (ref 6.4–8.2)
RBC # BLD AUTO: 5.09 M/UL (ref 3.8–5.2)
SERVICE CMNT-IMP: ABNORMAL
SODIUM BLD-SCNC: 139 MMOL/L (ref 136–145)
SODIUM SERPL-SCNC: 135 MMOL/L (ref 136–145)
SPECIMEN SITE: ABNORMAL
TROPONIN-HIGH SENSITIVITY: 48 NG/L (ref 0–51)
WBC # BLD AUTO: 11 K/UL (ref 3.6–11)

## 2022-09-25 PROCEDURE — 75810000455 HC PLCMT CENT VENOUS CATH LVL 2 5182

## 2022-09-25 PROCEDURE — 82947 ASSAY GLUCOSE BLOOD QUANT: CPT

## 2022-09-25 PROCEDURE — 99285 EMERGENCY DEPT VISIT HI MDM: CPT

## 2022-09-25 PROCEDURE — C9113 INJ PANTOPRAZOLE SODIUM, VIA: HCPCS | Performed by: NURSE PRACTITIONER

## 2022-09-25 PROCEDURE — 96374 THER/PROPH/DIAG INJ IV PUSH: CPT

## 2022-09-25 PROCEDURE — 83880 ASSAY OF NATRIURETIC PEPTIDE: CPT

## 2022-09-25 PROCEDURE — 96365 THER/PROPH/DIAG IV INF INIT: CPT

## 2022-09-25 PROCEDURE — 74011250636 HC RX REV CODE- 250/636: Performed by: NURSE PRACTITIONER

## 2022-09-25 PROCEDURE — 74011250636 HC RX REV CODE- 250/636: Performed by: EMERGENCY MEDICINE

## 2022-09-25 PROCEDURE — 85025 COMPLETE CBC W/AUTO DIFF WBC: CPT

## 2022-09-25 PROCEDURE — 74011000258 HC RX REV CODE- 258: Performed by: EMERGENCY MEDICINE

## 2022-09-25 PROCEDURE — 71275 CT ANGIOGRAPHY CHEST: CPT

## 2022-09-25 PROCEDURE — 74011000250 HC RX REV CODE- 250: Performed by: EMERGENCY MEDICINE

## 2022-09-25 PROCEDURE — 96361 HYDRATE IV INFUSION ADD-ON: CPT

## 2022-09-25 PROCEDURE — 87040 BLOOD CULTURE FOR BACTERIA: CPT

## 2022-09-25 PROCEDURE — 80053 COMPREHEN METABOLIC PANEL: CPT

## 2022-09-25 PROCEDURE — 96375 TX/PRO/DX INJ NEW DRUG ADDON: CPT

## 2022-09-25 PROCEDURE — 65610000006 HC RM INTENSIVE CARE

## 2022-09-25 PROCEDURE — 84484 ASSAY OF TROPONIN QUANT: CPT

## 2022-09-25 PROCEDURE — 93005 ELECTROCARDIOGRAM TRACING: CPT

## 2022-09-25 PROCEDURE — 74177 CT ABD & PELVIS W/CONTRAST: CPT

## 2022-09-25 PROCEDURE — 83735 ASSAY OF MAGNESIUM: CPT

## 2022-09-25 PROCEDURE — 74011000636 HC RX REV CODE- 636: Performed by: EMERGENCY MEDICINE

## 2022-09-25 PROCEDURE — 36415 COLL VENOUS BLD VENIPUNCTURE: CPT

## 2022-09-25 PROCEDURE — 74011000250 HC RX REV CODE- 250: Performed by: NURSE PRACTITIONER

## 2022-09-25 RX ORDER — ACETAMINOPHEN 325 MG/1
650 TABLET ORAL
Status: DISCONTINUED | OUTPATIENT
Start: 2022-09-25 | End: 2022-09-30 | Stop reason: HOSPADM

## 2022-09-25 RX ORDER — SODIUM CHLORIDE 0.9 % (FLUSH) 0.9 %
5-40 SYRINGE (ML) INJECTION EVERY 8 HOURS
Status: DISCONTINUED | OUTPATIENT
Start: 2022-09-25 | End: 2022-09-30 | Stop reason: HOSPADM

## 2022-09-25 RX ORDER — POLYETHYLENE GLYCOL 3350 17 G/17G
17 POWDER, FOR SOLUTION ORAL DAILY PRN
Status: DISCONTINUED | OUTPATIENT
Start: 2022-09-25 | End: 2022-09-30 | Stop reason: HOSPADM

## 2022-09-25 RX ORDER — ONDANSETRON 2 MG/ML
4 INJECTION INTRAMUSCULAR; INTRAVENOUS
Status: DISCONTINUED | OUTPATIENT
Start: 2022-09-25 | End: 2022-09-30 | Stop reason: HOSPADM

## 2022-09-25 RX ORDER — SODIUM CHLORIDE 0.9 % (FLUSH) 0.9 %
5-40 SYRINGE (ML) INJECTION AS NEEDED
Status: DISCONTINUED | OUTPATIENT
Start: 2022-09-25 | End: 2022-09-30 | Stop reason: HOSPADM

## 2022-09-25 RX ORDER — ONDANSETRON 2 MG/ML
4 INJECTION INTRAMUSCULAR; INTRAVENOUS
Status: COMPLETED | OUTPATIENT
Start: 2022-09-25 | End: 2022-09-25

## 2022-09-25 RX ORDER — ONDANSETRON 4 MG/1
4 TABLET, ORALLY DISINTEGRATING ORAL
Status: DISCONTINUED | OUTPATIENT
Start: 2022-09-25 | End: 2022-09-30 | Stop reason: HOSPADM

## 2022-09-25 RX ORDER — VANCOMYCIN/0.9 % SOD CHLORIDE 1.5G/250ML
1500 PLASTIC BAG, INJECTION (ML) INTRAVENOUS
Status: COMPLETED | OUTPATIENT
Start: 2022-09-25 | End: 2022-09-25

## 2022-09-25 RX ORDER — NOREPINEPHRINE BITARTRATE/D5W 8 MG/250ML
2-100 PLASTIC BAG, INJECTION (ML) INTRAVENOUS
Status: DISCONTINUED | OUTPATIENT
Start: 2022-09-25 | End: 2022-09-27

## 2022-09-25 RX ORDER — ENOXAPARIN SODIUM 100 MG/ML
40 INJECTION SUBCUTANEOUS DAILY
Status: DISCONTINUED | OUTPATIENT
Start: 2022-09-26 | End: 2022-09-30 | Stop reason: HOSPADM

## 2022-09-25 RX ORDER — LEVOFLOXACIN 5 MG/ML
750 INJECTION, SOLUTION INTRAVENOUS EVERY 24 HOURS
Status: DISCONTINUED | OUTPATIENT
Start: 2022-09-25 | End: 2022-09-29

## 2022-09-25 RX ORDER — METRONIDAZOLE 500 MG/100ML
500 INJECTION, SOLUTION INTRAVENOUS EVERY 8 HOURS
Status: DISCONTINUED | OUTPATIENT
Start: 2022-09-25 | End: 2022-09-29

## 2022-09-25 RX ORDER — SODIUM CHLORIDE, SODIUM LACTATE, POTASSIUM CHLORIDE, CALCIUM CHLORIDE 600; 310; 30; 20 MG/100ML; MG/100ML; MG/100ML; MG/100ML
150 INJECTION, SOLUTION INTRAVENOUS CONTINUOUS
Status: DISCONTINUED | OUTPATIENT
Start: 2022-09-25 | End: 2022-09-27

## 2022-09-25 RX ADMIN — PIPERACILLIN AND TAZOBACTAM 3.38 G: 3; .375 INJECTION, POWDER, FOR SOLUTION INTRAVENOUS at 16:23

## 2022-09-25 RX ADMIN — SODIUM CHLORIDE, PRESERVATIVE FREE 10 ML: 5 INJECTION INTRAVENOUS at 21:54

## 2022-09-25 RX ADMIN — SODIUM CHLORIDE 1000 ML: 9 INJECTION, SOLUTION INTRAVENOUS at 17:44

## 2022-09-25 RX ADMIN — LEVOFLOXACIN 750 MG: 5 INJECTION, SOLUTION INTRAVENOUS at 21:53

## 2022-09-25 RX ADMIN — SODIUM CHLORIDE 40 MG: 9 INJECTION, SOLUTION INTRAMUSCULAR; INTRAVENOUS; SUBCUTANEOUS at 21:54

## 2022-09-25 RX ADMIN — SODIUM CHLORIDE, PRESERVATIVE FREE 10 ML: 5 INJECTION INTRAVENOUS at 22:16

## 2022-09-25 RX ADMIN — METRONIDAZOLE 500 MG: 500 INJECTION, SOLUTION INTRAVENOUS at 21:53

## 2022-09-25 RX ADMIN — IOPAMIDOL 100 ML: 755 INJECTION, SOLUTION INTRAVENOUS at 18:40

## 2022-09-25 RX ADMIN — ONDANSETRON 4 MG: 2 INJECTION INTRAMUSCULAR; INTRAVENOUS at 15:20

## 2022-09-25 RX ADMIN — NOREPINEPHRINE BITARTRATE 4 MCG/MIN: 1 SOLUTION INTRAVENOUS at 17:39

## 2022-09-25 RX ADMIN — SODIUM CHLORIDE, POTASSIUM CHLORIDE, SODIUM LACTATE AND CALCIUM CHLORIDE 50 ML/HR: 600; 310; 30; 20 INJECTION, SOLUTION INTRAVENOUS at 22:10

## 2022-09-25 RX ADMIN — VANCOMYCIN HYDROCHLORIDE 1500 MG: 10 INJECTION, POWDER, LYOPHILIZED, FOR SOLUTION INTRAVENOUS at 17:51

## 2022-09-25 RX ADMIN — SODIUM CHLORIDE 1000 ML: 9 INJECTION, SOLUTION INTRAVENOUS at 15:20

## 2022-09-25 NOTE — ED NOTES
Pt and sister updated on plan of care. Sister at bedside. Awaiting CT for dispo. Pt voices no further needs at this time.

## 2022-09-25 NOTE — ED NOTES
At bedside with Dr. Marky Fountain for manual disimpaction. Soap suds enema given. Pt tolerated well. Large amount of stool removed.

## 2022-09-25 NOTE — ED NOTES
Pt with difficult vascular access. Accidental removal of right AC IV occurred during linen change. ED tech at bedside for ultrasound guided IV placement. Antibiotic infusion delayed.

## 2022-09-25 NOTE — ED PROVIDER NOTES
EMERGENCY DEPARTMENT HISTORY AND PHYSICAL EXAM      Date: 9/25/2022  Patient Name: Fawad Rothman    History of Presenting Illness     Chief Complaint   Patient presents with    Chest Pain     Pt arrives to ED via EMS with a cc of single episode rapid HR then right sided chest pain while sitting at the computer today; pt also had one episode of vomiting while in the back of the ambulance and incontinence of stool as well; pt is wheelchair bound at baseline       History Provided By: Patient and Patient's Sister    HPI: Fawad Rothman, 65 y.o. female presents to the ED with cc of nausea vomiting. Patient with a history of spina bifida is incontinence of bowel and bladder. She states she has not had a bowel movement in over a week. She began having nausea and vomiting today and had noticed rapid heart beat and chest pain. Patient states she has not had to keep anything down. She states the pain in her chest is severe in nature, no alleviating or exacerbating factors. She denies any recent fever or chills. She denies any shortness of breath. There are no other complaints, changes, or physical findings at this time. PCP: Pedro Mendiola, DO    No current facility-administered medications on file prior to encounter. Current Outpatient Medications on File Prior to Encounter   Medication Sig Dispense Refill    lisinopriL (PRINIVIL, ZESTRIL) 10 mg tablet TAKE 1 TABLET BY MOUTH EVERY DAY 90 Tablet 3    celecoxib (CELEBREX) 200 mg capsule Take 1 Capsule by mouth every twelve (12) hours as needed for Pain. 60 Capsule 5    cyanocobalamin (VITAMIN B12) 1,000 mcg/mL injection 1 mL by IntraMUSCular route every thirty (30) days. 4 mL 2    acetaminophen (TYLENOL) 500 mg tablet Take 500 mg by mouth every six (6) hours as needed for Pain. lisinopriL (PRINIVIL, ZESTRIL) 5 mg tablet Take 1 Tablet by mouth daily. 90 Tablet 3    plecanatide (Trulance) 3 mg tab Take 3 mg by mouth daily.  90 Tablet 3 simvastatin (ZOCOR) 20 mg tablet TAKE 1 TABLET BY MOUTH EVERY DAY 90 Tablet 3       Past History     Past Medical History:  Past Medical History:   Diagnosis Date    COVID-19 05/2021    Hypercholesterolemia     Hypertension     Spina bifida [facility])        Past Surgical History:  Past Surgical History:   Procedure Laterality Date    COLONOSCOPY N/A 5/1/2019    COLONOSCOPY performed by Eliot Fonseca MD at [facility] ENDOSCOPY    COLONOSCOPY N/A 3/17/2022    COLONOSCOPY performed by Troy Ballesteros MD at [facility] ENDOSCOPY    COLONOSCOPY,DIAGNOSTIC  5/1/2019         COLONOSCOPY,DIAGNOSTIC  3/17/2022         HX COLONOSCOPY  03/2022    HX OTHER SURGICAL  1890s    sacral wound flap repair       Family History:  Family History   Problem Relation Age of Onset    Other Mother         breast cancer, Aortic Dissection     Other Father         Bypass x5, shrinking brain     Other Sister         heart stents, asthma, no spleen        Social History:  Social History     Tobacco Use    Smoking status: Never    Smokeless tobacco: Never   Vaping Use    Vaping Use: Never used   Substance Use Topics    Alcohol use: Never    Drug use: Never       Allergies: Allergies   Allergen Reactions    Sulfa (Sulfonamide Antibiotics) Nausea and Vomiting    Topiramate Other (comments)      tingling sensation in hands and face         Review of Systems   Review of Systems   Unable to perform ROS: Acuity of condition     Physical Exam   Physical Exam  Vitals and nursing note reviewed. Constitutional:       General: She is in acute distress. Appearance: She is well-developed. She is obese. She is ill-appearing and diaphoretic. HENT:      Head: Normocephalic and atraumatic. Mouth/Throat:      Mouth: Mucous membranes are dry. Pharynx: No oropharyngeal exudate. Eyes:      General: No scleral icterus. Extraocular Movements: Extraocular movements intact.       Conjunctiva/sclera: Conjunctivae normal.      Pupils: Pupils are equal, round, and reactive to light. Neck:      Vascular: No JVD. Trachea: No tracheal deviation. Cardiovascular:      Rate and Rhythm: Regular rhythm. Tachycardia present. Heart sounds: Normal heart sounds. No murmur heard. Pulmonary:      Effort: Pulmonary effort is normal. No respiratory distress. Breath sounds: Normal breath sounds. No stridor. No wheezing or rales. Chest:      Chest wall: No tenderness. Abdominal:      General: There is distension. Palpations: Abdomen is soft. Tenderness: There is abdominal tenderness. There is no guarding or rebound. Musculoskeletal:         General: No tenderness. Normal range of motion. Cervical back: Normal range of motion and neck supple. Right lower leg: No edema. Left lower leg: No edema. Skin:     General: Skin is warm. Capillary Refill: Capillary refill takes 2 to 3 seconds. Neurological:      Mental Status: She is alert and oriented to person, place, and time. Cranial Nerves: No cranial nerve deficit.       Comments: No gross motor or sensory deficits    Psychiatric:         Behavior: Behavior normal.       Diagnostic Study Results     Labs -     Recent Results (from the past 12 hour(s))   EKG, 12 LEAD, INITIAL    Collection Time: 09/25/22  2:55 PM   Result Value Ref Range    Ventricular Rate 142 BPM    Atrial Rate 142 BPM    P-R Interval 124 ms    QRS Duration 74 ms    Q-T Interval 296 ms    QTC Calculation (Bezet) 455 ms    Calculated P Axis 48 degrees    Calculated R Axis 67 degrees    Calculated T Axis 32 degrees    Diagnosis       Sinus tachycardia  When compared with ECG of 28-APR-2019 19:21,  Nonspecific T wave abnormality has replaced inverted T waves in Inferior   leads     BLOOD GAS,CHEM8,LACTIC ACID POC    Collection Time: 09/25/22  3:25 PM   Result Value Ref Range    Calcium, ionized (POC) 1.17 1.12 - 1.32 mmol/L    BICARBONATE 21 mmol/L    Base deficit (POC) 3.1 mmol/L    Sample source VENOUS BLOOD      CO2, POC 21 19 - 24 MMOL/L    Sodium,  136 - 145 MMOL/L    Potassium, POC 5.9 (H) 3.5 - 5.5 MMOL/L    Chloride,  100 - 108 MMOL/L    Glucose,  (H) 74 - 106 MG/DL    Creatinine, POC 0.6 0.6 - 1.3 MG/DL    Lactic Acid (POC) 3.51 (HH) 0.40 - 2.00 mmol/L    Critical value read back SEGURA     pH, venous (POC) 7.39 7.32 - 7.42      pCO2, venous (POC) 35.1 (L) 41 - 51 MMHG    pO2, venous (POC) 52 (H) 25 - 40 mmHg   CBC WITH AUTOMATED DIFF    Collection Time: 09/25/22  4:15 PM   Result Value Ref Range    WBC 11.0 3.6 - 11.0 K/uL    RBC 5.09 3.80 - 5.20 M/uL    HGB 15.8 11.5 - 16.0 g/dL    HCT 47.7 (H) 35.0 - 47.0 %    MCV 93.7 80.0 - 99.0 FL    MCH 31.0 26.0 - 34.0 PG    MCHC 33.1 30.0 - 36.5 g/dL    RDW 11.9 11.5 - 14.5 %    PLATELET 665 (H) 664 - 400 K/uL    MPV 10.2 8.9 - 12.9 FL    NRBC 0.0 0  WBC    ABSOLUTE NRBC 0.00 0.00 - 0.01 K/uL    NEUTROPHILS 67 32 - 75 %    LYMPHOCYTES 29 12 - 49 %    MONOCYTES 4 (L) 5 - 13 %    EOSINOPHILS 0 0 - 7 %    BASOPHILS 0 0 - 1 %    IMMATURE GRANULOCYTES 0 0.0 - 0.5 %    ABS. NEUTROPHILS 7.2 1.8 - 8.0 K/UL    ABS. LYMPHOCYTES 3.2 0.8 - 3.5 K/UL    ABS. MONOCYTES 0.4 0.0 - 1.0 K/UL    ABS. EOSINOPHILS 0.0 0.0 - 0.4 K/UL    ABS. BASOPHILS 0.0 0.0 - 0.1 K/UL    ABS. IMM. GRANS. 0.0 0.00 - 0.04 K/UL    DF AUTOMATED     METABOLIC PANEL, COMPREHENSIVE    Collection Time: 09/25/22  4:15 PM   Result Value Ref Range    Sodium 135 (L) 136 - 145 mmol/L    Potassium 4.1 3.5 - 5.1 mmol/L    Chloride 102 97 - 108 mmol/L    CO2 19 (L) 21 - 32 mmol/L    Anion gap 14 5 - 15 mmol/L    Glucose 174 (H) 65 - 100 mg/dL    BUN 17 6 - 20 MG/DL    Creatinine 0.71 0.55 - 1.02 MG/DL    BUN/Creatinine ratio 24 (H) 12 - 20      GFR est AA >60 >60 ml/min/1.73m2    GFR est non-AA >60 >60 ml/min/1.73m2    Calcium 10.5 (H) 8.5 - 10.1 MG/DL    Bilirubin, total 1.3 (H) 0.2 - 1.0 MG/DL    ALT (SGPT) 97 (H) 12 - 78 U/L    AST (SGOT) 43 (H) 15 - 37 U/L    Alk.  phosphatase 91 45 - 117 U/L Protein, total 8.6 (H) 6.4 - 8.2 g/dL    Albumin 4.8 3.5 - 5.0 g/dL    Globulin 3.8 2.0 - 4.0 g/dL    A-G Ratio 1.3 1.1 - 2.2     NT-PRO BNP    Collection Time: 09/25/22  4:15 PM   Result Value Ref Range    NT pro-BNP 46 <125 PG/ML   TROPONIN-HIGH SENSITIVITY    Collection Time: 09/25/22  4:15 PM   Result Value Ref Range    Troponin-High Sensitivity 48 0 - 51 ng/L   MAGNESIUM    Collection Time: 09/25/22  4:15 PM   Result Value Ref Range    Magnesium 2.6 (H) 1.6 - 2.4 mg/dL       Radiologic Studies -   CTA CHEST W OR W WO CONT   Final Result   1. Pancolonic wall thickening and submucosal enhancement. Correlate for   infectious versus inflammatory colitis. 2.  No pulmonary embolism or acute airspace disease. XR CHEST PORT         CT ABD PELV W CONT    (Results Pending)     CT Results  (Last 48 hours)      None          CXR Results  (Last 48 hours)      None            Medical Decision Making   I am the first provider for this patient. I reviewed the vital signs, available nursing notes, past medical history, past surgical history, family history and social history. Vital Signs-Reviewed the patient's vital signs.   Patient Vitals for the past 12 hrs:   Temp Pulse Resp BP SpO2   09/25/22 1816 -- (!) 106 19 100/88 99 %   09/25/22 1800 -- (!) 107 21 93/60 100 %   09/25/22 1745 98.5 °F (36.9 °C) (!) 116 25 (!) 87/61 92 %   09/25/22 1730 -- (!) 122 24 (!) 78/51 100 %   09/25/22 1708 -- (!) 120 20 (!) 85/59 96 %   09/25/22 1645 -- (!) 120 22 (!) 83/62 97 %   09/25/22 1630 -- (!) 122 20 (!) 70/47 96 %   09/25/22 1627 -- (!) 118 21 (!) 63/53 96 %   09/25/22 1545 -- (!) 124 20 (!) 57/23 95 %   09/25/22 1533 -- (!) 125 22 (!) 100/57 97 %   09/25/22 1519 -- (!) 136 27 (!) 58/51 97 %   09/25/22 1515 -- (!) 147 24 (!) 57/42 97 %   09/25/22 1443 97.8 °F (36.6 °C) (!) 141 29 (!) 73/42 96 %       EKG interpretation: (Preliminary)  Sinus tach, rate 142, normal axis/pr/qrs, no acute changes, David Marks, DO      Records Reviewed: Nursing Notes, Old Medical Records, Ambulance Run Sheet, Previous Radiology Studies, and Previous Laboratory Studies    Provider Notes (Medical Decision Making):   DDx-bowel impaction small bowel obstruction, PE, ACS, chest wall pain, colitis, diverticulitis, pancreatitis, pneumonia which    ED Course:   Initial assessment performed. The patients presenting problems have been discussed, and they are in agreement with the care plan formulated and outlined with them. I have encouraged them to ask questions as they arise throughout their visit. Procedure Note:  Disimpaction  Accompanied by nurse Jaay Delgado, spent approximately 15 minutes manually disimpacting as much stools possible to get out. Amount probably enough to fill bedpan. Procedure tolerated well no complications. Patient with a single IV with poor flow patient continued to be hypotensive discussed with patient and her sister central line placement so that we could fluid resuscitate her as well as complete her antibiotics as well as pressors. Procedure Note - Central Line Placement:   5:39 PM  Performed by: Fei Fisher DO     Immediately prior to the procedure, the patient was reevaluated and found suitable for the planned procedure and any planned medications. Immediately prior to the procedure a time out was called to verify the correct patient, procedure, equipment, staff, and marking as appropriate. Area was cleansed with Chlorprep and anesthetized with 4mLs of 1% lidocaine. Prepped and draped in sterile fashion. Landmarks identified. 18 gauge needle with triple lumen catheter was inserted into pt's Left, Internal Jugular Vein with ultrasound guidance. Line sutured in place; sterile dressing applied. Position: Trendelenburg  Number of attempts: 1  Estimated blood loss: 0  The procedure took 15 minutes, and pt tolerated well.      I've performed a sepsis reassessment of the patient's clinical volume status and tissue perfusion at time 6:52 PM  PT AOX4, , pulse ox 96%, RR 18, skin warm and dry, /101. ED [facility] ED SEPSIS NOTE:     6:53 PM The patient now meets criteria for: Septic Shock    Fluid resuscitation with: 30 mL/kg crystalloid bolus  Due to concern for rapidly advancing infection and deterioration of patient's condition, antibiotics are started STAT and cultures ordered. ----------------------------------------------------------------------------  Pt receiving IV fluids will add Levophed to start, as this can weaned as tolerated following fluid resuscitation. Consult:  Case discussed with the intensivist.  Patient will be evaluated and seen. Critical Care Time:   CRITICAL CARE NOTE :    5:38 PM    IMPENDING DETERIORATION -Cardiovascular and Metabolic  ASSOCIATED RISK FACTORS - Hypotension, Shock, Dysrhythmia, Metabolic changes, and Dehydration  MANAGEMENT- Bedside Assessment and Supervision of Care  INTERPRETATION -  Xrays, CT Scan, ECG, Blood Pressure, Cardiac Output Measures , and labs  INTERVENTIONS - hemodynamic mngmt and IV Ab   CASE REVIEW - Hospitalist/Intensivist, Nursing, and Family  TREATMENT RESPONSE -Improved and Stable  PERFORMED BY - Self    NOTES   :  I have spent 90 minutes of critical care time involved in lab review, consultations with specialist, family decision- making, bedside attention and documentation. This time excludes time spent in any separate billed procedures. During this entire length of time I was immediately available to the patient . Juan Pablo Martin DO      Disposition:      Diagnosis     Clinical Impression:   1. Septic shock ([facility] [address]    2. Pancolitis ([facility] [address]    3. Spina bifida without hydrocephalus, unspecified spinal region [facility])        Attestations:    Juan Pablo Martin DO        Please note that this dictation was completed with [employer], the [employer] voice recognition software.   Quite often unanticipated grammatical, syntax, homophones, and other interpretive errors are inadvertently transcribed by the computer software. Please disregard these errors. Please excuse any errors that have escaped final proofreading. Thank you.

## 2022-09-25 NOTE — H&P
CRITICAL CARE NOTE      Name: Antoni Hernandes   : 1960   MRN: [id number]   Date: 2022      REASON FOR ICU ADMISSION:  Hypotension 2/2 dehydration +/- Sepsis    PRINCIPAL ICU DIAGNOSIS   Pan Colitis infectious vs inflammatory vs ischemia  Hypovolemic hypotension 2/2 dehydration +/- Sepsis  Acute on chronic Constipation    BRIEF PATIENT SUMMARY   66 y/o female hx of spina bifida,with neurogenic bladder, HTN , HLD previous admit for colitis () and chronic constipation/bowel/bladder incontinence admitted () for pan colitis: infectious vs inflammatory vs ischemic and hypotension 2/2 dehydration . Hx obtained per patient endorses ~45 minutes prior to presentation, episode of rapid HR with chest pain, vomit x 1 with stool incontinence. Denies recent illness, fever, chills. In ED fluid resuscitated 30 cc/kg crystalloid bolus, CTA chest neg for PE, CT A/P with pan colitis, infectious vs inflammatory. Started on levophed d/t continued hypotension. Normal WBC, Afebrile, Lactic 3.37. Manual disimpaction with soap enema performed in ED with large quantity of stool output.  Admit to ICU for vasopressors requirement    COMPREHENSIVE ASSESSMENT & PLAN:SYSTEM BASED     24 HOUR EVENTS:   Admit to ICU on levophed, continue IV fluid resuscitation and antibiotics  GI consult in AM    NEUROLOGICAL: Hx of Paraplegia 2/2 Spina Bifida   No acute issues  Close neurological monitoring    PULMONOLOGY:   No active issues  CTA Chest negative for PE or airspace disease  Maintain oxygen saturation > 94 %  HOB elevated Aspiration Precautions    CARDIOVASCULAR:   Hypotension 2/2 to severe dehydration +/-sepsis  Sinus Rhythm , no acute ST changes, Troponin- negative  Fluid resuscitated, Levophed for goal Map > 65    GASTROINTESTINAL:   Hx of Colitis:  last colonoscopy 3/17/22 w/ minimal erythema of sigmoid and descending colon,previous admit 2019 for acute colitis; colonoscopy consistent with left sided ulcerative colitis  Bowel/Bladder incontinence 2/2 Spina Bifida   Pan Colitis Infectious vs inflammatory vs ischemic  CT A/P: Pan colonic thickening and submucosal enhancement infectious vs inflammatory colitis  Large stool disimpacted in ED with enema and manual disimpaction  Continue IV fluids and antibiotics  GI consult in AM    RENAL/ELECTROLYTE/FLUIDS:Hx of Neurogenic Bladder (home self cath)   Bun/Creatine Stable  Tee Catheter in place 2/2 chronic urinary retention  Goal Urine output > 0.5 cc/kg/hr  IV fluids    ENDOCRINE:   No known hx of diabetes or thyroid disease  Blood Sugar Goal 120-180  Avoid Hypo/Hyperglycemia    HEMATOLOGY/ONCOLOGY:   H/H hemoconcentration 2/2 intravascular volume depletion  Enoxaparin for DVT prophylaxis    INFECTIOUS DISEASE:   Pan Colitis (Hx of Colitis) infectious vs inflammatory vs ischemia  Normal WBC, -fever, Elevated Lactic  Continued Flagyl/Levaquin    ANTIBIOTICS TO DATE:  Flagyl (9/25-current)  Levaquin (9/25-current)  Vancomycin (9/25)    CULTURES TO DATE:  (9/25) C- Diff/ Stool- pending  (9/25) BC- pending  (9/25) UC- pending    ICU DAILY CHECKLIST     Code Status:FULL  DVT Prophylaxis:Enoxaparin  T/L/D: Tubes: None  Lines: Peripheral IV  Drains: Tee Catheter  SUP: Protonix  Diet: Clears advance as tolerated  Activity Level:As tolerated  ABCDEF Bundle/Checklist Completed:Yes  Disposition: Stay in ICU  Multidisciplinary Rounds Completed:  Pending  Goals of Care Discussion/Palliative: Yes  Patient/Family Updated: Yes      HOSPITAL COURSE/DAILY EVENT LOG       SUBJECTIVE   Review of Systems   Constitutional: Negative. HENT: Negative. Eyes: Negative. Respiratory: Negative. Cardiovascular: Negative. Gastrointestinal:  Positive for abdominal pain, constipation, nausea and vomiting. Negative for blood in stool and melena. Genitourinary: Negative. Musculoskeletal: Negative. Skin: Negative. Neurological: Negative. Endo/Heme/Allergies: Negative. Psychiatric/Behavioral: Negative. OBJECTIVE     Labs and Data: Reviewed 22  Medications: Reviewed 22  Imaging: Reviewed 22    Physical Exam  Vitals reviewed. Constitutional:       Appearance: Normal appearance. She is ill-appearing. HENT:      Head: Normocephalic and atraumatic. Nose: Nose normal.      Mouth/Throat:      Mouth: Mucous membranes are moist.   Eyes:      Pupils: Pupils are equal, round, and reactive to light. Cardiovascular:      Rate and Rhythm: Normal rate and regular rhythm. Pulses: Normal pulses. Heart sounds: Normal heart sounds. Pulmonary:      Effort: Pulmonary effort is normal.      Breath sounds: Normal breath sounds. Abdominal:      General: Bowel sounds are normal. There is no distension. Palpations: Abdomen is soft. Tenderness: There is no abdominal tenderness. Musculoskeletal:         General: Normal range of motion. Cervical back: Normal range of motion and neck supple. Right lower leg: Edema present. Left lower leg: Edema present. Skin:     General: Skin is warm and dry. Coloration: Skin is pale. Neurological:      General: No focal deficit present. Mental Status: She is alert and oriented to person, place, and time.    Psychiatric:         Mood and Affect: Mood normal.        Visit Vitals  /88 (BP 1 Location: Left arm, BP Patient Position: At rest)   Pulse (!) 106   Temp 98.5 °F (36.9 °C)   Resp 19   Ht 4' 6\" (1.372 m)   Wt 68 kg (150 lb)   LMP 2018 (LMP Unknown)   SpO2 99%   BMI 36.17 kg/m²      O2 Device: None (Room air) Temp (24hrs), Av.2 °F (36.8 °C), Min:97.8 °F (36.6 °C), Max:98.5 °F (36.9 °C)           Intake/Output:   No intake or output data in the 24 hours ending 22    Imaging           CRITICAL CARE DOCUMENTATION  I had a face to face encounter with the patient, reviewed and interpreted patient data including clinical events, labs, images, vital signs, I/O's, and examined patient. I have discussed the case and the plan and management of the patient's care with the consulting services, the bedside nurses and the respiratory therapist.      NOTE OF PERSONAL INVOLVEMENT IN CARE   This patient has a high probability of imminent, clinically significant deterioration, which requires the highest level of preparedness to intervene urgently. I participated in the decision-making and personally managed or directed the management of the following life and organ supporting interventions that required my frequent assessment to treat or prevent imminent deterioration. I personally spent 55 minutes of critical care time. This is time spent at this critically ill patient's bedside actively involved in patient care as well as the coordination of care. This does not include any procedural time which has been billed separately.     Cande Foy NP   Critical Care Medicine  [facility]

## 2022-09-25 NOTE — ED NOTES
Dr. Emigdio Heller at bedside for central line placement. Consent form signed by sister prior to start of procedure. Line placed in left neck. Pt tolerated well. Ok to use, per Dr. Emigdio Heller.

## 2022-09-25 NOTE — ED NOTES
Assuming care at this time. Pt on room air with an 02 sat of 100%. Family at bedside. No complaints of pain at this time. Levo running at 4 mcq.

## 2022-09-26 LAB
ANION GAP SERPL CALC-SCNC: 7 MMOL/L (ref 5–15)
APPEARANCE UR: CLEAR
BACTERIA URNS QL MICRO: NEGATIVE /HPF
BILIRUB UR QL CFM: NEGATIVE
BUN SERPL-MCNC: 12 MG/DL (ref 6–20)
BUN/CREAT SERPL: 17 (ref 12–20)
C DIFF GDH STL QL: NEGATIVE
C DIFF TOX A+B STL QL IA: NEGATIVE
CALCIUM SERPL-MCNC: 8.2 MG/DL (ref 8.5–10.1)
CAMPYLOBACTER SPECIES, DNA: NEGATIVE
CHLORIDE SERPL-SCNC: 111 MMOL/L (ref 97–108)
CO2 SERPL-SCNC: 20 MMOL/L (ref 21–32)
COLOR UR: ABNORMAL
CREAT SERPL-MCNC: 0.72 MG/DL (ref 0.55–1.02)
ENTEROTOXIGEN E COLI, DNA: NEGATIVE
EPITH CASTS URNS QL MICRO: ABNORMAL /LPF
ERYTHROCYTE [DISTWIDTH] IN BLOOD BY AUTOMATED COUNT: 12.3 % (ref 11.5–14.5)
GLUCOSE SERPL-MCNC: 172 MG/DL (ref 65–100)
GLUCOSE UR STRIP.AUTO-MCNC: NEGATIVE MG/DL
HCT VFR BLD AUTO: 39.6 % (ref 35–47)
HGB BLD-MCNC: 13.1 G/DL (ref 11.5–16)
HGB UR QL STRIP: NEGATIVE
HYALINE CASTS URNS QL MICRO: ABNORMAL /LPF (ref 0–2)
INTERPRETATION: NORMAL
KETONES UR QL STRIP.AUTO: 15 MG/DL
LACTATE SERPL-SCNC: 1.5 MMOL/L (ref 0.4–2)
LACTATE SERPL-SCNC: 2.9 MMOL/L (ref 0.4–2)
LEUKOCYTE ESTERASE UR QL STRIP.AUTO: ABNORMAL
MAGNESIUM SERPL-MCNC: 2.2 MG/DL (ref 1.6–2.4)
MCH RBC QN AUTO: 31.8 PG (ref 26–34)
MCHC RBC AUTO-ENTMCNC: 33.1 G/DL (ref 30–36.5)
MCV RBC AUTO: 96.1 FL (ref 80–99)
NITRITE UR QL STRIP.AUTO: NEGATIVE
NRBC # BLD: 0 K/UL (ref 0–0.01)
NRBC BLD-RTO: 0 PER 100 WBC
P SHIGELLOIDES DNA STL QL NAA+PROBE: NEGATIVE
PH UR STRIP: 5 [PH] (ref 5–8)
PHOSPHATE SERPL-MCNC: 2.4 MG/DL (ref 2.6–4.7)
PLATELET # BLD AUTO: 283 K/UL (ref 150–400)
PMV BLD AUTO: 9.5 FL (ref 8.9–12.9)
POTASSIUM SERPL-SCNC: 3.8 MMOL/L (ref 3.5–5.1)
PROCALCITONIN SERPL-MCNC: 6.12 NG/ML
PROT UR STRIP-MCNC: NEGATIVE MG/DL
RBC # BLD AUTO: 4.12 M/UL (ref 3.8–5.2)
RBC #/AREA URNS HPF: ABNORMAL /HPF (ref 0–5)
SALMONELLA SPECIES, DNA: NEGATIVE
SHIGA TOXIN PRODUCING, DNA: NEGATIVE
SHIGELLA SP+EIEC IPAH STL QL NAA+PROBE: NEGATIVE
SODIUM SERPL-SCNC: 138 MMOL/L (ref 136–145)
SP GR UR REFRACTOMETRY: 1.01 (ref 1–1.03)
UA: UC IF INDICATED,UAUC: ABNORMAL
UROBILINOGEN UR QL STRIP.AUTO: 1 EU/DL (ref 0.2–1)
VIBRIO SPECIES, DNA: NEGATIVE
WBC # BLD AUTO: 17.3 K/UL (ref 3.6–11)
WBC URNS QL MICRO: ABNORMAL /HPF (ref 0–4)
Y. ENTEROCOLITICA, DNA: NEGATIVE

## 2022-09-26 PROCEDURE — 83735 ASSAY OF MAGNESIUM: CPT

## 2022-09-26 PROCEDURE — 84100 ASSAY OF PHOSPHORUS: CPT

## 2022-09-26 PROCEDURE — 74011000250 HC RX REV CODE- 250: Performed by: EMERGENCY MEDICINE

## 2022-09-26 PROCEDURE — 89055 LEUKOCYTE ASSESSMENT FECAL: CPT

## 2022-09-26 PROCEDURE — 74011000250 HC RX REV CODE- 250: Performed by: NURSE PRACTITIONER

## 2022-09-26 PROCEDURE — 74011250636 HC RX REV CODE- 250/636: Performed by: INTERNAL MEDICINE

## 2022-09-26 PROCEDURE — 83605 ASSAY OF LACTIC ACID: CPT

## 2022-09-26 PROCEDURE — 65610000006 HC RM INTENSIVE CARE

## 2022-09-26 PROCEDURE — 74011250636 HC RX REV CODE- 250/636: Performed by: NURSE PRACTITIONER

## 2022-09-26 PROCEDURE — 84145 PROCALCITONIN (PCT): CPT

## 2022-09-26 PROCEDURE — 36415 COLL VENOUS BLD VENIPUNCTURE: CPT

## 2022-09-26 PROCEDURE — 85027 COMPLETE CBC AUTOMATED: CPT

## 2022-09-26 PROCEDURE — 74011250637 HC RX REV CODE- 250/637: Performed by: INTERNAL MEDICINE

## 2022-09-26 PROCEDURE — 87506 IADNA-DNA/RNA PROBE TQ 6-11: CPT

## 2022-09-26 PROCEDURE — 81001 URINALYSIS AUTO W/SCOPE: CPT

## 2022-09-26 PROCEDURE — 74011250637 HC RX REV CODE- 250/637: Performed by: NURSE PRACTITIONER

## 2022-09-26 PROCEDURE — 87324 CLOSTRIDIUM AG IA: CPT

## 2022-09-26 PROCEDURE — 80048 BASIC METABOLIC PNL TOTAL CA: CPT

## 2022-09-26 PROCEDURE — 74011000258 HC RX REV CODE- 258: Performed by: EMERGENCY MEDICINE

## 2022-09-26 RX ORDER — BALSAM PERU/CASTOR OIL
OINTMENT (GRAM) TOPICAL 2 TIMES DAILY
Status: DISCONTINUED | OUTPATIENT
Start: 2022-09-26 | End: 2022-09-30 | Stop reason: HOSPADM

## 2022-09-26 RX ORDER — HYDROCORTISONE SODIUM SUCCINATE 100 MG/2ML
50 INJECTION, POWDER, FOR SOLUTION INTRAMUSCULAR; INTRAVENOUS EVERY 6 HOURS
Status: DISCONTINUED | OUTPATIENT
Start: 2022-09-26 | End: 2022-09-27

## 2022-09-26 RX ADMIN — HYDROCORTISONE SODIUM SUCCINATE 50 MG: 100 INJECTION, POWDER, FOR SOLUTION INTRAMUSCULAR; INTRAVENOUS at 23:45

## 2022-09-26 RX ADMIN — SODIUM CHLORIDE: 900 INJECTION, SOLUTION INTRAVENOUS at 04:19

## 2022-09-26 RX ADMIN — ACETAMINOPHEN 650 MG: 325 TABLET ORAL at 01:58

## 2022-09-26 RX ADMIN — NOREPINEPHRINE BITARTRATE 8 MCG/MIN: 1 SOLUTION INTRAVENOUS at 14:04

## 2022-09-26 RX ADMIN — SODIUM CHLORIDE, PRESERVATIVE FREE 10 ML: 5 INJECTION INTRAVENOUS at 21:27

## 2022-09-26 RX ADMIN — HYDROCORTISONE SODIUM SUCCINATE 50 MG: 100 INJECTION, POWDER, FOR SOLUTION INTRAMUSCULAR; INTRAVENOUS at 09:26

## 2022-09-26 RX ADMIN — SODIUM CHLORIDE, PRESERVATIVE FREE 40 ML: 5 INJECTION INTRAVENOUS at 06:03

## 2022-09-26 RX ADMIN — METRONIDAZOLE 500 MG: 500 INJECTION, SOLUTION INTRAVENOUS at 13:04

## 2022-09-26 RX ADMIN — CASTOR OIL AND BALSAM, PERU: 788; 87 OINTMENT TOPICAL at 09:26

## 2022-09-26 RX ADMIN — SODIUM CHLORIDE, POTASSIUM CHLORIDE, SODIUM LACTATE AND CALCIUM CHLORIDE 150 ML/HR: 600; 310; 30; 20 INJECTION, SOLUTION INTRAVENOUS at 09:27

## 2022-09-26 RX ADMIN — Medication 1 AMPULE: at 20:38

## 2022-09-26 RX ADMIN — SODIUM CHLORIDE, PRESERVATIVE FREE 10 ML: 5 INJECTION INTRAVENOUS at 14:07

## 2022-09-26 RX ADMIN — CASTOR OIL AND BALSAM, PERU: 788; 87 OINTMENT TOPICAL at 20:38

## 2022-09-26 RX ADMIN — METRONIDAZOLE 500 MG: 500 INJECTION, SOLUTION INTRAVENOUS at 04:51

## 2022-09-26 RX ADMIN — SODIUM CHLORIDE, POTASSIUM CHLORIDE, SODIUM LACTATE AND CALCIUM CHLORIDE 150 ML/HR: 600; 310; 30; 20 INJECTION, SOLUTION INTRAVENOUS at 18:40

## 2022-09-26 RX ADMIN — LEVOFLOXACIN 750 MG: 5 INJECTION, SOLUTION INTRAVENOUS at 21:27

## 2022-09-26 RX ADMIN — ACETAMINOPHEN 650 MG: 325 TABLET ORAL at 10:56

## 2022-09-26 RX ADMIN — ACETAMINOPHEN 650 MG: 325 TABLET ORAL at 20:41

## 2022-09-26 RX ADMIN — SODIUM CHLORIDE, POTASSIUM CHLORIDE, SODIUM LACTATE AND CALCIUM CHLORIDE 1000 ML: 600; 310; 30; 20 INJECTION, SOLUTION INTRAVENOUS at 09:28

## 2022-09-26 RX ADMIN — METRONIDAZOLE 500 MG: 500 INJECTION, SOLUTION INTRAVENOUS at 20:39

## 2022-09-26 RX ADMIN — ENOXAPARIN SODIUM 40 MG: 100 INJECTION SUBCUTANEOUS at 08:19

## 2022-09-26 RX ADMIN — HYDROCORTISONE SODIUM SUCCINATE 50 MG: 100 INJECTION, POWDER, FOR SOLUTION INTRAMUSCULAR; INTRAVENOUS at 17:52

## 2022-09-26 RX ADMIN — Medication 1 AMPULE: at 10:54

## 2022-09-26 NOTE — PROGRESS NOTES
ICU Progress Note        Subjective: Overnight events noted. Vital Signs:    Visit Vitals  BP (!) 94/50   Pulse (!) 123   Temp 99 °F (37.2 °C)   Resp 15   Ht 4' 6\" (1.372 m)   Wt 64 kg (141 lb 1.5 oz)   LMP 2018 (LMP Unknown)   SpO2 98%   BMI 34.02 kg/m²       O2 Device: None (Room air)       Temp (24hrs), Av.8 °F (37.1 °C), Min:97.8 °F (36.6 °C), Max:100.3 °F (37.9 °C)       Intake/Output:   Last shift:      No intake/output data recorded. Last 3 shifts:  1901 -  0700  In: 1020.2 [P.O.:120; I.V.:900.2]  Out: 1200 [Urine:1200]    Intake/Output Summary (Last 24 hours) at 2022 0856  Last data filed at 2022 0600  Gross per 24 hour   Intake 1020.23 ml   Output 1200 ml   Net -179.77 ml           Physical Exam:    General: Alert, awake and oriented. Not in acute distress.  SPINA BIFIDA, short stature +  HEENT:  Anicteric sclerae; pink palpebral conjunctivae; mucosa moist  Resp:  Bilateral air entry +, no crackles or wheeze  CV:  S1, S2 present  GI:  Abdomen soft, non-tender; (+) active bowel sounds  Extremities:  Club foot +  Skin:  Warm; no rashes/ lesions noted  Neurologic:  Non-focal    DATA:     Current Facility-Administered Medications   Medication Dose Route Frequency    alcohol 62% (NOZIN) nasal  1 Ampule  1 Ampule Topical Q12H    balsam peru-castor oiL (VENELEX) ointment   Topical BID    NOREPINephrine (LEVOPHED) 8 mg in 5% dextrose 250mL (32 mcg/mL) infusion  2-100 mcg/min IntraVENous TITRATE    sodium chloride (NS) flush 5-40 mL  5-40 mL IntraVENous Q8H    sodium chloride (NS) flush 5-40 mL  5-40 mL IntraVENous PRN    acetaminophen (TYLENOL) tablet 650 mg  650 mg Oral Q6H PRN    Or    acetaminophen (TYLENOL) suppository 650 mg  650 mg Rectal Q6H PRN    polyethylene glycol (MIRALAX) packet 17 g  17 g Oral DAILY PRN    ondansetron (ZOFRAN ODT) tablet 4 mg  4 mg Oral Q8H PRN    Or    ondansetron (ZOFRAN) injection 4 mg  4 mg IntraVENous Q6H PRN    enoxaparin (LOVENOX) injection 40 mg  40 mg SubCUTAneous DAILY    lactated Ringers infusion  75 mL/hr IntraVENous CONTINUOUS    pantoprazole (PROTONIX) 40 mg in 0.9% sodium chloride 10 mL injection  40 mg IntraVENous DAILY    levoFLOXacin (LEVAQUIN) 750 mg in D5W IVPB  750 mg IntraVENous Q24H    metroNIDAZOLE (FLAGYL) IVPB premix 500 mg  500 mg IntraVENous Q8H         Labs: Results:       Chemistry Recent Labs     09/26/22  0204 09/25/22  1615   * 174*    135*   K 3.8 4.1   * 102   CO2 20* 19*   BUN 12 17   CREA 0.72 0.71   CA 8.2* 10.5*   AGAP 7 14   BUCR 17 24*   AP  --  91   TP  --  8.6*   ALB  --  4.8   GLOB  --  3.8   AGRAT  --  1.3      CBC w/Diff Recent Labs     09/26/22  0204 09/25/22  1615   WBC 17.3* 11.0   RBC 4.12 5.09   HGB 13.1 15.8   HCT 39.6 47.7*    417*   GRANS  --  67   LYMPH  --  29   EOS  --  0      Coagulation No results for input(s): PTP, INR, APTT, INREXT in the last 72 hours. Liver Enzymes Recent Labs     09/25/22  1615   TP 8.6*   ALB 4.8   AP 91      ABG Lab Results   Component Value Date/Time    HCO3 21 09/25/2022 03:25 PM      Microbiology Recent Labs     09/25/22  1615   CULT NO GROWTH AFTER 15 HOURS        maging:  CXR Results  (Last 48 hours)      None            CT Results  (Last 48 hours)                 09/25/22 1839  CTA CHEST W OR W WO CONT Final result    Impression:  1. Pancolonic wall thickening and submucosal enhancement. Correlate for   infectious versus inflammatory colitis. 2.  No pulmonary embolism or acute airspace disease. Narrative:  EXAM: CTA CHEST W OR W WO CONT       INDICATION: Tachycardic, hypotension'        COMPARISON: None        CONTRAST: 100 mL of Isovue-370. TECHNIQUE:    Helical thin section chest CT following uneventful intravenous administration of   nonionic contrast according to departmental PE protocol. Coronal and sagittal   reformats were performed. 3D/MIP post processing was performed.  CT dose   reduction was achieved through use of a standardized protocol tailored for this   examination and automatic exposure control for dose modulation. Following the uneventful intravenous administration of contrast, thin axial   images were obtained through the abdomen and pelvis. Coronal and sagittal   reconstructions were generated. Oral contrast was not administered. CT dose   reduction was achieved through use of a standardized protocol tailored for this   examination and automatic exposure control for dose modulation. FINDINGS:        This is a good quality study for the evaluation of pulmonary embolism to the   first subsegmental arterial level. There is no pulmonary embolism to this level. CHEST WALL: No axillary or supraclavicular lymphadenopathy. THYROID: No nodule. MEDIASTINUM: No mass or lymphadenopathy. RONAN: No mass or lymphadenopathy. THORACIC AORTA: No aneurysm. HEART: Normal in size. ESOPHAGUS: No wall thickening or dilatation. TRACHEA/BRONCHI: Patent. PLEURA: No effusion or pneumothorax. LUNGS: No nodule, mass, or airspace disease. LIVER: Hepatic steatosis. No focal masses. BILIARY TREE: Gallbladder is within normal limits. CBD is not dilated. SPLEEN: within normal limits. PANCREAS: No mass or ductal dilatation. ADRENALS: Unremarkable. KIDNEYS: No mass, calculus, or hydronephrosis. STOMACH: Unremarkable. SMALL BOWEL: No dilatation or wall thickening. COLON: Diffuse wall thickening of the colon with submucosal enhancement, most   affecting the left colon, sigmoid, and rectum. APPENDIX: Unremarkable   PERITONEUM: No ascites or pneumoperitoneum. RETROPERITONEUM: No lymphadenopathy or aortic aneurysm. REPRODUCTIVE ORGANS: Uterus is unremarkable. No adnexal masses. URINARY BLADDER: Decompressed by Tee catheter. BONES: No destructive bone lesion. Chronic deformities of the bilateral femoral   heads with mild subluxation. ABDOMINAL WALL: No mass or hernia. ADDITIONAL COMMENTS: N/A                        Assessment and Plan:    64 y/o female hx of spina bifida,with neurogenic bladder, HTN , HLD previous admit for colitis (2019) and chronic constipation/bowel/bladder incontinence admitted (9/25) for pan colitis: infectious vs inflammatory vs ischemic and hypotension 2/2 dehydration . Hx obtained per patient endorses ~45 minutes prior to presentation, episode of rapid HR with chest pain, vomit x 1 with stool incontinence. Denies recent illness, fever, chills. In ED fluid resuscitated 30 cc/kg crystalloid bolus, CTA chest neg for PE, CT A/P with pan colitis, infectious vs inflammatory. Started on levophed d/t continued hypotension. Normal WBC, Afebrile, Lactic 3.37. Manual disimpaction with soap enema performed in ED with large quantity of stool output. Admit to ICU for vasopressors requirement    Sepsis/septic shock - POA. Due to pan colitis. Cont. Aggressive volume resuscitation, cont. Vasopressors to keep MAP 65 and above. Monitor urine output, mental status and lactate as the surrogate of perfusion. Cont. Broad spectrum antibiotics. Follow cultures and sensitivity. Overall, the patient looks dry, still is tachycardic with continuous diarrhea. Will give 1 liter LR bolus and increase the maintenance fluid to 150 ml/hour. I will also start on hydrocortisone for sepsis as well as for underlying likely inflammatory bowel diseases. At some point she was told that she had ulcerative colitis but did not have definite diagnosis. Will await GI recommendations. Keep NPO in the interim. Spoke to sister at bedside. CCM time - 35 minutes.        Flaco Rocha MD,EMILY, FCCM, FCCP, ATSF, FACP, DAABIP  Interventional Pulmonology/Critical [address] [place] [address]

## 2022-09-26 NOTE — PROGRESS NOTES
SHIFT REPORT    0700: Bedside shift change report given to 26 RuR[address] Medley (oncoming nurse) by Luisa Monteiro (offgoing nurse). Report included the following information SBAR, Kardex, Intake/Output, MAR, Recent Results, and Cardiac Rhythm SR/ST .    - In bed, resting quietly  - Paraplegic, decreased/absent sensation BLE baseline  - On RA  - LEVOPHED @ 9mcg  - LR @ 75  - NPO    LDA's:  - L IJ CVC  - PIV x1  - ALFARO      0900: Initial GI Consult rounds. No plan to scope. Plan to continue treatment for sepsis and pending c. Diff r/o and Enteric panel. Advance diet to Full Liquid. 1000: Interdisciplinary team rounds were held 9/26/2022 with the following team members:Care Management, Diabetes Treatment Specialist, Nursing, Nutrition, Pharmacy, Physician, and Respiratory Therapy and the patient and sibling(s). Plan of care discussed. See clinical pathway and/or care plan for interventions and desired outcomes. Goals of the Day:  - 1L LR Bolus  - Increase LR to 150  - Titrate Levophed    1130:  Small watery BM, c. Diff sample sent to lab.    1752: Levophed turned off    1900: Bedside shift change report given to Cinthia Morin (oncoming nurse) by 26 [address] Medley (offgoing nurse). Report included the following information SBAR, Kardex, Intake/Output, MAR, Recent Results, and Cardiac Rhythm NSR .

## 2022-09-26 NOTE — PROGRESS NOTES
2023 TRANSFER - IN REPORT:    Verbal report received from [address], RN(name) on [employer]  being received from ED(unit) for routine progression of care      Report consisted of patients Situation, Background, Assessment and   Recommendations(SBAR). Information from the following report(s) SBAR, ED Summary, MAR, and Recent Results was reviewed with the receiving nurse. Opportunity for questions and clarification was provided. 2125 Assessment completed, refer to the complex assessment flow sheet. 0130 reassessment completed, changes charted by exception in the complex assessment flow sheet. 4407 lab called to report a critical lab, lactic acid of 2.9, updated NP of critical lab, low phosphorus and updated on pt condition. Orders received. 3994 reassessment completed, no significant changes from the previous assessment. Shift report given Barbra Koyanagi, RN.

## 2022-09-27 LAB
ANION GAP SERPL CALC-SCNC: 2 MMOL/L (ref 5–15)
ATRIAL RATE: 142 BPM
BUN SERPL-MCNC: 4 MG/DL (ref 6–20)
BUN/CREAT SERPL: 11 (ref 12–20)
CALCIUM SERPL-MCNC: 8.5 MG/DL (ref 8.5–10.1)
CALCULATED P AXIS, ECG09: 48 DEGREES
CALCULATED R AXIS, ECG10: 67 DEGREES
CALCULATED T AXIS, ECG11: 32 DEGREES
CHLORIDE SERPL-SCNC: 113 MMOL/L (ref 97–108)
CO2 SERPL-SCNC: 26 MMOL/L (ref 21–32)
CREAT SERPL-MCNC: 0.35 MG/DL (ref 0.55–1.02)
DIAGNOSIS, 93000: NORMAL
ERYTHROCYTE [DISTWIDTH] IN BLOOD BY AUTOMATED COUNT: 12.5 % (ref 11.5–14.5)
GLUCOSE SERPL-MCNC: 99 MG/DL (ref 65–100)
HCT VFR BLD AUTO: 29.6 % (ref 35–47)
HCT VFR BLD AUTO: 29.6 % (ref 35–47)
HGB BLD-MCNC: 9.7 G/DL (ref 11.5–16)
HGB BLD-MCNC: 9.8 G/DL (ref 11.5–16)
MAGNESIUM SERPL-MCNC: 2.2 MG/DL (ref 1.6–2.4)
MCH RBC QN AUTO: 31.9 PG (ref 26–34)
MCHC RBC AUTO-ENTMCNC: 33.1 G/DL (ref 30–36.5)
MCV RBC AUTO: 96.4 FL (ref 80–99)
NRBC # BLD: 0 K/UL (ref 0–0.01)
NRBC BLD-RTO: 0 PER 100 WBC
P-R INTERVAL, ECG05: 124 MS
PHOSPHATE SERPL-MCNC: 2.1 MG/DL (ref 2.6–4.7)
PLATELET # BLD AUTO: 192 K/UL (ref 150–400)
PMV BLD AUTO: 9.6 FL (ref 8.9–12.9)
POTASSIUM SERPL-SCNC: 3.7 MMOL/L (ref 3.5–5.1)
PROCALCITONIN SERPL-MCNC: 2.71 NG/ML
Q-T INTERVAL, ECG07: 296 MS
QRS DURATION, ECG06: 74 MS
QTC CALCULATION (BEZET), ECG08: 455 MS
RBC # BLD AUTO: 3.07 M/UL (ref 3.8–5.2)
SODIUM SERPL-SCNC: 141 MMOL/L (ref 136–145)
VENTRICULAR RATE, ECG03: 142 BPM
WBC # BLD AUTO: 6.2 K/UL (ref 3.6–11)
WBC #/AREA STL HPF: >20 /HPF (ref 0–4)

## 2022-09-27 PROCEDURE — 74011250636 HC RX REV CODE- 250/636: Performed by: NURSE PRACTITIONER

## 2022-09-27 PROCEDURE — 74011250636 HC RX REV CODE- 250/636: Performed by: INTERNAL MEDICINE

## 2022-09-27 PROCEDURE — 83735 ASSAY OF MAGNESIUM: CPT

## 2022-09-27 PROCEDURE — 74011250637 HC RX REV CODE- 250/637: Performed by: INTERNAL MEDICINE

## 2022-09-27 PROCEDURE — 74011000250 HC RX REV CODE- 250: Performed by: NURSE PRACTITIONER

## 2022-09-27 PROCEDURE — 82610 CYSTATIN C: CPT

## 2022-09-27 PROCEDURE — 65270000046 HC RM TELEMETRY

## 2022-09-27 PROCEDURE — C9113 INJ PANTOPRAZOLE SODIUM, VIA: HCPCS | Performed by: NURSE PRACTITIONER

## 2022-09-27 PROCEDURE — 84100 ASSAY OF PHOSPHORUS: CPT

## 2022-09-27 PROCEDURE — 80048 BASIC METABOLIC PNL TOTAL CA: CPT

## 2022-09-27 PROCEDURE — 85027 COMPLETE CBC AUTOMATED: CPT

## 2022-09-27 PROCEDURE — 84145 PROCALCITONIN (PCT): CPT

## 2022-09-27 PROCEDURE — 86671 FUNGUS NES ANTIBODY: CPT

## 2022-09-27 PROCEDURE — 85018 HEMOGLOBIN: CPT

## 2022-09-27 PROCEDURE — 36415 COLL VENOUS BLD VENIPUNCTURE: CPT

## 2022-09-27 PROCEDURE — 74011250637 HC RX REV CODE- 250/637: Performed by: NURSE PRACTITIONER

## 2022-09-27 RX ORDER — HYDROCORTISONE SODIUM SUCCINATE 100 MG/2ML
50 INJECTION, POWDER, FOR SOLUTION INTRAMUSCULAR; INTRAVENOUS EVERY 8 HOURS
Status: DISCONTINUED | OUTPATIENT
Start: 2022-09-27 | End: 2022-09-28

## 2022-09-27 RX ADMIN — SODIUM CHLORIDE, PRESERVATIVE FREE 10 ML: 5 INJECTION INTRAVENOUS at 15:21

## 2022-09-27 RX ADMIN — HYDROCORTISONE SODIUM SUCCINATE 50 MG: 100 INJECTION, POWDER, FOR SOLUTION INTRAMUSCULAR; INTRAVENOUS at 14:10

## 2022-09-27 RX ADMIN — LEVOFLOXACIN 750 MG: 5 INJECTION, SOLUTION INTRAVENOUS at 21:01

## 2022-09-27 RX ADMIN — METRONIDAZOLE 500 MG: 500 INJECTION, SOLUTION INTRAVENOUS at 14:12

## 2022-09-27 RX ADMIN — METRONIDAZOLE 500 MG: 500 INJECTION, SOLUTION INTRAVENOUS at 20:05

## 2022-09-27 RX ADMIN — METRONIDAZOLE 500 MG: 500 INJECTION, SOLUTION INTRAVENOUS at 05:21

## 2022-09-27 RX ADMIN — SODIUM CHLORIDE, PRESERVATIVE FREE 10 ML: 5 INJECTION INTRAVENOUS at 21:02

## 2022-09-27 RX ADMIN — ENOXAPARIN SODIUM 40 MG: 100 INJECTION SUBCUTANEOUS at 09:41

## 2022-09-27 RX ADMIN — HYDROCORTISONE SODIUM SUCCINATE 50 MG: 100 INJECTION, POWDER, FOR SOLUTION INTRAMUSCULAR; INTRAVENOUS at 21:01

## 2022-09-27 RX ADMIN — Medication 1 AMPULE: at 09:44

## 2022-09-27 RX ADMIN — DIBASIC SODIUM PHOSPHATE, MONOBASIC POTASSIUM PHOSPHATE AND MONOBASIC SODIUM PHOSPHATE 2 TABLET: 852; 155; 130 TABLET ORAL at 09:45

## 2022-09-27 RX ADMIN — Medication 1 AMPULE: at 20:05

## 2022-09-27 RX ADMIN — DIBASIC SODIUM PHOSPHATE, MONOBASIC POTASSIUM PHOSPHATE AND MONOBASIC SODIUM PHOSPHATE 2 TABLET: 852; 155; 130 TABLET ORAL at 18:19

## 2022-09-27 RX ADMIN — HYDROCORTISONE SODIUM SUCCINATE 50 MG: 100 INJECTION, POWDER, FOR SOLUTION INTRAMUSCULAR; INTRAVENOUS at 05:21

## 2022-09-27 RX ADMIN — CASTOR OIL AND BALSAM, PERU: 788; 87 OINTMENT TOPICAL at 20:05

## 2022-09-27 RX ADMIN — ACETAMINOPHEN 650 MG: 325 TABLET ORAL at 23:19

## 2022-09-27 RX ADMIN — SODIUM CHLORIDE, PRESERVATIVE FREE 10 ML: 5 INJECTION INTRAVENOUS at 09:48

## 2022-09-27 RX ADMIN — CASTOR OIL AND BALSAM, PERU: 788; 87 OINTMENT TOPICAL at 09:44

## 2022-09-27 RX ADMIN — SODIUM CHLORIDE, PRESERVATIVE FREE 10 ML: 5 INJECTION INTRAVENOUS at 05:21

## 2022-09-27 RX ADMIN — SODIUM CHLORIDE 40 MG: 9 INJECTION, SOLUTION INTRAMUSCULAR; INTRAVENOUS; SUBCUTANEOUS at 09:43

## 2022-09-27 NOTE — PROGRESS NOTES
Bedside and Verbal shift change report received from [facility]. Report included the following information SBAR, MAR, Recent Results, Cardiac Rhythm normal sinus, and Quality Measures. Patient is alert and verbal. Medicated for right shoulder pain with Tylenol x with relief. Paraplegic, able to help turn, repositions self. Incontinent of loose stool x2, incontinence care and lopez care performed. BP stable off levo drip.

## 2022-09-27 NOTE — PROGRESS NOTES
GI PROGRESS NOTE  Anny Baer NP  [phone] NP in-hospital cell phone M-F until 4:30  After 5pm or on weekends, please call  for physician on call    NAME:Jessica Reyes RRR:[id number]   ATTG: Dr Robbin Barboza  PCP: Nieves Kirby, DO  Date/Time:  9/27/2022 1:44 PM     Primary GI: Dr. Amelia Suarez:   Recurrent pan colitis - worse on left side 5/2019, 3/2022 and now  Unclear if this is recurrent ischemic colitis vs ulcerative colitis  - 2019 bx consistent with ischemic colitis but bx 3/2022 - less likely for IBD, not ischemic  - Never on any longterm treatment for Ulcerative colitis  - No abdominal pain, diarrhea, blood in stool  - stool studies negative for c.diff and enteric panel  - Feeling better today overall just concerned about these bouts    - CT abd/pel W/WO contrast 9/25/22 :   1. Pancolonic wall thickening and submucosal enhancement. Correlate for  infectious versus inflammatory colitis. 2.  No pulmonary embolism or acute airspace disease. Chronic constipation  - failed linzess, now on Trulance PRN buthasn't ever taken any  - BM every few days -miralax and fiber didn't help  - Very little to no rectal tone - hasn't done suppositories. 5/2019 : [facility]  -- ulceration, mucosal friability and hemorrhage in the left colon, from rectum to splenic flexure, biopsied; appearance most consistent with left-sided ulcerative colitis  -- prolapsed internal hemorrhoids  -- lax rectal tone  -- sub-optimal bowel preparation  Bx: Although the distribution is somewhat unusual, the morphologic features of   superficial mucosal necrosis and ulceration favor ischemic colitis. Also in the differential diagnosis is self-limited colitis (possibly pseudomembranous). Lymphoplasmacytic infiltrate in the lamina propria, as associated with ulcerative colitis, is not identified.       3/2022 : [facility]   -Minimal erythema of sigmoid and descending colon from 20-40cm; biopsied  -Remainder of colon is unremarkable; biopsied are obtained in the ascending colon and the rectum separately  -Small grade 1 internal hemorrhoid  Bx: In the reference to specimen #2, the findings are focal and minimal.   Possible causes include acute self-limited/ infectious colitis, ischemia,   NSAID or bowel preparation changes and less likely inflammatory bowel   disease related changes. Hypovolemic  shock - off pressors    Plan:   No plan for colonoscopy   IBD panel pending  Follow labs  Continue with antibiotics. Rest per primary team.     Plan discussed with DR Rekha Novak. Subjective:   Discussed with RN events overnight. Doing well at this time. No abdominal pain. Tolerating food so far but just eating first few bites when I saw her. Complaint Y/N Description   Abdominal Pain n    Hematemesis n    Hematochezia n    Melena n    Constipation n    Diarrhea n    Dyspepsia n    Dysphagia n    Jaundiced n    Nausea/vomiting n      Review of Systems:  Symptom Y/N Comments  Symptom Y/N Comments   Fever/Chills    Chest Pain     Cough    Headaches     Sputum    Joint Pain     SOB/ALVARADO    Pruritis/Rash     Tolerating Diet y   Other       Could NOT obtain due to:      Objective:   VITALS:   Last 24hrs VS reviewed since prior progress note. Most recent are:  Visit Vitals  /78   Pulse (!) 105   Temp 98.7 °F (37.1 °C)   Resp 16   Ht 4' 6\" (1.372 m)   Wt 64 kg (141 lb 1.5 oz)   SpO2 99%   BMI 34.02 kg/m²       Intake/Output Summary (Last 24 hours) at 9/27/2022 1344  Last data filed at 9/27/2022 1200  Gross per 24 hour   Intake 2990 ml   Output 1150 ml   Net 1840 ml     PHYSICAL EXAM:  General: WD, WN. Alert, cooperative, no acute distress    HEENT: NC, Atraumatic. Anicteric sclerae. Lungs:  CTA Bilaterally. No Wheezing/Rhonchi/Rales. Heart:  Regular  rhythm,  No murmur,, No Rubs  Abdomen: Soft, Non distended, Non tender. +Bowel sounds, no HSM  Extremities: No c/c/e.  [place], contracted leg - hx of spina bifida  Neurologic:  Alert and oriented X 3. No acute neurological distress   Psych:   Good insight. Not anxious nor agitated. Lab and Radiology Data Reviewed: (see below)    Medications Reviewed: (see below)  PMH/SH reviewed - no change compared to H&P  ________________________________________________________________________  Total time spent with patient: 20 minutes ________________________________________________________________________  Care Plan discussed with:  Patient y   Family     ALENA Shannon              Consultant: Jasmyn Ibarra NP     Procedures: see electronic medical records for all procedures/Xrays and details which were not copied into this note but were reviewed prior to creation of Plan. LABS:  Recent Labs     09/27/22  1117 09/27/22  0355 09/26/22  0204   WBC  --  6.2 17.3*   HGB 9.7* 9.8* 13.1   HCT 29.6* 29.6* 39.6   PLT  --  192 283     Recent Labs     09/27/22  0355 09/26/22  0204 09/25/22  1615    138 135*   K 3.7 3.8 4.1   * 111* 102   CO2 26 20* 19*   BUN 4* 12 17   CREA 0.35* 0.72 0.71   GLU 99 172* 174*   CA 8.5 8.2* 10.5*   MG 2.2 2.2 2.6*   PHOS 2.1* 2.4*  --      Recent Labs     09/25/22  1615   AP 91   TP 8.6*   ALB 4.8   GLOB 3.8     No results for input(s): INR, PTP, APTT, INREXT in the last 72 hours. No results for input(s): FE, TIBC, PSAT, FERR in the last 72 hours. Lab Results   Component Value Date/Time    Folate 20.6 04/30/2019 12:59 AM     No results for input(s): PH, PCO2, PO2 in the last 72 hours. No results for input(s): CPK, CKMB in the last 72 hours.     No lab exists for component: TROPONINI  Lab Results   Component Value Date/Time    Color DARK YELLOW 09/26/2022 01:50 AM    Appearance CLEAR 09/26/2022 01:50 AM    Specific gravity 1.015 09/26/2022 01:50 AM    Specific gravity 1.021 03/15/2022 12:50 AM    pH (UA) 5.0 09/26/2022 01:50 AM    Protein Negative 09/26/2022 01:50 AM    Glucose Negative 09/26/2022 01:50 AM    Ketone 15 (A) 09/26/2022 01:50 AM    Urobilinogen 1.0 09/26/2022 01:50 AM    Nitrites Negative 09/26/2022 01:50 AM    Leukocyte Esterase TRACE (A) 09/26/2022 01:50 AM    Epithelial cells FEW 09/26/2022 01:50 AM    Bacteria Negative 09/26/2022 01:50 AM    WBC 0-4 09/26/2022 01:50 AM    RBC 0-5 09/26/2022 01:50 AM       MEDICATIONS:  Current Facility-Administered Medications   Medication Dose Route Frequency    phosphorus (K PHOS NEUTRAL) 250 mg tablet 2 Tablet  2 Tablet Oral BID    hydrocortisone Sod Succ (PF) (SOLU-CORTEF) injection 50 mg  50 mg IntraVENous Q8H    alcohol 62% (NOZIN) nasal  1 Ampule  1 Ampule Topical Q12H    balsam peru-castor oiL (VENELEX) ointment   Topical BID    sodium chloride (NS) flush 5-40 mL  5-40 mL IntraVENous Q8H    sodium chloride (NS) flush 5-40 mL  5-40 mL IntraVENous PRN    acetaminophen (TYLENOL) tablet 650 mg  650 mg Oral Q6H PRN    Or    acetaminophen (TYLENOL) suppository 650 mg  650 mg Rectal Q6H PRN    polyethylene glycol (MIRALAX) packet 17 g  17 g Oral DAILY PRN    ondansetron (ZOFRAN ODT) tablet 4 mg  4 mg Oral Q8H PRN    Or    ondansetron (ZOFRAN) injection 4 mg  4 mg IntraVENous Q6H PRN    enoxaparin (LOVENOX) injection 40 mg  40 mg SubCUTAneous DAILY    levoFLOXacin (LEVAQUIN) 750 mg in D5W IVPB  750 mg IntraVENous Q24H    metroNIDAZOLE (FLAGYL) IVPB premix 500 mg  500 mg IntraVENous Q8H

## 2022-09-27 NOTE — PROGRESS NOTES
Problem: Patient Education:  Go to Education Activity  Goal: Patient/Family Education  Outcome: Progressing Towards Goal     Problem: Falls - Risk of  Goal: *Absence of Falls  Description: Document Ashlee Londono Fall Risk and appropriate interventions in the flowsheet.   Outcome: Progressing Towards Goal  Note: Fall Risk Interventions:  Mobility Interventions: Bed/chair exit alarm         Medication Interventions: Bed/chair exit alarm    Elimination Interventions: Bed/chair exit alarm, Call light in reach              Problem: Patient Education: Go to Patient Education Activity  Goal: Patient/Family Education  Outcome: Progressing Towards Goal     Problem: Pain  Goal: *Control of Pain  Outcome: Progressing Towards Goal  Goal: *PALLIATIVE CARE:  Alleviation of Pain  Outcome: Progressing Towards Goal     Problem: Patient Education: Go to Patient Education Activity  Goal: Patient/Family Education  Outcome: Progressing Towards Goal     Problem: Patient Education: Go to Patient Education Activity  Goal: Patient/Family Education  Outcome: Progressing Towards Goal

## 2022-09-27 NOTE — PROGRESS NOTES
SHIFT REPORT    0700: Bedside shift change report given to 26 RuRambo Medley (oncoming nurse) by Sabra Machuca (offgoing nurse). Report included the following information SBAR, Kardex, Intake/Output, MAR, Recent Results, and Cardiac Rhythm NSR .     - In bed, resting  - Aox4  - On RA    LDA's:  - ALFARO  - L IJ CVC  - PIV x1    1000: Interdisciplinary team rounds were held 9/27/2022 with the following team members:Care Management, Diabetes Treatment Specialist, Nursing, Nutrition, Pharmacy, Physician, and Respiratory Therapy and the patient and sibling(s). Plan of care discussed. See clinical pathway and/or care plan for interventions and desired outcomes. Goals of the Day:  - Xfer out of unit  - DC LR maintenance  - Advance Diet to Regular GI [place]  - PT/OT Consult    1100: Converted bed to chair position. 1200: Moderate loose BM    1445: Orders for xfer to Tele    1545: L IJ CVC removed    1900: Bedside shift change report given to Sabra Machuca (oncoming nurse) by [address] Medley (offgoing nurse). Report included the following information SBAR, Kardex, Intake/Output, MAR, Recent Results, and Cardiac Rhythm NSR .

## 2022-09-27 NOTE — PROGRESS NOTES
Transition of Care- Anticipate discharge home without servicesp cm will continue to follow patients clinical progress and discharge planning needs. RUR- 10%  Transportation at Discharge- Family-sister    Chart review completed- patient reports to live alone in an apartment home. Pt uses wheelchair at baseline. Caregiver Contact:twila omer (Sister)   [phone]    DME- pt uses a wheelchair at baseline      Reason for Admission:    Sepsis/septic shock                   RUR Score:   10%                  Plan for utilizing home health:    Not at this time      PCP: First and Last name:  Jeramy Coley DO                    Current Advanced Directive/Advance Care Plan: Full Code      Healthcare Decision Maker:   Click here to complete [address] including selection of the Healthcare Decision Maker Relationship (ie \"Primary\")             Primary Decision Maker: Nick Arguelles Sister - [phone]    Secondary Decision Maker: Yadira Thorpe - Other Relative - [phone]                      Care Management Interventions  PCP Verified by CM:  Yes  Mode of Transport at Discharge: Self  Transition of Care Consult (CM Consult): Discharge Planning  Support Systems: Other Family Member(s)  Confirm Follow Up Transport: Family  Discharge Location  Patient Expects to be Discharged to[de-identified] Home

## 2022-09-27 NOTE — PROGRESS NOTES
Critical Care Progress Note  Jaquan Santiago MD          Date of Service:  2022  NAME:  Kendrick Mims  :  1960  MRN:  [id number]      Subjective/Hospital course:    Admitted   64 y/o female hx of spina bifida,with neurogenic bladder, HTN , HLD previous admit for colitis () and chronic constipation/bowel/bladder incontinence admitted () for pan colitis: infectious vs inflammatory vs ischemic and hypotension 2/2 dehydration . Hx obtained per patient endorses ~45 minutes prior to presentation, episode of rapid HR with chest pain, vomit x 1 with stool incontinence. In ED fluid resuscitated 30 cc/kg crystalloid bolus, CTA chest neg for PE, CT A/P with pan colitis, infectious vs inflammatory. Started on levophed d/t continued hypotension. Normal WBC, Afebrile, Lactic 3.37. Manual disimpaction with soap enema performed in ED with large quantity of stool output. Admit to ICU for vasopressors requirement   - Off pressors since 5pm yesterday. No new complaints        Problem list:   Hypotension    Septic shock vs colume depletion  Colitis  HERVE  Constipation   Anemia    Assessment/Plan:     Sepsis/septic shock   Secondary to pan colitis  Enteric panel negative  Continue empiric abx  Appreciate GI recs  Possible scope later this week  Procal mildly elevated - will trend     Volume depletion  Appetite improved today - advance diet. Encourage fluid intake  Test orthostatics     HERVE - resolved, UOP appropriate    Anemia  No signs of acute blood loss  Likely dilutional with fluids yesterday/ overnight  Repeat H/H to ensure stability      Deconditioning  PT/OT consult     Code status: Full  DVT prophylaxis: lovenox  GI prophylaxis: protonix (home med)    Care Plan discussed with: Patient/Family and Nurse    I personally spent NA minutes of critical care time.   This is time spent at this critically ill patient's bedside actively involved in patient care as well as the coordination of care and discussions with the patient's family. This does not include any procedural time which has been billed separately. Review of Systems:   Review of Systems   All other systems reviewed and are negative. Vital Signs:   Patient Vitals for the past 4 hrs:   BP Temp Pulse Resp SpO2   09/27/22 0915 -- -- 90 20 99 %   09/27/22 0900 102/69 -- 85 (!) 31 100 %   09/27/22 0845 -- -- 84 18 96 %   09/27/22 0830 102/66 -- 75 22 98 %   09/27/22 0815 -- -- 74 10 98 %   09/27/22 0800 116/79 -- 80 13 98 %   09/27/22 0745 -- -- 79 17 98 %   09/27/22 0730 113/74 -- 84 18 100 %   09/27/22 0715 -- -- 76 15 98 %   09/27/22 0700 109/73 98.9 °F (37.2 °C) 78 18 98 %        Intake/Output Summary (Last 24 hours) at 9/27/2022 0954  Last data filed at 9/27/2022 0800  Gross per 24 hour   Intake 4363.75 ml   Output 1050 ml   Net 3313.75 ml        Physical Examination:    Physical Exam  Awake oriented, NAD  + bowel sounds, soft   RRR no murmur  Bilateral CTA   Labs and Imaging:   Reviewed.       Medications:     Current Facility-Administered Medications   Medication Dose Route Frequency    phosphorus (K PHOS NEUTRAL) 250 mg tablet 2 Tablet  2 Tablet Oral BID    alcohol 62% (NOZIN) nasal  1 Ampule  1 Ampule Topical Q12H    balsam peru-castor oiL (VENELEX) ointment   Topical BID    hydrocortisone Sod Succ (PF) (SOLU-CORTEF) injection 50 mg  50 mg IntraVENous Q6H    NOREPINephrine (LEVOPHED) 8 mg in 5% dextrose 250mL (32 mcg/mL) infusion  2-100 mcg/min IntraVENous TITRATE    sodium chloride (NS) flush 5-40 mL  5-40 mL IntraVENous Q8H    sodium chloride (NS) flush 5-40 mL  5-40 mL IntraVENous PRN    acetaminophen (TYLENOL) tablet 650 mg  650 mg Oral Q6H PRN    Or    acetaminophen (TYLENOL) suppository 650 mg  650 mg Rectal Q6H PRN    polyethylene glycol (MIRALAX) packet 17 g  17 g Oral DAILY PRN    ondansetron (ZOFRAN ODT) tablet 4 mg  4 mg Oral Q8H PRN    Or    ondansetron (ZOFRAN) injection 4 mg  4 mg IntraVENous Q6H PRN enoxaparin (LOVENOX) injection 40 mg  40 mg SubCUTAneous DAILY    lactated Ringers infusion  150 mL/hr IntraVENous CONTINUOUS    pantoprazole (PROTONIX) 40 mg in 0.9% sodium chloride 10 mL injection  40 mg IntraVENous DAILY    levoFLOXacin (LEVAQUIN) 750 mg in D5W IVPB  750 mg IntraVENous Q24H    metroNIDAZOLE (FLAGYL) IVPB premix 500 mg  500 mg IntraVENous Q8H     ______________________________________________________________________  EXPECTED LENGTH OF STAY: - - -  ACTUAL LENGTH OF STAY:          2                 Jaquan Santiago MD   Pulmonary/[facility]  [phone]

## 2022-09-27 NOTE — PROGRESS NOTES
Hospitalist Progress Note    NAME: Tena Alcantar   :  1960   MRN:  [id number]       Assessment / Plan:  Sepsis/septic shock   Secondary to pan colitis  Enteric panel negative  Continue empiric abx with levaquin and flagyl  Appreciate GI recs, no plan for score  Avoid anti-diarrheals     Volume depletion  Check orthostatics  Tolerating diet  Wean off stress dose steroids. Wean to Q12 tomorrow then daily then off     HERVE - resolved     Anemia  No signs of acute blood loss  Likely dilutional with fluids   Monitor hgb     Deconditioning  PT/OT consult       30.0 - 39.9 Obese / Body mass index is 34.02 kg/m². Estimated discharge date:   Barriers: Clinical improvement    Code status: Full  Prophylaxis: SCD's  Recommended Disposition: Home w/Family     Subjective:     Chief Complaint / Reason for Physician Visit  Patient seen and examined the bedside. She says she is feeling a lot better since coming to the hospital.  She continues to have diarrhea and incontinence but this is improving. She denies any abdominal pain. No overt bleeding. She is tolerating a diet. Discussed with RN events overnight. Review of Systems:  Symptom Y/N Comments  Symptom Y/N Comments   Fever/Chills    Chest Pain     Poor Appetite    Edema     Cough    Abdominal Pain     Sputum    Joint Pain     SOB/ALVARADO    Pruritis/Rash     Nausea/vomit    Tolerating PT/OT     Diarrhea    Tolerating Diet     Constipation    Other       Could NOT obtain due to:      Objective:     VITALS:   Last 24hrs VS reviewed since prior progress note.  Most recent are:  Patient Vitals for the past 24 hrs:   Temp Pulse Resp BP SpO2   22 1615 -- 85 22 -- 97 %   22 1614 -- 88 20 -- 97 %   22 1613 -- 94 26 -- 96 %   22 1612 -- 88 21 -- 97 %   22 1611 -- 82 19 -- 96 %   22 1610 -- 84 19 -- 96 %   22 1609 -- 85 19 -- 96 %   22 1608 -- 86 19 -- 97 %   22 1607 -- 87 19 -- 95 %   22 1606 -- 85 23 -- 98 %   09/27/22 1605 -- 85 21 -- 97 %   09/27/22 1604 -- 87 22 -- 98 %   09/27/22 1603 -- 84 20 -- 96 %   09/27/22 1602 -- 85 22 -- 96 %   09/27/22 1601 -- 86 22 -- 97 %   09/27/22 1600 98.6 °F (37 °C) 81 22 104/61 98 %   09/27/22 1559 -- 80 25 -- 98 %   09/27/22 1558 -- 85 24 -- 98 %   09/27/22 1557 -- 81 22 -- 99 %   09/27/22 1556 -- 92 28 -- 92 %   09/27/22 1555 -- 94 22 -- 99 %   09/27/22 1554 -- (!) 101 24 -- (!) 84 %   09/27/22 1553 -- 89 23 -- 99 %   09/27/22 1552 -- 87 23 -- 100 %   09/27/22 1551 -- 81 24 -- 99 %   09/27/22 1550 -- 88 25 -- 97 %   09/27/22 1549 -- 89 (!) 31 -- 100 %   09/27/22 1548 -- 82 17 -- 97 %   09/27/22 1547 -- 82 23 -- 100 %   09/27/22 1546 -- 84 19 -- 98 %   09/27/22 1545 -- 82 24 -- 99 %   09/27/22 1544 -- 83 15 -- 98 %   09/27/22 1543 -- 87 21 -- 98 %   09/27/22 1542 -- 88 24 -- 98 %   09/27/22 1541 -- 86 22 -- 98 %   09/27/22 1540 -- 86 17 -- 98 %   09/27/22 1539 -- 83 16 -- 99 %   09/27/22 1538 -- 80 21 -- 100 %   09/27/22 1537 -- 83 20 -- 99 %   09/27/22 1536 -- 83 17 -- 99 %   09/27/22 1535 -- 82 18 -- 99 %   09/27/22 1534 -- 89 18 -- 100 %   09/27/22 1533 -- 83 18 -- 99 %   09/27/22 1532 -- 87 20 -- 99 %   09/27/22 1531 -- (!) 107 19 -- 97 %   09/27/22 1530 -- (!) 104 (!) 33 101/75 98 %   09/27/22 1529 -- 90 18 -- 100 %   09/27/22 1528 -- 84 19 -- 100 %   09/27/22 1527 -- 83 21 -- 100 %   09/27/22 1526 -- 85 20 -- 99 %   09/27/22 1525 -- 82 18 -- 99 %   09/27/22 1524 -- 87 27 -- 98 %   09/27/22 1523 -- 88 25 -- 99 %   09/27/22 1522 -- 83 19 -- 99 %   09/27/22 1521 -- 88 21 -- 98 %   09/27/22 1520 -- 85 17 -- 98 %   09/27/22 1519 -- 88 21 -- 98 %   09/27/22 1518 -- 87 19 -- 97 %   09/27/22 1517 -- 84 20 -- 96 %   09/27/22 1516 -- 86 20 -- 96 %   09/27/22 1515 -- 85 21 -- 98 %   09/27/22 1514 -- 86 18 -- 98 %   09/27/22 1513 -- 82 20 -- 98 %   09/27/22 1512 -- 82 21 -- 97 %   09/27/22 1511 -- 87 18 -- 97 %   09/27/22 1510 -- 85 20 -- 99 %   09/27/22 1509 -- 86 19 -- 99 % 09/27/22 1508 -- 86 20 -- 99 %   09/27/22 1507 -- 83 25 -- 99 %   09/27/22 1506 -- 84 21 -- 98 %   09/27/22 1505 -- 86 21 -- 99 %   09/27/22 1504 -- 87 23 -- 99 %   09/27/22 1503 -- 93 22 -- 99 %   09/27/22 1502 -- 87 22 -- 99 %   09/27/22 1501 -- 91 22 -- 96 %   09/27/22 1500 -- 89 18 (!) 103/58 95 %   09/27/22 1459 -- 86 18 -- 99 %   09/27/22 1458 -- 87 21 -- 99 %   09/27/22 1457 -- 83 20 -- 99 %   09/27/22 1456 -- 86 20 -- 99 %   09/27/22 1455 -- 95 20 -- 97 %   09/27/22 1454 -- 95 26 -- 100 %   09/27/22 1453 -- 94 21 -- 98 %   09/27/22 1452 -- 97 17 -- 98 %   09/27/22 1451 -- 96 18 -- 96 %   09/27/22 1450 -- 96 18 -- 97 %   09/27/22 1449 -- 90 18 -- 96 %   09/27/22 1448 -- 94 18 -- 96 %   09/27/22 1447 -- 94 19 -- 96 %   09/27/22 1446 -- 96 21 -- 97 %   09/27/22 1445 -- 88 23 -- 97 %   09/27/22 1444 -- 88 16 -- 97 %   09/27/22 1443 -- 89 20 -- 96 %   09/27/22 1442 -- 89 18 -- 97 %   09/27/22 1441 -- 90 19 -- 97 %   09/27/22 1440 -- 89 22 -- 99 %   09/27/22 1439 -- 91 19 -- 97 %   09/27/22 1438 -- 95 20 -- 97 %   09/27/22 1437 -- 90 16 -- 97 %   09/27/22 1436 -- 90 20 -- 97 %   09/27/22 1435 -- 93 16 -- 96 %   09/27/22 1434 -- 89 18 -- 95 %   09/27/22 1433 -- 89 16 -- 97 %   09/27/22 1432 -- 92 20 -- 97 %   09/27/22 1431 -- 91 17 -- 98 %   09/27/22 1430 -- 88 17 125/81 98 %   09/27/22 1429 -- 93 20 -- 97 %   09/27/22 1428 -- 96 21 -- 98 %   09/27/22 1427 -- 92 19 -- 98 %   09/27/22 1426 -- 90 18 -- 98 %   09/27/22 1425 -- 93 15 -- 97 %   09/27/22 1424 -- 93 17 -- 97 %   09/27/22 1423 -- 95 20 -- 98 %   09/27/22 1422 -- 92 18 -- 99 %   09/27/22 1421 -- 94 22 -- 99 %   09/27/22 1420 -- 96 20 -- 99 %   09/27/22 1419 -- 96 17 -- 99 %   09/27/22 1418 -- 96 16 -- 99 %   09/27/22 1417 -- 99 20 -- 98 %   09/27/22 1416 -- 99 20 -- 99 %   09/27/22 1415 -- 99 25 -- 99 %   09/27/22 1414 -- 99 15 -- 100 %   09/27/22 1413 -- (!) 102 23 -- 100 %   09/27/22 1412 -- 98 13 -- 100 %   09/27/22 1411 -- (!) 102 26 -- 100 % 09/27/22 1410 -- 96 26 -- 98 %   09/27/22 1409 -- 94 16 -- 100 %   09/27/22 1408 -- 99 (!) 32 -- 99 %   09/27/22 1407 -- 98 24 -- 99 %   09/27/22 1406 -- 99 25 -- 100 %   09/27/22 1405 -- (!) 101 22 -- 98 %   09/27/22 1404 -- 98 23 -- 100 %   09/27/22 1403 -- (!) 101 17 -- 100 %   09/27/22 1402 -- 96 20 -- 98 %   09/27/22 1401 -- 96 20 -- 99 %   09/27/22 1400 -- 97 20 115/70 98 %   09/27/22 1359 -- (!) 105 21 -- 95 %   09/27/22 1358 -- (!) 107 11 -- 100 %   09/27/22 1357 -- (!) 101 15 -- 100 %   09/27/22 1356 -- (!) 102 16 -- 98 %   09/27/22 1355 -- (!) 109 17 -- 98 %   09/27/22 1354 -- (!) 105 22 -- 100 %   09/27/22 1353 -- (!) 105 21 -- 99 %   09/27/22 1352 -- (!) 103 23 -- 99 %   09/27/22 1351 -- (!) 108 9 -- 93 %   09/27/22 1350 -- (!) 105 22 -- 98 %   09/27/22 1349 -- (!) 104 18 -- 100 %   09/27/22 1348 -- (!) 102 24 -- 100 %   09/27/22 1347 -- (!) 103 15 -- 100 %   09/27/22 1346 -- 96 20 -- 100 %   09/27/22 1345 -- (!) 103 21 -- 100 %   09/27/22 1344 -- 100 18 -- 100 %   09/27/22 1343 -- (!) 101 18 -- 100 %   09/27/22 1342 -- (!) 102 27 -- 100 %   09/27/22 1341 -- (!) 102 16 -- --   09/27/22 1340 -- 98 26 -- 100 %   09/27/22 1339 -- 99 23 -- 94 %   09/27/22 1338 -- (!) 103 14 -- 96 %   09/27/22 1337 -- (!) 102 28 -- 98 %   09/27/22 1336 -- (!) 105 23 -- 98 %   09/27/22 1335 -- (!) 102 9 -- --   09/27/22 1334 -- (!) 102 24 -- --   09/27/22 1333 -- (!) 106 24 -- --   09/27/22 1332 -- (!) 108 21 -- (!) 88 %   09/27/22 1331 -- (!) 103 18 -- 97 %   09/27/22 1330 -- (!) 105 16 116/79 99 %   09/27/22 1315 -- (!) 111 19 117/78 100 %   09/27/22 1300 -- (!) 111 21 -- 100 %   09/27/22 1245 -- 99 24 -- 98 %   09/27/22 1230 -- 72 20 (!) 125/53 99 %   09/27/22 1200 98.7 °F (37.1 °C) 89 22 101/70 98 %   09/27/22 1145 -- 81 23 -- 100 %   09/27/22 1130 -- 84 19 101/67 99 %   09/27/22 1115 -- 88 21 -- 98 %   09/27/22 1100 -- 93 21 111/67 99 %   09/27/22 1045 -- 84 18 -- 100 %   09/27/22 1030 -- 98 25 109/60 98 %   09/27/22 1015 -- 92 22 -- 99 %   09/27/22 1000 -- (!) 103 17 123/86 99 %   09/27/22 0945 -- 100 19 -- 98 %   09/27/22 0930 -- 88 20 114/72 92 %   09/27/22 0915 -- 90 20 -- 99 %   09/27/22 0900 -- 85 (!) 31 102/69 100 %   09/27/22 0845 -- 84 18 -- 96 %   09/27/22 0830 -- 75 22 102/66 98 %   09/27/22 0815 -- 74 10 -- 98 %   09/27/22 0800 98.9 °F (37.2 °C) 80 13 116/79 98 %   09/27/22 0745 -- 79 17 -- 98 %   09/27/22 0730 -- 84 18 113/74 100 %   09/27/22 0715 -- 76 15 -- 98 %   09/27/22 0700 98.9 °F (37.2 °C) 78 18 109/73 98 %   09/27/22 0530 -- 82 20 122/77 99 %   09/27/22 0500 -- 98 -- 114/78 97 %   09/27/22 0430 -- 93 -- 94/62 98 %   09/27/22 0400 -- 92 -- 106/63 98 %   09/27/22 0330 98.7 °F (37.1 °C) 70 -- 104/64 99 %   09/27/22 0200 -- 77 -- (!) 95/58 98 %   09/27/22 0100 -- 78 16 103/65 98 %   09/27/22 0000 98.6 °F (37 °C) 82 16 (!) 92/58 97 %   09/26/22 2330 -- 97 21 (!) 92/57 98 %   09/26/22 2300 -- 87 14 (!) 80/53 90 %   09/26/22 2200 -- 87 14 (!) 85/56 94 %   09/26/22 2100 -- 93 21 102/61 97 %   09/26/22 2000 98.7 °F (37.1 °C) 98 18 105/65 96 %   09/26/22 1900 -- 96 18 97/64 99 %   09/26/22 1845 -- 98 20 (!) 107/52 98 %   09/26/22 1830 -- 95 22 96/69 100 %   09/26/22 1815 -- 98 22 102/65 100 %   09/26/22 1800 -- 94 21 (!) 101/59 100 %   09/26/22 1745 -- 92 24 112/68 100 %   09/26/22 1730 -- (!) 113 26 100/89 (!) 82 %   09/26/22 1715 -- 92 16 107/87 100 %   09/26/22 1700 -- 92 20 121/82 100 %   09/26/22 1645 -- 92 26 (!) 110/48 99 %       Intake/Output Summary (Last 24 hours) at 9/27/2022 1638  Last data filed at 9/27/2022 1600  Gross per 24 hour   Intake 2490 ml   Output 1035 ml   Net 1455 ml        I had a face to face encounter and independently examined this patient on 9/27/2022, as outlined below:  PHYSICAL EXAM:  General: WD, WN. Alert, cooperative, no acute distress    EENT:  EOMI. Anicteric sclerae. MMM  Resp:  CTA bilaterally, no wheezing or rales.   No accessory muscle use  CV:  Regular  rhythm,  No edema  GI:  Soft, Non distended, Non tender. +Bowel sounds  Neurologic:  Alert and oriented X 3, normal speech,   Psych:   Good insight. Not anxious nor agitated  Skin:  No rashes. No jaundice    Reviewed most current lab test results and cultures  YES  Reviewed most current radiology test results   YES  Review and summation of old records today    NO  Reviewed patient's current orders and MAR    YES  PMH/SH reviewed - no change compared to H&P  ________________________________________________________________________  Care Plan discussed with:    Comments   Patient     Family      RN     Care Manager     Consultant                        Multidiciplinary team rounds were held today with , nursing, pharmacist and clinical coordinator. Patient's plan of care was discussed; medications were reviewed and discharge planning was addressed. ________________________________________________________________________  Total NON critical care TIME: 33 Minutes    Total CRITICAL CARE TIME Spent:   Minutes non procedure based      Comments   >50% of visit spent in counseling and coordination of care     ________________________________________________________________________  Renard Schwartz MD     Procedures: see electronic medical records for all procedures/Xrays and details which were not copied into this note but were reviewed prior to creation of Plan. LABS:  I reviewed today's most current labs and imaging studies.   Pertinent labs include:  Recent Labs     09/27/22  1117 09/27/22  0355 09/26/22  0204 09/25/22  1615   WBC  --  6.2 17.3* 11.0   HGB 9.7* 9.8* 13.1 15.8   HCT 29.6* 29.6* 39.6 47.7*   PLT  --  192 283 417*     Recent Labs     09/27/22  0355 09/26/22  0204 09/25/22  1615    138 135*   K 3.7 3.8 4.1   * 111* 102   CO2 26 20* 19*   GLU 99 172* 174*   BUN 4* 12 17   CREA 0.35* 0.72 0.71   CA 8.5 8.2* 10.5*   MG 2.2 2.2 2.6*   PHOS 2.1* 2.4*  --    ALB  --   --  4.8   TBILI  --   -- 1.3*   ALT  --   --  97*       Signed: Barrett Sandoval MD

## 2022-09-28 LAB
BAKER'S YEAST IGA QN: <20 UNITS (ref 0–24.9)
BAKER'S YEAST IGG QN: <20 UNITS (ref 0–24.9)
BASOPHILS # BLD: 0 K/UL (ref 0–0.1)
BASOPHILS NFR BLD: 0 % (ref 0–1)
CYSTATIN C SERPL-MCNC: 0.81 MG/L (ref 0.72–1.16)
DIFFERENTIAL METHOD BLD: ABNORMAL
EOSINOPHIL # BLD: 0 K/UL (ref 0–0.4)
EOSINOPHIL NFR BLD: 0 % (ref 0–7)
ERYTHROCYTE [DISTWIDTH] IN BLOOD BY AUTOMATED COUNT: 12.7 % (ref 11.5–14.5)
HCT VFR BLD AUTO: 29.9 % (ref 35–47)
HGB BLD-MCNC: 9.8 G/DL (ref 11.5–16)
IMM GRANULOCYTES # BLD AUTO: 0 K/UL (ref 0–0.04)
IMM GRANULOCYTES NFR BLD AUTO: 0 % (ref 0–0.5)
LYMPHOCYTES # BLD: 2.5 K/UL (ref 0.8–3.5)
LYMPHOCYTES NFR BLD: 44 % (ref 12–49)
MCH RBC QN AUTO: 31.3 PG (ref 26–34)
MCHC RBC AUTO-ENTMCNC: 32.8 G/DL (ref 30–36.5)
MCV RBC AUTO: 95.5 FL (ref 80–99)
MONOCYTES # BLD: 0.6 K/UL (ref 0–1)
MONOCYTES NFR BLD: 10 % (ref 5–13)
NEUTS SEG # BLD: 2.6 K/UL (ref 1.8–8)
NEUTS SEG NFR BLD: 46 % (ref 32–75)
NRBC # BLD: 0 K/UL (ref 0–0.01)
NRBC BLD-RTO: 0 PER 100 WBC
P-ANCA ATYPICAL TITR SER IF: NORMAL TITER
PLATELET # BLD AUTO: 216 K/UL (ref 150–400)
PMV BLD AUTO: 10.6 FL (ref 8.9–12.9)
RBC # BLD AUTO: 3.13 M/UL (ref 3.8–5.2)
WBC # BLD AUTO: 5.7 K/UL (ref 3.6–11)

## 2022-09-28 PROCEDURE — 97165 OT EVAL LOW COMPLEX 30 MIN: CPT | Performed by: OCCUPATIONAL THERAPIST

## 2022-09-28 PROCEDURE — 74011250637 HC RX REV CODE- 250/637: Performed by: STUDENT IN AN ORGANIZED HEALTH CARE EDUCATION/TRAINING PROGRAM

## 2022-09-28 PROCEDURE — 74011250637 HC RX REV CODE- 250/637: Performed by: INTERNAL MEDICINE

## 2022-09-28 PROCEDURE — 36415 COLL VENOUS BLD VENIPUNCTURE: CPT

## 2022-09-28 PROCEDURE — 74011636637 HC RX REV CODE- 636/637: Performed by: NURSE PRACTITIONER

## 2022-09-28 PROCEDURE — 97161 PT EVAL LOW COMPLEX 20 MIN: CPT | Performed by: PHYSICAL THERAPIST

## 2022-09-28 PROCEDURE — 85025 COMPLETE CBC W/AUTO DIFF WBC: CPT

## 2022-09-28 PROCEDURE — 74011000250 HC RX REV CODE- 250: Performed by: NURSE PRACTITIONER

## 2022-09-28 PROCEDURE — 97530 THERAPEUTIC ACTIVITIES: CPT | Performed by: OCCUPATIONAL THERAPIST

## 2022-09-28 PROCEDURE — 74011250637 HC RX REV CODE- 250/637: Performed by: NURSE PRACTITIONER

## 2022-09-28 PROCEDURE — 74011250636 HC RX REV CODE- 250/636: Performed by: NURSE PRACTITIONER

## 2022-09-28 PROCEDURE — 97530 THERAPEUTIC ACTIVITIES: CPT | Performed by: PHYSICAL THERAPIST

## 2022-09-28 PROCEDURE — 74011250636 HC RX REV CODE- 250/636: Performed by: INTERNAL MEDICINE

## 2022-09-28 PROCEDURE — 65270000046 HC RM TELEMETRY

## 2022-09-28 PROCEDURE — 97535 SELF CARE MNGMENT TRAINING: CPT | Performed by: OCCUPATIONAL THERAPIST

## 2022-09-28 RX ORDER — PREDNISONE 20 MG/1
40 TABLET ORAL
Status: DISCONTINUED | OUTPATIENT
Start: 2022-09-28 | End: 2022-09-30 | Stop reason: HOSPADM

## 2022-09-28 RX ORDER — BACLOFEN 10 MG/1
10 TABLET ORAL
Status: DISCONTINUED | OUTPATIENT
Start: 2022-09-28 | End: 2022-09-30 | Stop reason: HOSPADM

## 2022-09-28 RX ADMIN — LEVOFLOXACIN 750 MG: 5 INJECTION, SOLUTION INTRAVENOUS at 20:02

## 2022-09-28 RX ADMIN — ENOXAPARIN SODIUM 40 MG: 100 INJECTION SUBCUTANEOUS at 09:08

## 2022-09-28 RX ADMIN — ACETAMINOPHEN 650 MG: 325 TABLET ORAL at 17:14

## 2022-09-28 RX ADMIN — Medication 1 AMPULE: at 09:08

## 2022-09-28 RX ADMIN — CASTOR OIL AND BALSAM, PERU: 788; 87 OINTMENT TOPICAL at 09:08

## 2022-09-28 RX ADMIN — MESALAMINE 1000 MG: 250 CAPSULE ORAL at 17:14

## 2022-09-28 RX ADMIN — SODIUM CHLORIDE, PRESERVATIVE FREE 10 ML: 5 INJECTION INTRAVENOUS at 21:43

## 2022-09-28 RX ADMIN — Medication 1 AMPULE: at 20:05

## 2022-09-28 RX ADMIN — MESALAMINE 1000 MG: 250 CAPSULE ORAL at 21:43

## 2022-09-28 RX ADMIN — METRONIDAZOLE 500 MG: 500 INJECTION, SOLUTION INTRAVENOUS at 13:28

## 2022-09-28 RX ADMIN — METRONIDAZOLE 500 MG: 500 INJECTION, SOLUTION INTRAVENOUS at 05:17

## 2022-09-28 RX ADMIN — HYDROCORTISONE SODIUM SUCCINATE 50 MG: 100 INJECTION, POWDER, FOR SOLUTION INTRAMUSCULAR; INTRAVENOUS at 05:17

## 2022-09-28 RX ADMIN — PREDNISONE 40 MG: 20 TABLET ORAL at 13:28

## 2022-09-28 RX ADMIN — CASTOR OIL AND BALSAM, PERU: 788; 87 OINTMENT TOPICAL at 20:06

## 2022-09-28 RX ADMIN — SODIUM CHLORIDE, PRESERVATIVE FREE 10 ML: 5 INJECTION INTRAVENOUS at 13:29

## 2022-09-28 RX ADMIN — METRONIDAZOLE 500 MG: 500 INJECTION, SOLUTION INTRAVENOUS at 20:02

## 2022-09-28 RX ADMIN — MESALAMINE 1000 MG: 250 CAPSULE ORAL at 13:28

## 2022-09-28 RX ADMIN — SODIUM CHLORIDE, PRESERVATIVE FREE 10 ML: 5 INJECTION INTRAVENOUS at 05:17

## 2022-09-28 NOTE — PROGRESS NOTES
0288 - Bedside shift change report given to [id number] Ryan Ray (oncoming nurse) by Gasper Dior (offgoing nurse). Report included the following information SBAR, Kardex, ED Summary, Intake/Output, MAR, Recent Results, and Cardiac Rhythm NSR .     0800 - Pt assessment completed. Pt had loose BM, incontinence care performed. 1030 - NP from GI at the bedside, see notes. 1100 - Pt had another loose BM. Pt bathed and all linens changed. 1200 - Pt reassessment completed. No changes since this morning. 56 - Pt worked with PT and OT today and spent lunch time OOB to her wheelchair. Pt mobility team used to get pt back to bed with skip lift pad (in recliner in room). 1600 - Pt reasessment performed, no changes. 1715 - Pt given tylenol for mild muscle pain    1800 - Tylenol effective in relieving pain for patient. 1910 - Bedside shift change report given to Jaci Collier RN (oncoming nurse) by Tonny Reza RN (offgoing nurse). Report included the following information SBAR, Kardex, ED Summary, Intake/Output, MAR, Recent Results, and Cardiac Rhythm NSR .      Brina Hoffman RN

## 2022-09-28 NOTE — PROGRESS NOTES
GI PROGRESS NOTE  Kimberlee Nicholas NP  [phone] NP in-hospital cell phone M-F until 4:30  After 5pm or on weekends, please call  for physician on call    NAME:Jessica Francisco DVF:[id number]   ATTG: Dr Charisse Vyas  PCP: Pedro Mendiola, DO  Date/Time:  9/28/2022 1219 PM   Primary GI: Dr. Gary Mackey:   Recurrent pan colitis - worse on left side 5/2019, 3/2022 and now  Septic Shock thought 2/2 pancolitis   Unclear if this is recurrent ischemic colitis vs ulcerative colitis  Worsening diarrhea overnight - non bloody  Anemia - 9.8 - stable x 24 hours. - 2019 bx consistent with ischemic colitis but bx 3/2022 - less likely for IBD, not ischemic  - Never on any longterm treatment for Ulcerative colitis  - No abdominal pain- resolved  - stool studies negative for c.diff and enteric panel    CT abd/pel W/WO contrast 9/25/22 :   1. Pancolonic wall thickening and submucosal enhancement. Correlate for  infectious versus inflammatory colitis. 2.  No pulmonary embolism or acute airspace disease. Chronic constipation  - failed linzess, now on Trulance PRN but hasn't ever taken any  - BM every few days -miralax and fiber didn't help  - Very little to no rectal tone - hasn't done suppositories. 5/2019 : [facility]N  -- ulceration, mucosal friability and hemorrhage in the left colon, from rectum to splenic flexure, biopsied; appearance most consistent with left-sided ulcerative colitis  -- prolapsed internal hemorrhoids  -- lax rectal tone  -- sub-optimal bowel preparation  Bx: Although the distribution is somewhat unusual, the morphologic features of   superficial mucosal necrosis and ulceration favor ischemic colitis. Also in the differential diagnosis is self-limited colitis (possibly pseudomembranous). Lymphoplasmacytic infiltrate in the lamina propria, as associated with ulcerative colitis, is not identified.       3/2022 : [facility]N   -Minimal erythema of sigmoid and descending colon from 20-40cm; biopsied  -Remainder of colon is unremarkable; biopsied are obtained in the ascending colon and the rectum separately  -Small grade 1 internal hemorrhoid  Bx: In the reference to specimen #2, the findings are focal and minimal.   Possible causes include acute self-limited/ infectious colitis, ischemia,   NSAID or bowel preparation changes and less likely inflammatory bowel   disease related changes. Plan:   Start prednisone 40mg daily x 5 days, then 30mg daily x 5 days, then 20mg daily x 5 days, then 10mg daily x 5 days, then stop  Start mesalamine 1gm QID - please continue outpatient   No plan for colonoscopy inpatient, but may need to consider repeating outpatient in a few months. IBD panel still pending  Follow labs  Continue with antibiotics. Rest per primary team.     Plan discussed with DR Dixie Vaughn. Will continue to follow. Subjective:   Discussed with RN events overnight. Pt had a hard night but doing better this AM. Now having incontinence of stool- no awareness of urgency to go. Liquids. No blood. No abdominal pain. Complaint Y/N Description   Abdominal Pain n    Hematemesis n    Hematochezia n    Melena n    Constipation n    Diarrhea n    Dyspepsia n    Dysphagia n    Jaundiced n    Nausea/vomiting n      Review of Systems:  Symptom Y/N Comments  Symptom Y/N Comments   Fever/Chills    Chest Pain     Cough    Headaches     Sputum    Joint Pain     SOB/ALVARADO    Pruritis/Rash     Tolerating Diet y   Other       Could NOT obtain due to:      Objective:   VITALS:   Last 24hrs VS reviewed since prior progress note.  Most recent are:  Visit Vitals  /70 (BP 1 Location: Left lower arm, BP Patient Position: At rest)   Pulse 68   Temp 98.4 °F (36.9 °C)   Resp 22   Ht 4' 6\" (1.372 m)   Wt 64 kg (141 lb 1.5 oz)   SpO2 96%   BMI 34.02 kg/m²       Intake/Output Summary (Last 24 hours) at 9/28/2022 1219  Last data filed at 9/28/2022 0630  Gross per 24 hour   Intake 690 ml   Output 1135 ml   Net -445 ml       PHYSICAL EXAM:  General: WD, WN. Alert, cooperative, no acute distress    HEENT: NC, Atraumatic. Anicteric sclerae. Lungs:  CTA Bilaterally. No Wheezing/Rhonchi/Rales. Heart:  Regular  rhythm,  No murmur,, No Rubs  Abdomen: Soft, Non distended, Non tender. +Bowel sounds, no HSM  Extremities: No c/c/e. [place], contracted leg - hx of spina bifida  Neurologic:  Alert and oriented X 3. No acute neurological distress   Psych:   Good insight. Not anxious nor agitated. Lab and Radiology Data Reviewed: (see below)    Medications Reviewed: (see below)  PMH/SH reviewed - no change compared to H&P  ________________________________________________________________________  Total time spent with patient: 30 minutes ________________________________________________________________________  Care Plan discussed with:  Patient y   Family  Sister at bedside   ALENA Hampton, ,rn              Consultant: Jerri Alejo NP     Procedures: see electronic medical records for all procedures/Xrays and details which were not copied into this note but were reviewed prior to creation of Plan. LABS:  Recent Labs     09/28/22  0431 09/27/22  1117 09/27/22  0355   WBC 5.7  --  6.2   HGB 9.8* 9.7* 9.8*   HCT 29.9* 29.6* 29.6*     --  192       Recent Labs     09/27/22  0355 09/26/22  0204 09/25/22  1615    138 135*   K 3.7 3.8 4.1   * 111* 102   CO2 26 20* 19*   BUN 4* 12 17   CREA 0.35* 0.72 0.71   GLU 99 172* 174*   CA 8.5 8.2* 10.5*   MG 2.2 2.2 2.6*   PHOS 2.1* 2.4*  --        Recent Labs     09/25/22  1615   AP 91   TP 8.6*   ALB 4.8   GLOB 3.8       No results for input(s): INR, PTP, APTT, INREXT, INREXT in the last 72 hours. No results for input(s): FE, TIBC, PSAT, FERR in the last 72 hours. Lab Results   Component Value Date/Time    Folate 20.6 04/30/2019 12:59 AM     No results for input(s): PH, PCO2, PO2 in the last 72 hours.   No results for input(s): CPK, CKMB in the last 72 hours.    No lab exists for component: TROPONINI  Lab Results   Component Value Date/Time    Color DARK YELLOW 09/26/2022 01:50 AM    Appearance CLEAR 09/26/2022 01:50 AM    Specific gravity 1.015 09/26/2022 01:50 AM    Specific gravity 1.021 03/15/2022 12:50 AM    pH (UA) 5.0 09/26/2022 01:50 AM    Protein Negative 09/26/2022 01:50 AM    Glucose Negative 09/26/2022 01:50 AM    Ketone 15 (A) 09/26/2022 01:50 AM    Urobilinogen 1.0 09/26/2022 01:50 AM    Nitrites Negative 09/26/2022 01:50 AM    Leukocyte Esterase TRACE (A) 09/26/2022 01:50 AM    Epithelial cells FEW 09/26/2022 01:50 AM    Bacteria Negative 09/26/2022 01:50 AM    WBC 0-4 09/26/2022 01:50 AM    RBC 0-5 09/26/2022 01:50 AM       MEDICATIONS:  Current Facility-Administered Medications   Medication Dose Route Frequency    hydrocortisone Sod Succ (PF) (SOLU-CORTEF) injection 50 mg  50 mg IntraVENous Q8H    alcohol 62% (NOZIN) nasal  1 Ampule  1 Ampule Topical Q12H    balsam peru-castor oiL (VENELEX) ointment   Topical BID    sodium chloride (NS) flush 5-40 mL  5-40 mL IntraVENous Q8H    sodium chloride (NS) flush 5-40 mL  5-40 mL IntraVENous PRN    acetaminophen (TYLENOL) tablet 650 mg  650 mg Oral Q6H PRN    Or    acetaminophen (TYLENOL) suppository 650 mg  650 mg Rectal Q6H PRN    polyethylene glycol (MIRALAX) packet 17 g  17 g Oral DAILY PRN    ondansetron (ZOFRAN ODT) tablet 4 mg  4 mg Oral Q8H PRN    Or    ondansetron (ZOFRAN) injection 4 mg  4 mg IntraVENous Q6H PRN    enoxaparin (LOVENOX) injection 40 mg  40 mg SubCUTAneous DAILY    levoFLOXacin (LEVAQUIN) 750 mg in D5W IVPB  750 mg IntraVENous Q24H    metroNIDAZOLE (FLAGYL) IVPB premix 500 mg  500 mg IntraVENous Q8H

## 2022-09-28 NOTE — PROGRESS NOTES
Hospitalist Progress Note    NAME: Prasanna Molina   :  1960   MRN:  [id number]       Assessment / Plan:  Sepsis/septic shock   Secondary to pan colitis  Enteric panel negative  Continue empiric abx with levaquin and flagyl  Appreciate GI recs, no plan for scope. Plans for steroid taper and mesalamine to be continued outpatient  Avoid anti-diarrheals     Volume depletion  Check orthostatics  Tolerating diet  Wean off stress dose steroids. Wean to Q12 tomorrow then daily then off     HERVE - resolved     Anemia  No signs of acute blood loss  Likely dilutional with fluids   Monitor hgb     Deconditioning  Muscle spasms  Encouraged OOB. Baclofen prn muscle spasms  PT/OT consult       30.0 - 39.9 Obese / Body mass index is 34.02 kg/m². Estimated discharge date:   Barriers: Clinical improvement    Code status: Full  Prophylaxis: SCD's  Recommended Disposition: Home w/Family     Subjective:     Chief Complaint / Reason for Physician Visit  Patient seen and examined the bedside. Discussed with RN events overnight. She states she is feeling better but she has throbbing muscular spasms in her lower back. She denies other pain. She continues to have diarrhea and incontinence of urine and stool. The incontinence is stable from baseline SB. She denies any abdominal pain. No overt bleeding. She is tolerating a diet. Review of Systems:  Symptom Y/N Comments  Symptom Y/N Comments   Fever/Chills n   Chest Pain n    Poor Appetite    Edema     Cough n   Abdominal Pain n    Sputum    Joint Pain     SOB/ALVARADO n   Pruritis/Rash     Nausea/vomit n   Tolerating PT/OT n    Diarrhea y   Tolerating Diet n    Constipation n   Other       Could NOT obtain due to:      Objective:     VITALS:   Last 24hrs VS reviewed since prior progress note.  Most recent are:  Patient Vitals for the past 24 hrs:   Temp Pulse Resp BP SpO2   22 1218 -- 66 -- (!) 107/53 98 %   22 1200 -- 68 22 -- 96 %   22 0800 98.4 °F (36.9 °C) 65 22 120/70 98 %   09/28/22 0352 98.7 °F (37.1 °C) 75 19 117/79 95 %   09/28/22 0337 -- 65 17 (!) 98/57 95 %   09/28/22 0000 -- 73 21 115/67 97 %   09/27/22 2004 98.8 °F (37.1 °C) 75 22 114/65 97 %   09/27/22 1904 -- 77 21 123/72 98 %   09/27/22 1615 -- 85 22 -- 97 %   09/27/22 1614 -- 88 20 -- 97 %   09/27/22 1613 -- 94 26 -- 96 %   09/27/22 1612 -- 88 21 -- 97 %   09/27/22 1611 -- 82 19 -- 96 %   09/27/22 1610 -- 84 19 -- 96 %   09/27/22 1609 -- 85 19 -- 96 %         Intake/Output Summary (Last 24 hours) at 9/28/2022 1608  Last data filed at 9/28/2022 0630  Gross per 24 hour   Intake 590 ml   Output 775 ml   Net -185 ml          I had a face to face encounter and independently examined this patient on 9/28/2022, as outlined below:  PHYSICAL EXAM:  General: WD, WN. Alert, cooperative, no acute distress    EENT:  EOMI. Anicteric sclerae. MMM  Resp:  CTA bilaterally, no wheezing or rales. No accessory muscle use  CV:  Regular  rhythm,  No edema  GI:  Soft, Non distended, Non tender. +Bowel sounds  Neurologic:  Alert and oriented X 3, normal speech,   Psych:   Good insight. Not anxious nor agitated  Skin:  No rashes. No jaundice    Reviewed most current lab test results and cultures  YES  Reviewed most current radiology test results   YES  Review and summation of old records today    NO  Reviewed patient's current orders and MAR    YES  PMH/SH reviewed - no change compared to H&P  ________________________________________________________________________  Care Plan discussed with:    Comments   Patient x    Family      RN     Care Manager     Consultant                        Multidiciplinary team rounds were held today with , nursing, pharmacist and clinical coordinator. Patient's plan of care was discussed; medications were reviewed and discharge planning was addressed.      ________________________________________________________________________  Total NON critical care TIME: 35 Minutes    Total CRITICAL CARE TIME Spent:   Minutes non procedure based      Comments   >50% of visit spent in counseling and coordination of care     ________________________________________________________________________  Mike Smith MD     Procedures: see electronic medical records for all procedures/Xrays and details which were not copied into this note but were reviewed prior to creation of Plan. LABS:  I reviewed today's most current labs and imaging studies.   Pertinent labs include:  Recent Labs     09/28/22  0431 09/27/22  1117 09/27/22  0355 09/26/22  0204   WBC 5.7  --  6.2 17.3*   HGB 9.8* 9.7* 9.8* 13.1   HCT 29.9* 29.6* 29.6* 39.6     --  192 283       Recent Labs     09/27/22  0355 09/26/22  0204 09/25/22  1615    138 135*   K 3.7 3.8 4.1   * 111* 102   CO2 26 20* 19*   GLU 99 172* 174*   BUN 4* 12 17   CREA 0.35* 0.72 0.71   CA 8.5 8.2* 10.5*   MG 2.2 2.2 2.6*   PHOS 2.1* 2.4*  --    ALB  --   --  4.8   TBILI  --   --  1.3*   ALT  --   --  97*         Signed: Mike Smith MD

## 2022-09-28 NOTE — PROGRESS NOTES
OCCUPATIONAL THERAPY EVALUATION/DISCHARGE  Patient: Fatoumata Rowan (85 y.o. female)  Date: 9/28/2022  Primary Diagnosis: Severe sepsis ([facility] [address] [A41.9, R65.20]       Precautions: fall       ASSESSMENT  Based on the objective data described below, the patient presents with near baseline level of functioning for adls and mobility. Pt was generally stand by assistance/set up to mod A (bed level toileting) for adls and has no concerns for returning home. She reports that her home is accessible and she manages independently including working, driving etc.  She states that she knows how to access care if she needs it and has family support. Pt does not feel that she needs OT services in the acute setting  nor at discharge. .    Current Level of Function (ADLs/self-care): assist for toileting, set up for bed level adls    Functional Outcome Measure: The patient scored 40/100 on the Barthel Index outcome measure which is indicative of \"partially dependent \" adl functioning. Other factors to consider for discharge: spinal bifida, w/c bound, independent at baseline, had O/P PT for her shoulder/spine     PLAN :  Recommend with staff: encourage OOB to her w/c with plan for returning (skip?)    Recommendation for discharge: (in order for the patient to meet his/her long term goals)  No skilled occupational therapy/ follow up rehabilitation needs identified at this time. This discharge recommendation:  Has not yet been discussed the attending provider and/or case management    IF patient discharges home will need the following DME: none       SUBJECTIVE:   Patient stated I don't need OT . ... I was going to PT for my shoulder.     OBJECTIVE DATA SUMMARY:   HISTORY:   Past Medical History:   Diagnosis Date    COVID-19 05/2021    Hypercholesterolemia     Hypertension     Spina bifida [facility])      Past Surgical History:   Procedure Laterality Date    COLONOSCOPY N/A 5/1/2019    COLONOSCOPY performed by Indira Hyatt MD at hospitals ENDOSCOPY    COLONOSCOPY N/A 3/17/2022    COLONOSCOPY performed by Pritesh Shaffer MD at hospitals ENDOSCOPY    COLONOSCOPY,DIAGNOSTIC  5/1/2019         COLONOSCOPY,DIAGNOSTIC  3/17/2022         HX COLONOSCOPY  03/2022    HX OTHER SURGICAL  1890s    sacral wound flap repair       Prior Level of Function/Environment/Context: independnt in adls and IADLs. 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 Expanded or extensive additional review of patient history:   Home Situation  Home Environment: Apartment  Wheelchair Ramp: Yes  Living Alone: Yes  Support Systems: Other Family Member(s) (know how to access caregivers)  Patient Expects to be Discharged to[de-identified] Home  Current DME Used/Available at Home: Grab bars, Tub transfer bench, Wheelchair  Tub or Shower Type: Tub/Shower combination    Hand dominance: Right    EXAMINATION OF PERFORMANCE DEFICITS:  Cognitive/Behavioral Status:  Neurologic State: Alert; Appropriate for age  Orientation Level: Appropriate for age  Cognition: Appropriate decision making; Appropriate for age attention/concentration; Appropriate safety awareness; Follows commands  Perception: Appears intact  Perseveration: No perseveration noted  Safety/Judgement: Awareness of environment;Good awareness of safety precautions; Insight into deficits    Skin: generally intact    Edema: none observed    Hearing: Auditory  Auditory Impairment: None    Vision/Perceptual:                           Acuity: Within Defined Limits         Range of Motion:  BUEs:    AROM: Within functional limits (BUEs intact;  BLEs impaired at baseline due to spinabifida)  PROM: Within functional limits                      Strength:  BUEs:  Strength: Generally decreased, functional (pt reports generally at her baseline-was in O/P PT due to shoulder/spine)                Coordination:  Coordination: Within functional limits (BUEs;  impaired BLEs)  Fine Motor Skills-Upper: Left Intact; Right Intact Gross Motor Skills-Upper: Left Intact; Right Intact    Tone & Sensation:    Tone:  (Normal BLEs; impaired BLEs due to spina bifida)  Sensation: Impaired (impaired BLEs at baseline;  BUEs intact)                      Balance:  Sitting: Intact (dynamic: impaired seated EOB)  Standing: Impaired (using BLEs for balance only during transfers)  Standing - Static:  (does not functionally stand at baseline,  BLEs used for balance only during transfers at baseline)  Standing - Dynamic :  (not performing at baseline due to spina bifida at baseline)    Functional Mobility and Transfers for ADLs:  Bed Mobility:  Rolling: Stand-by assistance  Supine to Sit: Stand-by assistance  Sit to Supine: Stand-by assistance  Scooting: Stand-by assistance    Transfers:  Bed to Chair: Stand-by assistance  Assistive Device :  (uses w/c indep. at baseline)    ADL Assessment:  Feeding: Independent    Oral Facial Hygiene/Grooming: Setup    Bathing: Minimum assistance    Type of Bath: Chlorhexidine (CHG); Basin/Soap/Water;Full    Upper Body Dressing: Setup    Lower Body Dressing: Setup (performs at bed level at baseline)    Toileting: Minimum assistance (pt performs straight cath at baseline;)                ADL Intervention and task modifications:                 Type of Bath: Chlorhexidine (CHG); Basin/Soap/Water;Full                        Cognitive Retraining  Safety/Judgement: Awareness of environment;Good awareness of safety precautions; Insight into deficits    Therapeutic Exercise:  Seated EOB performed familiar exercises from outpatient   Functional Measure:    Barthel Index:  Bathin  Bladder: 0  Bowels: 0  Groomin  Dressing: 10  Feeding: 10  Mobility: 0  Stairs: 0  Toilet Use: 5  Transfer (Bed to Chair and Back): 10  Total: 40/100      The Barthel ADL Index: Guidelines  1. The index should be used as a record of what a patient does, not as a record of what a patient could do.   2. The main aim is to establish degree of independence from any help, physical or verbal, however minor and for whatever reason. 3. The need for supervision renders the patient not independent. 4. A patient's performance should be established using the best available evidence. Asking the patient, friends/relatives and nurses are the usual sources, but direct observation and common sense are also important. However direct testing is not needed. 5. Usually the patient's performance over the preceding 24-48 hours is important, but occasionally longer periods will be relevant. 6. Middle categories imply that the patient supplies over 50 per cent of the effort. 7. Use of aids to be independent is allowed. Score Interpretation (from [address] [facility] [address])    Independent   60-79 Minimally independent   40-59 Partially dependent   20-39 Very dependent   <20 Totally dependent     -Orestes Pop., Barthel, D.W. (1965). Functional evaluation: the Barthel Index. [address] [facility] ([address] (1997). The Barthel activities of daily living index: self-reporting versus actual performance in the old (> or = 75 years). Journal [address] 45(7), [address] [facility], J.JLeolaMLeolaF, Yomaira Eng., Maximiliano Fermin. (1999). Measuring the change in disability after inpatient rehabilitation; comparison of the responsiveness of the Barthel Index and Functional [place] Measure. Journal of Neurology, Neurosurgery, and Psychiatry, 66(4), 846-300. LEAH Hernandez, YEN Jovel, & Socrates Billy, M.A. (2004) Assessment of post-stroke quality of life in cost-effectiveness studies: The usefulness of the Barthel Index and the EuroQoL-5D.  Quality of Life Research, 15, 271-12        Occupational Therapy Evaluation Charge Determination   History Examination Decision-Making   MEDIUM Complexity : Expanded review of history including physical, cognitive and psychosocial  history  MEDIUM Complexity : 3-5 performance deficits relating to physical, cognitive , or psychosocial skils that result in activity limitations and / or participation restrictions MEDIUM Complexity : Patient may present with comorbidities that affect occupational performnce. Miniml to moderate modification of tasks or assistance (eg, physical or verbal ) with assesment(s) is necessary to enable patient to complete evaluation       Based on the above components, the patient evaluation is determined to be of the following complexity level: MEDIUM  Pain Rating:  No pain reported    Activity Tolerance:   BP stable    After treatment patient left in no apparent distress:    Sitting in her wheelchair and reported comfort. COMMUNICATION/EDUCATION:   The patients plan of care was discussed with: Physical therapist and Registered nurse.      Thank you for this referral.  Elvira Forte OTR/LARON    67 minutes

## 2022-09-28 NOTE — PROGRESS NOTES
Problem: Mobility Impaired (Adult and Pediatric)  Goal: *Acute Goals and Plan of Care (Insert Text)  Description: FUNCTIONAL STATUS PRIOR TO ADMISSION: The patient was functional at the wheelchair level and was modified independent for transfers to the chair. HOME SUPPORT PRIOR TO ADMISSION: The patient lived alone with friends  to provide assistance. Physical Therapy Goals  Initiated 9/28/2022  1. Patient will move from supine to sit and sit to supine , scoot up and down, and roll side to side in bed with independence within 7 day(s). 2.  Patient will transfer from bed to chair and chair to bed with modified independence using the least restrictive device within 7 day(s). Outcome: Not Met   PHYSICAL THERAPY EVALUATION  Patient: Franco Morales (28 y.o. female)  Date: 9/28/2022  [facility]: [facility] [A41.9, R65.20]       Precautions: h/o spina bifida; independent at w/c level       ASSESSMENT  Based on the objective data described below, the patient presents with mildly impaired functional mobility following admission for severe sepsis. Patient with h/o spina bifida and is independent at a w/c level at baseline. Patient demonstrating bed mobility at supervision level today and is able to complete partial stand pivot transfer from bed to w/c then wc/ to reclining chair and back to w/c with CGA. Patient is very near her functional baseline but is requesting to continue to work with therapy to ensure safe transfers and maintain functional strength to allow her to return to home following discharge. Patient also requesting HHPT following discharge to ensure safe mobility within home environment. Patient will likely only require one more PT session while hospitalized. Current Level of Function Impacting Discharge (mobility/balance): CGA for transfers    Functional Outcome Measure: The patient scored on the 40/100 outcome measure which is indicative of partial dependence.       Other factors to consider for discharge: excellent support system     Patient will benefit from skilled therapy intervention to address the above noted impairments. PLAN :  Recommendations and Planned Interventions: bed mobility training, transfer training, and patient and family training/education      Frequency/Duration: Patient will be followed by physical therapy:  3 times a week to address goals. Recommendation for discharge: (in order for the patient to meet his/her long term goals)  Physical therapy at least 2 days/week in the home     This discharge recommendation:  Has not yet been discussed the attending provider and/or case management    IF patient discharges home will need the following DME: patient owns DME required for discharge         SUBJECTIVE:   Patient stated This makes me feel so much better mentally. - performing OOB transfers    OBJECTIVE DATA SUMMARY:   HISTORY:    Past Medical History:   Diagnosis Date    COVID-19 05/2021    Hypercholesterolemia     Hypertension     Spina bifida [facility])      Past Surgical History:   Procedure Laterality Date    COLONOSCOPY N/A 5/1/2019    COLONOSCOPY performed by Joseph Santacruz MD at [facility] ENDOSCOPY    COLONOSCOPY N/A 3/17/2022    COLONOSCOPY performed by Sami Montanez MD at [facility] ENDOSCOPY    COLONOSCOPY,DIAGNOSTIC  5/1/2019         COLONOSCOPY,DIAGNOSTIC  3/17/2022         HX COLONOSCOPY  03/2022    HX OTHER SURGICAL  1890s    sacral wound flap repair         Home Situation  Home Environment: Apartment  Wheelchair Ramp: Yes  Living Alone: Yes  Support Systems: Other Family Member(s) (know how to access caregivers)  Patient Expects to be Discharged to[de-identified] Home  Current DME Used/Available at Home: Grab bars, Tub transfer bench, Wheelchair  Tub or Shower Type: Tub/Shower combination    EXAMINATION/PRESENTATION/DECISION MAKING:   Critical Behavior:  Neurologic State: Alert, Appropriate for age  Orientation Level: Appropriate for age  Cognition: Appropriate decision making, Appropriate for age attention/concentration, Appropriate safety awareness, Follows commands  Safety/Judgement: Awareness of environment, Good awareness of safety precautions, Insight into deficits  Hearing: Auditory  Auditory Impairment: None    Range Of Motion:  AROM: Within functional limits (BUEs intact;  BLEs impaired at baseline due to spinabifida)           PROM: Within functional limits           Strength:    Strength: Generally decreased, functional (pt reports generally at her baseline-was in O/P PT due to shoulder/spine)                    Tone & Sensation:   Tone:  (Normal BLEs; impaired BLEs due to spina bifida)              Sensation: Impaired (impaired BLEs at baseline;  BUEs intact)               Coordination:  Coordination: Within functional limits (BUEs;  impaired BLEs)  Vision:   Acuity: Within Defined Limits  Functional Mobility:  Bed Mobility:  Rolling: Stand-by assistance  Supine to Sit: Stand-by assistance  Sit to Supine: Stand-by assistance  Scooting: Stand-by assistance  Transfers:              Bed to Chair: Contact guard assistance              Balance:   Sitting: Intact (dynamic: impaired seated EOB)  Standing: Impaired (using BLEs for balance only during transfers)  Standing - Static:  (does not functionally stand at baseline,  BLEs used for balance only during transfers at baseline)  Standing - Dynamic :  (not performing at baseline due to spina bifida at baseline)      Functional Measure:  Barthel Index:    Bathin  Bladder: 0  Bowels: 0  Groomin  Dressing: 10  Feeding: 10  Mobility: 0  Stairs: 0  Toilet Use: 5  Transfer (Bed to Chair and Back): 10  Total: 40/100       The Barthel ADL Index: Guidelines  1. The index should be used as a record of what a patient does, not as a record of what a patient could do. 2. The main aim is to establish degree of independence from any help, physical or verbal, however minor and for whatever reason.   3. The need for supervision renders the patient not independent. 4. A patient's performance should be established using the best available evidence. Asking the patient, friends/relatives and nurses are the usual sources, but direct observation and common sense are also important. However direct testing is not needed. 5. Usually the patient's performance over the preceding 24-48 hours is important, but occasionally longer periods will be relevant. 6. Middle categories imply that the patient supplies over 50 per cent of the effort. 7. Use of aids to be independent is allowed. Score Interpretation (from [address] [address])    Independent   60-79 Minimally independent   40-59 Partially dependent   20-39 Very dependent   <20 Totally dependent     -Orestes Pop., Barthel, DLeolaW. (1965). Functional evaluation: the Barthel Index. [address] [facility] ([address] [place] (1997). The Barthel activities of daily living index: self-reporting versus actual performance in the old (> or = 75 years). Journal [address] 45(7), [address] [facility], JONATHAN, Joslyn Vang., Anne-Marie Chappell. (1999). Measuring the change in disability after inpatient rehabilitation; comparison of the responsiveness of the Barthel Index and Functional [place] Measure. Journal of Neurology, Neurosurgery, and Psychiatry, 66(4), 260-106. Mert Ward, CLAUDY.FRAN.LUZ ELENA, YEN Jovel, & Elvis Lima MLeolaA. (2004) Assessment of post-stroke quality of life in cost-effectiveness studies: The usefulness of the Barthel Index and the EuroQoL-5D. Quality of Life Research, 15, 260-50          Activity Tolerance:   Good    After treatment patient left in no apparent distress:   Sitting in chair and Call bell within reach    COMMUNICATION/EDUCATION:   The patients plan of care was discussed with: Occupational therapist and Registered nurse.      Fall prevention education was provided and the patient/caregiver indicated understanding., Patient/family have participated as able in goal setting and plan of care. , and Patient/family agree to work toward stated goals and plan of care.     Thank you for this referral.  Hugh Sharpe, PT   Time Calculation: 67 mins

## 2022-09-29 LAB
ANION GAP SERPL CALC-SCNC: 6 MMOL/L (ref 5–15)
BUN SERPL-MCNC: 11 MG/DL (ref 6–20)
BUN/CREAT SERPL: 22 (ref 12–20)
CALCIUM SERPL-MCNC: 8.3 MG/DL (ref 8.5–10.1)
CHLORIDE SERPL-SCNC: 109 MMOL/L (ref 97–108)
CO2 SERPL-SCNC: 27 MMOL/L (ref 21–32)
CREAT SERPL-MCNC: 0.51 MG/DL (ref 0.55–1.02)
GLUCOSE SERPL-MCNC: 111 MG/DL (ref 65–100)
MAGNESIUM SERPL-MCNC: 2 MG/DL (ref 1.6–2.4)
PHOSPHATE SERPL-MCNC: 2.8 MG/DL (ref 2.6–4.7)
POTASSIUM SERPL-SCNC: 2.9 MMOL/L (ref 3.5–5.1)
SODIUM SERPL-SCNC: 142 MMOL/L (ref 136–145)

## 2022-09-29 PROCEDURE — 74011250637 HC RX REV CODE- 250/637: Performed by: INTERNAL MEDICINE

## 2022-09-29 PROCEDURE — 74011250636 HC RX REV CODE- 250/636: Performed by: NURSE PRACTITIONER

## 2022-09-29 PROCEDURE — 74011000250 HC RX REV CODE- 250: Performed by: NURSE PRACTITIONER

## 2022-09-29 PROCEDURE — 74011636637 HC RX REV CODE- 636/637: Performed by: NURSE PRACTITIONER

## 2022-09-29 PROCEDURE — 84100 ASSAY OF PHOSPHORUS: CPT

## 2022-09-29 PROCEDURE — 74011250637 HC RX REV CODE- 250/637: Performed by: NURSE PRACTITIONER

## 2022-09-29 PROCEDURE — 83735 ASSAY OF MAGNESIUM: CPT

## 2022-09-29 PROCEDURE — 36415 COLL VENOUS BLD VENIPUNCTURE: CPT

## 2022-09-29 PROCEDURE — 80048 BASIC METABOLIC PNL TOTAL CA: CPT

## 2022-09-29 PROCEDURE — 74011250636 HC RX REV CODE- 250/636: Performed by: STUDENT IN AN ORGANIZED HEALTH CARE EDUCATION/TRAINING PROGRAM

## 2022-09-29 PROCEDURE — 65270000046 HC RM TELEMETRY

## 2022-09-29 PROCEDURE — 74011250637 HC RX REV CODE- 250/637: Performed by: STUDENT IN AN ORGANIZED HEALTH CARE EDUCATION/TRAINING PROGRAM

## 2022-09-29 RX ORDER — LEVOFLOXACIN 750 MG/1
750 TABLET ORAL EVERY 24 HOURS
Status: DISCONTINUED | OUTPATIENT
Start: 2022-09-29 | End: 2022-09-30 | Stop reason: HOSPADM

## 2022-09-29 RX ORDER — POTASSIUM CHLORIDE 750 MG/1
40 TABLET, FILM COATED, EXTENDED RELEASE ORAL
Status: COMPLETED | OUTPATIENT
Start: 2022-09-29 | End: 2022-09-29

## 2022-09-29 RX ORDER — POTASSIUM CHLORIDE 7.45 MG/ML
10 INJECTION INTRAVENOUS
Status: COMPLETED | OUTPATIENT
Start: 2022-09-29 | End: 2022-09-29

## 2022-09-29 RX ORDER — METRONIDAZOLE 250 MG/1
500 TABLET ORAL EVERY 12 HOURS
Status: DISCONTINUED | OUTPATIENT
Start: 2022-09-29 | End: 2022-09-30 | Stop reason: HOSPADM

## 2022-09-29 RX ORDER — POTASSIUM CHLORIDE 7.45 MG/ML
10 INJECTION INTRAVENOUS
Status: DISCONTINUED | OUTPATIENT
Start: 2022-09-29 | End: 2022-09-29 | Stop reason: ALTCHOICE

## 2022-09-29 RX ADMIN — PREDNISONE 40 MG: 20 TABLET ORAL at 09:07

## 2022-09-29 RX ADMIN — MESALAMINE 1000 MG: 250 CAPSULE ORAL at 13:23

## 2022-09-29 RX ADMIN — METRONIDAZOLE 500 MG: 250 TABLET ORAL at 09:07

## 2022-09-29 RX ADMIN — SODIUM CHLORIDE, PRESERVATIVE FREE 10 ML: 5 INJECTION INTRAVENOUS at 06:32

## 2022-09-29 RX ADMIN — POTASSIUM CHLORIDE 40 MEQ: 750 TABLET, FILM COATED, EXTENDED RELEASE ORAL at 09:28

## 2022-09-29 RX ADMIN — METRONIDAZOLE 500 MG: 250 TABLET ORAL at 21:28

## 2022-09-29 RX ADMIN — CASTOR OIL AND BALSAM, PERU: 788; 87 OINTMENT TOPICAL at 09:16

## 2022-09-29 RX ADMIN — ENOXAPARIN SODIUM 40 MG: 100 INJECTION SUBCUTANEOUS at 09:11

## 2022-09-29 RX ADMIN — MESALAMINE 1000 MG: 250 CAPSULE ORAL at 17:35

## 2022-09-29 RX ADMIN — Medication 1 AMPULE: at 09:09

## 2022-09-29 RX ADMIN — BACLOFEN 10 MG: 10 TABLET ORAL at 16:21

## 2022-09-29 RX ADMIN — SODIUM CHLORIDE, PRESERVATIVE FREE 10 ML: 5 INJECTION INTRAVENOUS at 21:29

## 2022-09-29 RX ADMIN — SODIUM CHLORIDE, PRESERVATIVE FREE 10 ML: 5 INJECTION INTRAVENOUS at 13:24

## 2022-09-29 RX ADMIN — MESALAMINE 1000 MG: 250 CAPSULE ORAL at 09:07

## 2022-09-29 RX ADMIN — LEVOFLOXACIN 750 MG: 750 TABLET, FILM COATED ORAL at 21:27

## 2022-09-29 RX ADMIN — MESALAMINE 1000 MG: 250 CAPSULE ORAL at 21:27

## 2022-09-29 RX ADMIN — POTASSIUM CHLORIDE 10 MEQ: 7.46 INJECTION, SOLUTION INTRAVENOUS at 09:30

## 2022-09-29 RX ADMIN — POTASSIUM CHLORIDE 10 MEQ: 7.46 INJECTION, SOLUTION INTRAVENOUS at 11:49

## 2022-09-29 RX ADMIN — METRONIDAZOLE 500 MG: 500 INJECTION, SOLUTION INTRAVENOUS at 04:33

## 2022-09-29 NOTE — PROGRESS NOTES
Pharmacy IV to PO Conversion Program    Medication: levofloxain, metronidazole  Indication: colitis/intra-abd infection      Impression/Plan: tolerating diet, improved WBC, change to PO per protocol     Thanks  EDGAR RodriguezD    [email or web address]

## 2022-09-29 NOTE — PROGRESS NOTES
Hospitalist Progress Note    NAME: Emre Clemens   :  1960   MRN:  [id number]       Assessment / Plan:  Sepsis/septic shock   Secondary to pan colitis  Enteric panel negative  Continue empiric abx with levaquin and flagyl  Appreciate GI recs, no plan for scope. Plans for steroid taper and mesalamine to be continued outpatient  Avoid anti-diarrheals  Diet well tolerated     Volume depletion  Tolerating diet  Wean off stress dose steroids. HERVE - resolved     Anemia  No signs of acute blood loss  Likely dilutional with fluids   Monitor hgb     Deconditioning  Muscle spasms  Encouraged OOB. Baclofen prn muscle spasms  PT/OT consult     30.0 - 39.9 Obese / Body mass index is 34.02 kg/m². Estimated discharge date:   Barriers: Clinical improvement    Code status: Full  Prophylaxis: SCD's  Recommended Disposition:  PT, OT, RN-likely tomorrow     Subjective:     Chief Complaint / Reason for Physician Visit  Patient seen and examined the bedside. Discussed with RN events overnight. She states she is feeling much better. She is using today to coordinate her discharge for tomorrow morning to get her home health care aid set up for tomorrow. She would also like a nurse via home health. Review of Systems:  Symptom Y/N Comments  Symptom Y/N Comments   Fever/Chills n   Chest Pain n    Poor Appetite    Edema     Cough n   Abdominal Pain n    Sputum    Joint Pain     SOB/ALVARADO n   Pruritis/Rash     Nausea/vomit n   Tolerating PT/OT n    Diarrhea y   Tolerating Diet n    Constipation n   Other       Could NOT obtain due to:      Objective:     VITALS:   Last 24hrs VS reviewed since prior progress note.  Most recent are:  Patient Vitals for the past 24 hrs:   Temp Pulse Resp BP SpO2   22 1300 -- 70 21 -- 99 %   22 1200 -- 68 18 128/72 96 %   22 1145 98 °F (36.7 °C) 62 23 137/75 100 %   22 1110 -- 62 18 134/73 98 %   22 1100 -- 64 20 -- 99 %   22 0900 -- 63 19 126/70 99 %   09/29/22 0800 -- 98 21 (!) 81/68 97 %   09/29/22 0700 98.4 °F (36.9 °C) (!) 47 20 115/70 99 %   09/29/22 0600 -- (!) 56 23 (!) 125/59 100 %   09/29/22 0245 -- 68 17 115/60 97 %   09/29/22 0230 -- (!) 47 -- -- --   09/29/22 0215 -- (!) 45 -- -- --   09/29/22 0015 97.7 °F (36.5 °C) (!) 53 14 108/64 98 %   09/28/22 2000 97.8 °F (36.6 °C) 67 20 (!) 106/58 99 %   09/28/22 1600 98 °F (36.7 °C) 67 15 117/65 100 %         Intake/Output Summary (Last 24 hours) at 9/29/2022 1354  Last data filed at 9/29/2022 1300  Gross per 24 hour   Intake 1570 ml   Output 1640 ml   Net -70 ml          I had a face to face encounter and independently examined this patient on 9/29/2022, as outlined below:  PHYSICAL EXAM:  General: WD, WN. Alert, cooperative, no acute distress    EENT:  EOMI. Anicteric sclerae. MMM  Resp:  CTA bilaterally, no wheezing or rales. No accessory muscle use  CV:  Regular  rhythm,  No edema  GI:  Soft, Non distended, Non tender. +Bowel sounds  Neurologic:  Alert and oriented X 3, normal speech,   Psych:   Good insight. Not anxious nor agitated  Skin:  No rashes. No jaundice    Reviewed most current lab test results and cultures  YES  Reviewed most current radiology test results   YES  Review and summation of old records today    NO  Reviewed patient's current orders and MAR    YES  PMH/SH reviewed - no change compared to H&P  ________________________________________________________________________  Care Plan discussed with:    Comments   Patient x    Family      RN     Care Manager     Consultant                        Multidiciplinary team rounds were held today with , nursing, pharmacist and clinical coordinator. Patient's plan of care was discussed; medications were reviewed and discharge planning was addressed.      ________________________________________________________________________  Total NON critical care TIME: 35 Minutes    Total CRITICAL CARE TIME Spent:   Minutes non procedure based      Comments   >50% of visit spent in counseling and coordination of care     ________________________________________________________________________  Mckenzie Meza MD     Procedures: see electronic medical records for all procedures/Xrays and details which were not copied into this note but were reviewed prior to creation of Plan. LABS:  I reviewed today's most current labs and imaging studies.   Pertinent labs include:  Recent Labs     09/28/22  0431 09/27/22  1117 09/27/22  0355   WBC 5.7  --  6.2   HGB 9.8* 9.7* 9.8*   HCT 29.9* 29.6* 29.6*     --  192       Recent Labs     09/29/22  0328 09/27/22  0355    141   K 2.9* 3.7   * 113*   CO2 27 26   * 99   BUN 11 4*   CREA 0.51* 0.35*   CA 8.3* 8.5   MG 2.0 2.2   PHOS 2.8 2.1*         Signed: Mckenzie Meza MD

## 2022-09-29 NOTE — PROGRESS NOTES
GI PROGRESS NOTE  Aruna Dumas NP  [phone] NP in-hospital cell phone M-F until 4:30  After 5pm or on weekends, please call  for physician on call    NAME:Jessica Dockery [facility]:[id number]   ATTG: Dr Hakan Lopez  PCP: Marquis Haley, DO  Date/Time:  9/29/2022 1219 PM   Primary GI: Dr. Candi Galan:   Recurrent pan colitis - worse on left side 5/2019, 3/2022 and now  Septic Shock thought 2/2 pancolitis   Unclear if this is recurrent ischemic colitis vs ulcerative colitis  Anemia - stable  - 2019 bx consistent with ischemic colitis but bx 3/2022 - less likely for IBD, not ischemic  - Never on any longterm treatment for Ulcerative colitis, bx never confirmed UC  - No abdominal pain- resolved  - stool studies negative for c.diff and enteric panel  -IBD panel negative    CT abd/pel W/WO contrast 9/25/22 :   1. Pancolonic wall thickening and submucosal enhancement. Correlate for  infectious versus inflammatory colitis. 2.  No pulmonary embolism or acute airspace disease. Chronic constipation  - failed linzess, now on Trulance PRN but hasn't ever taken any  - BM every few days -miralax and fiber didn't help  - Very little to no rectal tone - hasn't done suppositories. 5/2019 : [facility]  -- ulceration, mucosal friability and hemorrhage in the left colon, from rectum to splenic flexure, biopsied; appearance most consistent with left-sided ulcerative colitis  -- prolapsed internal hemorrhoids  -- lax rectal tone  -- sub-optimal bowel preparation  Bx: Although the distribution is somewhat unusual, the morphologic features of   superficial mucosal necrosis and ulceration favor ischemic colitis. Also in the differential diagnosis is self-limited colitis (possibly pseudomembranous). Lymphoplasmacytic infiltrate in the lamina propria, as associated with ulcerative colitis, is not identified.       3/2022 : [facility]   -Minimal erythema of sigmoid and descending colon from 20-40cm; biopsied  -Remainder of colon is unremarkable; biopsied are obtained in the ascending colon and the rectum separately  -Small grade 1 internal hemorrhoid  Bx: In the reference to specimen #2, the findings are focal and minimal.   Possible causes include acute self-limited/ infectious colitis, ischemia,   NSAID or bowel preparation changes and less likely inflammatory bowel   disease related changes. Plan:   Continue prednisone 40mg daily x 5 days, then 30mg daily x 5 days, then 20mg daily x 5 days, then 10mg daily x 5 days, then stop  Start mesalamine 1gm QID - please continue outpatient   No plan for colonoscopy inpatient, but may need to consider repeating outpatient in a few months. Follow labs  Continue with antibiotics. Supportive measure per primary team    Plan discussed with Dr Kathy Levine. Will continue to follow. Subjective:   Discussed with RN events overnight. Pt had a hard night but doing better this AM. Now having incontinence of stool- no awareness of urgency to go. Liquids. No blood. No abdominal pain. Complaint Y/N Description   Abdominal Pain n    Hematemesis n    Hematochezia n    Melena n    Constipation n    Diarrhea n    Dyspepsia n    Dysphagia n    Jaundiced n    Nausea/vomiting n      Review of Systems:  Symptom Y/N Comments  Symptom Y/N Comments   Fever/Chills    Chest Pain     Cough    Headaches     Sputum    Joint Pain     SOB/ALVARADO    Pruritis/Rash     Tolerating Diet y   Other       Could NOT obtain due to:      Objective:   VITALS:   Last 24hrs VS reviewed since prior progress note.  Most recent are:  Visit Vitals  BP (!) 145/73 (BP 1 Location: Left lower arm, BP Patient Position: At rest)   Pulse 63   Temp 98.6 °F (37 °C)   Resp 23   Ht 4' 6\" (1.372 m)   Wt 64 kg (141 lb 1.5 oz)   SpO2 99%   Breastfeeding No   BMI 34.02 kg/m²       Intake/Output Summary (Last 24 hours) at 9/29/2022 1701  Last data filed at 9/29/2022 1600  Gross per 24 hour   Intake 1470 ml   Output 2040 ml Net -570 ml       PHYSICAL EXAM:  General: WD, WN. Alert, cooperative, no acute distress    HEENT: NC, Atraumatic. Anicteric sclerae. Lungs:  CTA Bilaterally. No Wheezing/Rhonchi/Rales. Heart:  Regular  rhythm,  No murmur,, No Rubs  Abdomen: Soft, Non distended, Non tender. +Bowel sounds, no HSM  Extremities: No c/c/e. [place], contracted leg - hx of spina bifida  Neurologic:  Alert and oriented X 3. No acute neurological distress   Psych:   Good insight. Not anxious nor agitated. Lab and Radiology Data Reviewed: (see below)    Medications Reviewed: (see below)  PMH/SH reviewed - no change compared to H&P  ________________________________________________________________________  Total time spent with patient: 30 minutes ________________________________________________________________________  Care Plan discussed with:  Patient y   Family     RN               Consultant:       Anita Blake NP     Procedures: see electronic medical records for all procedures/Xrays and details which were not copied into this note but were reviewed prior to creation of Plan. LABS:  Recent Labs     09/28/22  0431 09/27/22  1117 09/27/22  0355   WBC 5.7  --  6.2   HGB 9.8* 9.7* 9.8*   HCT 29.9* 29.6* 29.6*     --  192       Recent Labs     09/29/22  0328 09/27/22  0355    141   K 2.9* 3.7   * 113*   CO2 27 26   BUN 11 4*   CREA 0.51* 0.35*   * 99   CA 8.3* 8.5   MG 2.0 2.2   PHOS 2.8 2.1*       No results for input(s): AP, TBIL, TP, ALB, GLOB, GGT, AML, LPSE in the last 72 hours. No lab exists for component: SGOT, GPT, AMYP, HLPSE    No results for input(s): INR, PTP, APTT, INREXT, INREXT in the last 72 hours. No results for input(s): FE, TIBC, PSAT, FERR in the last 72 hours. Lab Results   Component Value Date/Time    Folate 20.6 04/30/2019 12:59 AM     No results for input(s): PH, PCO2, PO2 in the last 72 hours. No results for input(s): CPK, CKMB in the last 72 hours.     No lab exists for component: TROPONINI  Lab Results   Component Value Date/Time    Color DARK YELLOW 09/26/2022 01:50 AM    Appearance CLEAR 09/26/2022 01:50 AM    Specific gravity 1.015 09/26/2022 01:50 AM    Specific gravity 1.021 03/15/2022 12:50 AM    pH (UA) 5.0 09/26/2022 01:50 AM    Protein Negative 09/26/2022 01:50 AM    Glucose Negative 09/26/2022 01:50 AM    Ketone 15 (A) 09/26/2022 01:50 AM    Urobilinogen 1.0 09/26/2022 01:50 AM    Nitrites Negative 09/26/2022 01:50 AM    Leukocyte Esterase TRACE (A) 09/26/2022 01:50 AM    Epithelial cells FEW 09/26/2022 01:50 AM    Bacteria Negative 09/26/2022 01:50 AM    WBC 0-4 09/26/2022 01:50 AM    RBC 0-5 09/26/2022 01:50 AM       MEDICATIONS:  Current Facility-Administered Medications   Medication Dose Route Frequency    levoFLOXacin (LEVAQUIN) tablet 750 mg  750 mg Oral Q24H    metroNIDAZOLE (FLAGYL) tablet 500 mg  500 mg Oral Q12H    predniSONE (DELTASONE) tablet 40 mg  40 mg Oral DAILY WITH BREAKFAST    mesalamine (PENTASA) CR capsule 1,000 mg  1 g Oral QID    baclofen (LIORESAL) tablet 10 mg  10 mg Oral BID PRN    alcohol 62% (NOZIN) nasal  1 Ampule  1 Ampule Topical Q12H    balsam peru-castor oiL (VENELEX) ointment   Topical BID    sodium chloride (NS) flush 5-40 mL  5-40 mL IntraVENous Q8H    sodium chloride (NS) flush 5-40 mL  5-40 mL IntraVENous PRN    acetaminophen (TYLENOL) tablet 650 mg  650 mg Oral Q6H PRN    Or    acetaminophen (TYLENOL) suppository 650 mg  650 mg Rectal Q6H PRN    polyethylene glycol (MIRALAX) packet 17 g  17 g Oral DAILY PRN    ondansetron (ZOFRAN ODT) tablet 4 mg  4 mg Oral Q8H PRN    Or    ondansetron (ZOFRAN) injection 4 mg  4 mg IntraVENous Q6H PRN    enoxaparin (LOVENOX) injection 40 mg  40 mg SubCUTAneous DAILY

## 2022-09-29 NOTE — PROGRESS NOTES
0700: Bedside shift report received from [facility], [address] [place] [address].     0730: Morning assessment complete. Pt is alert and oriented x4. No c/o pain or discomfort. Potassium is being replenished. 0900: Dr. Kaye Manual at bedside. Updated on pt condition. See progress notes for details. 1200: Noon assessment completed. No acute changes from morning assessment. See flow sheets for details. 1430: Pt bathed and repositioned for comfort. 1600: Afternoon assessment completed. No acute changes from morning assessment. See flow sheets for details. 1900: Bedside and Verbal shift change report given to Carl Crouch RN (oncoming nurse) by Nazia Rice RN (offgoing nurse). Report included the following information SBAR, ED Summary, Intake/Output, and Cardiac Rhythm   .

## 2022-09-29 NOTE — PROGRESS NOTES
Bedside and Verbal shift change report received from [facility]. Report included the following information SBAR, ED Summary, MAR, Cardiac Rhythm normal sinus, Alarm Parameters , and Quality Measures. Patient is alert and oriented x4. Denies any pain or discomfort. 0230- HR dropped to 46bpm, patient is laying on her left side, BP at this time is 115/60. Went to assess patient, incontinence care performed. Verbalized she feels fine. Will continue to monitor.

## 2022-09-29 NOTE — PROGRESS NOTES
1900: Report received from [address]: Pt assessed per flowsheet. Producing small mucousy stool, no complaints of pain. Turning self in bed. 0000: Pt reassessed, no acute changes. Sleeping.    0400: Pt reassessed. Pad changed from small frequent stool smears. 0700: Report given to [address]. Problem: Patient Education:  Go to Education Activity  Goal: Patient/Family Education  Outcome: Progressing Towards Goal     Problem: Falls - Risk of  Goal: *Absence of Falls  Description: Document Tana Amato Fall Risk and appropriate interventions in the flowsheet. Outcome: Progressing Towards Goal  Note: Fall Risk Interventions:  Mobility Interventions: Bed/chair exit alarm, Communicate number of staff needed for ambulation/transfer         Medication Interventions: Patient to call before getting OOB, Bed/chair exit alarm    Elimination Interventions: Bed/chair exit alarm              Problem: Patient Education: Go to Patient Education Activity  Goal: Patient/Family Education  Outcome: Progressing Towards Goal     Problem: Pain  Goal: *Control of Pain  Outcome: Progressing Towards Goal  Goal: *PALLIATIVE CARE:  Alleviation of Pain  Outcome: Progressing Towards Goal     Problem: Patient Education: Go to Patient Education Activity  Goal: Patient/Family Education  Outcome: Progressing Towards Goal     Problem: Patient Education: Go to Patient Education Activity  Goal: Patient/Family Education  Outcome: Progressing Towards Goal     Problem: Pressure Injury - Risk of  Goal: *Prevention of pressure injury  Description: Document Martinez Scale and appropriate interventions in the flowsheet.   Outcome: Progressing Towards Goal  Note: Pressure Injury Interventions:  Sensory Interventions: Pressure redistribution bed/mattress (bed type)    Moisture Interventions: Absorbent underpads    Activity Interventions: Increase time out of bed, Pressure redistribution bed/mattress(bed type)    Mobility Interventions: HOB 30 degrees or less, Pressure redistribution bed/mattress (bed type)    Nutrition Interventions: Discuss nutritional consult with provider, Document food/fluid/supplement intake, Offer support with meals,snacks and hydration    Friction and Shear Interventions: Apply protective barrier, creams and emollients                Problem: Patient Education: Go to Patient Education Activity  Goal: Patient/Family Education  Outcome: Progressing Towards Goal     Problem: Patient Education: Go to Patient Education Activity  Goal: Patient/Family Education  Outcome: Progressing Towards Goal

## 2022-09-29 NOTE — PROGRESS NOTES
Care Management:    Transition of Care- Anticipate discharge home without services cm will continue to follow patients clinical progress and discharge planning needs. RUR- 10%  Transportation at Discharge- Family-sister     PTA: patient reports to live alone in an apartment home. Pt uses wheelchair at baseline. Caregiver Contact:twila omer (Sister)   [phone]    DME- pt uses a wheelchair at baseline        Reason for Admission:    Sepsis/septic shock- Continue empiric abx with levaquin and flagyl  Appreciate GI recs, no plan for scope. Plans for steroid taper and mesalamine to be continued outpatient                              Primary Decision Maker: Blanca Muniz Sister - [phone]    Secondary Decision Maker: Elizabeth Sears - Other Relative - [phone]                   Chart reviewed and cm will cont to follow for discharge needs.     Junior Cruz RN ACM [phone]

## 2022-09-30 ENCOUNTER — HOME HEALTH ADMISSION (OUTPATIENT)
Dept: HOME HEALTH SERVICES | Facility: HOME HEALTH | Age: 62
End: 2022-09-30
Payer: COMMERCIAL

## 2022-09-30 VITALS
OXYGEN SATURATION: 98 % | RESPIRATION RATE: 21 BRPM | BODY MASS INDEX: 32.65 KG/M2 | HEART RATE: 79 BPM | SYSTOLIC BLOOD PRESSURE: 142 MMHG | WEIGHT: 141.09 LBS | HEIGHT: 55 IN | TEMPERATURE: 99.3 F | DIASTOLIC BLOOD PRESSURE: 70 MMHG

## 2022-09-30 LAB
ANION GAP SERPL CALC-SCNC: 7 MMOL/L (ref 5–15)
BUN SERPL-MCNC: 15 MG/DL (ref 6–20)
BUN/CREAT SERPL: 36 (ref 12–20)
CALCIUM SERPL-MCNC: 8.1 MG/DL (ref 8.5–10.1)
CHLORIDE SERPL-SCNC: 111 MMOL/L (ref 97–108)
CO2 SERPL-SCNC: 25 MMOL/L (ref 21–32)
CREAT SERPL-MCNC: 0.42 MG/DL (ref 0.55–1.02)
GLUCOSE SERPL-MCNC: 104 MG/DL (ref 65–100)
MAGNESIUM SERPL-MCNC: 2 MG/DL (ref 1.6–2.4)
PHOSPHATE SERPL-MCNC: 2.4 MG/DL (ref 2.6–4.7)
POTASSIUM SERPL-SCNC: 3.7 MMOL/L (ref 3.5–5.1)
SODIUM SERPL-SCNC: 143 MMOL/L (ref 136–145)

## 2022-09-30 PROCEDURE — 36415 COLL VENOUS BLD VENIPUNCTURE: CPT

## 2022-09-30 PROCEDURE — 74011250637 HC RX REV CODE- 250/637: Performed by: NURSE PRACTITIONER

## 2022-09-30 PROCEDURE — 74011250637 HC RX REV CODE- 250/637: Performed by: INTERNAL MEDICINE

## 2022-09-30 PROCEDURE — 80048 BASIC METABOLIC PNL TOTAL CA: CPT

## 2022-09-30 PROCEDURE — 83735 ASSAY OF MAGNESIUM: CPT

## 2022-09-30 PROCEDURE — 74011250636 HC RX REV CODE- 250/636: Performed by: NURSE PRACTITIONER

## 2022-09-30 PROCEDURE — 84100 ASSAY OF PHOSPHORUS: CPT

## 2022-09-30 PROCEDURE — 74011250637 HC RX REV CODE- 250/637: Performed by: STUDENT IN AN ORGANIZED HEALTH CARE EDUCATION/TRAINING PROGRAM

## 2022-09-30 PROCEDURE — 74011636637 HC RX REV CODE- 636/637: Performed by: NURSE PRACTITIONER

## 2022-09-30 RX ORDER — METRONIDAZOLE 500 MG/1
500 TABLET ORAL EVERY 12 HOURS
Qty: 20 TABLET | Refills: 0 | Status: SHIPPED | OUTPATIENT
Start: 2022-09-30 | End: 2022-10-10

## 2022-09-30 RX ORDER — LEVOFLOXACIN 750 MG/1
750 TABLET ORAL EVERY 24 HOURS
Qty: 10 TABLET | Refills: 0 | Status: SHIPPED | OUTPATIENT
Start: 2022-09-30 | End: 2022-10-10

## 2022-09-30 RX ORDER — BACLOFEN 10 MG/1
10 TABLET ORAL
Qty: 10 TABLET | Refills: 0 | Status: SHIPPED | OUTPATIENT
Start: 2022-09-30 | End: 2022-10-05

## 2022-09-30 RX ORDER — MESALAMINE 500 MG/1
1000 CAPSULE, EXTENDED RELEASE ORAL 4 TIMES DAILY
Qty: 240 CAPSULE | Refills: 0 | Status: SHIPPED | OUTPATIENT
Start: 2022-09-30 | End: 2022-10-30

## 2022-09-30 RX ORDER — PREDNISONE 20 MG/1
TABLET ORAL
Qty: 19 TABLET | Refills: 0 | Status: SHIPPED | OUTPATIENT
Start: 2022-10-01 | End: 2022-10-18

## 2022-09-30 RX ADMIN — METRONIDAZOLE 500 MG: 250 TABLET ORAL at 08:29

## 2022-09-30 RX ADMIN — PREDNISONE 40 MG: 20 TABLET ORAL at 08:29

## 2022-09-30 RX ADMIN — MESALAMINE 1000 MG: 250 CAPSULE ORAL at 08:29

## 2022-09-30 RX ADMIN — CASTOR OIL AND BALSAM, PERU: 788; 87 OINTMENT TOPICAL at 08:36

## 2022-09-30 RX ADMIN — BACLOFEN 10 MG: 10 TABLET ORAL at 09:27

## 2022-09-30 RX ADMIN — Medication 1 AMPULE: at 08:36

## 2022-09-30 RX ADMIN — ENOXAPARIN SODIUM 40 MG: 100 INJECTION SUBCUTANEOUS at 08:30

## 2022-09-30 NOTE — DISCHARGE SUMMARY
Hospitalist Discharge Summary     Patient ID:  Alvarez Bragg  [id number]  32 y.o.  1960 9/25/2022    PCP on record: Cooper Rai DO    Admit date: 9/25/2022  Discharge date and time: 9/30/2022    DISCHARGE DIAGNOSIS:    Sepsis secondary to pancolitis    CONSULTATIONS:  IP CONSULT TO GASTROENTEROLOGY    Excerpted HPI from H&P of Caryn Andujar MD:  64 y/o female hx of spina bifida,with neurogenic bladder, HTN , HLD previous admit for colitis (2019) and chronic constipation/bowel/bladder incontinence admitted (9/25) for pan colitis: infectious vs inflammatory vs ischemic and hypotension 2/2 dehydration . Hx obtained per patient endorses ~45 minutes prior to presentation, episode of rapid HR with chest pain, vomit x 1 with stool incontinence. Denies recent illness, fever, chills. In ED fluid resuscitated 30 cc/kg crystalloid bolus, CTA chest neg for PE, CT A/P with pan colitis, infectious vs inflammatory. Started on levophed d/t continued hypotension. Normal WBC, Afebrile, Lactic 3.37. Manual disimpaction with soap enema performed in ED with large quantity of stool output. Admit to ICU for vasopressors requirement.    ______________________________________________________________________  DISCHARGE SUMMARY/HOSPITAL COURSE:  for full details see H&P, daily progress notes, labs, consult notes. Sepsis/septic shock  Volume depletion   Secondary to pan colitis  Enteric panel negative  Continue empiric abx with levaquin and flagyl for full 14 days  Appreciate GI recs, follow up for outpatient scope planning. Continue steroid taper and mesalamine  Avoid anti-diarrheals    HERVE - resolved     Anemia  No signs of acute blood loss  Likely dilutional with fluids   Follow up outpatient for repeat labs     Deconditioning  Muscle spasms  Encouraged OOB.   Baclofen prn muscle spasms  PT/OT with HH  ______________________________________________________________________  Patient seen and examined by me on discharge day. Pertinent Findings:  Gen:    Not in distress  Chest: Clear lungs  CVS:   Regular rhythm. No edema  Abd:  Soft, not distended, not tender  Neuro:  Alert, oriented  _______________________________________________________________________  DISCHARGE MEDICATIONS:   Current Discharge Medication List        START taking these medications    Details   baclofen (LIORESAL) 10 mg tablet Take 1 Tablet by mouth two (2) times daily as needed for Muscle Spasm(s) for up to 5 days. Qty: 10 Tablet, Refills: 0  Start date: 9/30/2022, End date: 10/5/2022      levoFLOXacin (LEVAQUIN) 750 mg tablet Take 1 Tablet by mouth every twenty-four (24) hours for 10 days. Qty: 10 Tablet, Refills: 0  Start date: 9/30/2022, End date: 10/10/2022      metroNIDAZOLE (FLAGYL) 500 mg tablet Take 1 Tablet by mouth every twelve (12) hours for 10 days. Qty: 20 Tablet, Refills: 0  Start date: 9/30/2022, End date: 10/10/2022      predniSONE (DELTASONE) 20 mg tablet Take 2 Tablets by mouth daily (with breakfast) for 2 days, THEN 1.5 Tablets daily (with breakfast) for 5 days, THEN 1 Tablet daily (with breakfast) for 5 days, THEN 0.5 Tablets daily (with breakfast) for 5 days. Indications: ulcerative colitis, an inflammatory condition of the intestines  Qty: 19 Tablet, Refills: 0  Start date: 10/1/2022, End date: 10/18/2022      mesalamine (PENTASA) 500 mg CR capsule Take 2 Capsules by mouth four (4) times daily for 30 days.   Qty: 240 Capsule, Refills: 0  Start date: 9/30/2022, End date: 10/30/2022           CONTINUE these medications which have NOT CHANGED    Details   acetaminophen (TYLENOL) 500 mg tablet Take 500 mg by mouth every six (6) hours as needed for Pain.      simvastatin (ZOCOR) 20 mg tablet TAKE 1 TABLET BY MOUTH EVERY DAY  Qty: 90 Tablet, Refills: 3      lisinopriL (PRINIVIL, ZESTRIL) 10 mg tablet TAKE 1 TABLET BY MOUTH EVERY DAY  Qty: 90 Tablet, Refills: 3      celecoxib (CELEBREX) 200 mg capsule Take 1 Capsule by mouth every twelve (12) hours as needed for Pain. Qty: 60 Capsule, Refills: 5      cyanocobalamin (VITAMIN B12) 1,000 mcg/mL injection 1 mL by IntraMUSCular route every thirty (30) days. Qty: 4 mL, Refills: 2      plecanatide (Trulance) 3 mg tab Take 3 mg by mouth daily. Qty: 90 Tablet, Refills: 3               Patient Follow Up Instructions: Activity: PT/OT per Home Health  Diet: Regular Diet  Wound Care: None needed    Follow-up with PCP in 7 days. Follow up with GI in 2-4 weeks.   Follow-up tests/labs CBC  Follow-up Information       Follow up With Specialties Details Why Jean Andre  Internal Medicine Physician   [address] [place] [address] 83.  [phone]            ________________________________________________________________    Risk of deterioration: Low    Condition at Discharge:  Stable  __________________________________________________________________    Disposition  Home with family and home health services    ____________________________________________________________________    Code Status: Full Code  ___________________________________________________________________      Total time in minutes spent coordinating this discharge (includes going over instructions, follow-up, prescriptions, and preparing report for sign off to her PCP) :  >30 minutes    Signed:  Luis M Vazquez MD

## 2022-09-30 NOTE — PROGRESS NOTES
Care Management:     Transition of Care- discharge home today with [facility] PT and SN. Spoke with patient and she is agreeable to [facility] agency that can accept . Referral sent to [facility] via [place]. Order is written. [facility] has accepted. RUR- 10%  Transportation at Discharge- Family-sister. PTA: patient reports to live alone in an apartment home. Pt uses wheelchair at baseline. Caregiver Contact:twila omer (Sister)   [phone]    CM has asked specialist to set up follow up PCP appointment.       Reason for Admission:    Sepsis/septic shock-   Hx spina bifida                             Primary Decision Maker: Alicia Huber - Sister - [phone]    Secondary Decision Maker: Stephanie Stone - Other Relative - [phone] RN [facility] [address]

## 2022-09-30 NOTE — PROGRESS NOTES
GI PROGRESS NOTE  Pinky Mckeon, CHAYO  [phone] NP in-hospital cell phone M-F until 4:30  After 5pm or on weekends, please call  for physician on call    NAME:Jessica Hays TFH:[id number]   ATTG: Dr Paige Burrell  PCP: Clarissa Weeks,   Date/Time:  9/30/2022 1219 PM   Primary GI: Dr. Alan Flores:   Recurrent pan colitis - worse on left side 5/2019, 3/2022 and now  Septic Shock thought 2/2 pancolitis   Unclear if this is recurrent ischemic colitis vs ulcerative colitis  Anemia - stable  - 2019 bx consistent with ischemic colitis but bx 3/2022 - less likely for IBD, not ischemic  - Never on any longterm treatment for Ulcerative colitis, bx never confirmed UC  - No abdominal pain- resolved  - stool studies negative for c.diff and enteric panel  -IBD panel negative    CT abd/pel W/WO contrast 9/25/22 :   1. Pancolonic wall thickening and submucosal enhancement. Correlate for  infectious versus inflammatory colitis. 2.  No pulmonary embolism or acute airspace disease. Chronic constipation  - failed linzess, now on Trulance PRN but hasn't ever taken any  - BM every few days -miralax and fiber didn't help  - Very little to no rectal tone - hasn't done suppositories. 5/2019 : [facility]  -- ulceration, mucosal friability and hemorrhage in the left colon, from rectum to splenic flexure, biopsied; appearance most consistent with left-sided ulcerative colitis  -- prolapsed internal hemorrhoids  -- lax rectal tone  -- sub-optimal bowel preparation  Bx: Although the distribution is somewhat unusual, the morphologic features of   superficial mucosal necrosis and ulceration favor ischemic colitis. Also in the differential diagnosis is self-limited colitis (possibly pseudomembranous). Lymphoplasmacytic infiltrate in the lamina propria, as associated with ulcerative colitis, is not identified.       3/2022 : [facility]   -Minimal erythema of sigmoid and descending colon from 20-40cm; biopsied  -Remainder of colon is unremarkable; biopsied are obtained in the ascending colon and the rectum separately  -Small grade 1 internal hemorrhoid  Bx: In the reference to specimen #2, the findings are focal and minimal.   Possible causes include acute self-limited/ infectious colitis, ischemia,   NSAID or bowel preparation changes and less likely inflammatory bowel   disease related changes. Plan:   Continue prednisone 40mg daily x 5 days, then 30mg daily x 5 days, then 20mg daily x 5 days, then 10mg daily x 5 days, then stop  Continue mesalamine 1gm QID - please continue outpatient   No plan for colonoscopy inpatient, but may need to consider repeating outpatient in a few months. Follow labs  Continue with antibiotics. Supportive measure per primary team  Will have office call patient to set up follow up appointment  Encouraged patient to continue daily miralax, discussed bowel regimen  Will sign off for now. Please call GI with any further questions or concerns. Thanks! Plan discussed with Dr Gloriajean Aschoff. continue to follow. Subjective:   Patient resting comfortably in bed during visit    Complaint Y/N Description   Abdominal Pain n    Hematemesis n    Hematochezia n    Melena n    Constipation n    Diarrhea n    Dyspepsia n    Dysphagia n    Jaundiced n    Nausea/vomiting n      Review of Systems:  Symptom Y/N Comments  Symptom Y/N Comments   Fever/Chills    Chest Pain     Cough    Headaches     Sputum    Joint Pain     SOB/ALVARADO    Pruritis/Rash     Tolerating Diet y   Other       Could NOT obtain due to:      Objective:   VITALS:   Last 24hrs VS reviewed since prior progress note.  Most recent are:  Visit Vitals  BP (!) 142/70 (BP 1 Location: Right upper arm, BP Patient Position: At rest)   Pulse 79   Temp 99.3 °F (37.4 °C)   Resp 21   Ht 4' 6\" (1.372 m)   Wt 64 kg (141 lb 1.5 oz)   SpO2 98%   Breastfeeding No   BMI 34.02 kg/m²       Intake/Output Summary (Last 24 hours) at 9/30/2022 1209  Last data filed at 9/30/2022 0300  Gross per 24 hour   Intake 200 ml   Output 1150 ml   Net -950 ml       PHYSICAL EXAM:  General: WD, WN. Alert, cooperative, no acute distress    HEENT: NC, Atraumatic. Anicteric sclerae. Lungs:  CTA Bilaterally. No Wheezing/Rhonchi/Rales. Heart:  Regular  rhythm,  No murmur,, No Rubs  Abdomen: Soft, Non distended, Non tender. +Bowel sounds, no HSM  Extremities: No c/c/e. contracted leg - hx of spina bifida  Neurologic:  Alert and oriented X 3. No acute neurological distress   Psych:   Good insight. Not anxious nor agitated. Lab and Radiology Data Reviewed: (see below)    Medications Reviewed: (see below)  PMH/SH reviewed - no change compared to H&P  ________________________________________________________________________  Total time spent with patient: 30 minutes ________________________________________________________________________  Care Plan discussed with:  Patient y   Family     RN               Consultant:       Joseph Valenzuela NP     Procedures: see electronic medical records for all procedures/Xrays and details which were not copied into this note but were reviewed prior to creation of Plan. LABS:  Recent Labs     09/28/22  0431   WBC 5.7   HGB 9.8*   HCT 29.9*          Recent Labs     09/30/22  0302 09/29/22  0328    142   K 3.7 2.9*   * 109*   CO2 25 27   BUN 15 11   CREA 0.42* 0.51*   * 111*   CA 8.1* 8.3*   MG 2.0 2.0   PHOS 2.4* 2.8       No results for input(s): AP, TBIL, TP, ALB, GLOB, GGT, AML, LPSE in the last 72 hours. No lab exists for component: SGOT, GPT, AMYP, HLPSE    No results for input(s): INR, PTP, APTT, INREXT, INREXT in the last 72 hours. No results for input(s): FE, TIBC, PSAT, FERR in the last 72 hours. Lab Results   Component Value Date/Time    Folate 20.6 04/30/2019 12:59 AM     No results for input(s): PH, PCO2, PO2 in the last 72 hours. No results for input(s): CPK, CKMB in the last 72 hours.     No lab exists for component: TROPONINI  Lab Results   Component Value Date/Time    Color DARK YELLOW 09/26/2022 01:50 AM    Appearance CLEAR 09/26/2022 01:50 AM    Specific gravity 1.015 09/26/2022 01:50 AM    Specific gravity 1.021 03/15/2022 12:50 AM    pH (UA) 5.0 09/26/2022 01:50 AM    Protein Negative 09/26/2022 01:50 AM    Glucose Negative 09/26/2022 01:50 AM    Ketone 15 (A) 09/26/2022 01:50 AM    Urobilinogen 1.0 09/26/2022 01:50 AM    Nitrites Negative 09/26/2022 01:50 AM    Leukocyte Esterase TRACE (A) 09/26/2022 01:50 AM    Epithelial cells FEW 09/26/2022 01:50 AM    Bacteria Negative 09/26/2022 01:50 AM    WBC 0-4 09/26/2022 01:50 AM    RBC 0-5 09/26/2022 01:50 AM       MEDICATIONS:  Current Facility-Administered Medications   Medication Dose Route Frequency    levoFLOXacin (LEVAQUIN) tablet 750 mg  750 mg Oral Q24H    metroNIDAZOLE (FLAGYL) tablet 500 mg  500 mg Oral Q12H    predniSONE (DELTASONE) tablet 40 mg  40 mg Oral DAILY WITH BREAKFAST    mesalamine (PENTASA) CR capsule 1,000 mg  1 g Oral QID    baclofen (LIORESAL) tablet 10 mg  10 mg Oral BID PRN    alcohol 62% (NOZIN) nasal  1 Ampule  1 Ampule Topical Q12H    balsam peru-castor oiL (VENELEX) ointment   Topical BID    sodium chloride (NS) flush 5-40 mL  5-40 mL IntraVENous Q8H    sodium chloride (NS) flush 5-40 mL  5-40 mL IntraVENous PRN    acetaminophen (TYLENOL) tablet 650 mg  650 mg Oral Q6H PRN    Or    acetaminophen (TYLENOL) suppository 650 mg  650 mg Rectal Q6H PRN    polyethylene glycol (MIRALAX) packet 17 g  17 g Oral DAILY PRN    ondansetron (ZOFRAN ODT) tablet 4 mg  4 mg Oral Q8H PRN    Or    ondansetron (ZOFRAN) injection 4 mg  4 mg IntraVENous Q6H PRN    enoxaparin (LOVENOX) injection 40 mg  40 mg SubCUTAneous DAILY

## 2022-09-30 NOTE — PROGRESS NOTES
[address] [facility] [address] with patient by phone to give updated PCP hospital follow up. Patient is aware of time/date of appt. Krystal Chen Care Management Assistant    [address] follow-up PCP transitional care appointment has been scheduled with Dr. Yemi Le on 10/3/22 at 0900. This is the first available appt due to limited provider availability. PCP office does not offer alternate provider option for hospital follow up. Pending patient discharge. Krystal Chen Care Management       [facility] follow-up PCP transitional care appointment has been scheduled with Dr. Yemi Le on 11/3/22 at 1020. This is the first available appt due to limited provider availability. PCP office does not offer alternate provider option for hospital follow up. Pending patient discharge.  Raquel Morales

## 2022-09-30 NOTE — PROGRESS NOTES
Bedside and Verbal shift change report received from [facility] (offgoing nurse). Report included the following information SBAR, Kardex, ED Summary, Procedure Summary, Intake/Output, MAR, Accordion, Recent Results, Med Rec Status, Cardiac Rhythm sinus rhythm, Alarm Parameters , and Quality Measures. 0930: She accepted her meal well without c/o nausea or regurgitation. Her sister and  are at the bedside. 1000: Planning for discharge.

## 2022-09-30 NOTE — ROUTINE PROCESS
Discharge plan of care/case management plan validated with provider discharge order. If you experience chest pain call 911. Do not drive yourself.

## 2022-09-30 NOTE — PROGRESS NOTES
Pharmacist Discharge Medication Reconciliation    Significant PMH:   Past Medical History:   Diagnosis Date    COVID-19 05/2021    Hypercholesterolemia     Hypertension     Spina bifida ([facility]      Encounter Diagnoses:   Encounter Diagnoses   Name Primary? Septic shock ([facility] Yes    Pancolitis ([facility]     Spina bifida without hydrocephalus, unspecified spinal region [facility])      Allergies: Sulfa (sulfonamide antibiotics) and Topiramate    Discharge Medications:   Current Discharge Medication List        START taking these medications    Details   baclofen (LIORESAL) 10 mg tablet Take 1 Tablet by mouth two (2) times daily as needed for Muscle Spasm(s) for up to 5 days. Qty: 10 Tablet, Refills: 0  Start date: 9/30/2022, End date: 10/5/2022      levoFLOXacin (LEVAQUIN) 750 mg tablet Take 1 Tablet by mouth every twenty-four (24) hours for 10 days. Qty: 10 Tablet, Refills: 0  Start date: 9/30/2022, End date: 10/10/2022      metroNIDAZOLE (FLAGYL) 500 mg tablet Take 1 Tablet by mouth every twelve (12) hours for 10 days. Qty: 20 Tablet, Refills: 0  Start date: 9/30/2022, End date: 10/10/2022      predniSONE (DELTASONE) 20 mg tablet Take 2 Tablets by mouth daily (with breakfast) for 2 days, THEN 1.5 Tablets daily (with breakfast) for 5 days, THEN 1 Tablet daily (with breakfast) for 5 days, THEN 0.5 Tablets daily (with breakfast) for 5 days. Indications: ulcerative colitis, an inflammatory condition of the intestines  Qty: 19 Tablet, Refills: 0  Start date: 10/1/2022, End date: 10/18/2022      mesalamine (PENTASA) 500 mg CR capsule Take 2 Capsules by mouth four (4) times daily for 30 days.   Qty: 240 Capsule, Refills: 0  Start date: 9/30/2022, End date: 10/30/2022           CONTINUE these medications which have NOT CHANGED    Details   acetaminophen (TYLENOL) 500 mg tablet Take 500 mg by mouth every six (6) hours as needed for Pain.      simvastatin (ZOCOR) 20 mg tablet TAKE 1 TABLET BY MOUTH EVERY DAY  Qty: 90 Tablet, Refills: 3      lisinopriL (PRINIVIL, ZESTRIL) 10 mg tablet TAKE 1 TABLET BY MOUTH EVERY DAY  Qty: 90 Tablet, Refills: 3      celecoxib (CELEBREX) 200 mg capsule Take 1 Capsule by mouth every twelve (12) hours as needed for Pain. Qty: 60 Capsule, Refills: 5      cyanocobalamin (VITAMIN B12) 1,000 mcg/mL injection 1 mL by IntraMUSCular route every thirty (30) days. Qty: 4 mL, Refills: 2      plecanatide (Trulance) 3 mg tab Take 3 mg by mouth daily. Qty: 90 Tablet, Refills: 3             The patient's chart, MAR and AVS were reviewed by Margi Taylor [facility].     Discharging Provider: Saúl Leiva MD    Thank you,     Margi Taylor, [facility]

## 2022-10-01 ENCOUNTER — HOME CARE VISIT (OUTPATIENT)
Dept: SCHEDULING | Facility: HOME HEALTH | Age: 62
End: 2022-10-01
Payer: COMMERCIAL

## 2022-10-01 LAB
BACTERIA SPEC CULT: NORMAL
SERVICE CMNT-IMP: NORMAL

## 2022-10-01 PROCEDURE — 400013 HH SOC

## 2022-10-01 PROCEDURE — G0299 HHS/HOSPICE OF RN EA 15 MIN: HCPCS

## 2022-10-02 VITALS
TEMPERATURE: 98.5 F | RESPIRATION RATE: 18 BRPM | SYSTOLIC BLOOD PRESSURE: 138 MMHG | OXYGEN SATURATION: 97 % | DIASTOLIC BLOOD PRESSURE: 78 MMHG | HEART RATE: 87 BPM

## 2022-10-04 ENCOUNTER — HOME CARE VISIT (OUTPATIENT)
Dept: SCHEDULING | Facility: HOME HEALTH | Age: 62
End: 2022-10-04
Payer: COMMERCIAL

## 2022-10-04 VITALS
TEMPERATURE: 98.3 F | HEART RATE: 99 BPM | RESPIRATION RATE: 18 BRPM | DIASTOLIC BLOOD PRESSURE: 64 MMHG | OXYGEN SATURATION: 96 % | SYSTOLIC BLOOD PRESSURE: 127 MMHG

## 2022-10-04 PROCEDURE — G0299 HHS/HOSPICE OF RN EA 15 MIN: HCPCS

## 2022-10-05 ENCOUNTER — HOME CARE VISIT (OUTPATIENT)
Dept: SCHEDULING | Facility: HOME HEALTH | Age: 62
End: 2022-10-05
Payer: COMMERCIAL

## 2022-10-05 VITALS
HEART RATE: 104 BPM | DIASTOLIC BLOOD PRESSURE: 76 MMHG | SYSTOLIC BLOOD PRESSURE: 118 MMHG | TEMPERATURE: 98.1 F | OXYGEN SATURATION: 97 % | RESPIRATION RATE: 18 BRPM

## 2022-10-05 PROCEDURE — G0151 HHCP-SERV OF PT,EA 15 MIN: HCPCS

## 2022-10-06 ENCOUNTER — HOME CARE VISIT (OUTPATIENT)
Dept: SCHEDULING | Facility: HOME HEALTH | Age: 62
End: 2022-10-06
Payer: COMMERCIAL

## 2022-10-06 VITALS
OXYGEN SATURATION: 96 % | RESPIRATION RATE: 18 BRPM | SYSTOLIC BLOOD PRESSURE: 122 MMHG | HEART RATE: 99 BPM | DIASTOLIC BLOOD PRESSURE: 74 MMHG | TEMPERATURE: 98.6 F

## 2022-10-06 PROCEDURE — G0299 HHS/HOSPICE OF RN EA 15 MIN: HCPCS

## 2022-10-10 ENCOUNTER — HOME CARE VISIT (OUTPATIENT)
Dept: SCHEDULING | Facility: HOME HEALTH | Age: 62
End: 2022-10-10
Payer: COMMERCIAL

## 2022-10-10 VITALS
SYSTOLIC BLOOD PRESSURE: 104 MMHG | RESPIRATION RATE: 18 BRPM | TEMPERATURE: 97.2 F | HEART RATE: 103 BPM | DIASTOLIC BLOOD PRESSURE: 66 MMHG | OXYGEN SATURATION: 98 %

## 2022-10-10 PROCEDURE — G0151 HHCP-SERV OF PT,EA 15 MIN: HCPCS

## 2022-10-11 ENCOUNTER — HOME CARE VISIT (OUTPATIENT)
Dept: SCHEDULING | Facility: HOME HEALTH | Age: 62
End: 2022-10-11
Payer: COMMERCIAL

## 2022-10-11 PROCEDURE — G0300 HHS/HOSPICE OF LPN EA 15 MIN: HCPCS

## 2022-10-12 VITALS
DIASTOLIC BLOOD PRESSURE: 64 MMHG | TEMPERATURE: 98 F | SYSTOLIC BLOOD PRESSURE: 118 MMHG | RESPIRATION RATE: 16 BRPM | HEART RATE: 99 BPM | OXYGEN SATURATION: 99 %

## 2022-10-13 ENCOUNTER — HOME CARE VISIT (OUTPATIENT)
Dept: HOME HEALTH SERVICES | Facility: HOME HEALTH | Age: 62
End: 2022-10-13
Payer: COMMERCIAL

## 2022-10-14 ENCOUNTER — HOME CARE VISIT (OUTPATIENT)
Dept: SCHEDULING | Facility: HOME HEALTH | Age: 62
End: 2022-10-14
Payer: COMMERCIAL

## 2022-10-14 VITALS
HEART RATE: 92 BPM | SYSTOLIC BLOOD PRESSURE: 110 MMHG | OXYGEN SATURATION: 99 % | DIASTOLIC BLOOD PRESSURE: 80 MMHG | RESPIRATION RATE: 20 BRPM | TEMPERATURE: 97.4 F

## 2022-10-14 PROCEDURE — G0151 HHCP-SERV OF PT,EA 15 MIN: HCPCS

## 2022-10-17 ENCOUNTER — HOME CARE VISIT (OUTPATIENT)
Dept: SCHEDULING | Facility: HOME HEALTH | Age: 62
End: 2022-10-17
Payer: COMMERCIAL

## 2022-10-17 VITALS
HEART RATE: 78 BPM | RESPIRATION RATE: 18 BRPM | DIASTOLIC BLOOD PRESSURE: 74 MMHG | OXYGEN SATURATION: 98 % | SYSTOLIC BLOOD PRESSURE: 108 MMHG | TEMPERATURE: 97.8 F

## 2022-10-17 PROCEDURE — G0151 HHCP-SERV OF PT,EA 15 MIN: HCPCS

## 2022-10-19 ENCOUNTER — HOME CARE VISIT (OUTPATIENT)
Dept: SCHEDULING | Facility: HOME HEALTH | Age: 62
End: 2022-10-19
Payer: COMMERCIAL

## 2022-10-19 VITALS
DIASTOLIC BLOOD PRESSURE: 78 MMHG | OXYGEN SATURATION: 98 % | RESPIRATION RATE: 18 BRPM | SYSTOLIC BLOOD PRESSURE: 108 MMHG | TEMPERATURE: 97.5 F | HEART RATE: 90 BPM

## 2022-10-19 PROCEDURE — G0151 HHCP-SERV OF PT,EA 15 MIN: HCPCS

## 2022-10-20 ENCOUNTER — TELEPHONE (OUTPATIENT)
Dept: INTERNAL MEDICINE CLINIC | Age: 62
End: 2022-10-20

## 2022-10-20 ENCOUNTER — HOME CARE VISIT (OUTPATIENT)
Dept: HOME HEALTH SERVICES | Facility: HOME HEALTH | Age: 62
End: 2022-10-20
Payer: COMMERCIAL

## 2022-10-20 NOTE — TELEPHONE ENCOUNTER
Patient states she is too sick/not feeling well enough to come into office for a hospital follow up. (And that is why she states she missed the recent one)    Pt declined scheduling any in office visits    Pt asking for a [facility]V hosp follow up or a phone call to discuss medications as she has some medication questions. Pcp does not do VV    Please call pt to discuss options and meds.

## 2022-10-21 ENCOUNTER — VIRTUAL VISIT (OUTPATIENT)
Dept: INTERNAL MEDICINE CLINIC | Age: 62
End: 2022-10-21
Payer: COMMERCIAL

## 2022-10-21 DIAGNOSIS — I95.0 IDIOPATHIC HYPOTENSION: ICD-10-CM

## 2022-10-21 DIAGNOSIS — E78.2 MIXED HYPERLIPIDEMIA: ICD-10-CM

## 2022-10-21 DIAGNOSIS — D50.9 IRON DEFICIENCY ANEMIA, UNSPECIFIED IRON DEFICIENCY ANEMIA TYPE: ICD-10-CM

## 2022-10-21 DIAGNOSIS — I10 ESSENTIAL HYPERTENSION: ICD-10-CM

## 2022-10-21 DIAGNOSIS — B37.0 THRUSH: ICD-10-CM

## 2022-10-21 DIAGNOSIS — G82.20 PARAPLEGIA (HCC): ICD-10-CM

## 2022-10-21 DIAGNOSIS — K52.9 COLITIS: Primary | ICD-10-CM

## 2022-10-21 DIAGNOSIS — M79.10 MYALGIA: ICD-10-CM

## 2022-10-21 DIAGNOSIS — R65.20 SEVERE SEPSIS (HCC): ICD-10-CM

## 2022-10-21 DIAGNOSIS — A41.9 SEVERE SEPSIS (HCC): ICD-10-CM

## 2022-10-21 PROBLEM — R41.82 ACUTE ALTERATION IN MENTAL STATUS: Status: RESOLVED | Noted: 2022-03-15 | Resolved: 2022-10-21

## 2022-10-21 PROCEDURE — 99215 OFFICE O/P EST HI 40 MIN: CPT | Performed by: INTERNAL MEDICINE

## 2022-10-21 RX ORDER — NYSTATIN 100000 [USP'U]/ML
200000 SUSPENSION ORAL 4 TIMES DAILY
Qty: 473 ML | Refills: 1 | Status: SHIPPED | OUTPATIENT
Start: 2022-10-21

## 2022-10-21 NOTE — TELEPHONE ENCOUNTER
#[phone]  pt states that she only needs medication advise. She was put on Budesonide 3 3 mg tablets every morning. Would it be better to take this with diclofenac or celecoxib? Pt doesn't know what she should be taking with the Budesonide. Please leave vm if needed.   Pt did not give spelling of other medications so these were taken off med list.

## 2022-10-21 NOTE — PROGRESS NOTES
1. \"Have you been to the ER, urgent care clinic since your last visit? Hospitalized since your last visit? \" Yes MMR 09/25-09/30/22 for septic shock    2. \"Have you seen or consulted any other health care providers outside of the [address] [place] [address] since your last visit? \" No     3. For patients aged 39-70: Has the patient had a colonoscopy / FIT/ Cologuard? Yes - Care Gap present. Most recent result on file      If the patient is female:    4. For patients aged 41-77: Has the patient had a mammogram within the past 2 years? Yes - Care Gap present. Most recent result on file      5. For patients aged 21-65: Has the patient had a pap smear? Yes - Care Gap present.  Most recent result on file

## 2022-10-21 NOTE — PROGRESS NOTES
HISTORY OF PRESENT ILLNESS  Lionel Rivera is a 65 y.o. female. HPI  Pt of Dr. Kathy Riggins. Here for EMMANUEL. This is an established visit completed with telemedicine was completed with video assist. The patient acknowledges and agrees to this method of visitation tinayme. Pt was in the hospital 9/25/22 - 9/30/22 for hypotension, colitis, sepsis. Ms. Lionel Rivera is a 65y.o. year old female, she is seen today for Transition of Care services following a hospital discharge for hypotension, colitis, severe sepsis on 9/25/22 - 9/30/22. Our office Nurse Navigator performed an outreach to Ms. Brando Staples on 9/25/22 (within 2 business days of discharge) to complete medication reconciliation and a telephonic assessment of her condition. Pt initially N/S'd her appt w/ Dr. Meño Cutler. She preferred a VV appt   Was put on lioresal, levaquin, flagyl, prednisone, pentasa, she has completed these. Currently taking budesonide for colitis. Then will start mesalamine 0.375 g 4 capsules daily after 6 weeks. She came straight home, did not go to rehab. Is currently completing home health PT and OT. Reviewed discharge summary:  Sepsis/septic shock  Volume depletion   Secondary to pan colitis  Enteric panel negative  Continue empiric abx with levaquin and flagyl for full 14 days  Appreciate GI recs, follow up for outpatient scope planning. Continue steroid taper and mesalamine  Avoid anti-diarrheals  HERVE - resolved  Anemia  No signs of acute blood loss  Likely dilutional with fluids   Follow up outpatient for repeat labs  Deconditioning  Muscle spasms  Encouraged OOB.   Baclofen prn muscle spasms  PT/OT with [facility]  Reviewed consult w/ NP Amna (GI):  Assessment:   Recurrent pan colitis - worse on left side 5/2019, 3/2022 and now  - Hx of same since 2019  - 2019 bx consistent with ischemic colitis but bx 3/2022 - less likely for IBD  - Never on any longterm treatment for Ulcerative colitis  - No abdominal pain, diarrhea, blood in stool  - CT abd/pel W/WO contrast 9/25/22 :   1. Pancolonic wall thickening and submucosal enhancement. Correlate for  infectious versus inflammatory colitis. 2.  No pulmonary embolism or acute airspace disease. Chronic constipation  - failed linzess, now on Trulance PRN buthasn't ever taken any  - BM every few days -miralax and fiber didn't help  - Very little to no rectal tone - hasn't done suppositories. 5/2019 : [facility]  -- ulceration, mucosal friability and hemorrhage in the left colon, from rectum to splenic flexure, biopsied; appearance most consistent with left-sided ulcerative colitis  -- prolapsed internal hemorrhoids  -- lax rectal tone  -- sub-optimal bowel preparation  Bx: Although the distribution is somewhat unusual, the morphologic features of   superficial mucosal necrosis and ulceration favor ischemic colitis. Also in the differential diagnosis is self-limited colitis (possibly pseudomembranous). Lymphoplasmacytic infiltrate in the lamina propria, as associated with ulcerative colitis, is not identified. 3/2022 : [facility]   -Minimal erythema of sigmoid and descending colon from 20-40cm; biopsied  -Remainder of colon is unremarkable; biopsied are obtained in the ascending colon and the rectum separately  -Small grade 1 internal hemorrhoid  Bx: In the reference to specimen #2, the findings are focal and minimal.   Possible causes include acute self-limited/ infectious colitis, ischemia,   NSAID or bowel preparation changes and less likely inflammatory bowel   disease related changes. Hypovolemic  shock - on pressors  Concern for sepsis  Plan:   No plan for colonoscopy at this time - potentially later in the week if stabilizes. Added IBD panel  Optimize blood pressure   Follow labs  Continue with antibiotics. Complete stool studies for cdiff and enteric panel  Rest per primary team.   Reviewed CTA chest 9/25/22:  Impression:     1. Pancolonic wall thickening and submucosal enhancement.  Correlate for infectious versus inflammatory colitis. 2.  No pulmonary embolism or acute airspace disease. Pt c/o joint pain (in L elbow, BL shoulders, R hip). Of note, she is a paraplegic. Prior to [facility] w/ Dr. Lisa Langley, she was taking diclofenac (nightly for years). He recommended she take celebrex. Instead discussed this at great length with her  Discussed not taking either of these (or any NSAIDs) unless approved by Dr. Елена North due to colitis. Advised pt to take Tylenol, discussed she can take Tylenol arthritis strength. She had back spasms in the hospital, was given a muscle relaxer, has not needed since leaving hospital.    She has been having difficulty with hypotension when hospitalized  Has been taking BP meds since leaving hospital.  However not last night  BP was 53/30 when she presented to hospital  She was told to hold off on BP meds unless high  SBP recently around 150  She has a prescription of lisinopril 10 mg she has been taking half of this every night until last night and it was a bit lower  Advised pt to restart on half tablet 5 mg of lisinopril. States zocor was stopped in the hospital, she has not restarted since then. Will have home health run labs to check LFTs before starting back on this. Pt states she has noticed a yellow film on her mouth. Of note, she had dry mouth while on antibiotics and steroids, but this has improved since completing these. Rx'd nystatin.      Reviewed labs from the hospital had elevated LFTs and anemia she does have a history of chronic anemia    Patient Active Problem List    Diagnosis Date Noted    Colitis 03/15/2022    Arnold-Chiari malformation, type I ([address] 03/15/2022    Hydrocephalus ([address] 03/15/2022    Tension vascular headache 03/15/2022    Sinus headache 03/15/2022    Paraplegia ([address] 03/15/2022    Acute alteration in mental status 03/15/2022    Convulsion ([address] 03/15/2022    Severe obesity ([address] 09/10/2019    Iron deficiency anemia 09/10/2019    Neurogenic bladder 09/10/2019    Spina bifida ([facility] 09/10/2019    Mixed hyperlipidemia 09/10/2019    B12 deficiency 05/03/2019    Hypotension 04/28/2019    Severe sepsis ([facility] 04/28/2019     Current Outpatient Medications   Medication Sig Dispense Refill    baclofen (LIORESAL) 10 mg tablet Take 10 mg by mouth two (2) times daily as needed for Muscle Spasm(s). NOT TAKING      DULCOLAX, BISACODYL, PO Take 5 mg by mouth daily as needed for PRN Reason (Other) (constipation). docusate sodium (COLACE) 100 mg capsule Take 100 mg by mouth daily as needed for Constipation. budesonide (ENTOCORT EC) 3 mg capsule Take 9 mg by mouth Every morning. diclofenac EC (VOLTAREN) 50 mg EC tablet Take 50 mg by mouth daily. mesalamine (PENTASA) 500 mg CR capsule Take 2 Capsules by mouth four (4) times daily for 30 days. 240 Capsule 0    lisinopriL (PRINIVIL, ZESTRIL) 10 mg tablet TAKE 1 TABLET BY MOUTH EVERY DAY (Patient taking differently: Take 0.5 tab oral daily) 90 Tablet 3    celecoxib (CELEBREX) 200 mg capsule Take 1 Capsule by mouth every twelve (12) hours as needed for Pain. (Patient not taking: Reported on 10/14/2022) 60 Capsule 5    cyanocobalamin (VITAMIN B12) 1,000 mcg/mL injection 1 mL by IntraMUSCular route every thirty (30) days. 4 mL 2    acetaminophen (TYLENOL) 500 mg tablet Take 500 mg by mouth every six (6) hours as needed for Pain. plecanatide (Trulance) 3 mg tab Take 3 mg by mouth daily.  90 Tablet 3    simvastatin (ZOCOR) 20 mg tablet TAKE 1 TABLET BY MOUTH EVERY DAY (Patient not taking: Reported on 10/14/2022) 90 Tablet 3     Past Surgical History:   Procedure Laterality Date    COLONOSCOPY N/A 5/1/2019    COLONOSCOPY performed by Facundo Odom MD at \[facility]\"" ENDOSCOPY    COLONOSCOPY N/A 3/17/2022    COLONOSCOPY performed by Temo Aburto MD at \[facility]\"" ENDOSCOPY    COLONOSCOPY,DIAGNOSTIC  5/1/2019         COLONOSCOPY,DIAGNOSTIC  3/17/2022         HX COLONOSCOPY  03/2022    HX OTHER SURGICAL  1890s    sacral wound flap repair      Lab Results   Component Value Date/Time    WBC 5.7 09/28/2022 04:31 AM    HGB 9.8 (L) 09/28/2022 04:31 AM    HCT 29.9 (L) 09/28/2022 04:31 AM    PLATELET 095 39/10/1420 04:31 AM    MCV 95.5 09/28/2022 04:31 AM     Lab Results   Component Value Date/Time    Cholesterol, total 342 (H) 02/16/2022 12:00 AM    HDL Cholesterol 55 02/16/2022 12:00 AM    LDL, calculated 213 (H) 02/16/2022 12:00 AM    LDL, calculated 70 06/16/2020 01:13 PM    Triglyceride 357 (H) 02/16/2022 12:00 AM     Lab Results   Component Value Date/Time    GFR est non-AA >60 09/30/2022 03:02 AM    GFR est AA >60 09/30/2022 03:02 AM    Creatinine 0.42 (L) 09/30/2022 03:02 AM    Creatinine, POC 0.6 09/25/2022 03:25 PM    BUN 15 09/30/2022 03:02 AM    Sodium 143 09/30/2022 03:02 AM    Sodium,  09/25/2022 03:25 PM    Potassium 3.7 09/30/2022 03:02 AM    Potassium, POC 5.9 (H) 09/25/2022 03:25 PM    Chloride 111 (H) 09/30/2022 03:02 AM    Chloride,  09/25/2022 03:25 PM    CO2 25 09/30/2022 03:02 AM    Magnesium 2.0 09/30/2022 03:02 AM    Phosphorus 2.4 (L) 09/30/2022 03:02 AM        Review of Systems   Respiratory:  Negative for shortness of breath. Cardiovascular:  Negative for chest pain. Physical Exam  Constitutional:       General: She is not in acute distress. Appearance: Normal appearance. She is not ill-appearing, toxic-appearing or diaphoretic. HENT:      Head: Normocephalic and atraumatic. Eyes:      General:         Right eye: No discharge. Left eye: No discharge. Conjunctiva/sclera: Conjunctivae normal.   Neurological:      General: No focal deficit present. Mental Status: She is alert and oriented to person, place, and time. Psychiatric:         Mood and Affect: Mood normal.         Behavior: Behavior normal.       ASSESSMENT and PLAN high complexity medical decision making post hospital follow-up    ICD-10-CM ICD-9-CM    1.  Colitis  H28.8 929.3 METABOLIC PANEL, COMPREHENSIVE      CBC W/O DIFF   Patient was admitted with colitis was found to have Pancolitis will need a colonoscopy but currently this has been deferred in the past several years ago there was some concern for ischemic colitis potentially ulcerative colitis    They did a IBD panel which apparently was inconclusive    They did start her mesalamine and currently have her on budesonide 3 mg 3 of these per day for the next 6 weeks and they will be tapering it down and changing her mesalamine to a extended release formulation    She normally has a history of constipation and has been on Trulance on and off but having some difficulty getting this approved through the insurance company    She does need to follow-up with gastroenterology Dr. Italo De La Garza we discussed this today she will contact his office she will need a colonoscopy to help make a more concrete diagnosis in regards to the colitis    Overall she has improved since her hospitalization    She did have elevated LFTs during her hospitalization this needs to be followed up on    Will order labs to complete with home health is currently coming up    She is a paraplegic and is difficult for her to get out of the house   METABOLIC PANEL, COMPREHENSIVE      CBC W/O DIFF      2. Severe sepsis (HCC)  C00.4 403.8 METABOLIC PANEL, COMPREHENSIVE    R65.20 995.92 CBC W/O DIFF   Patient was admitted with severe sepsis was placed on antibiotics she was sent home on antibiotics which are now completed this is resolved   METABOLIC PANEL, COMPREHENSIVE      CBC W/O DIFF      3.  Idiopathic hypotension  S94.1 313.7 METABOLIC PANEL, COMPREHENSIVE      CBC W/O DIFF   Patient with significant hypotension while hospitalized her blood pressure medication was held currently taking lisinopril 5 mg nightly    Did not take it last night    We will go ahead and have her take half of the 5 mg lisinopril blood pressure medication and monitor she reports elevated readings this morning when she did not take any at all   METABOLIC PANEL, COMPREHENSIVE      CBC W/O DIFF      4. Paraplegia (HCC)  V22.21 134.4 METABOLIC PANEL, COMPREHENSIVE      CBC W/O DIFF   Homebound with paraplegia   METABOLIC PANEL, COMPREHENSIVE      CBC W/O DIFF      5. Essential hypertension  R46 054.3 METABOLIC PANEL, COMPREHENSIVE      CBC W/O DIFF   See above   METABOLIC PANEL, COMPREHENSIVE      CBC W/O DIFF      6. Mixed hyperlipidemia  Z79.5 726.1 METABOLIC PANEL, COMPREHENSIVE      CBC W/O DIFF   Normally on Zocor but this was held during her hospitalization she had elevated LFTs but these were not repeated we will go ahead and repeat this with home health and will likely be able to resume Zocor in the near future likely related to her colitis and sepsis   METABOLIC PANEL, COMPREHENSIVE      CBC W/O DIFF      7. Myalgia  M79.10 729.1    Discussed no diclofenac or Celebrex due to colitis this is contraindicated she can use Tylenol    She will discuss this further with gastroenterology   8. Thrush  B37.0 112.0    We will treat with nystatin   9. Iron deficiency anemia, unspecified iron deficiency anemia type  D50.9 280.9    Long history of anemia we will repeat levels hemoglobin around 9 while hospitalized we will ensure improving   Hypokalemia--not currently taking Klor-Con we will check level treat further as needed had severe hypokalemia but this was repleted while hospitalized  Depression screen reviewed and negative. Scribed by Ellen Valera, as dictated by Dr. Anshul Lujan. Current diagnosis and concerns discussed with pt at length. Pt understands risks and benefits or current treatment plan and medications, and accepts the treatment and medication with any possible risks. Pt asks appropriate questions, which were answered. Pt was instructed to call with any concerns or problems. I have reviewed the note documented by the scribe. The services provided are my own.  The documentation is accurate. Deena Foreman, who was evaluated through a synchronous (real-time) audio-video encounter, and/or her healthcare decision maker, is aware that it is a billable service, which includes applicable co-pays, with coverage as determined by her insurance carrier. She provided verbal consent to proceed and patient identification was verified. This visit was conducted pursuant to the emergency declaration under the [address] [place] [address] and the [employer] and [employer] Act. A caregiver was present when appropriate. Ability to conduct physical exam was limited. The patient was located at: Home: [address] [place] [address] [address] [id number]  The provider was located at: Home: [unfilled]    --Willard Mon on 10/21/2022 at 8:53 AM

## 2022-10-24 ENCOUNTER — HOME CARE VISIT (OUTPATIENT)
Dept: SCHEDULING | Facility: HOME HEALTH | Age: 62
End: 2022-10-24
Payer: COMMERCIAL

## 2022-10-24 VITALS
OXYGEN SATURATION: 98 % | HEART RATE: 85 BPM | RESPIRATION RATE: 18 BRPM | TEMPERATURE: 97.6 F | SYSTOLIC BLOOD PRESSURE: 110 MMHG | DIASTOLIC BLOOD PRESSURE: 74 MMHG

## 2022-10-24 PROCEDURE — G0151 HHCP-SERV OF PT,EA 15 MIN: HCPCS

## 2022-10-25 ENCOUNTER — HOME CARE VISIT (OUTPATIENT)
Dept: HOME HEALTH SERVICES | Facility: HOME HEALTH | Age: 62
End: 2022-10-25
Payer: COMMERCIAL

## 2022-10-25 ENCOUNTER — HOME CARE VISIT (OUTPATIENT)
Dept: SCHEDULING | Facility: HOME HEALTH | Age: 62
End: 2022-10-25
Payer: COMMERCIAL

## 2022-10-25 ENCOUNTER — TELEPHONE (OUTPATIENT)
Dept: INTERNAL MEDICINE CLINIC | Age: 62
End: 2022-10-25

## 2022-10-25 DIAGNOSIS — I95.0 IDIOPATHIC HYPOTENSION: Primary | ICD-10-CM

## 2022-10-25 LAB
ALBUMIN SERPL-MCNC: 4.5 G/DL (ref 3.8–4.8)
ALBUMIN/GLOB SERPL: 2 {RATIO} (ref 1.2–2.2)
ALP SERPL-CCNC: 65 IU/L (ref 44–121)
ALT SERPL-CCNC: 54 IU/L (ref 0–32)
AST SERPL-CCNC: 28 IU/L (ref 0–40)
BILIRUB SERPL-MCNC: 0.9 MG/DL (ref 0–1.2)
BUN SERPL-MCNC: 16 MG/DL (ref 8–27)
BUN/CREAT SERPL: 43 (ref 12–28)
CALCIUM SERPL-MCNC: 9.4 MG/DL (ref 8.7–10.3)
CHLORIDE SERPL-SCNC: 99 MMOL/L (ref 96–106)
CO2 SERPL-SCNC: 23 MMOL/L (ref 20–29)
CREAT SERPL-MCNC: 0.37 MG/DL (ref 0.57–1)
EGFR: 115 ML/MIN/1.73
ERYTHROCYTE [DISTWIDTH] IN BLOOD BY AUTOMATED COUNT: 13 % (ref 11.7–15.4)
GLOBULIN SER CALC-MCNC: 2.2 G/DL (ref 1.5–4.5)
GLUCOSE SERPL-MCNC: 105 MG/DL (ref 70–99)
HCT VFR BLD AUTO: 42.5 % (ref 34–46.6)
HGB BLD-MCNC: 13.7 G/DL (ref 11.1–15.9)
MCH RBC QN AUTO: 31 PG (ref 26.6–33)
MCHC RBC AUTO-ENTMCNC: 32.2 G/DL (ref 31.5–35.7)
MCV RBC AUTO: 96 FL (ref 79–97)
PLATELET # BLD AUTO: 322 X10E3/UL (ref 150–450)
POTASSIUM SERPL-SCNC: 4.2 MMOL/L (ref 3.5–5.2)
PROT SERPL-MCNC: 6.7 G/DL (ref 6–8.5)
RBC # BLD AUTO: 4.42 X10E6/UL (ref 3.77–5.28)
SODIUM SERPL-SCNC: 138 MMOL/L (ref 134–144)
WBC # BLD AUTO: 7.8 X10E3/UL (ref 3.4–10.8)

## 2022-10-25 PROCEDURE — G0300 HHS/HOSPICE OF LPN EA 15 MIN: HCPCS

## 2022-10-25 NOTE — TELEPHONE ENCOUNTER
----- Message from Maxine Keane sent at 10/24/2022  4:26 PM EDT -----  Subject: Referral Request    Reason for referral request? would like to have [facility] to   have blood drawn   Provider patient wants to be referred to(if known):     Provider Phone Number(if known):     Additional Information for Provider?   ---------------------------------------------------------------------------  --------------  [id number] [facility]    [phone]; OK to leave message on voicemail  ---------------------------------------------------------------------------  --------------

## 2022-10-26 NOTE — PROGRESS NOTES
Sent patient message via NPTV, verified active    Labs look better.  No longer anemic.  Continue same meds.

## 2022-10-28 ENCOUNTER — HOME CARE VISIT (OUTPATIENT)
Dept: SCHEDULING | Facility: HOME HEALTH | Age: 62
End: 2022-10-28
Payer: COMMERCIAL

## 2022-10-28 VITALS
OXYGEN SATURATION: 98 % | RESPIRATION RATE: 18 BRPM | TEMPERATURE: 98.6 F | SYSTOLIC BLOOD PRESSURE: 110 MMHG | DIASTOLIC BLOOD PRESSURE: 76 MMHG | HEART RATE: 96 BPM

## 2022-10-28 PROCEDURE — G0151 HHCP-SERV OF PT,EA 15 MIN: HCPCS

## 2022-10-30 VITALS
HEART RATE: 68 BPM | SYSTOLIC BLOOD PRESSURE: 120 MMHG | RESPIRATION RATE: 16 BRPM | TEMPERATURE: 97.6 F | DIASTOLIC BLOOD PRESSURE: 64 MMHG

## 2022-11-07 ENCOUNTER — OFFICE VISIT (OUTPATIENT)
Dept: INTERNAL MEDICINE CLINIC | Age: 62
End: 2022-11-07
Payer: COMMERCIAL

## 2022-11-07 VITALS
DIASTOLIC BLOOD PRESSURE: 82 MMHG | WEIGHT: 142 LBS | BODY MASS INDEX: 32.86 KG/M2 | HEART RATE: 95 BPM | RESPIRATION RATE: 16 BRPM | TEMPERATURE: 98.2 F | SYSTOLIC BLOOD PRESSURE: 122 MMHG | OXYGEN SATURATION: 97 % | HEIGHT: 55 IN

## 2022-11-07 DIAGNOSIS — B37.0 THRUSH: Primary | ICD-10-CM

## 2022-11-07 PROCEDURE — 99213 OFFICE O/P EST LOW 20 MIN: CPT | Performed by: STUDENT IN AN ORGANIZED HEALTH CARE EDUCATION/TRAINING PROGRAM

## 2022-11-07 RX ORDER — FLUCONAZOLE 100 MG/1
100 TABLET ORAL DAILY
Qty: 15 TABLET | Refills: 0 | Status: SHIPPED | OUTPATIENT
Start: 2022-11-07 | End: 2022-11-21

## 2022-11-07 NOTE — PROGRESS NOTES
[facility]   Internal Medicine  [address] [facility] [address], [address] [facility]  [address]  [address]  [phone]      Primary Care Visit Note    Assessment/Plan:     Diagnoses and all orders for this visit:    1. Thrush  -     fluconazole (DIFLUCAN) 100 mg tablet; Take 1 Tablet by mouth daily for 14 days. FDA advises cautious prescribing of oral fluconazole in pregnancy. - Stop nystatin          Chucho Beck MD    CC:     Chief Complaint   Patient presents with    Klarissa Kim in mouth and throat       HPI:     Oumou Edwards is a 65 y.o. female who presents for evaluation of thrush. - patient has got some relief with the nystatin but mainly in the mouth but not in her throat. - pt is having burning with swallowing and causing some dysphagia as well. - pt is still taking budesonide which has been helping with the colitis. ROS:   All 10 point review of systems negative except those mentioned in the HPI. Past Medical History:      Active Ambulatory Problems     Diagnosis Date Noted    Hypotension 04/28/2019    Severe sepsis ([address] 04/28/2019    B12 deficiency 05/03/2019    Severe obesity ([address] 09/10/2019    Iron deficiency anemia 09/10/2019    Neurogenic bladder 09/10/2019    Spina bifida ([address] 09/10/2019    Mixed hyperlipidemia 09/10/2019    Colitis 03/15/2022    Arnold-Chiari malformation, type I ([address] 03/15/2022    Hydrocephalus ([address] 03/15/2022    Tension vascular headache 03/15/2022    Sinus headache 03/15/2022    Paraplegia ([address] 03/15/2022    Convulsion ([address] 03/15/2022     Resolved Ambulatory Problems     Diagnosis Date Noted    Ulcerative colitis ([address] 05/03/2019    Acute alteration in mental status 03/15/2022     Past Medical History:   Diagnosis Date    COVID-19 05/2021    Hypercholesterolemia     Hypertension           Current Medications:     Current Outpatient Medications:     diclofenac (VOLTAREN) 1 % gel, Apply 2 g to affected area two (2) times daily as needed for Pain. apply to L elbow for pain as needed, Disp: , Rfl:     nystatin (MYCOSTATIN) 100,000 unit/mL suspension, Take 2 mL by mouth four (4) times daily. swish and spit, Disp: 473 mL, Rfl: 1    DULCOLAX, BISACODYL, PO, Take 5 mg by mouth daily as needed for PRN Reason (Other) (constipation). , Disp: , Rfl:     docusate sodium (COLACE) 100 mg capsule, Take 100 mg by mouth daily as needed for Constipation. , Disp: , Rfl:     budesonide (ENTOCORT EC) 3 mg capsule, Take 9 mg by mouth Every morning., Disp: , Rfl:     lisinopriL (PRINIVIL, ZESTRIL) 10 mg tablet, TAKE 1 TABLET BY MOUTH EVERY DAY, Disp: 90 Tablet, Rfl: 3    cyanocobalamin (VITAMIN B12) 1,000 mcg/mL injection, 1 mL by IntraMUSCular route every thirty (30) days. (Patient taking differently: 1,000 mcg by IntraMUSCular route every thirty (30) days. given at MD office), Disp: 4 mL, Rfl: 2    acetaminophen (TYLENOL) 500 mg tablet, Take 500 mg by mouth every six (6) hours as needed for Pain., Disp: , Rfl:     baclofen (LIORESAL) 10 mg tablet, Take 10 mg by mouth two (2) times daily as needed for Muscle Spasm(s). NOT TAKING (Patient not taking: Reported on 11/7/2022), Disp: , Rfl:     diclofenac EC (VOLTAREN) 50 mg EC tablet, Take 50 mg by mouth daily. (Patient not taking: No sig reported), Disp: , Rfl:     celecoxib (CELEBREX) 200 mg capsule, Take 1 Capsule by mouth every twelve (12) hours as needed for Pain. (Patient not taking: No sig reported), Disp: 60 Capsule, Rfl: 5    plecanatide (Trulance) 3 mg tab, Take 3 mg by mouth daily.  (Patient not taking: Reported on 11/7/2022), Disp: 90 Tablet, Rfl: 3    simvastatin (ZOCOR) 20 mg tablet, TAKE 1 TABLET BY MOUTH EVERY DAY (Patient not taking: No sig reported), Disp: 90 Tablet, Rfl: 3      Past Surgical History:     Past Surgical History:   Procedure Laterality Date    COLONOSCOPY N/A 5/1/2019    COLONOSCOPY performed by Verito Mckeon MD at [facility] ENDOSCOPY    COLONOSCOPY N/A 3/17/2022 COLONOSCOPY performed by Evelyn Nevarez MD [facility] ENDOSCOPY    COLONOSCOPY,DIAGNOSTIC  5/1/2019         COLONOSCOPY,DIAGNOSTIC  3/17/2022         HX COLONOSCOPY  03/2022    HX OTHER SURGICAL  1890s    sacral wound flap repair         Family History:     Family History   Problem Relation Age of Onset    Other Mother         breast cancer, Aortic Dissection     Other Father         Bypass x5, shrinking brain     Other Sister         heart stents, asthma, no spleen          Social History:     Social History     Socioeconomic History    Marital status:      Spouse name: Not on file    Number of children: Not on file    Years of education: Not on file    Highest education level: Not on file   Occupational History    Not on file   Tobacco Use    Smoking status: Never    Smokeless tobacco: Never   Vaping Use    Vaping Use: Never used   Substance and Sexual Activity    Alcohol use: Never    Drug use: Never    Sexual activity: Not Currently   Other Topics Concern    Not on file   Social History Narrative    Not on file     Social Determinants of Health     Financial Resource Strain: Low Risk     Difficulty of Paying Living Expenses: Not hard at all   Food Insecurity: No Food Insecurity    Worried About Running Out of Food in the Last Year: Never true    Ran Out of Food in the Last Year: Never true   Transportation Needs: Not on file   Physical Activity: Not on file   Stress: Not on file   Social Connections: Not on file   Intimate Partner Violence: Not on file   Housing Stability: Not on file            Visit Vitals  /82   Pulse 95   Temp 98.2 °F (36.8 °C) (Temporal)   Resp 16   Ht 4' 6\" (1.372 m)   Wt 142 lb (64.4 kg)   LMP 08/01/2018 (LMP Unknown)   SpO2 97%   BMI 34.24 kg/m²       Physical Exam:   General - Well appearing female  HEENT - eomi, non-icteric sclera. MMM, thrush of the posterior tongue.    Pulm - clear to auscultation bilaterally  Cardio - RRR, normal S1 S2, no murmur  Abd - soft, nontender, no masses, no HSM  Neuro-  Alert and oriented, No focal deficits           Labs/Imaging:     Labs and imaging reviewed by me and significant for:    Lab Results   Component Value Date/Time    WBC 7.8 10/25/2022 02:05 PM    HGB 13.7 10/25/2022 02:05 PM    HCT 42.5 10/25/2022 02:05 PM    PLATELET 241 99/21/4647 02:05 PM    MCV 96 10/25/2022 02:05 PM     Lab Results   Component Value Date/Time    Sodium 138 10/25/2022 02:05 PM    Potassium 4.2 10/25/2022 02:05 PM    Chloride 99 10/25/2022 02:05 PM    CO2 23 10/25/2022 02:05 PM    Anion gap 7 09/30/2022 03:02 AM    Glucose 105 (H) 10/25/2022 02:05 PM    BUN 16 10/25/2022 02:05 PM    Creatinine 0.37 (L) 10/25/2022 02:05 PM    BUN/Creatinine ratio 43 (H) 10/25/2022 02:05 PM    GFR est AA >60 09/30/2022 03:02 AM    GFR est non-AA >60 09/30/2022 03:02 AM    Calcium 9.4 10/25/2022 02:05 PM    Bilirubin, total 0.9 10/25/2022 02:05 PM    Alk.  phosphatase 65 10/25/2022 02:05 PM    Protein, total 6.7 10/25/2022 02:05 PM    Albumin 4.5 10/25/2022 02:05 PM    Globulin 3.8 09/25/2022 04:15 PM    A-G Ratio 2.0 10/25/2022 02:05 PM    ALT (SGPT) 54 (H) 10/25/2022 02:05 PM    AST (SGOT) 28 10/25/2022 02:05 PM

## 2022-11-07 NOTE — PROGRESS NOTES
1. \"Have you been to the ER, urgent care clinic since your last visit? Hospitalized since your last visit? \" Yes ER,09/2022, Septic shock    2. \"Have you seen or consulted any other health care providers outside of the [address] [place] [address] since your last visit? \" No     3. For patients aged 39-70: Has the patient had a colonoscopy / FIT/ Cologuard? Yes - no Care Gap present      If the patient is female:    4. For patients aged 41-77: Has the patient had a mammogram within the past 2 years? Yes - no Care Gap present      5. For patients aged 21-65: Has the patient had a pap smear?  Yes - no Care Gap present

## 2022-11-14 NOTE — PROGRESS NOTES
Called, spoke to pt. Two identifiers confirmed. Pt notified of results/recommendations per Dr. Dhara Coles. Pt verbalized understanding of information discussed w/ no further questions at this time.
Vit D remains low, though bit improved. New/different rx sent in. Stop prior 50,000 rx. Cholesterol levels look good now, continue zocor.
75

## 2022-11-29 ENCOUNTER — TRANSCRIBE ORDER (OUTPATIENT)
Dept: SCHEDULING | Age: 62
End: 2022-11-29

## 2022-11-29 DIAGNOSIS — K55.039 ACUTE ISCHEMIC COLITIS (HCC): ICD-10-CM

## 2022-11-29 DIAGNOSIS — K51.90 ULCERATIVE COLITIS (HCC): Primary | ICD-10-CM

## 2022-11-29 DIAGNOSIS — R93.3 ABNORMAL CT SCAN, COLON: ICD-10-CM

## 2022-11-29 DIAGNOSIS — K60.0 ACUTE ANTERIOR ANAL FISSURE: ICD-10-CM

## 2022-12-19 ENCOUNTER — TELEPHONE (OUTPATIENT)
Dept: INTERNAL MEDICINE CLINIC | Age: 62
End: 2022-12-19

## 2022-12-19 NOTE — TELEPHONE ENCOUNTER
Spoke with patient regarding on going symptoms with suspected thrush. Patient seen in office twice regarding symptoms. States symptoms are not subsiding and getting worse with medication. Patient has seen dentist and states no evidence of thrush and given lozengers. Patient also seen ENT and states evidence of loss of mucosa. Patient given compound solution by ENT but cannot take with combo of colon medication mesalamine. Patient denies fever but c/o of yellow tongue and blisters in back of throat and burning sensation in throat radiating to chest. Patient scheduled for 12/27/22. Please advise.

## 2022-12-19 NOTE — TELEPHONE ENCOUNTER
#[phone]  pt states she has had a VV and been seen in our office by other physicians. She would like to see Dr. Momo Lovelace at this point. Pt has had since Sept. Issues with her throat. She has had blisters and burning in her chest.  She has had the mouth swish and oral nystatin. Pt needs to know what is going on. Pt would like to see doctor as soon as possible. Please call pt. Thanks.

## 2022-12-19 NOTE — TELEPHONE ENCOUNTER
Spoke to pt and scheduled for Dr. Lindy Galicia next available Tuesday 12/27. Please call and advise.

## 2022-12-19 NOTE — TELEPHONE ENCOUNTER
Spoke with patient. Advised of Dr. Aida Webster recommendations. Verbalized understanding. Per Dr. Daigle Dub needs to let ENT know that the medicine that they gave her she cannot take, and see what alternatives they have.  \"

## 2023-01-06 ENCOUNTER — NURSE TRIAGE (OUTPATIENT)
Dept: OTHER | Facility: CLINIC | Age: 63
End: 2023-01-06

## 2023-01-06 NOTE — TELEPHONE ENCOUNTER
Location of patient: [place]    Received call from [address] at [facility] with [employer] Subjective: Caller states \"burning in throat and chest\"     Current Symptoms: see above, burning down back of throat and into chest, getting worse, was sporadic and is now consistent, no diagnosis of GERD. Ruling out UC vs Chron's disease, has had episodes of colitis with hospitalization. Was treated for possible thrush by a few different doctors, but the medications did not help, went to ENT and was told it was something else, and meds did not help, saw another provider at ENT and was told the same things. Says she can see blisters on the back of her tongue    Onset: 4-5 months ago; worsening    Associated Symptoms: NA    Pain Severity: 7/10; burning; lets up when she takes tylenol    Temperature: n/a     What has been tried: tylenol, multiple medications from different doctors and dentist    LMP: NA Pregnant: NA    Recommended disposition: See in Office Within 2 Weeks    Care advice provided, patient verbalizes understanding; denies any other questions or concerns; instructed to call back for any new or worsening symptoms. Patient/Caller agrees with recommended disposition; writer provided warm transfer to [place] at [facility] for appointment scheduling    Attention Provider: Thank you for allowing me to participate in the care of your patient. The patient was connected to triage in response to information provided to the [facility]. Please do not respond through this encounter as the response is not directed to a shared pool.     Reason for Disposition   [1] Sore throat is the only symptom AND [2] present > 48 hours   All other mouth symptoms (Exceptions: dry mouth from not drinking enough liquids, chapped lips)    Protocols used: Sore Throat-ADULT-AH, Mouth Symptoms-ADULT-AH

## 2023-01-09 ENCOUNTER — TELEPHONE (OUTPATIENT)
Dept: INTERNAL MEDICINE CLINIC | Age: 63
End: 2023-01-09

## 2023-01-09 NOTE — TELEPHONE ENCOUNTER
----- Message from [address] sent at 1/6/2023  4:52 PM EST -----  Subject: Referral Request    Reason for referral request? Request for ENT  Provider patient wants to be referred to(if known):     Provider Phone Number(if known): Additional Information for Provider? PT has already been to [place] [facility]   in [facility] and [address] with no resolution.  Any   suggestions for an ENT  ---------------------------------------------------------------------------  --------------  [id number]    [phone]; OK to leave message on voicemail  ---------------------------------------------------------------------------  --------------

## 2023-01-09 NOTE — TELEPHONE ENCOUNTER
----- Message from [address] sent at 1/6/2023  4:53 PM EST -----  Subject: Message to Provider    QUESTIONS  Information for Provider? Returned from NT. Burning in throat and chest x   few weeks. Dispo to be seen in next few weeks. Appt scheduled for next   available 3/1. Please call to schedule sooner appt.  ---------------------------------------------------------------------------  --------------  Agnes SYED  [phone]; OK to leave message on voicemail  ---------------------------------------------------------------------------  --------------  SCRIPT ANSWERS  Relationship to Patient?  Self

## 2023-01-20 ENCOUNTER — HOSPITAL ENCOUNTER (OUTPATIENT)
Dept: CT IMAGING | Age: 63
Discharge: HOME | End: 2023-01-20
Attending: INTERNAL MEDICINE
Payer: COMMERCIAL

## 2023-01-20 DIAGNOSIS — K51.90 ULCERATIVE COLITIS (HCC): ICD-10-CM

## 2023-01-20 DIAGNOSIS — K60.0 ACUTE ANTERIOR ANAL FISSURE: ICD-10-CM

## 2023-01-20 DIAGNOSIS — K55.039 ACUTE ISCHEMIC COLITIS (HCC): ICD-10-CM

## 2023-01-20 DIAGNOSIS — R93.3 ABNORMAL CT SCAN, COLON: ICD-10-CM

## 2023-01-20 PROCEDURE — 74011000636 HC RX REV CODE- 636: Performed by: INTERNAL MEDICINE

## 2023-01-20 PROCEDURE — 74177 CT ABD & PELVIS W/CONTRAST: CPT

## 2023-01-20 RX ADMIN — IOPAMIDOL 80 ML: 755 INJECTION, SOLUTION INTRAVENOUS at 09:30

## 2023-03-01 ENCOUNTER — OFFICE VISIT (OUTPATIENT)
Dept: INTERNAL MEDICINE CLINIC | Age: 63
End: 2023-03-01

## 2023-03-01 VITALS
HEIGHT: 55 IN | SYSTOLIC BLOOD PRESSURE: 136 MMHG | TEMPERATURE: 98.3 F | BODY MASS INDEX: 34.24 KG/M2 | HEART RATE: 84 BPM | OXYGEN SATURATION: 98 % | DIASTOLIC BLOOD PRESSURE: 75 MMHG | RESPIRATION RATE: 16 BRPM

## 2023-03-01 DIAGNOSIS — R73.03 PREDIABETES: ICD-10-CM

## 2023-03-01 DIAGNOSIS — G91.1 OBSTRUCTIVE HYDROCEPHALUS (HCC): ICD-10-CM

## 2023-03-01 DIAGNOSIS — E78.2 MIXED HYPERLIPIDEMIA: ICD-10-CM

## 2023-03-01 DIAGNOSIS — G82.20 PARAPLEGIA (HCC): ICD-10-CM

## 2023-03-01 DIAGNOSIS — I10 ESSENTIAL HYPERTENSION: ICD-10-CM

## 2023-03-01 DIAGNOSIS — K51.00 PANCOLITIS (HCC): ICD-10-CM

## 2023-03-01 DIAGNOSIS — Q07.01 CHIARI MALFORMATION TYPE II (HCC): ICD-10-CM

## 2023-03-01 DIAGNOSIS — N31.9 NEUROGENIC BLADDER: ICD-10-CM

## 2023-03-01 DIAGNOSIS — K21.9 GASTROESOPHAGEAL REFLUX DISEASE, UNSPECIFIED WHETHER ESOPHAGITIS PRESENT: ICD-10-CM

## 2023-03-01 DIAGNOSIS — Q05.9 SPINA BIFIDA, UNSPECIFIED HYDROCEPHALUS PRESENCE, UNSPECIFIED SPINAL REGION (HCC): ICD-10-CM

## 2023-03-01 DIAGNOSIS — K59.00 CONSTIPATION, UNSPECIFIED CONSTIPATION TYPE: ICD-10-CM

## 2023-03-01 DIAGNOSIS — Z00.00 ANNUAL PHYSICAL EXAM: Primary | ICD-10-CM

## 2023-03-01 PROBLEM — R56.9 CONVULSION (HCC): Status: RESOLVED | Noted: 2022-03-15 | Resolved: 2023-03-01

## 2023-03-01 RX ORDER — MESALAMINE 0.38 G/1
1.5 CAPSULE, EXTENDED RELEASE ORAL DAILY
COMMUNITY

## 2023-03-01 RX ORDER — OMEPRAZOLE 40 MG/1
40 CAPSULE, DELAYED RELEASE ORAL DAILY
COMMUNITY
Start: 2023-02-27

## 2023-03-01 NOTE — PROGRESS NOTES
Erik Rodriguez is a 65 y.o. female who presents for evaluation of annual cpe. Last seen by me aug 1, 2022. She was inpt [facility] sept 25-30 with sepsis from colitis. She is doing much better now, is taking mesalamine daily. Still struggles with chronic constipation, but she now makes sure to move her bowels at least every 3 days. In past, she states she would sometimes go a month without having a bm. Has struggled with ugi and mouth complaints for about 4 months now. Did a few virtual visits and was thought to have thrush, but she did not get better with that treatment. Ent eventually did a scope, and thinks that her symptoms are due to severe gerd. Was started on omeprazole, which was started yesterday.       ROS:  Constitutional: negative for fevers, chills, anorexia and weight loss  Eyes:   negative for visual disturbance and irritation  ENT:   negative for tinnitus,sore throat,nasal congestion,ear pain,hoarseness  Respiratory:  negative for cough, hemoptysis, dyspnea,wheezing  CV:   negative for chest pain, palpitations, lower extremity edema  GI:   negative for nausea, vomiting, diarrhea, abdominal pain,melena  Genitourinary: negative for frequency, dysuria and hematuria  Musculoskel: negative for myalgias, arthralgias, back pain, muscle weakness, joint pain  Neurological:  negative for headaches, dizziness, focal weakness, numbness  Psychiatric:     Negative for depression or anxiety      Past Medical History:   Diagnosis Date    COVID-19 05/2021    Hypercholesterolemia     Hypertension     Spina bifida [facility])        Past Surgical History:   Procedure Laterality Date    COLONOSCOPY N/A 5/1/2019    COLONOSCOPY performed by Carmenza Marti MD at [facility] ENDOSCOPY    COLONOSCOPY N/A 3/17/2022    COLONOSCOPY performed by Anabella Viera MD at [facility] ENDOSCOPY    COLONOSCOPY,DIAGNOSTIC  5/1/2019         COLONOSCOPY,DIAGNOSTIC  3/17/2022         HX COLONOSCOPY  03/2022    HX OTHER SURGICAL  1890s    sacral wound flap repair       Family History   Problem Relation Age of Onset    Other Mother         breast cancer, Aortic Dissection     Other Father         Bypass x5, shrinking brain     Other Sister         heart stents, asthma, no spleen        Social History     Socioeconomic History    Marital status:      Spouse name: Not on file    Number of children: Not on file    Years of education: Not on file    Highest education level: Not on file   Occupational History    Not on file   Tobacco Use    Smoking status: Never    Smokeless tobacco: Never   Vaping Use    Vaping Use: Never used   Substance and Sexual Activity    Alcohol use: Never    Drug use: Never    Sexual activity: Not Currently   Other Topics Concern    Not on file   Social History Narrative    Not on file     Social Determinants of Health     Financial Resource Strain: Low Risk     Difficulty of Paying Living Expenses: Not hard at all   Food Insecurity: No Food Insecurity    Worried About Running Out of Food in the Last Year: Never true    Ran Out of Food in the Last Year: Never true   Transportation Needs: Not on file   Physical Activity: Not on file   Stress: Not on file   Social Connections: Not on file   Intimate Partner Violence: Not on file   Housing Stability: Not on file          Visit Vitals  /75 (BP 1 Location: Left upper arm, BP Patient Position: Sitting)   Pulse 84   Temp 98.3 °F (36.8 °C) (Temporal)   Resp 16   Ht 4' 6\" (1.372 m)   LMP 08/01/2018 (LMP Unknown)   SpO2 98%   BMI 34.24 kg/m²       Physical Examination:   General - Well appearing female.   In w/c  HEENT - PERRL, TM no erythema/opacification, normal nasal turbinates, no oropharyngeal erythema or exudate, MMM  Neck - supple, no bruits, no thyroidomegaly, no lymphadenopathy  Pulm - clear to auscultation bilaterally  Cardio - RRR, normal S1 S2, no murmur  Abd - soft, nontender, no masses, no HSM  Extrem - no edema, +2 distal pulses  Neuro-  No focal deficits, CN intact Assessment/Plan:     Annual cpe--check cbc, cmp, flp, tsh, a1c  Pancolitis--continue mesalamine. Follows with dr stacey flores  Chronic constipation--suggested to take stool softener at least every other day, and daily if needed is ok.   Htn--only taking lisinopril now prn  Hx spina bifida--  Hx neurogenic bladder--  Paraplegic--  Gerd--on prilosec now      Rtc 4 months        Brian Valera III, DO

## 2023-03-01 NOTE — PROGRESS NOTES
1. \"Have you been to the ER, urgent care clinic since your last visit? Hospitalized since your last visit? \" Yes see encounters    2. \"Have you seen or consulted any other health care providers outside of the [address] [place] [address] since your last visit? \" Yes, ENT     3. For patients aged 39-70: Has the patient had a colonoscopy / FIT/ Cologuard? Yes - no Care Gap present      If the patient is female:    4. For patients aged 41-77: Has the patient had a mammogram within the past 2 years? Yes - no Care Gap present      5. For patients aged 21-65: Has the patient had a pap smear?  Yes - no Care Gap present

## 2023-03-02 ENCOUNTER — APPOINTMENT (OUTPATIENT)
Dept: INTERNAL MEDICINE CLINIC | Age: 63
End: 2023-03-02

## 2023-03-04 LAB
ALBUMIN SERPL-MCNC: 5 G/DL (ref 3.8–4.8)
ALBUMIN/GLOB SERPL: 2.1 {RATIO} (ref 1.2–2.2)
ALP SERPL-CCNC: 91 IU/L (ref 44–121)
ALT SERPL-CCNC: 37 IU/L (ref 0–32)
AST SERPL-CCNC: 24 IU/L (ref 0–40)
BASOPHILS # BLD AUTO: 0 X10E3/UL (ref 0–0.2)
BASOPHILS NFR BLD AUTO: 0 %
BILIRUB SERPL-MCNC: 0.7 MG/DL (ref 0–1.2)
BUN SERPL-MCNC: 13 MG/DL (ref 8–27)
BUN/CREAT SERPL: 24 (ref 12–28)
CALCIUM SERPL-MCNC: 9.8 MG/DL (ref 8.7–10.3)
CHLORIDE SERPL-SCNC: 102 MMOL/L (ref 96–106)
CHOLEST SERPL-MCNC: 346 MG/DL (ref 100–199)
CO2 SERPL-SCNC: 21 MMOL/L (ref 20–29)
CREAT SERPL-MCNC: 0.54 MG/DL (ref 0.57–1)
EGFRCR SERPLBLD CKD-EPI 2021: 105 ML/MIN/1.73
EOSINOPHIL # BLD AUTO: 0 X10E3/UL (ref 0–0.4)
EOSINOPHIL NFR BLD AUTO: 1 %
ERYTHROCYTE [DISTWIDTH] IN BLOOD BY AUTOMATED COUNT: 11.4 % (ref 11.7–15.4)
EST. AVERAGE GLUCOSE BLD GHB EST-MCNC: 117 MG/DL
GLOBULIN SER CALC-MCNC: 2.4 G/DL (ref 1.5–4.5)
GLUCOSE SERPL-MCNC: 108 MG/DL (ref 70–99)
HBA1C MFR BLD: 5.7 % (ref 4.8–5.6)
HCT VFR BLD AUTO: 43.6 % (ref 34–46.6)
HDLC SERPL-MCNC: 63 MG/DL
HGB BLD-MCNC: 14.6 G/DL (ref 11.1–15.9)
IMM GRANULOCYTES # BLD AUTO: 0 X10E3/UL (ref 0–0.1)
IMM GRANULOCYTES NFR BLD AUTO: 0 %
LABORATORY COMMENT REPORT: ABNORMAL
LDLC SERPL CALC-MCNC: 223 MG/DL (ref 0–99)
LYMPHOCYTES # BLD AUTO: 1.4 X10E3/UL (ref 0.7–3.1)
LYMPHOCYTES NFR BLD AUTO: 16 %
MCH RBC QN AUTO: 31.9 PG (ref 26.6–33)
MCHC RBC AUTO-ENTMCNC: 33.5 G/DL (ref 31.5–35.7)
MCV RBC AUTO: 95 FL (ref 79–97)
MONOCYTES # BLD AUTO: 0.6 X10E3/UL (ref 0.1–0.9)
MONOCYTES NFR BLD AUTO: 7 %
NEUTROPHILS # BLD AUTO: 6.6 X10E3/UL (ref 1.4–7)
NEUTROPHILS NFR BLD AUTO: 76 %
PLATELET # BLD AUTO: 320 X10E3/UL (ref 150–450)
POTASSIUM SERPL-SCNC: 4 MMOL/L (ref 3.5–5.2)
PROT SERPL-MCNC: 7.4 G/DL (ref 6–8.5)
RBC # BLD AUTO: 4.57 X10E6/UL (ref 3.77–5.28)
SODIUM SERPL-SCNC: 143 MMOL/L (ref 134–144)
TRIGL SERPL-MCNC: 292 MG/DL (ref 0–149)
TSH SERPL DL<=0.005 MIU/L-ACNC: 1.29 UIU/ML (ref 0.45–4.5)
VLDLC SERPL CALC-MCNC: 60 MG/DL (ref 5–40)
WBC # BLD AUTO: 8.7 X10E3/UL (ref 3.4–10.8)

## 2023-03-04 RX ORDER — ROSUVASTATIN CALCIUM 10 MG/1
10 TABLET, COATED ORAL
Qty: 90 TABLET | Refills: 3 | Status: SHIPPED | OUTPATIENT
Start: 2023-03-04

## 2023-03-05 NOTE — PROGRESS NOTES
Cholesterol levels very elevated, . Goal is less than 100. Typically start meds once above 185 or so. Rx sent in for crestor. Other labs look ok.

## 2023-06-02 ENCOUNTER — OFFICE VISIT (OUTPATIENT)
Age: 63
End: 2023-06-02
Payer: COMMERCIAL

## 2023-06-02 VITALS
BODY MASS INDEX: 31.24 KG/M2 | WEIGHT: 135 LBS | OXYGEN SATURATION: 97 % | TEMPERATURE: 98.2 F | RESPIRATION RATE: 16 BRPM | HEART RATE: 88 BPM | HEIGHT: 55 IN | SYSTOLIC BLOOD PRESSURE: 128 MMHG | DIASTOLIC BLOOD PRESSURE: 82 MMHG

## 2023-06-02 DIAGNOSIS — K51.00 ULCERATIVE (CHRONIC) PANCOLITIS WITHOUT COMPLICATIONS (HCC): ICD-10-CM

## 2023-06-02 DIAGNOSIS — K59.09 CHRONIC CONSTIPATION: ICD-10-CM

## 2023-06-02 DIAGNOSIS — M15.9 PRIMARY OSTEOARTHRITIS INVOLVING MULTIPLE JOINTS: ICD-10-CM

## 2023-06-02 DIAGNOSIS — I10 ESSENTIAL (PRIMARY) HYPERTENSION: ICD-10-CM

## 2023-06-02 DIAGNOSIS — G82.20 PARAPLEGIA, UNSPECIFIED (HCC): Primary | ICD-10-CM

## 2023-06-02 DIAGNOSIS — R10.13 DYSPEPSIA: ICD-10-CM

## 2023-06-02 PROCEDURE — 99214 OFFICE O/P EST MOD 30 MIN: CPT | Performed by: INTERNAL MEDICINE

## 2023-06-02 PROCEDURE — 3074F SYST BP LT 130 MM HG: CPT | Performed by: INTERNAL MEDICINE

## 2023-06-02 PROCEDURE — 3078F DIAST BP <80 MM HG: CPT | Performed by: INTERNAL MEDICINE

## 2023-06-02 SDOH — ECONOMIC STABILITY: INCOME INSECURITY: HOW HARD IS IT FOR YOU TO PAY FOR THE VERY BASICS LIKE FOOD, HOUSING, MEDICAL CARE, AND HEATING?: NOT HARD AT ALL

## 2023-06-02 SDOH — ECONOMIC STABILITY: FOOD INSECURITY: WITHIN THE PAST 12 MONTHS, THE FOOD YOU BOUGHT JUST DIDN'T LAST AND YOU DIDN'T HAVE MONEY TO GET MORE.: NEVER TRUE

## 2023-06-02 SDOH — ECONOMIC STABILITY: FOOD INSECURITY: WITHIN THE PAST 12 MONTHS, YOU WORRIED THAT YOUR FOOD WOULD RUN OUT BEFORE YOU GOT MONEY TO BUY MORE.: NEVER TRUE

## 2023-06-02 SDOH — ECONOMIC STABILITY: HOUSING INSECURITY
IN THE LAST 12 MONTHS, WAS THERE A TIME WHEN YOU DID NOT HAVE A STEADY PLACE TO SLEEP OR SLEPT IN A SHELTER (INCLUDING NOW)?: NO

## 2023-06-02 ASSESSMENT — PATIENT HEALTH QUESTIONNAIRE - PHQ9
SUM OF ALL RESPONSES TO PHQ QUESTIONS 1-9: 1
2. FEELING DOWN, DEPRESSED OR HOPELESS: 1
SUM OF ALL RESPONSES TO PHQ QUESTIONS 1-9: 1
SUM OF ALL RESPONSES TO PHQ9 QUESTIONS 1 & 2: 1
1. LITTLE INTEREST OR PLEASURE IN DOING THINGS: 0
SUM OF ALL RESPONSES TO PHQ QUESTIONS 1-9: 1
SUM OF ALL RESPONSES TO PHQ QUESTIONS 1-9: 1

## 2023-06-02 NOTE — PROGRESS NOTES
1. \"Have you been to the ER, urgent care clinic since your last visit? Hospitalized since your last visit? \" No    2. \"Have you seen or consulted any other health care providers outside of the [address] [place] [address] since your last visit? \" Yes cardiology for annual check up      3. For patients aged 39-70: Has the patient had a colonoscopy / FIT/ Cologuard? Yes - no Care Gap present      If the patient is female:    4. For patients aged 41-77: Has the patient had a mammogram within the past 2 years? Yes - no Care Gap present      5. For patients aged 21-65: Has the patient had a pap smear?  Yes - no Care Gap present
Rosi Mcclellan is a 65 y.o. female who presents for evaluation of routine follow up for htn, chronic constipation, mixed hyperlipids. Last seen by me march 1, 2023 in cpe. Bowels working much better. Has not started crestor, LDL was 223, but will start now. Struggling with diffuse osteoarthritis, and stopped her voltaren pill as she had egd that showed gastritis. Her gi symptoms have resolved. She would also like to resume PT.      ROS:  Constitutional: negative for fevers, chills, anorexia and weight loss  Eyes:   negative for visual disturbance and irritation  ENT:   negative for tinnitus,sore throat,nasal congestion,ear pain,hoarseness  Respiratory:  negative for cough, hemoptysis, dyspnea,wheezing  CV:   negative for chest pain, palpitations, lower extremity edema  GI:   negative for nausea, vomiting, diarrhea, abdominal pain,melena  Genitourinary: negative for frequency, dysuria and hematuria  Musculoskel: negative for myalgias, arthralgias, back pain, muscle weakness.   ++diffuse joint pain  Neurological:  negative for headaches, dizziness, focal weakness, numbness  Psychiatric:     Negative for depression or anxiety      Past Medical History:   Diagnosis Date    COVID-19 05/2021    Hypercholesterolemia     Hypertension     Spina bifida [facility])        Past Surgical History:   Procedure Laterality Date    COLONOSCOPY  03/2022    COLONOSCOPY N/A 5/1/2019    COLONOSCOPY performed by Broderick Stephens MD at [facility] ENDOSCOPY    COLONOSCOPY N/A 3/17/2022    COLONOSCOPY performed by Dillon Hampton MD at [facility] ENDOSCOPY    COLONOSCOPY,BIOPSY  3/17/2022         COLONOSCOPY,BIOPSY  5/1/2019         OTHER SURGICAL HISTORY  1890s    sacral wound flap repair       Family History   Problem Relation Age of Onset    Other Mother         breast cancer, Aortic Dissection     Other Father         Bypass x5, shrinking brain     Other Sister         heart stents, asthma, no spleen        Social History     Socioeconomic History
47M PMHx decompensated cirrhosis (c/b ascites, SBP w/ hx of fluoroquinolone resistant E. coli, small esophageal varices, portal hypertensive gastropathy) secondary to alcoholic hepatitis with recent acute on chronic liver failure in 5/2020 from alcohol relapse p/w with blood stools and hematemesis. Now transferred to the MICU for elective intubation for EGD.     Neuro:   #Potential for hepatic encephelopathy  - Neuro checks per ICU protocol   - Sedation for intubation for procedure  - Will resume rifaximin and lactulose once cleared by GI     CV:   - Hemodynamically stable at this time   - No sign of hemodynamically significant hemorrhage    - Continue with telemetry   - EKG on admission NSR and T wave inversions stable from previous EKGs.    Pulm:   - has known hepatic shung but given normal Oxygenation degree of shunt is minimal  - Elective intubation today for planned EGD   - Obtain CXR post intubation, ABG   - No active pulmonary issues at this time   - Maintain aspiration precautions at all times   - f/u with COVID 19 PCR     GI:   #Hematemesis with melena, Hgb 8.4  - GI to do EGD for suspected esophageal variceal bleeding  - Maintain NPO   - CBC q6h, transfuse as above   - Vitamin K IV 10mg  - TEG and replete platlet/FFP/CRYO  Octreotide gtt  - PPI gtt  - start ceftriaxone 1 g daily for total 5 day course  - Transplant hepatology team f/u; per chart review, given frequent alcohol relapse, pt needs to abstain for >3m to be listed for transplant    Ascites  - will continue with empiric Ceftriaxone for SBP ppx   - hold diuresis while GI bleeding     /RENAL:  -  follow urine out put  - Cr 1.85, appears to be at baseline   - Monitor electrolytes / supplement as needed      HEME:  #Hematemesis with melena, Hgb 8.4  - CBC q6h, transfuse as needed      - DVT PPX: mechanical devices    ID:   - c/w Ceftriaxone 1g daily x 5 days for SBP ppx    ENDO:  - T2DM, on repaglinide at home.  - FS goal 140-180, at goal    Full code  Total critical caretime 45 min

## 2023-06-13 NOTE — PROGRESS NOTES
66yFemale pmh UC in past s/p Colectomy with j-pouch had sinusitis and used azithromycin and developed diarrhea. Patient is due for pouchoscopy soon. PAtient reports diarrhea started Friday and has significantly decreased.    Problem 1-Diarrhea  Rec  -C-diff negative  -Check GI PCR  -lactose free low residue diet  -patient to have pouchoscopy outpatient  -no acute GI intervention   PT DAILY TREATMENT NOTE - [facility] 2-15    Patient Name: Tony Love  Date:3/10/2022  : 1960  [x]  Patient  Verified  Payor: Bandar Hernandez / Plan: Yeimy Horvath / Product Type: HMO /    In time:930 Out time: 1017  Total Treatment Time (min): 47  Total Timed Codes (min): 29  1:1 Treatment Time ( only): 29  Visit #:  7    Treatment Area: Neck muscle spasm [M62.838]    SUBJECTIVE  Pain Level (0-10 scale): 2/10 in L-sided neck  Any medication changes, allergies to medications, adverse drug reactions, diagnosis change, or new procedure performed?: [x] No    [] Yes (see summary sheet for update)  Subjective functional status/changes:   [] No changes reported  Patient reports her neck has been feeling much better as a whole. She did switch chairs and has been having increased discomfort in the R shoulder blade while she is getting adjusted to it. OBJECTIVE    37 min Therapeutic Exercise:  [x] See flow sheet :    Rationale: increase ROM, increase strength, improve coordination, improve balance and increase proprioception to improve the patients ability to sleep, transfer, and perform work tasks     nt min Neuromuscular Re-education:  [x]  See flow sheet :   Rationale: increase ROM, increase strength, improve coordination, improve balance and increase proprioception  to improve the patients ability to sleep, transfer, and perform work tasks    10 min Manual Therapy: Bilateral cervical PA/rotational/lateral joint mobilizations (Grade III, C2-7); L shoulder PROM, all planes to tolerance; L glenohumeral inferior/AP joint mobilizations (Grade III); STM/TPR bilateral suboccipitals/L upper trapezius/levator scapula/scalenes/supraspinatus/infraspinatus/teres minor/subscapularis/pectoralis major/minor/latissimus dorsi; L pectoralis major MWM (pin + stretch with horizontal abduction at 120 degrees of shoulder elevation);  L subscapularis MWM (pin + stretch with shoulder ER); supine L scalenes MWM (pin + stretch with R cervical side bending, 10x); seated L first rib inferior mobilizations (Grade III)     Rationale: decrease pain, increase ROM, increase tissue extensibility and decrease trigger points to improve the patients ability to sleep, transfer, and perform work tasks    With   [x] TE   [] TA   [x] Neuro   [] SC   [] other: Patient Education: [x] Review HEP    [] Progressed/Changed HEP based on:   [] positioning   [] body mechanics   [] transfers   [] heat/ice application    [] other:      Other Objective/Functional Measures: none noted    Pain Level (0-10 scale) post treatment: 2 in L-sided neck    ASSESSMENT/Changes in Function:   TTP along the middle trap and rhomboid region. Struggled with face pulls. Good control with prone back burners. Tolerated all therex, will continue to progress as tolerated. Patient will continue to benefit from skilled PT services to modify and progress therapeutic interventions, address functional mobility deficits, address ROM deficits, address strength deficits, analyze and address soft tissue restrictions, analyze and cue movement patterns, analyze and modify body mechanics/ergonomics and assess and modify postural abnormalities to attain remaining goals. [x]  See Plan of Care  []  See progress note/recertification  []  See Discharge Summary         Progress towards goals / Updated goals:    Short Term Goals: To be accomplished in 6-8 treatments: The patient will demonstrate understanding of and compliance with updated and progressive HEP toward improved participation in physical therapy plan of care. - Progressing              The patient will demonstrate ability to transfer to/from wheelchair to mat table 3/3 times without onset of familiar L-sided neck pain toward improved quality of life. - Progressing  Long Term Goals: To be accomplished in 12-16 treatments:                The patient will demonstrate multiplanar bilateral UE strength increased by >= 1/2 MMT grade toward improved endurance with functional activities such as transfers and work tasks. - Progressing              The patient will demonstrate longus colli endurance test >= 38 seconds toward improved postural endurance with transfers and work tasks. - Progressing              The patient will score >= 57 on FOTO to demonstrate significantly improved subjective report of function. - Progressing  Frequency / Duration: Patient to be seen 2 times per week for 12-16 treatments.     PLAN  [x]  Upgrade activities as tolerated     [x]  Continue plan of care  [x]  Update interventions per flow sheet       []  Discharge due to:_  []  Other:_      Estrada Borrego PTA, DPT 3/10/2022  66yFemale pmh UC in past s/p Colectomy with j-pouch had sinusitis and used azithromycin and developed diarrhea. Patient is due for pouchoscopy soon. PAtient reports diarrhea started Friday and has significantly decreased.    Problem 1-Diarrhea  likely transient abx induced diarrhea   Rec  -C-diff negative  -Check GI PCR  -lactose free low residue diet  -patient to have pouchoscopy outpatient  -no acute GI intervention    66yFemale pmh UC in past s/p Colectomy with j-pouch had sinusitis and used azithromycin and developed diarrhea. Patient is due for pouchoscopy soon. PAtient reports diarrhea started Friday and has significantly decreased.    Problem 1-Diarrhea  likely transient abx induced diarrhea   Rec  -C-diff negative  -Check GI PCR  -lactose free low residue diet  -patient to have pouchoscopy outpatient  -no acute GI intervention

## 2023-07-29 ENCOUNTER — APPOINTMENT (OUTPATIENT)
Facility: HOSPITAL | Age: 63
End: 2023-07-29
Payer: COMMERCIAL

## 2023-07-29 ENCOUNTER — HOSPITAL ENCOUNTER (EMERGENCY)
Facility: HOSPITAL | Age: 63
Discharge: HOME | End: 2023-07-29
Attending: STUDENT IN AN ORGANIZED HEALTH CARE EDUCATION/TRAINING PROGRAM
Payer: COMMERCIAL

## 2023-07-29 VITALS
WEIGHT: 142 LBS | DIASTOLIC BLOOD PRESSURE: 78 MMHG | HEIGHT: 55 IN | HEART RATE: 80 BPM | OXYGEN SATURATION: 98 % | TEMPERATURE: 97.5 F | SYSTOLIC BLOOD PRESSURE: 145 MMHG | BODY MASS INDEX: 32.86 KG/M2 | RESPIRATION RATE: 22 BRPM

## 2023-07-29 DIAGNOSIS — R42 DIZZINESS: Primary | ICD-10-CM

## 2023-07-29 DIAGNOSIS — R11.2 NAUSEA AND VOMITING, UNSPECIFIED VOMITING TYPE: ICD-10-CM

## 2023-07-29 LAB
ALBUMIN SERPL-MCNC: 3.7 G/DL (ref 3.5–5)
ALBUMIN/GLOB SERPL: 1 (ref 1.1–2.2)
ALP SERPL-CCNC: 93 U/L (ref 45–117)
ALT SERPL-CCNC: 106 U/L (ref 12–78)
ANION GAP SERPL CALC-SCNC: 7 MMOL/L (ref 5–15)
APPEARANCE UR: CLEAR
AST SERPL-CCNC: 43 U/L (ref 15–37)
BACTERIA URNS QL MICRO: NEGATIVE /HPF
BASOPHILS # BLD: 0 K/UL (ref 0–0.1)
BASOPHILS NFR BLD: 0 % (ref 0–1)
BILIRUB SERPL-MCNC: 0.6 MG/DL (ref 0.2–1)
BILIRUB UR QL: NEGATIVE
BUN SERPL-MCNC: 16 MG/DL (ref 6–20)
BUN/CREAT SERPL: 30 (ref 12–20)
CALCIUM SERPL-MCNC: 8.9 MG/DL (ref 8.5–10.1)
CHLORIDE SERPL-SCNC: 105 MMOL/L (ref 97–108)
CO2 SERPL-SCNC: 25 MMOL/L (ref 21–32)
COLOR UR: ABNORMAL
CREAT SERPL-MCNC: 0.53 MG/DL (ref 0.55–1.02)
DIFFERENTIAL METHOD BLD: ABNORMAL
EOSINOPHIL # BLD: 0 K/UL (ref 0–0.4)
EOSINOPHIL NFR BLD: 0 % (ref 0–7)
EPITH CASTS URNS QL MICRO: ABNORMAL /LPF
ERYTHROCYTE [DISTWIDTH] IN BLOOD BY AUTOMATED COUNT: 11.9 % (ref 11.5–14.5)
GLOBULIN SER CALC-MCNC: 3.7 G/DL (ref 2–4)
GLUCOSE SERPL-MCNC: 145 MG/DL (ref 65–100)
GLUCOSE UR STRIP.AUTO-MCNC: NEGATIVE MG/DL
HCT VFR BLD AUTO: 39 % (ref 35–47)
HGB BLD-MCNC: 12.6 G/DL (ref 11.5–16)
HGB UR QL STRIP: NEGATIVE
HYALINE CASTS URNS QL MICRO: ABNORMAL /LPF (ref 0–5)
IMM GRANULOCYTES # BLD AUTO: 0.1 K/UL (ref 0–0.04)
IMM GRANULOCYTES NFR BLD AUTO: 1 % (ref 0–0.5)
KETONES UR QL STRIP.AUTO: 15 MG/DL
LACTATE BLD-SCNC: 1.28 MMOL/L (ref 0.4–2)
LEUKOCYTE ESTERASE UR QL STRIP.AUTO: NEGATIVE
LIPASE SERPL-CCNC: 128 U/L (ref 73–393)
LYMPHOCYTES # BLD: 1.2 K/UL (ref 0.8–3.5)
LYMPHOCYTES NFR BLD: 12 % (ref 12–49)
MAGNESIUM SERPL-MCNC: 2 MG/DL (ref 1.6–2.4)
MCH RBC QN AUTO: 31 PG (ref 26–34)
MCHC RBC AUTO-ENTMCNC: 32.3 G/DL (ref 30–36.5)
MCV RBC AUTO: 96.1 FL (ref 80–99)
MONOCYTES # BLD: 0.6 K/UL (ref 0–1)
MONOCYTES NFR BLD: 6 % (ref 5–13)
NEUTS SEG # BLD: 7.7 K/UL (ref 1.8–8)
NEUTS SEG NFR BLD: 81 % (ref 32–75)
NITRITE UR QL STRIP.AUTO: NEGATIVE
NRBC # BLD: 0 K/UL (ref 0–0.01)
NRBC BLD-RTO: 0 PER 100 WBC
PH UR STRIP: 5 (ref 5–8)
PLATELET # BLD AUTO: 245 K/UL (ref 150–400)
PMV BLD AUTO: 9.5 FL (ref 8.9–12.9)
POTASSIUM SERPL-SCNC: 3.5 MMOL/L (ref 3.5–5.1)
PROT SERPL-MCNC: 7.4 G/DL (ref 6.4–8.2)
PROT UR STRIP-MCNC: ABNORMAL MG/DL
RBC # BLD AUTO: 4.06 M/UL (ref 3.8–5.2)
RBC #/AREA URNS HPF: ABNORMAL /HPF (ref 0–5)
SODIUM SERPL-SCNC: 137 MMOL/L (ref 136–145)
SP GR UR REFRACTOMETRY: >1.03 (ref 1–1.03)
URINE CULTURE IF INDICATED: ABNORMAL
UROBILINOGEN UR QL STRIP.AUTO: 0.2 EU/DL (ref 0.2–1)
WBC # BLD AUTO: 9.6 K/UL (ref 3.6–11)
WBC URNS QL MICRO: ABNORMAL /HPF (ref 0–4)

## 2023-07-29 PROCEDURE — 36415 COLL VENOUS BLD VENIPUNCTURE: CPT

## 2023-07-29 PROCEDURE — 74177 CT ABD & PELVIS W/CONTRAST: CPT

## 2023-07-29 PROCEDURE — 81001 URINALYSIS AUTO W/SCOPE: CPT

## 2023-07-29 PROCEDURE — 80053 COMPREHEN METABOLIC PANEL: CPT

## 2023-07-29 PROCEDURE — 93005 ELECTROCARDIOGRAM TRACING: CPT | Performed by: STUDENT IN AN ORGANIZED HEALTH CARE EDUCATION/TRAINING PROGRAM

## 2023-07-29 PROCEDURE — 51701 INSERT BLADDER CATHETER: CPT

## 2023-07-29 PROCEDURE — 99285 EMERGENCY DEPT VISIT HI MDM: CPT

## 2023-07-29 PROCEDURE — 6360000004 HC RX CONTRAST MEDICATION: Performed by: STUDENT IN AN ORGANIZED HEALTH CARE EDUCATION/TRAINING PROGRAM

## 2023-07-29 PROCEDURE — 2580000003 HC RX 258: Performed by: STUDENT IN AN ORGANIZED HEALTH CARE EDUCATION/TRAINING PROGRAM

## 2023-07-29 PROCEDURE — 70450 CT HEAD/BRAIN W/O DYE: CPT

## 2023-07-29 PROCEDURE — 83690 ASSAY OF LIPASE: CPT

## 2023-07-29 PROCEDURE — 83735 ASSAY OF MAGNESIUM: CPT

## 2023-07-29 PROCEDURE — 85025 COMPLETE CBC W/AUTO DIFF WBC: CPT

## 2023-07-29 PROCEDURE — 83605 ASSAY OF LACTIC ACID: CPT

## 2023-07-29 RX ORDER — 0.9 % SODIUM CHLORIDE 0.9 %
1000 INTRAVENOUS SOLUTION INTRAVENOUS ONCE
Status: COMPLETED | OUTPATIENT
Start: 2023-07-29 | End: 2023-07-29

## 2023-07-29 RX ORDER — HYDROCORTISONE 25 MG/G
CREAM TOPICAL
Qty: 28 G | Refills: 0 | Status: SHIPPED | OUTPATIENT
Start: 2023-07-29

## 2023-07-29 RX ADMIN — SODIUM CHLORIDE 1000 ML: 9 INJECTION, SOLUTION INTRAVENOUS at 21:18

## 2023-07-29 RX ADMIN — IOPAMIDOL 100 ML: 755 INJECTION, SOLUTION INTRAVENOUS at 20:37

## 2023-07-29 ASSESSMENT — PAIN SCALES - GENERAL: PAINLEVEL_OUTOF10: 4

## 2023-07-29 ASSESSMENT — LIFESTYLE VARIABLES
HOW MANY STANDARD DRINKS CONTAINING ALCOHOL DO YOU HAVE ON A TYPICAL DAY: PATIENT DOES NOT DRINK
HOW OFTEN DO YOU HAVE A DRINK CONTAINING ALCOHOL: NEVER

## 2023-07-30 LAB
EKG ATRIAL RATE: 70 BPM
EKG DIAGNOSIS: NORMAL
EKG P AXIS: 2 DEGREES
EKG P-R INTERVAL: 138 MS
EKG Q-T INTERVAL: 408 MS
EKG QRS DURATION: 78 MS
EKG QTC CALCULATION (BAZETT): 440 MS
EKG R AXIS: 62 DEGREES
EKG T AXIS: 43 DEGREES
EKG VENTRICULAR RATE: 70 BPM

## 2023-07-30 NOTE — DISCHARGE INSTRUCTIONS
You have been evaluated in the Emergency Department today for dizziness. Your evaluation suggests that your symptoms are most likely due to peripheral vertigo. Please follow up with your primary care doctor in 2-3 days. Return to the ER immediately for worsening or uncontrolled symptoms, worsening headache, chest pain, shortness of breath, persistent vomiting, vision changes, fainting, or for any other concerning symptoms. Thank you for choosing us for your care.

## 2023-07-30 NOTE — ED NOTES
Pt discharged by provider. All questions answered and pt expresses understanding of instructions. Pt transported home via [facility] [place].   Batool Rome RN  07/29/23 [address] [facility] Alena Pace RN  07/29/23 [id number]

## 2023-07-31 ENCOUNTER — TELEPHONE (OUTPATIENT)
Age: 63
End: 2023-07-31

## 2023-07-31 NOTE — TELEPHONE ENCOUNTER
----- Message from Jose Angel Seymour sent at 7/31/2023  2:21 PM EDT -----  Subject: Appointment Request    Reason for Call: Established Patient Appointment needed: Routine ED Follow   Up Visit    QUESTIONS    Reason for appointment request? No appointments available during search     Additional Information for Provider? pt needs f/u for ed/ dizziness and   diarrhea, vomiting. would also like to talk to pharmacist if possible to   discuss if meds are making her feel this way.   ---------------------------------------------------------------------------  --------------  Elva Marine Yaw  [phone]; OK to leave message on voicemail  ---------------------------------------------------------------------------  --------------  SCRIPT ANSWERS

## 2023-08-04 ENCOUNTER — OFFICE VISIT (OUTPATIENT)
Age: 63
End: 2023-08-04
Payer: COMMERCIAL

## 2023-08-04 VITALS
OXYGEN SATURATION: 99 % | WEIGHT: 142 LBS | HEART RATE: 90 BPM | DIASTOLIC BLOOD PRESSURE: 76 MMHG | HEIGHT: 55 IN | BODY MASS INDEX: 32.86 KG/M2 | SYSTOLIC BLOOD PRESSURE: 126 MMHG | TEMPERATURE: 98.2 F | RESPIRATION RATE: 16 BRPM

## 2023-08-04 DIAGNOSIS — K59.09 CHRONIC CONSTIPATION: ICD-10-CM

## 2023-08-04 DIAGNOSIS — K52.9 GASTROENTERITIS: Primary | ICD-10-CM

## 2023-08-04 DIAGNOSIS — Q05.9 SPINA BIFIDA, UNSPECIFIED HYDROCEPHALUS PRESENCE, UNSPECIFIED SPINAL REGION (HCC): ICD-10-CM

## 2023-08-04 DIAGNOSIS — K51.00 ULCERATIVE (CHRONIC) PANCOLITIS WITHOUT COMPLICATIONS (HCC): ICD-10-CM

## 2023-08-04 DIAGNOSIS — M15.9 PRIMARY OSTEOARTHRITIS INVOLVING MULTIPLE JOINTS: ICD-10-CM

## 2023-08-04 DIAGNOSIS — G82.20 PARAPLEGIA, UNSPECIFIED (HCC): ICD-10-CM

## 2023-08-04 DIAGNOSIS — I10 ESSENTIAL (PRIMARY) HYPERTENSION: ICD-10-CM

## 2023-08-04 DIAGNOSIS — R79.89 ELEVATED LFTS: ICD-10-CM

## 2023-08-04 PROCEDURE — 3078F DIAST BP <80 MM HG: CPT | Performed by: INTERNAL MEDICINE

## 2023-08-04 PROCEDURE — 99214 OFFICE O/P EST MOD 30 MIN: CPT | Performed by: INTERNAL MEDICINE

## 2023-08-04 PROCEDURE — 3074F SYST BP LT 130 MM HG: CPT | Performed by: INTERNAL MEDICINE

## 2023-08-04 RX ORDER — CELECOXIB 200 MG/1
200 CAPSULE ORAL 2 TIMES DAILY
COMMUNITY

## 2023-08-04 RX ORDER — METHYLPREDNISOLONE 4 MG/1
TABLET ORAL
COMMUNITY
Start: 2023-07-26 | End: 2023-08-04 | Stop reason: ALTCHOICE

## 2023-08-04 RX ORDER — CYCLOBENZAPRINE HCL 10 MG
10 TABLET ORAL PRN
COMMUNITY
Start: 2023-07-26 | End: 2023-08-04

## 2023-08-04 RX ORDER — ROSUVASTATIN CALCIUM 10 MG/1
5 TABLET, COATED ORAL DAILY
Qty: 30 TABLET | Refills: 1
Start: 2023-08-04

## 2023-08-04 NOTE — PROGRESS NOTES
Kelly Magdaleno is a 59 y.o. female who presents for evaluation of ED follow up. Last seen by me June 2, 2023. Had ed visit on July 29, 2023. Started with some vertigo, then developed n/v and diarrhea. She got septic from colitis last year, and was afraid this might be a recurrence. No gi bleeding though, and no f/c. ED work up with CT scan was negative for any colitis. Blood work had elevated lfts--likely from n/v. She was d/c to home and has done well since then, with no recurrence.       ROS:  Constitutional: negative for fevers, chills, anorexia and weight loss  Eyes:   negative for visual disturbance and irritation  ENT:   negative for tinnitus,sore throat,nasal congestion,ear pain,hoarseness  Respiratory:  negative for cough, hemoptysis, dyspnea,wheezing  CV:   negative for chest pain, palpitations, lower extremity edema  GI:   negative for nausea, vomiting, diarrhea, abdominal pain,melena  Genitourinary: negative for frequency, dysuria and hematuria  Musculoskel: negative for myalgias, arthralgias, back pain, muscle weakness, joint pain  Neurological:  negative for headaches, dizziness, focal weakness, numbness  Psychiatric:     Negative for depression or anxiety      Past Medical History:   Diagnosis Date    COVID-19 05/2021    Hypercholesterolemia     Hypertension     Spina bifida [facility])        Past Surgical History:   Procedure Laterality Date    COLONOSCOPY  03/2022    COLONOSCOPY N/A 5/1/2019    COLONOSCOPY performed by Nallely Mendoza MD at [facility] ENDOSCOPY    COLONOSCOPY N/A 3/17/2022    COLONOSCOPY performed by Francisco Kearns MD at [facility] ENDOSCOPY    COLONOSCOPY,BIOPSY  3/17/2022         COLONOSCOPY,BIOPSY  5/1/2019         OTHER SURGICAL HISTORY  1890s    sacral wound flap repair       Family History   Problem Relation Age of Onset    Other Mother         breast cancer, Aortic Dissection     Other Father         Bypass x5, shrinking brain     Other Sister         heart stents, asthma, no spleen

## 2023-08-04 NOTE — PROGRESS NOTES
1. \"Have you been to the ER, urgent care clinic since your last visit? Hospitalized since your last visit? \" Yes see enounters    2. \"Have you seen or consulted any other health care providers outside of the [address] [place] [address] since your last visit? \" No     3. For patients aged 43-73: Has the patient had a colonoscopy / FIT/ Cologuard? Yes - no Care Gap present      If the patient is female:    4. For patients aged 43-66: Has the patient had a mammogram within the past 2 years? Yes - no Care Gap present      5. For patients aged 21-65: Has the patient had a pap smear?  NA - based on age or sex

## 2023-10-03 DIAGNOSIS — E53.8 B12 DEFICIENCY: Primary | ICD-10-CM

## 2023-10-03 RX ORDER — SYRINGE W-NEEDLE,DISPOSAB,3 ML 25GX5/8"
SYRINGE, EMPTY DISPOSABLE MISCELLANEOUS
Qty: 3 EACH | Refills: 3 | Status: SHIPPED | OUTPATIENT
Start: 2023-10-03

## 2023-10-03 RX ORDER — CYANOCOBALAMIN 1000 UG/ML
1000 INJECTION, SOLUTION INTRAMUSCULAR; SUBCUTANEOUS
Qty: 3 EACH | Refills: 3 | Status: SHIPPED | OUTPATIENT
Start: 2023-10-03

## 2023-10-03 NOTE — TELEPHONE ENCOUNTER
PCP: Latoya Valdes,     Last appt: 8/4/2023  No future appointments.     Requested Prescriptions     Pending Prescriptions Disp Refills    cyanocobalamin 1000 MCG/ML injection 3 each 3     Sig: Inject 1 mL into the muscle every 30 days    SYRINGE-NEEDLE, DISP, 3 ML ([employer] SYRING[employer]) 25G X 5/8\" 3 ML MISC 3 each 3     Sig: Use one syringe with each B12 injection

## 2023-10-03 NOTE — TELEPHONE ENCOUNTER
States needs refill of medication from previous provider:    b12 injection  cyanocobalamin 1000 MCG/ML injection    Syringe  1 ml, 25 x 5/8 ([place])      Send to :  [facility]/pharmacy #[id number]- [address] [facility], [address] [place] [address] - [place] [phone] - F [phone]

## 2023-10-16 ENCOUNTER — TELEPHONE (OUTPATIENT)
Age: 63
End: 2023-10-16

## 2023-10-16 NOTE — TELEPHONE ENCOUNTER
Patient states that due to her being in a wheelchair if she would be able to come to the office to be weighed.  Please call [phone]

## 2023-11-13 RX ORDER — DICLOFENAC POTASSIUM 50 MG/1
50 TABLET, FILM COATED ORAL DAILY
Qty: 90 TABLET | Refills: 3 | Status: SHIPPED | OUTPATIENT
Start: 2023-11-13

## 2023-11-13 RX ORDER — LISINOPRIL 10 MG/1
TABLET ORAL
Qty: 90 TABLET | Refills: 3 | Status: SHIPPED | OUTPATIENT
Start: 2023-11-13

## 2023-11-24 ENCOUNTER — TELEPHONE (OUTPATIENT)
Age: 63
End: 2023-11-24

## 2023-11-24 NOTE — TELEPHONE ENCOUNTER
----- Message from [facility] sent at 11/24/2023  9:49 AM EST -----  Subject: Referral Request    Reason for referral request? Pt would like a lab order request, standard   labs, specific to A1C. Provider patient wants to be referred to(if known):     Provider Phone Number(if known): Additional Information for Provider?  Patient wants to have labs done in   office if possible.   ---------------------------------------------------------------------------  --------------  [address] [place]    [phone]; OK to leave message on voicemail  ---------------------------------------------------------------------------  --------------

## 2023-12-05 DIAGNOSIS — E78.49 FAMILIAL HYPERLIPIDEMIA, HIGH LDL: Primary | ICD-10-CM

## 2023-12-05 DIAGNOSIS — R73.03 PREDIABETES: ICD-10-CM

## 2024-02-22 ENCOUNTER — OFFICE VISIT (OUTPATIENT)
Age: 64
End: 2024-02-22
Payer: COMMERCIAL

## 2024-02-22 VITALS
SYSTOLIC BLOOD PRESSURE: 119 MMHG | TEMPERATURE: 98.3 F | HEIGHT: 55 IN | HEART RATE: 87 BPM | WEIGHT: 142 LBS | BODY MASS INDEX: 32.86 KG/M2 | DIASTOLIC BLOOD PRESSURE: 80 MMHG | RESPIRATION RATE: 17 BRPM | OXYGEN SATURATION: 98 %

## 2024-02-22 DIAGNOSIS — I10 ESSENTIAL (PRIMARY) HYPERTENSION: ICD-10-CM

## 2024-02-22 DIAGNOSIS — G93.5 ARNOLD-CHIARI MALFORMATION, TYPE I (HCC): ICD-10-CM

## 2024-02-22 DIAGNOSIS — G82.20 PARAPLEGIA (HCC): ICD-10-CM

## 2024-02-22 DIAGNOSIS — R73.03 PREDIABETES: ICD-10-CM

## 2024-02-22 DIAGNOSIS — E53.8 B12 DEFICIENCY: ICD-10-CM

## 2024-02-22 DIAGNOSIS — D50.8 OTHER IRON DEFICIENCY ANEMIA: ICD-10-CM

## 2024-02-22 DIAGNOSIS — E78.49 FAMILIAL HYPERLIPIDEMIA, HIGH LDL: Primary | ICD-10-CM

## 2024-02-22 DIAGNOSIS — N31.9 NEUROGENIC BLADDER: ICD-10-CM

## 2024-02-22 PROCEDURE — 3079F DIAST BP 80-89 MM HG: CPT | Performed by: INTERNAL MEDICINE

## 2024-02-22 PROCEDURE — 99214 OFFICE O/P EST MOD 30 MIN: CPT | Performed by: INTERNAL MEDICINE

## 2024-02-22 PROCEDURE — 3074F SYST BP LT 130 MM HG: CPT | Performed by: INTERNAL MEDICINE

## 2024-02-22 ASSESSMENT — PATIENT HEALTH QUESTIONNAIRE - PHQ9
2. FEELING DOWN, DEPRESSED OR HOPELESS: 0
SUM OF ALL RESPONSES TO PHQ QUESTIONS 1-9: 0
1. LITTLE INTEREST OR PLEASURE IN DOING THINGS: 0
SUM OF ALL RESPONSES TO PHQ QUESTIONS 1-9: 0
SUM OF ALL RESPONSES TO PHQ9 QUESTIONS 1 & 2: 0
SUM OF ALL RESPONSES TO PHQ QUESTIONS 1-9: 0
SUM OF ALL RESPONSES TO PHQ QUESTIONS 1-9: 0

## 2024-02-22 NOTE — PROGRESS NOTES
\"Have you been to the ER, urgent care clinic since your last visit?  Hospitalized since your last visit?\"    NO    “Have you seen or consulted any other health care providers outside of [facility] since your last visit?”    NO       Have you had a mammogram?”     Scheduled for 2024-Dr. Esquivel// [facility]    
brain     Other Sister         heart stents, asthma, no spleen        Social History     Socioeconomic History    Marital status:      Spouse name: Not on file    Number of children: Not on file    Years of education: Not on file    Highest education level: Not on file   Occupational History    Not on file   Tobacco Use    Smoking status: Never    Smokeless tobacco: Never   Substance and Sexual Activity    Alcohol use: Never    Drug use: Never    Sexual activity: Not on file   Other Topics Concern    Not on file   Social History Narrative    Not on file     Social Determinants of Health     Financial Resource Strain: Low Risk  (6/2/2023)    Overall Financial Resource Strain (CARDIA)     Difficulty of Paying Living Expenses: Not hard at all   Food Insecurity: Not on file (6/2/2023)   Transportation Needs: Unknown (6/2/2023)    PRAPARE - Transportation     Lack of Transportation (Medical): Not on file     Lack of Transportation (Non-Medical): No   Physical Activity: Not on file   Stress: Not on file   Social Connections: Not on file   Intimate Partner Violence: Not on file   Housing Stability: Unknown (6/2/2023)    Housing Stability Vital Sign     Unable to Pay for Housing in the Last Year: Not on file     Number of Places Lived in the Last Year: Not on file     Unstable Housing in the Last Year: No          /80 (Site: Left Upper Arm, Position: Sitting, Cuff Size: Small Adult)   Pulse 87   Temp 98.3 °F (36.8 °C) (Oral)   Resp 17   Ht 1.372 m (4' 6\")   Wt 64.4 kg (142 lb)   SpO2 98%   BMI 34.24 kg/m²     Physical Examination:   General - Well appearing female.  In w/c  HEENT - PERRL, TM no erythema/opacification, normal nasal turbinates, no oropharyngeal erythema or exudate, MMM  Neck - supple, no bruits, no thyroidomegaly, no lymphadenopathy  Pulm - clear to auscultation bilaterally  Cardio - RRR, normal S1 S2, no murmur  Abd - soft, nontender, no masses, no HSM  Extrem - no edema, +2 distal

## 2024-02-23 LAB
ALBUMIN SERPL-MCNC: 4.8 G/DL (ref 3.9–4.9)
ALBUMIN/GLOB SERPL: 2.5 {RATIO} (ref 1.2–2.2)
ALP SERPL-CCNC: 99 IU/L (ref 44–121)
ALT SERPL-CCNC: 97 IU/L (ref 0–32)
AST SERPL-CCNC: 50 IU/L (ref 0–40)
BASOPHILS # BLD AUTO: 0 X10E3/UL (ref 0–0.2)
BASOPHILS NFR BLD AUTO: 0 %
BILIRUB SERPL-MCNC: 0.6 MG/DL (ref 0–1.2)
BUN SERPL-MCNC: 16 MG/DL (ref 8–27)
BUN/CREAT SERPL: 39 (ref 12–28)
CALCIUM SERPL-MCNC: 9.6 MG/DL (ref 8.7–10.3)
CHLORIDE SERPL-SCNC: 100 MMOL/L (ref 96–106)
CHOLEST SERPL-MCNC: 346 MG/DL (ref 100–199)
CO2 SERPL-SCNC: 23 MMOL/L (ref 20–29)
CREAT SERPL-MCNC: 0.41 MG/DL (ref 0.57–1)
EGFRCR SERPLBLD CKD-EPI 2021: 111 ML/MIN/1.73
EOSINOPHIL # BLD AUTO: 0 X10E3/UL (ref 0–0.4)
EOSINOPHIL NFR BLD AUTO: 1 %
ERYTHROCYTE [DISTWIDTH] IN BLOOD BY AUTOMATED COUNT: 12.1 % (ref 11.7–15.4)
GLOBULIN SER CALC-MCNC: 1.9 G/DL (ref 1.5–4.5)
GLUCOSE SERPL-MCNC: 98 MG/DL (ref 70–99)
HBA1C MFR BLD: 6.1 % (ref 4.8–5.6)
HCT VFR BLD AUTO: 42.5 % (ref 34–46.6)
HDLC SERPL-MCNC: 54 MG/DL
HGB BLD-MCNC: 14.1 G/DL (ref 11.1–15.9)
IMM GRANULOCYTES # BLD AUTO: 0 X10E3/UL (ref 0–0.1)
IMM GRANULOCYTES NFR BLD AUTO: 0 %
LABORATORY COMMENT REPORT: ABNORMAL
LDLC SERPL CALC-MCNC: 231 MG/DL (ref 0–99)
LYMPHOCYTES # BLD AUTO: 1.3 X10E3/UL (ref 0.7–3.1)
LYMPHOCYTES NFR BLD AUTO: 27 %
MCH RBC QN AUTO: 30.8 PG (ref 26.6–33)
MCHC RBC AUTO-ENTMCNC: 33.2 G/DL (ref 31.5–35.7)
MCV RBC AUTO: 93 FL (ref 79–97)
MONOCYTES # BLD AUTO: 0.4 X10E3/UL (ref 0.1–0.9)
MONOCYTES NFR BLD AUTO: 8 %
NEUTROPHILS # BLD AUTO: 3.2 X10E3/UL (ref 1.4–7)
NEUTROPHILS NFR BLD AUTO: 64 %
PLATELET # BLD AUTO: 294 X10E3/UL (ref 150–450)
POTASSIUM SERPL-SCNC: 4.6 MMOL/L (ref 3.5–5.2)
PROT SERPL-MCNC: 6.7 G/DL (ref 6–8.5)
RBC # BLD AUTO: 4.58 X10E6/UL (ref 3.77–5.28)
SODIUM SERPL-SCNC: 139 MMOL/L (ref 134–144)
TRIGL SERPL-MCNC: 293 MG/DL (ref 0–149)
TSH SERPL DL<=0.005 MIU/L-ACNC: 1.25 UIU/ML (ref 0.45–4.5)
VLDLC SERPL CALC-MCNC: 61 MG/DL (ref 5–40)
WBC # BLD AUTO: 4.9 X10E3/UL (ref 3.4–10.8)

## 2024-02-27 ENCOUNTER — PATIENT MESSAGE (OUTPATIENT)
Age: 64
End: 2024-02-27

## 2024-03-05 NOTE — TELEPHONE ENCOUNTER
Izabela Martin MA 3/4/2024 11:09 AM EST      ----- Message -----  From: Jamila Shanks  Sent: 3/4/2024 10:57 AM EST  To: *  Subject: lab results     I was told on by a [facility] pharmacy clerk when I inquired while picking up my other meds.

## 2024-03-05 NOTE — TELEPHONE ENCOUNTER
Call placed to [employer] to obtain denial letter for repatha   They will fax letter to office

## 2024-03-08 NOTE — TELEPHONE ENCOUNTER
Letter of request for reconsideration of denial for Repatha along with clinical notes faxed to [employer]

## 2024-03-11 NOTE — TELEPHONE ENCOUNTER
Contacted [employer] to have an updated fax number to send over documents.    Advised by representative to send through mail to [address]

## 2024-03-18 ENCOUNTER — TELEPHONE (OUTPATIENT)
Age: 64
End: 2024-03-18

## 2024-03-18 NOTE — TELEPHONE ENCOUNTER
Message  Received: Today  Neel Thapa, Brenda Calix, LPN  Caller: Unspecified (Today,  9:04 AM)  I would recommend scheduling with a NP at earliest convenience.    Thanks.          Called pt,verified pt with two pt identifiers, advised pt that  advised to schedule her with NP. Offered tomorrow appt but pt could not make that. Scheduled for next available on 7/5/24 at 8:30 am. Gave her address. Put her on wait list and advised we will keep her appt with  as scheduled. Pt verbalized understanding.

## 2024-03-18 NOTE — TELEPHONE ENCOUNTER
Returned call,verified pt with two pt identifiers, pt advised that her left shoulder, arm, elbow has been hurting her. Giving her weakness again and tingling in hand. Also muscle and joint pain on that side. She thinks she needs testing again. Advised we would have to see her first before anything could be ordered. Advised her last visit 6/24/22 with . advised I will forward to  to advise on , if he can fit her in or she can see a NP. Advised the NP can order any testing a dr can and can consult  if needed.  Pt verbalized understanding and thanked me for the call.

## 2024-03-18 NOTE — TELEPHONE ENCOUNTER
Patient called wanting to schedule an appt with Dr Thapa. Advised he is booked out until next year and she asked that I send a message back to Dr Thapa to see if he can see her sooner because she only wants to see him. Please advise. [phone]

## 2024-03-21 ENCOUNTER — TELEPHONE (OUTPATIENT)
Age: 64
End: 2024-03-21

## 2024-03-21 NOTE — TELEPHONE ENCOUNTER
[employer] states the expedited appeal for Repatha has been denied.   Auth #[id number]    Letter will go out to us and pt.

## 2024-03-22 ENCOUNTER — TRANSCRIBE ORDERS (OUTPATIENT)
Facility: HOSPITAL | Age: 64
End: 2024-03-22

## 2024-03-22 DIAGNOSIS — I10 ESSENTIAL HYPERTENSION, MALIGNANT: Primary | ICD-10-CM

## 2024-03-22 DIAGNOSIS — E78.2 MIXED HYPERLIPIDEMIA: ICD-10-CM

## 2024-04-01 ENCOUNTER — OFFICE VISIT (OUTPATIENT)
Age: 64
End: 2024-04-01
Payer: COMMERCIAL

## 2024-04-01 VITALS
TEMPERATURE: 97 F | SYSTOLIC BLOOD PRESSURE: 126 MMHG | HEART RATE: 99 BPM | DIASTOLIC BLOOD PRESSURE: 85 MMHG | OXYGEN SATURATION: 97 % | BODY MASS INDEX: 32.86 KG/M2 | RESPIRATION RATE: 18 BRPM | HEIGHT: 55 IN | WEIGHT: 142 LBS

## 2024-04-01 DIAGNOSIS — E78.49 FAMILIAL HYPERLIPIDEMIA, HIGH LDL: ICD-10-CM

## 2024-04-01 DIAGNOSIS — G93.5 ARNOLD-CHIARI MALFORMATION, TYPE I (HCC): ICD-10-CM

## 2024-04-01 DIAGNOSIS — L97.111 SKIN ULCER OF RIGHT HIP, LIMITED TO BREAKDOWN OF SKIN (HCC): Primary | ICD-10-CM

## 2024-04-01 DIAGNOSIS — G91.1 OBSTRUCTIVE HYDROCEPHALUS (HCC): ICD-10-CM

## 2024-04-01 DIAGNOSIS — G82.20 PARAPLEGIA (HCC): ICD-10-CM

## 2024-04-01 DIAGNOSIS — N31.9 NEUROGENIC BLADDER: ICD-10-CM

## 2024-04-01 PROCEDURE — 99214 OFFICE O/P EST MOD 30 MIN: CPT | Performed by: INTERNAL MEDICINE

## 2024-04-01 RX ORDER — BACITRACIN ZINC AND POLYMYXIN B SULFATE 500; 1000 [USP'U]/G; [USP'U]/G
OINTMENT TOPICAL
Qty: 15 G | Refills: 1 | Status: SHIPPED | OUTPATIENT
Start: 2024-04-01 | End: 2024-04-08

## 2024-04-01 RX ORDER — SIMVASTATIN 40 MG
40 TABLET ORAL NIGHTLY
Qty: 90 TABLET | Refills: 3 | Status: SHIPPED | OUTPATIENT
Start: 2024-04-01

## 2024-04-01 NOTE — PROGRESS NOTES
\"Have you been to the ER, urgent care clinic since your last visit?  Hospitalized since your last visit?\"    NO    “Have you seen or consulted any other health care providers outside of [facility] since your last visit?”    NO    Have you had a mammogram?”   NO    Date of last Mammogram: 2/11/2022             Click Here for Release of Records Request

## 2024-04-01 NOTE — PROGRESS NOTES
Jamila Shanks is a 63 y.o. female who presents for evaluation of wound on right hip.  Last seen by me feb 22, 2024.  First noticed this wound about 3 weeks ago, about 3/16 inch in diameter.  On occasion has drained some mostly clear fluid, occasionally some pink tinged fluid, but never any purulent material.  No f/c.    No erythema or warmth.  Is paraplegic from spina bifida, and has had pressure ulcers before.  Had a similar one years ago in same location, and needed surgery then.      ROS:  Constitutional: negative for fevers, chills, anorexia and weight loss  Eyes:   negative for visual disturbance and irritation  ENT:   negative for tinnitus,sore throat,nasal congestion,ear pain,hoarseness  Respiratory:  negative for cough, hemoptysis, dyspnea,wheezing  CV:   negative for chest pain, palpitations, lower extremity edema  GI:   negative for nausea, vomiting, diarrhea, abdominal pain,melena  Genitourinary: negative for frequency, dysuria and hematuria  Musculoskel: negative for myalgias, arthralgias, back pain, muscle weakness, joint pain  Neurological:  negative for headaches, dizziness, focal weakness, numbness  Psychiatric:     Negative for depression or anxiety      Past Medical History:   Diagnosis Date    COVID-19 05/2021    Hypercholesterolemia     Hypertension     Spina bifida (HCC)        Past Surgical History:   Procedure Laterality Date    COLONOSCOPY  03/2022    COLONOSCOPY N/A 5/1/2019    COLONOSCOPY performed by Adebayo Hinojosa MD at [facility] ENDOSCOPY    COLONOSCOPY N/A 3/17/2022    COLONOSCOPY performed by Benigno Nogueira MD at [facility] ENDOSCOPY    COLONOSCOPY,BIOPSY  3/17/2022         COLONOSCOPY,BIOPSY  5/1/2019         OTHER SURGICAL HISTORY  1890s    sacral wound flap repair       Family History   Problem Relation Age of Onset    Other Mother         breast cancer, Aortic Dissection     Other Father         Bypass x5, shrinking brain     Other Sister         heart stents, asthma, no spleen        Social

## 2024-04-02 ENCOUNTER — HOSPITAL ENCOUNTER (OUTPATIENT)
Facility: HOSPITAL | Age: 64
Discharge: HOME | End: 2024-04-05
Attending: INTERNAL MEDICINE

## 2024-04-02 DIAGNOSIS — E78.2 MIXED HYPERLIPIDEMIA: ICD-10-CM

## 2024-04-02 DIAGNOSIS — I10 ESSENTIAL HYPERTENSION, MALIGNANT: ICD-10-CM

## 2024-04-02 PROCEDURE — 75571 CT HRT W/O DYE W/CA TEST: CPT

## 2024-04-09 ENCOUNTER — HOSPITAL ENCOUNTER (OUTPATIENT)
Facility: HOSPITAL | Age: 64
Discharge: HOME | End: 2024-04-09
Attending: FAMILY MEDICINE
Payer: COMMERCIAL

## 2024-04-09 VITALS
TEMPERATURE: 97.7 F | DIASTOLIC BLOOD PRESSURE: 70 MMHG | RESPIRATION RATE: 18 BRPM | SYSTOLIC BLOOD PRESSURE: 141 MMHG | HEART RATE: 105 BPM

## 2024-04-09 DIAGNOSIS — L89.313 PRESSURE INJURY OF RIGHT ISCHIUM, STAGE 3 (HCC): Primary | ICD-10-CM

## 2024-04-09 PROCEDURE — 99213 OFFICE O/P EST LOW 20 MIN: CPT

## 2024-04-09 PROCEDURE — 11042 DBRDMT SUBQ TIS 1ST 20SQCM/<: CPT

## 2024-04-09 RX ORDER — BETAMETHASONE DIPROPIONATE 0.5 MG/G
CREAM TOPICAL ONCE
OUTPATIENT
Start: 2024-04-09 | End: 2024-04-09

## 2024-04-09 RX ORDER — GINSENG 100 MG
CAPSULE ORAL ONCE
OUTPATIENT
Start: 2024-04-09 | End: 2024-04-09

## 2024-04-09 RX ORDER — BACITRACIN ZINC AND POLYMYXIN B SULFATE 500; 1000 [USP'U]/G; [USP'U]/G
OINTMENT TOPICAL ONCE
Status: CANCELLED | OUTPATIENT
Start: 2024-04-09 | End: 2024-04-09

## 2024-04-09 RX ORDER — CLOBETASOL PROPIONATE 0.5 MG/G
OINTMENT TOPICAL ONCE
Status: CANCELLED | OUTPATIENT
Start: 2024-04-09 | End: 2024-04-09

## 2024-04-09 RX ORDER — LIDOCAINE 50 MG/G
OINTMENT TOPICAL ONCE
Status: CANCELLED | OUTPATIENT
Start: 2024-04-09 | End: 2024-04-09

## 2024-04-09 RX ORDER — LIDOCAINE 40 MG/G
CREAM TOPICAL ONCE
OUTPATIENT
Start: 2024-04-09 | End: 2024-04-09

## 2024-04-09 RX ORDER — BACITRACIN ZINC AND POLYMYXIN B SULFATE 500; 1000 [USP'U]/G; [USP'U]/G
OINTMENT TOPICAL ONCE
OUTPATIENT
Start: 2024-04-09 | End: 2024-04-09

## 2024-04-09 RX ORDER — LIDOCAINE 40 MG/G
CREAM TOPICAL ONCE
Status: CANCELLED | OUTPATIENT
Start: 2024-04-09 | End: 2024-04-09

## 2024-04-09 RX ORDER — GENTAMICIN SULFATE 1 MG/G
OINTMENT TOPICAL ONCE
OUTPATIENT
Start: 2024-04-09 | End: 2024-04-09

## 2024-04-09 RX ORDER — CLOBETASOL PROPIONATE 0.5 MG/G
OINTMENT TOPICAL ONCE
OUTPATIENT
Start: 2024-04-09 | End: 2024-04-09

## 2024-04-09 RX ORDER — SODIUM CHLOR/HYPOCHLOROUS ACID 0.033 %
SOLUTION, IRRIGATION IRRIGATION ONCE
OUTPATIENT
Start: 2024-04-09 | End: 2024-04-09

## 2024-04-09 RX ORDER — LIDOCAINE HYDROCHLORIDE 40 MG/ML
SOLUTION TOPICAL ONCE
OUTPATIENT
Start: 2024-04-09 | End: 2024-04-09

## 2024-04-09 RX ORDER — LIDOCAINE HYDROCHLORIDE 20 MG/ML
JELLY TOPICAL ONCE
OUTPATIENT
Start: 2024-04-09 | End: 2024-04-09

## 2024-04-09 RX ORDER — SODIUM CHLOR/HYPOCHLOROUS ACID 0.033 %
SOLUTION, IRRIGATION IRRIGATION ONCE
Status: CANCELLED | OUTPATIENT
Start: 2024-04-09 | End: 2024-04-09

## 2024-04-09 RX ORDER — LIDOCAINE 50 MG/G
OINTMENT TOPICAL ONCE
OUTPATIENT
Start: 2024-04-09 | End: 2024-04-09

## 2024-04-09 RX ORDER — IBUPROFEN 200 MG
TABLET ORAL ONCE
Status: CANCELLED | OUTPATIENT
Start: 2024-04-09 | End: 2024-04-09

## 2024-04-09 RX ORDER — LIDOCAINE HYDROCHLORIDE 40 MG/ML
SOLUTION TOPICAL ONCE
Status: CANCELLED | OUTPATIENT
Start: 2024-04-09 | End: 2024-04-09

## 2024-04-09 RX ORDER — LIDOCAINE HYDROCHLORIDE 20 MG/ML
JELLY TOPICAL ONCE
Status: CANCELLED | OUTPATIENT
Start: 2024-04-09 | End: 2024-04-09

## 2024-04-09 RX ORDER — IBUPROFEN 200 MG
TABLET ORAL ONCE
OUTPATIENT
Start: 2024-04-09 | End: 2024-04-09

## 2024-04-09 RX ORDER — GINSENG 100 MG
CAPSULE ORAL ONCE
Status: CANCELLED | OUTPATIENT
Start: 2024-04-09 | End: 2024-04-09

## 2024-04-09 RX ORDER — TRIAMCINOLONE ACETONIDE 1 MG/G
OINTMENT TOPICAL ONCE
OUTPATIENT
Start: 2024-04-09 | End: 2024-04-09

## 2024-04-09 RX ORDER — TRIAMCINOLONE ACETONIDE 1 MG/G
OINTMENT TOPICAL ONCE
Status: CANCELLED | OUTPATIENT
Start: 2024-04-09 | End: 2024-04-09

## 2024-04-09 RX ORDER — GENTAMICIN SULFATE 1 MG/G
OINTMENT TOPICAL ONCE
Status: CANCELLED | OUTPATIENT
Start: 2024-04-09 | End: 2024-04-09

## 2024-04-09 RX ORDER — BETAMETHASONE DIPROPIONATE 0.5 MG/G
CREAM TOPICAL ONCE
Status: CANCELLED | OUTPATIENT
Start: 2024-04-09 | End: 2024-04-09

## 2024-04-09 NOTE — PROGRESS NOTES
Hygiene good  Psych: cooperative. No anxiety or depression. Normal mood and affect.  Neuro: alert and oriented to person/place/situation. Otherwise nonfocal.  Derm: Normal  turgor for age, dry skin  HEENT: Normocephalic, atraumatic. EOMI. Conjunctiva clear. No scleral icterus.  Neck: Normal range of motion.   Chest: Respirations nonlabored  Lower extremities: color normal; temperature normal.  Ulcer Description:   See Flowsheet           Data Review:              Procedure:   Ulcer assessment: Due to presence of necrotic tissue within the wound bed, ulcer requires debridement.    Procedure: Debridement:   The indication for debridement was reviewed with patient. Risks of procedure (bleeding, infection, pain) were discussed with patient/ and consent signed on first visit. Questions were answered      Subcutaneous excisional debridement Right ischium ulcer   Indication: to remove necrotic tissue/ vitalized and devitalized tissue/ infected tissue/ through skin and subcutaneous layer of wound bed  Time out done  Consent in chart   Anesthesia: Topical  lidocaine   Instrument: curette  Residual Necrosis: Present and scored   Bleeding: <1ml   Hemostasis: Pressure   Patient tolerated procedure well   Procedural Pain: 0  Post - procedural pain: 0    Post debridement measurements: see below  Surface area debrided: 0.6sq. cm          -------  Wound 04/09/24 Ischium Right (Active)   Wound Image   04/09/24 0840   Wound Etiology Pressure Stage 3 04/09/24 0840   Dressing Status New dressing applied 04/09/24 0858   Wound Cleansed Cleansed with saline 04/09/24 0840   Dressing/Treatment Hydrocolloid 04/09/24 0858   Wound Length (cm) 0.6 cm 04/09/24 0840   Wound Width (cm) 1 cm 04/09/24 0840   Wound Depth (cm) 0.1 cm 04/09/24 0840   Wound Surface Area (cm^2) 0.6 cm^2 04/09/24 0840   Wound Volume (cm^3) 0.06 cm^3 04/09/24 0840   Post-Procedure Length (cm) 0.6 cm 04/09/24 0858   Post-Procedure Width (cm) 1 cm 04/09/24 0858

## 2024-04-09 NOTE — FLOWSHEET NOTE
04/09/24 0840   Wound 04/09/24 Ischium Right   Date First Assessed/Time First Assessed: 04/09/24 0839   Wound Approximate Age at First Assessment (Weeks): 3 weeks  Primary Wound Type: Pressure Injury  Location: Ischium  Wound Location Orientation: Right   Wound Image    Wound Etiology Pressure Stage 3   Dressing Status Old drainage noted   Wound Cleansed Cleansed with saline   Wound Length (cm) 0.6 cm   Wound Width (cm) 1 cm   Wound Depth (cm) 0.1 cm   Wound Surface Area (cm^2) 0.6 cm^2   Wound Volume (cm^3) 0.06 cm^3   Wound Assessment [place]/red;Slough   Drainage Amount Moderate (25-50%)   Drainage Description Serous   Odor None   Misti-wound Assessment Fragile   Margins Defined edges   Wound Thickness Description not for Pressure Injury Full thickness     BP (!) 141/70   Pulse (!) 105   Temp 97.7 °F (36.5 °C) (Temporal)   Resp 18

## 2024-04-09 NOTE — DISCHARGE INSTRUCTIONS
Discharge Instructions [facility]  [address]   [address], [address]  [place] [address]   Telephone: [phone]     FAX [phone]    NAME:  Jamila Shanks  YOB: 1960  MEDICAL RECORD NUMBER:  [id number]  DATE:  4/9/2024  WOUND CARE ORDERS:  Right Ischium wound :Cleanse with soap and water , apply primary dressing Hydrocolloid/Duoderm  .  Pt./pcg/HH nurse to change (freq) 3x Weekly  and as needed for compromise.Follow up with provider in 1 Week(s).   Home Health Agency: none  *Purple Gel Cushion on [employer]*  TREATMENT ORDERS:    Turn/reposition every 2 hours when in bed, avoid direct pressure on wound site.  When sitting, shift position or do seat lifts every 15 minutes.  Limit side lying to 30 degree tilt.  Limit HOB elevation to 30 degrees.  Use speciality pressure relief cushion, mattress as appropriate  Follow Diet as prescribed:   [] Diet as tolerated: [] Calorie Diabetic Diet: Low carb and no Sugar [] No Added Salt:  [] Increase Protein: [] Limit the amount of liquid you are drinking and avoid drinking in between meals     Return Appointment:  [x] Return Appointment: With Dr. Arizmendi in  1 Week(s)  [] Nurse Visit : *** days  [] Ordered tests:    Electronically signed Sherrell Hoang RN on 4/9/2024 at 8:51 AM     Wound Care Center Information: Should you experience any significant changes in your wound(s) or have questions about your wound care, please contact the [facility] at MONDAY - FRIDAY 8:00 am - 4:30.  If you need help with your wound outside these hours and cannot wait until we are again available, contact your PCP or go to the hospital emergency room.   PLEASE NOTE: IF YOU ARE UNABLE TO OBTAIN WOUND SUPPLIES, CONTINUE TO USE THE SUPPLIES YOU HAVE AVAILABLE UNTIL YOU ARE ABLE TO REACH US. IT IS MOST IMPORTANT TO KEEP THE WOUND COVERED AT ALL TIMES.     Physician Signature:_______________________    Date: ___________ Time:

## 2024-04-16 ENCOUNTER — HOSPITAL ENCOUNTER (OUTPATIENT)
Facility: HOSPITAL | Age: 64
Discharge: HOME | End: 2024-04-16
Attending: FAMILY MEDICINE
Payer: COMMERCIAL

## 2024-04-16 VITALS
DIASTOLIC BLOOD PRESSURE: 63 MMHG | RESPIRATION RATE: 16 BRPM | HEART RATE: 96 BPM | TEMPERATURE: 97.3 F | SYSTOLIC BLOOD PRESSURE: 131 MMHG

## 2024-04-16 DIAGNOSIS — L89.313 PRESSURE INJURY OF RIGHT ISCHIUM, STAGE 3 (HCC): Primary | ICD-10-CM

## 2024-04-16 PROCEDURE — 11042 DBRDMT SUBQ TIS 1ST 20SQCM/<: CPT

## 2024-04-16 RX ORDER — SODIUM CHLOR/HYPOCHLOROUS ACID 0.033 %
SOLUTION, IRRIGATION IRRIGATION ONCE
OUTPATIENT
Start: 2024-04-16 | End: 2024-04-16

## 2024-04-16 RX ORDER — BACITRACIN ZINC AND POLYMYXIN B SULFATE 500; 1000 [USP'U]/G; [USP'U]/G
OINTMENT TOPICAL ONCE
OUTPATIENT
Start: 2024-04-16 | End: 2024-04-16

## 2024-04-16 RX ORDER — IBUPROFEN 200 MG
TABLET ORAL ONCE
OUTPATIENT
Start: 2024-04-16 | End: 2024-04-16

## 2024-04-16 RX ORDER — LIDOCAINE HYDROCHLORIDE 40 MG/ML
SOLUTION TOPICAL ONCE
OUTPATIENT
Start: 2024-04-16 | End: 2024-04-16

## 2024-04-16 RX ORDER — GINSENG 100 MG
CAPSULE ORAL ONCE
OUTPATIENT
Start: 2024-04-16 | End: 2024-04-16

## 2024-04-16 RX ORDER — LIDOCAINE 50 MG/G
OINTMENT TOPICAL ONCE
OUTPATIENT
Start: 2024-04-16 | End: 2024-04-16

## 2024-04-16 RX ORDER — LIDOCAINE HYDROCHLORIDE 20 MG/ML
JELLY TOPICAL ONCE
OUTPATIENT
Start: 2024-04-16 | End: 2024-04-16

## 2024-04-16 RX ORDER — BETAMETHASONE DIPROPIONATE 0.5 MG/G
CREAM TOPICAL ONCE
OUTPATIENT
Start: 2024-04-16 | End: 2024-04-16

## 2024-04-16 RX ORDER — LIDOCAINE 40 MG/G
CREAM TOPICAL ONCE
OUTPATIENT
Start: 2024-04-16 | End: 2024-04-16

## 2024-04-16 RX ORDER — GENTAMICIN SULFATE 1 MG/G
OINTMENT TOPICAL ONCE
OUTPATIENT
Start: 2024-04-16 | End: 2024-04-16

## 2024-04-16 RX ORDER — CLOBETASOL PROPIONATE 0.5 MG/G
OINTMENT TOPICAL ONCE
OUTPATIENT
Start: 2024-04-16 | End: 2024-04-16

## 2024-04-16 RX ORDER — TRIAMCINOLONE ACETONIDE 1 MG/G
OINTMENT TOPICAL ONCE
OUTPATIENT
Start: 2024-04-16 | End: 2024-04-16

## 2024-04-16 NOTE — PROGRESS NOTES
Comments)      tingling sensation in hands and face          Signed By: Destiney Arizmendi MD      04/16/24

## 2024-04-16 NOTE — DISCHARGE INSTRUCTIONS
Discharge Instructions [facility]  [address]   [address], [address]  [place] [address]   Telephone: [phone]     FAX [phone]    NAME:  Jamila Shanks  YOB: 1960  MEDICAL RECORD NUMBER:  [id number]  DATE:  4/16/2024  WOUND CARE ORDERS:  Right Ischium wound :Cleanse with soap and water , apply primary dressing  Duoderm  cover with secondary dressing  Tegaderm .  Pt./pcg/HH nurse to change (freq) 3x Weekly  and as needed for compromise.Follow up with provider in 1 Week(s).   Home Health Agency: none  TREATMENT ORDERS:    Elevate leg(s) above the level of the heart when sitting.   Avoid prolonged standing in one place.  Do no get dressing/wrap wet.  Follow Diet as prescribed:   [] Diet as tolerated: [] Calorie Diabetic Diet: Low carb and no Sugar [] No Added Salt:  [] Increase Protein: [] Limit the amount of liquid you are drinking and avoid drinking in between meals     Return Appointment:  [x] Return Appointment: With Dr. Arizmendi in  1 Week(s)  [] Nurse Visit : *** days  [] Ordered tests:    Electronically signed Sherrell Hoang RN on 4/16/2024 at 10:33 AM     Wound Care Center Information: Should you experience any significant changes in your wound(s) or have questions about your wound care, please contact the [facility] at MONDAY - FRIDAY 8:00 am - 4:30.  If you need help with your wound outside these hours and cannot wait until we are again available, contact your PCP or go to the hospital emergency room.   PLEASE NOTE: IF YOU ARE UNABLE TO OBTAIN WOUND SUPPLIES, CONTINUE TO USE THE SUPPLIES YOU HAVE AVAILABLE UNTIL YOU ARE ABLE TO REACH US. IT IS MOST IMPORTANT TO KEEP THE WOUND COVERED AT ALL TIMES.     Physician Signature:_______________________    Date: ___________ Time:  ____________

## 2024-04-16 NOTE — FLOWSHEET NOTE
04/16/24 1020   Wound 04/09/24 Ischium Right   Date First Assessed/Time First Assessed: 04/09/24 0839   Wound Approximate Age at First Assessment (Weeks): 3 weeks  Primary Wound Type: Pressure Injury  Location: Ischium  Wound Location Orientation: Right   Wound Image    Wound Etiology Pressure Stage 3   Dressing Status Old drainage noted   Wound Cleansed Cleansed with saline   Dressing/Treatment   (Hydrocolloid,tegaderm)   Wound Length (cm) 0.6 cm   Wound Width (cm) 0.8 cm   Wound Depth (cm) 0.1 cm   Wound Surface Area (cm^2) 0.48 cm^2   Change in Wound Size % (l*w) 20   Wound Volume (cm^3) 0.048 cm^3   Wound Healing % 20   Wound Assessment Pink/red   Drainage Amount Small (< 25%)   Drainage Description Serous   Odor None   Misti-wound Assessment Fragile   Margins Defined edges   Wound Thickness Description not for Pressure Injury Full thickness     /63   Pulse 96   Temp 97.3 °F (36.3 °C) (Temporal)   Resp 16

## 2024-04-23 ENCOUNTER — HOSPITAL ENCOUNTER (OUTPATIENT)
Facility: HOSPITAL | Age: 64
Discharge: HOME | End: 2024-04-23
Attending: FAMILY MEDICINE
Payer: COMMERCIAL

## 2024-04-23 VITALS
DIASTOLIC BLOOD PRESSURE: 68 MMHG | TEMPERATURE: 97.8 F | SYSTOLIC BLOOD PRESSURE: 131 MMHG | HEART RATE: 91 BPM | RESPIRATION RATE: 18 BRPM

## 2024-04-23 DIAGNOSIS — L89.313 PRESSURE INJURY OF RIGHT ISCHIUM, STAGE 3 (HCC): Primary | ICD-10-CM

## 2024-04-23 LAB — MAMMOGRAPHY, EXTERNAL: NORMAL

## 2024-04-23 PROCEDURE — 11042 DBRDMT SUBQ TIS 1ST 20SQCM/<: CPT

## 2024-04-23 RX ORDER — LIDOCAINE HYDROCHLORIDE 40 MG/ML
SOLUTION TOPICAL ONCE
OUTPATIENT
Start: 2024-04-23 | End: 2024-04-23

## 2024-04-23 RX ORDER — LIDOCAINE HYDROCHLORIDE 20 MG/ML
JELLY TOPICAL ONCE
OUTPATIENT
Start: 2024-04-23 | End: 2024-04-23

## 2024-04-23 RX ORDER — BETAMETHASONE DIPROPIONATE 0.5 MG/G
CREAM TOPICAL ONCE
OUTPATIENT
Start: 2024-04-23 | End: 2024-04-23

## 2024-04-23 RX ORDER — GINSENG 100 MG
CAPSULE ORAL ONCE
OUTPATIENT
Start: 2024-04-23 | End: 2024-04-23

## 2024-04-23 RX ORDER — BACITRACIN ZINC AND POLYMYXIN B SULFATE 500; 1000 [USP'U]/G; [USP'U]/G
OINTMENT TOPICAL ONCE
OUTPATIENT
Start: 2024-04-23 | End: 2024-04-23

## 2024-04-23 RX ORDER — LIDOCAINE 50 MG/G
OINTMENT TOPICAL ONCE
OUTPATIENT
Start: 2024-04-23 | End: 2024-04-23

## 2024-04-23 RX ORDER — SODIUM CHLOR/HYPOCHLOROUS ACID 0.033 %
SOLUTION, IRRIGATION IRRIGATION ONCE
OUTPATIENT
Start: 2024-04-23 | End: 2024-04-23

## 2024-04-23 RX ORDER — IBUPROFEN 200 MG
TABLET ORAL ONCE
OUTPATIENT
Start: 2024-04-23 | End: 2024-04-23

## 2024-04-23 RX ORDER — LIDOCAINE 40 MG/G
CREAM TOPICAL ONCE
OUTPATIENT
Start: 2024-04-23 | End: 2024-04-23

## 2024-04-23 RX ORDER — CLOBETASOL PROPIONATE 0.5 MG/G
OINTMENT TOPICAL ONCE
OUTPATIENT
Start: 2024-04-23 | End: 2024-04-23

## 2024-04-23 RX ORDER — TRIAMCINOLONE ACETONIDE 1 MG/G
OINTMENT TOPICAL ONCE
OUTPATIENT
Start: 2024-04-23 | End: 2024-04-23

## 2024-04-23 RX ORDER — GENTAMICIN SULFATE 1 MG/G
OINTMENT TOPICAL ONCE
OUTPATIENT
Start: 2024-04-23 | End: 2024-04-23

## 2024-04-23 ASSESSMENT — PAIN DESCRIPTION - LOCATION: LOCATION: OTHER (COMMENT)

## 2024-04-23 ASSESSMENT — PAIN SCALES - GENERAL: PAINLEVEL_OUTOF10: 3

## 2024-04-23 ASSESSMENT — PAIN DESCRIPTION - DESCRIPTORS: DESCRIPTORS: SORE

## 2024-04-23 NOTE — FLOWSHEET NOTE
04/23/24 0950   Wound 04/09/24 Ischium Right   Date First Assessed/Time First Assessed: 04/09/24 0839   Wound Approximate Age at First Assessment (Weeks): 3 weeks  Primary Wound Type: Pressure Injury  Location: Ischium  Wound Location Orientation: Right   Dressing Status New dressing applied   Dressing/Treatment Honey gel/honey paste;Gauze dressing/dressing sponge

## 2024-04-23 NOTE — PROGRESS NOTES
General: NAD. Hygiene good  Psych: cooperative. No anxiety or depression. Normal mood and affect.  Neuro: alert and oriented to person/place/situation. Otherwise nonfocal.  Derm: Normal  turgor for age, dry skin  HEENT: Normocephalic, atraumatic. EOMI. Conjunctiva clear. No scleral icterus.  Neck: Normal range of motion.   Chest: Respirations nonlabored  Lower extremities: color normal; temperature normal.  Ulcer Description:   See Flowsheet           Data Review:              Procedure:   N/a        -------  Wound 04/09/24 Ischium Right (Active)   Wound Image   04/23/24 0919   Wound Etiology Pressure Stage 3 04/23/24 0919   Dressing Status New dressing applied 04/23/24 0950   Wound Cleansed Cleansed with saline 04/23/24 0919   Dressing/Treatment Honey gel/honey paste;Gauze dressing/dressing sponge 04/23/24 0950   Wound Length (cm) 0.7 cm 04/23/24 0919   Wound Width (cm) 0.7 cm 04/23/24 0919   Wound Depth (cm) 0.1 cm 04/23/24 0919   Wound Surface Area (cm^2) 0.49 cm^2 04/23/24 0919   Change in Wound Size % (l*w) 18.33 04/23/24 0919   Wound Volume (cm^3) 0.049 cm^3 04/23/24 0919   Wound Healing % 18 04/23/24 0919   Post-Procedure Length (cm) 0.6 cm 04/16/24 1032   Post-Procedure Width (cm) 0.8 cm 04/16/24 1032   Post-Procedure Depth (cm) 0.2 cm 04/16/24 1032   Post-Procedure Surface Area (cm^2) 0.48 cm^2 04/16/24 1032   Post-Procedure Volume (cm^3) 0.096 cm^3 04/16/24 1032   Wound Assessment Pink/red 04/23/24 0919   Drainage Amount Small (< 25%) 04/23/24 0919   Drainage Description Serosanguinous 04/23/24 0919   Odor None 04/23/24 0919   Misti-wound Assessment Fragile 04/23/24 0919   Margins Defined edges 04/23/24 0919   Wound Thickness Description not for Pressure Injury Full thickness 04/23/24 0919   Number of days: 14          -------    Past Medical History:   Diagnosis Date    COVID-19 05/2021    Hypercholesterolemia     Hypertension     Spina bifida (HCC)      Past Surgical History:   Procedure Laterality

## 2024-04-23 NOTE — FLOWSHEET NOTE
04/23/24 0919   Wound 04/09/24 Ischium Right   Date First Assessed/Time First Assessed: 04/09/24 0839   Wound Approximate Age at First Assessment (Weeks): 3 weeks  Primary Wound Type: Pressure Injury  Location: Ischium  Wound Location Orientation: Right   Wound Image    Wound Etiology Pressure Stage 3   Dressing Status New dressing applied   Wound Cleansed Cleansed with saline   Wound Length (cm) 0.7 cm   Wound Width (cm) 0.7 cm   Wound Depth (cm) 0.1 cm   Wound Surface Area (cm^2) 0.49 cm^2   Change in Wound Size % (l*w) 18.33   Wound Volume (cm^3) 0.049 cm^3   Wound Healing % 18   Wound Assessment Pink/red   Drainage Amount Small (< 25%)   Drainage Description Serosanguinous   Odor None   Misti-wound Assessment Fragile   Margins Defined edges   Wound Thickness Description not for Pressure Injury Full thickness   Pain Assessment   Pain Assessment 0-10   Pain Level 3   Pain Location Other (Comment)   Pain Descriptors Sore     /68   Pulse 91   Temp 97.8 °F (36.6 °C) (Temporal)   Resp 18

## 2024-04-30 ENCOUNTER — HOSPITAL ENCOUNTER (OUTPATIENT)
Facility: HOSPITAL | Age: 64
Discharge: HOME | End: 2024-04-30
Attending: FAMILY MEDICINE
Payer: COMMERCIAL

## 2024-04-30 VITALS
SYSTOLIC BLOOD PRESSURE: 129 MMHG | DIASTOLIC BLOOD PRESSURE: 92 MMHG | TEMPERATURE: 97.6 F | HEART RATE: 93 BPM | RESPIRATION RATE: 18 BRPM

## 2024-04-30 DIAGNOSIS — L89.313 PRESSURE INJURY OF RIGHT ISCHIUM, STAGE 3 (HCC): Primary | ICD-10-CM

## 2024-04-30 PROCEDURE — 11042 DBRDMT SUBQ TIS 1ST 20SQCM/<: CPT

## 2024-04-30 RX ORDER — BETAMETHASONE DIPROPIONATE 0.5 MG/G
CREAM TOPICAL ONCE
OUTPATIENT
Start: 2024-04-30 | End: 2024-04-30

## 2024-04-30 RX ORDER — LIDOCAINE 50 MG/G
OINTMENT TOPICAL ONCE
OUTPATIENT
Start: 2024-04-30 | End: 2024-04-30

## 2024-04-30 RX ORDER — GENTAMICIN SULFATE 1 MG/G
OINTMENT TOPICAL ONCE
OUTPATIENT
Start: 2024-04-30 | End: 2024-04-30

## 2024-04-30 RX ORDER — LIDOCAINE HYDROCHLORIDE 20 MG/ML
JELLY TOPICAL ONCE
OUTPATIENT
Start: 2024-04-30 | End: 2024-04-30

## 2024-04-30 RX ORDER — BACITRACIN ZINC AND POLYMYXIN B SULFATE 500; 1000 [USP'U]/G; [USP'U]/G
OINTMENT TOPICAL ONCE
OUTPATIENT
Start: 2024-04-30 | End: 2024-04-30

## 2024-04-30 RX ORDER — TRIAMCINOLONE ACETONIDE 1 MG/G
OINTMENT TOPICAL ONCE
OUTPATIENT
Start: 2024-04-30 | End: 2024-04-30

## 2024-04-30 RX ORDER — SODIUM CHLOR/HYPOCHLOROUS ACID 0.033 %
SOLUTION, IRRIGATION IRRIGATION ONCE
OUTPATIENT
Start: 2024-04-30 | End: 2024-04-30

## 2024-04-30 RX ORDER — IBUPROFEN 200 MG
TABLET ORAL ONCE
OUTPATIENT
Start: 2024-04-30 | End: 2024-04-30

## 2024-04-30 RX ORDER — LIDOCAINE 40 MG/G
CREAM TOPICAL ONCE
OUTPATIENT
Start: 2024-04-30 | End: 2024-04-30

## 2024-04-30 RX ORDER — GINSENG 100 MG
CAPSULE ORAL ONCE
OUTPATIENT
Start: 2024-04-30 | End: 2024-04-30

## 2024-04-30 RX ORDER — CLOBETASOL PROPIONATE 0.5 MG/G
OINTMENT TOPICAL ONCE
OUTPATIENT
Start: 2024-04-30 | End: 2024-04-30

## 2024-04-30 RX ORDER — LIDOCAINE HYDROCHLORIDE 40 MG/ML
SOLUTION TOPICAL ONCE
OUTPATIENT
Start: 2024-04-30 | End: 2024-04-30

## 2024-04-30 NOTE — FLOWSHEET NOTE
04/30/24 0952   Wound 04/09/24 Ischium Right   Date First Assessed/Time First Assessed: 04/09/24 0839   Wound Approximate Age at First Assessment (Weeks): 3 weeks  Primary Wound Type: Pressure Injury  Location: Ischium  Wound Location Orientation: Right   Wound Image     Wound Etiology Pressure Stage 3   Dressing Status Old drainage noted   Wound Cleansed Cleansed with saline   Dressing/Treatment   (Medihoney, gauze, foam border)   Wound Length (cm) 0.5 cm   Wound Width (cm) 0.5 cm   Wound Depth (cm) 0.1 cm   Wound Surface Area (cm^2) 0.25 cm^2   Change in Wound Size % (l*w) 58.33   Wound Volume (cm^3) 0.025 cm^3   Wound Healing % 58   Wound Assessment Pink/red   Drainage Amount Small (< 25%)   Drainage Description Serosanguinous   Odor None   Misti-wound Assessment Fragile   Margins Defined edges   Wound Thickness Description not for Pressure Injury Full thickness     BP (!) 129/92   Pulse 93   Temp 97.6 °F (36.4 °C) (Temporal)   Resp 18

## 2024-04-30 NOTE — PROGRESS NOTES
0952   Wound Healing % 58 04/30/24 0952   Post-Procedure Length (cm) 0.5 cm 04/30/24 1002   Post-Procedure Width (cm) 0.5 cm 04/30/24 1002   Post-Procedure Depth (cm) 0.2 cm 04/30/24 1002   Post-Procedure Surface Area (cm^2) 0.25 cm^2 04/30/24 1002   Post-Procedure Volume (cm^3) 0.05 cm^3 04/30/24 1002   Wound Assessment Pink/red 04/30/24 0952   Drainage Amount Small (< 25%) 04/30/24 0952   Drainage Description Serosanguinous 04/30/24 0952   Odor None 04/30/24 0952   Misti-wound Assessment Fragile 04/30/24 0952   Margins Defined edges 04/30/24 0952   Wound Thickness Description not for Pressure Injury Full thickness 04/30/24 0952   Number of days: 21          -------    Past Medical History:   Diagnosis Date    COVID-19 05/2021    Hypercholesterolemia     Hypertension     Spina bifida (HCC)      Past Surgical History:   Procedure Laterality Date    COLONOSCOPY  03/2022    COLONOSCOPY N/A 5/1/2019    COLONOSCOPY performed by Adebayo Hinojosa MD at [facility] ENDOSCOPY    COLONOSCOPY N/A 3/17/2022    COLONOSCOPY performed by Benigno Nogueira MD at [facility] ENDOSCOPY    COLONOSCOPY,BIOPSY  3/17/2022         COLONOSCOPY,BIOPSY  5/1/2019         OTHER SURGICAL HISTORY  1890s    sacral wound flap repair     Family History   Problem Relation Age of Onset    Other Mother         breast cancer, Aortic Dissection     Other Father         Bypass x5, shrinking brain     Other Sister         heart stents, asthma, no spleen      Social History     Socioeconomic History    Marital status:    Tobacco Use    Smoking status: Never    Smokeless tobacco: Never   Substance and Sexual Activity    Alcohol use: Never    Drug use: Never     Social Determinants of Health     Financial Resource Strain: Low Risk  (6/2/2023)    Overall Financial Resource Strain (CARDIA)     Difficulty of Paying Living Expenses: Not hard at all   Transportation Needs: Unknown (6/2/2023)    PRAPARE - Transportation     Lack of Transportation (Non-Medical): No   Housing

## 2024-05-07 ENCOUNTER — HOSPITAL ENCOUNTER (OUTPATIENT)
Facility: HOSPITAL | Age: 64
Discharge: HOME | End: 2024-05-07
Attending: FAMILY MEDICINE
Payer: COMMERCIAL

## 2024-05-07 VITALS
DIASTOLIC BLOOD PRESSURE: 70 MMHG | HEART RATE: 100 BPM | TEMPERATURE: 97.8 F | RESPIRATION RATE: 18 BRPM | SYSTOLIC BLOOD PRESSURE: 134 MMHG

## 2024-05-07 DIAGNOSIS — L89.313 PRESSURE INJURY OF RIGHT ISCHIUM, STAGE 3 (HCC): Primary | ICD-10-CM

## 2024-05-07 PROCEDURE — 11042 DBRDMT SUBQ TIS 1ST 20SQCM/<: CPT

## 2024-05-07 RX ORDER — GINSENG 100 MG
CAPSULE ORAL ONCE
OUTPATIENT
Start: 2024-05-07 | End: 2024-05-07

## 2024-05-07 RX ORDER — BACITRACIN ZINC AND POLYMYXIN B SULFATE 500; 1000 [USP'U]/G; [USP'U]/G
OINTMENT TOPICAL ONCE
OUTPATIENT
Start: 2024-05-07 | End: 2024-05-07

## 2024-05-07 RX ORDER — LIDOCAINE 50 MG/G
OINTMENT TOPICAL ONCE
OUTPATIENT
Start: 2024-05-07 | End: 2024-05-07

## 2024-05-07 RX ORDER — TRIAMCINOLONE ACETONIDE 1 MG/G
OINTMENT TOPICAL ONCE
OUTPATIENT
Start: 2024-05-07 | End: 2024-05-07

## 2024-05-07 RX ORDER — IBUPROFEN 200 MG
TABLET ORAL ONCE
OUTPATIENT
Start: 2024-05-07 | End: 2024-05-07

## 2024-05-07 RX ORDER — BETAMETHASONE DIPROPIONATE 0.5 MG/G
CREAM TOPICAL ONCE
OUTPATIENT
Start: 2024-05-07 | End: 2024-05-07

## 2024-05-07 RX ORDER — LIDOCAINE HYDROCHLORIDE 20 MG/ML
JELLY TOPICAL ONCE
OUTPATIENT
Start: 2024-05-07 | End: 2024-05-07

## 2024-05-07 RX ORDER — GENTAMICIN SULFATE 1 MG/G
OINTMENT TOPICAL ONCE
OUTPATIENT
Start: 2024-05-07 | End: 2024-05-07

## 2024-05-07 RX ORDER — SODIUM CHLOR/HYPOCHLOROUS ACID 0.033 %
SOLUTION, IRRIGATION IRRIGATION ONCE
OUTPATIENT
Start: 2024-05-07 | End: 2024-05-07

## 2024-05-07 RX ORDER — LIDOCAINE 40 MG/G
CREAM TOPICAL ONCE
OUTPATIENT
Start: 2024-05-07 | End: 2024-05-07

## 2024-05-07 RX ORDER — LIDOCAINE HYDROCHLORIDE 40 MG/ML
SOLUTION TOPICAL ONCE
OUTPATIENT
Start: 2024-05-07 | End: 2024-05-07

## 2024-05-07 RX ORDER — CLOBETASOL PROPIONATE 0.5 MG/G
OINTMENT TOPICAL ONCE
OUTPATIENT
Start: 2024-05-07 | End: 2024-05-07

## 2024-05-07 NOTE — FLOWSHEET NOTE
05/07/24 0823   Wound 04/09/24 Ischium Right   Date First Assessed/Time First Assessed: 04/09/24 0839   Wound Approximate Age at First Assessment (Weeks): 3 weeks  Primary Wound Type: Pressure Injury  Location: Ischium  Wound Location Orientation: Right   Wound Image    Wound Etiology Pressure Stage 3   Dressing Status Old drainage noted   Wound Cleansed Cleansed with saline   Wound Length (cm) 0.3 cm   Wound Width (cm) 0.4 cm   Wound Depth (cm) 0.1 cm   Wound Surface Area (cm^2) 0.12 cm^2   Change in Wound Size % (l*w) 80   Wound Volume (cm^3) 0.012 cm^3   Wound Healing % 80   Wound Assessment Pink/red   Drainage Amount Small (< 25%)   Drainage Description Serosanguinous   Odor None   Misti-wound Assessment Fragile   Margins Defined edges   Wound Thickness Description not for Pressure Injury Full thickness   Pain Assessment   Pain Assessment None - Denies Pain     /70   Pulse 100   Temp 97.8 °F (36.6 °C) (Temporal)   Resp 18

## 2024-05-07 NOTE — PROGRESS NOTES
Wound Center  Progress Note / Procedure Note      Chief Complaint:  Jamila Shanks is a 63 y.o.  female  with Right ischium wound of >1month duration.      Assessment/Plan     63 y.o. female with spina bifida, paraplegia    -Right ischium chronic ulcer.  Pressure, stage 3  Full thickness  smaller  Slough/granular  Necessitates debridement  for wound healing and to prevent/heal infection  Ulcer needs debridement- see note below    Offloading   has off brand purple gel cushion  Limit sitting    Nutrition   Discussed   Increased protein      Following discussed with patient   Needs :  Serial debridement- prn    Good local wound care  Dressing:  medihoney gel, gauzed then CGM covers  Frequency : two times a week    -Good offloading  reviewed    Patient/  understood and agrees with plan. Questions answered.    Follow up with me in 1 week    Subjective:     Since last visit  5/7 no new issues    4/23/24 now using CGM cover adhesives- working better than tegaderm    HPI:     Wound started as a pimple about 3 weeks ago, then scabbed over then open again and got bigger  Saw PCP  Recommended bacitracin, gauze and tegaderm and referred here  Has been leaving open to air at night  Had a fairly large wound in 80's on right ischium that required muscle flap, but since then has had no problems    Appetite good  Uses memory foam cushion on w/c  Self-catheterizes several times a day  Works full time, working from home since pandemic      History/Chart/Medications reviewed    Wound caused by: pressure  Current wound care:See flowsheet  Offloading wound: Yes  Appetite: good  Wound associated pain: See flowsheet  Diabetic: no  Smoker: no  ROS: no N/V/D, no T/chills; no local rash, no chest pain or shortness of breath, no headache or dizzyness      Objective:     Physical Exam:   See flowsheet / nursing notes for vitals  Vitals:    05/07/24 0823   BP: 134/70   Pulse: 100   Resp: 18   Temp: 97.8 °F (36.6 °C)     General: NAD.

## 2024-05-07 NOTE — DISCHARGE INSTRUCTIONS
Discharge Instructions [facility]  [address]   [address]  [place] [address]   Telephone: [phone]     FAX [phone]    NAME:  Jamila Shanks  YOB: 1960  MEDICAL RECORD NUMBER:  [id number]  DATE:  5/7/2024    CPT code:Debridement SQ (39263)    WOUND CARE ORDERS:  Right Ischium wound :Cleanse with soap and water , apply primary dressing Medihoney gel cover with secondary dressing Gauze and Patch  .  Pt./pcg/HH nurse to change (freq) 2x Weekly and as needed for compromise.Follow up with provider in 1 Week(s).   Home Health Agency: none  TREATMENT ORDERS:    Turn/reposition every 2 hours when in bed, avoid direct pressure on wound site.  When sitting, shift position or do seat lifts every 15 minutes.  Limit side lying to 30 degree tilt.  Limit HOB elevation to 30 degrees.  Use speciality pressure relief cushion, mattress as appropriate  Follow Diet as prescribed:   [] Diet as tolerated: [] Calorie Diabetic Diet: Low carb and no Sugar [] No Added Salt:  [] Increase Protein: [] Limit the amount of liquid you are drinking and avoid drinking in between meals     Return Appointment:  [x] Return Appointment: With Dr. Arizmendi in  1 Week(s)  [] Nurse Visit : *** days  [] Ordered tests:    Electronically signed Sherrell Hoang RN on 5/7/2024 at 8:51 AM     Wound Care Center Information: Should you experience any significant changes in your wound(s) or have questions about your wound care, please contact the [facility] at MONDAY - FRIDAY 8:00 am - 4:30.  If you need help with your wound outside these hours and cannot wait until we are again available, contact your PCP or go to the hospital emergency room.   PLEASE NOTE: IF YOU ARE UNABLE TO OBTAIN WOUND SUPPLIES, CONTINUE TO USE THE SUPPLIES YOU HAVE AVAILABLE UNTIL YOU ARE ABLE TO REACH US. IT IS MOST IMPORTANT TO KEEP THE WOUND COVERED AT ALL TIMES.     Physician

## 2024-05-09 ENCOUNTER — HOSPITAL ENCOUNTER (OUTPATIENT)
Facility: HOSPITAL | Age: 64
End: 2024-05-09
Payer: COMMERCIAL

## 2024-05-09 ENCOUNTER — HOSPITAL ENCOUNTER (OUTPATIENT)
Facility: HOSPITAL | Age: 64
Discharge: HOME | End: 2024-05-09
Payer: COMMERCIAL

## 2024-05-09 ENCOUNTER — TELEPHONE (OUTPATIENT)
Age: 64
End: 2024-05-09

## 2024-05-09 DIAGNOSIS — S46.012D TRAUMATIC COMPLETE TEAR OF LEFT ROTATOR CUFF, SUBSEQUENT ENCOUNTER: ICD-10-CM

## 2024-05-09 DIAGNOSIS — M75.101 TEAR OF RIGHT ROTATOR CUFF, UNSPECIFIED TEAR EXTENT, UNSPECIFIED WHETHER TRAUMATIC: ICD-10-CM

## 2024-05-09 PROCEDURE — 73221 MRI JOINT UPR EXTREM W/O DYE: CPT

## 2024-05-09 NOTE — TELEPHONE ENCOUNTER
[facility] is calling to get Verbal order for Repatha for Replacement for patient on what needing due to Malfunction. Please call. Thank you    Please danuta see patient My Chart Message.     # is [phone]   Ref# [id number]

## 2024-05-14 ENCOUNTER — HOSPITAL ENCOUNTER (OUTPATIENT)
Facility: HOSPITAL | Age: 64
Discharge: HOME | End: 2024-05-14
Attending: FAMILY MEDICINE
Payer: COMMERCIAL

## 2024-05-14 VITALS
RESPIRATION RATE: 18 BRPM | DIASTOLIC BLOOD PRESSURE: 61 MMHG | HEART RATE: 86 BPM | SYSTOLIC BLOOD PRESSURE: 118 MMHG | TEMPERATURE: 97.4 F

## 2024-05-14 DIAGNOSIS — L89.313 PRESSURE INJURY OF RIGHT ISCHIUM, STAGE 3 (HCC): Primary | ICD-10-CM

## 2024-05-14 PROCEDURE — 11042 DBRDMT SUBQ TIS 1ST 20SQCM/<: CPT

## 2024-05-14 RX ORDER — BACITRACIN ZINC AND POLYMYXIN B SULFATE 500; 1000 [USP'U]/G; [USP'U]/G
OINTMENT TOPICAL ONCE
OUTPATIENT
Start: 2024-05-14 | End: 2024-05-14

## 2024-05-14 RX ORDER — BETAMETHASONE DIPROPIONATE 0.5 MG/G
CREAM TOPICAL ONCE
OUTPATIENT
Start: 2024-05-14 | End: 2024-05-14

## 2024-05-14 RX ORDER — GENTAMICIN SULFATE 1 MG/G
OINTMENT TOPICAL ONCE
OUTPATIENT
Start: 2024-05-14 | End: 2024-05-14

## 2024-05-14 RX ORDER — LIDOCAINE HYDROCHLORIDE 20 MG/ML
JELLY TOPICAL ONCE
OUTPATIENT
Start: 2024-05-14 | End: 2024-05-14

## 2024-05-14 RX ORDER — CLOBETASOL PROPIONATE 0.5 MG/G
OINTMENT TOPICAL ONCE
OUTPATIENT
Start: 2024-05-14 | End: 2024-05-14

## 2024-05-14 RX ORDER — GINSENG 100 MG
CAPSULE ORAL ONCE
OUTPATIENT
Start: 2024-05-14 | End: 2024-05-14

## 2024-05-14 RX ORDER — SODIUM CHLOR/HYPOCHLOROUS ACID 0.033 %
SOLUTION, IRRIGATION IRRIGATION ONCE
OUTPATIENT
Start: 2024-05-14 | End: 2024-05-14

## 2024-05-14 RX ORDER — TRIAMCINOLONE ACETONIDE 1 MG/G
OINTMENT TOPICAL ONCE
OUTPATIENT
Start: 2024-05-14 | End: 2024-05-14

## 2024-05-14 RX ORDER — LIDOCAINE HYDROCHLORIDE 40 MG/ML
SOLUTION TOPICAL ONCE
OUTPATIENT
Start: 2024-05-14 | End: 2024-05-14

## 2024-05-14 RX ORDER — IBUPROFEN 200 MG
TABLET ORAL ONCE
OUTPATIENT
Start: 2024-05-14 | End: 2024-05-14

## 2024-05-14 RX ORDER — LIDOCAINE 50 MG/G
OINTMENT TOPICAL ONCE
OUTPATIENT
Start: 2024-05-14 | End: 2024-05-14

## 2024-05-14 RX ORDER — LIDOCAINE 40 MG/G
CREAM TOPICAL ONCE
OUTPATIENT
Start: 2024-05-14 | End: 2024-05-14

## 2024-05-14 NOTE — FLOWSHEET NOTE
05/14/24 0933   Wound 04/09/24 Ischium Right   Date First Assessed/Time First Assessed: 04/09/24 0839   Wound Approximate Age at First Assessment (Weeks): 3 weeks  Primary Wound Type: Pressure Injury  Location: Ischium  Wound Location Orientation: Right   Wound Image    Wound Etiology Pressure Stage 3   Dressing Status Old drainage noted   Wound Cleansed Cleansed with saline   Wound Length (cm) 0.1 cm   Wound Width (cm) 0.5 cm   Wound Depth (cm) 0.1 cm   Wound Surface Area (cm^2) 0.05 cm^2   Change in Wound Size % (l*w) 91.67   Wound Volume (cm^3) 0.005 cm^3   Wound Healing % 92   Wound Assessment Pink/red   Drainage Amount Small (< 25%)   Drainage Description Serous   Odor None   Misti-wound Assessment   (other)   Margins Defined edges   Wound Thickness Description not for Pressure Injury Full thickness     /61   Pulse 86   Temp 97.4 °F (36.3 °C) (Temporal)   Resp 18

## 2024-05-14 NOTE — DISCHARGE INSTRUCTIONS
Discharge Instructions [facility]  [address]   [address]  [place] [address]   Telephone: [phone]     FAX [phone]    NAME:  Jamila Shanks  YOB: 1960  MEDICAL RECORD NUMBER:  [id number]  DATE:  5/14/2024    CPT code:Debridement SQ (03022)    WOUND CARE ORDERS:  Right Ischium wound :Cleanse with soap and water , apply primary dressing Medihoney gel cover with secondary dressing Gauze and patch . Pt./pcg/HH nurse to change (freq) 3x Weekly  and as needed for compromise.Follow up with provider in 1 Week(s).   Home Health Agency: none  TREATMENT ORDERS:    Turn/reposition every 2 hours when in bed, avoid direct pressure on wound site.  When sitting, shift position or do seat lifts every 15 minutes.  Limit side lying to 30 degree tilt.  Limit HOB elevation to 30 degrees.  Use speciality pressure relief cushion, mattress as appropriate  Follow Diet as prescribed:   [] Diet as tolerated: [] Calorie Diabetic Diet: Low carb and no Sugar [] No Added Salt:  [] Increase Protein: [] Limit the amount of liquid you are drinking and avoid drinking in between meals     Return Appointment:  [] Return Appointment: With Dr. Arizmendi in  1 Week(s)  [] Nurse Visit : *** days  [] Ordered tests:    Electronically signed Sherrell Hoang RN on 5/14/2024 at 9:41 AM     Wound Care Center Information: Should you experience any significant changes in your wound(s) or have questions about your wound care, please contact the [facility] at MONDAY - FRIDAY 8:00 am - 4:30.  If you need help with your wound outside these hours and cannot wait until we are again available, contact your PCP or go to the hospital emergency room.   PLEASE NOTE: IF YOU ARE UNABLE TO OBTAIN WOUND SUPPLIES, CONTINUE TO USE THE SUPPLIES YOU HAVE AVAILABLE UNTIL YOU ARE ABLE TO REACH US. IT IS MOST IMPORTANT TO KEEP THE WOUND COVERED AT ALL TIMES.     Physician

## 2024-05-14 NOTE — PROGRESS NOTES
Wound Center  Progress Note / Procedure Note      Chief Complaint:  Jamila Shanks is a 63 y.o.  female  with Right ischium wound of >1month duration.      Assessment/Plan     63 y.o. female with spina bifida, paraplegia    -Right ischium chronic ulcer.  Pressure, stage 3  Full thickness  smaller  Mild Slough/mostly granular  Necessitates debridement  for wound healing and to prevent/heal infection  Ulcer needs debridement- see note below    Offloading   has off brand purple gel cushion  Limit sitting    Nutrition   Discussed   Increased protein      Following discussed with patient   Needs :  Serial debridement- prn    Good local wound care  Dressing:  medihoney gel, gauzed then CGM covers  Frequency : two times a week    -Good offloading  reviewed    Patient/  understood and agrees with plan. Questions answered.    Follow up with me in 1 week    Subjective:     Since last visit  5/14 no new issues    4/23/24 now using CGM cover adhesives- working better than tegaderm    HPI:     Wound started as a pimple about 3 weeks ago, then scabbed over then open again and got bigger  Saw PCP  Recommended bacitracin, gauze and tegaderm and referred here  Has been leaving open to air at night  Had a fairly large wound in 80's on right ischium that required muscle flap, but since then has had no problems    Appetite good  Uses memory foam cushion on w/c  Self-catheterizes several times a day  Works full time, working from home since pandemic      History/Chart/Medications reviewed    Wound caused by: pressure  Current wound care:See flowsheet  Offloading wound: Yes  Appetite: good  Wound associated pain: See flowsheet  Diabetic: no  Smoker: no  ROS: no N/V/D, no T/chills; no local rash, no chest pain or shortness of breath, no headache or dizzyness      Objective:     Physical Exam:   See flowsheet / nursing notes for vitals  Vitals:    05/14/24 0926   BP: 118/61   Pulse: 86   Resp: 18   Temp: 97.4 °F (36.3 °C)

## 2024-05-17 ENCOUNTER — TELEPHONE (OUTPATIENT)
Age: 64
End: 2024-05-17

## 2024-05-17 NOTE — TELEPHONE ENCOUNTER
Pharm will fax request for damaged item.        Case #[id number]    This for Evolocumab     They will take a verbal ok to replace medication

## 2024-05-17 NOTE — TELEPHONE ENCOUNTER
Placed call to [employer] in regards to verbal approval for replacement medication, Repatha.     Spoke with Elliot, pharmacist, and provided verbal okay to replace Repatha.

## 2024-05-21 ENCOUNTER — HOSPITAL ENCOUNTER (OUTPATIENT)
Facility: HOSPITAL | Age: 64
Discharge: HOME | End: 2024-05-21
Attending: FAMILY MEDICINE
Payer: COMMERCIAL

## 2024-05-21 VITALS
DIASTOLIC BLOOD PRESSURE: 61 MMHG | HEART RATE: 94 BPM | RESPIRATION RATE: 18 BRPM | SYSTOLIC BLOOD PRESSURE: 129 MMHG | TEMPERATURE: 97.8 F

## 2024-05-21 DIAGNOSIS — L89.313 PRESSURE INJURY OF RIGHT ISCHIUM, STAGE 3 (HCC): Primary | ICD-10-CM

## 2024-05-21 PROCEDURE — 99213 OFFICE O/P EST LOW 20 MIN: CPT

## 2024-05-21 RX ORDER — LIDOCAINE 40 MG/G
CREAM TOPICAL ONCE
Status: CANCELLED | OUTPATIENT
Start: 2024-05-21 | End: 2024-05-21

## 2024-05-21 RX ORDER — LIDOCAINE 50 MG/G
OINTMENT TOPICAL ONCE
Status: CANCELLED | OUTPATIENT
Start: 2024-05-21 | End: 2024-05-21

## 2024-05-21 RX ORDER — TRIAMCINOLONE ACETONIDE 1 MG/G
OINTMENT TOPICAL ONCE
Status: CANCELLED | OUTPATIENT
Start: 2024-05-21 | End: 2024-05-21

## 2024-05-21 RX ORDER — BACITRACIN ZINC AND POLYMYXIN B SULFATE 500; 1000 [USP'U]/G; [USP'U]/G
OINTMENT TOPICAL ONCE
Status: CANCELLED | OUTPATIENT
Start: 2024-05-21 | End: 2024-05-21

## 2024-05-21 RX ORDER — IBUPROFEN 200 MG
TABLET ORAL ONCE
Status: CANCELLED | OUTPATIENT
Start: 2024-05-21 | End: 2024-05-21

## 2024-05-21 RX ORDER — LIDOCAINE HYDROCHLORIDE 40 MG/ML
SOLUTION TOPICAL ONCE
Status: CANCELLED | OUTPATIENT
Start: 2024-05-21 | End: 2024-05-21

## 2024-05-21 RX ORDER — LIDOCAINE HYDROCHLORIDE 20 MG/ML
JELLY TOPICAL ONCE
Status: CANCELLED | OUTPATIENT
Start: 2024-05-21 | End: 2024-05-21

## 2024-05-21 RX ORDER — GINSENG 100 MG
CAPSULE ORAL ONCE
Status: CANCELLED | OUTPATIENT
Start: 2024-05-21 | End: 2024-05-21

## 2024-05-21 RX ORDER — GENTAMICIN SULFATE 1 MG/G
OINTMENT TOPICAL ONCE
Status: CANCELLED | OUTPATIENT
Start: 2024-05-21 | End: 2024-05-21

## 2024-05-21 RX ORDER — BETAMETHASONE DIPROPIONATE 0.5 MG/G
CREAM TOPICAL ONCE
Status: CANCELLED | OUTPATIENT
Start: 2024-05-21 | End: 2024-05-21

## 2024-05-21 RX ORDER — SODIUM CHLOR/HYPOCHLOROUS ACID 0.033 %
SOLUTION, IRRIGATION IRRIGATION ONCE
Status: CANCELLED | OUTPATIENT
Start: 2024-05-21 | End: 2024-05-21

## 2024-05-21 RX ORDER — CLOBETASOL PROPIONATE 0.5 MG/G
OINTMENT TOPICAL ONCE
Status: CANCELLED | OUTPATIENT
Start: 2024-05-21 | End: 2024-05-21

## 2024-05-21 NOTE — DISCHARGE INSTRUCTIONS
Discharge Instructions/Wound Care Orders  [facility]  [address]   [address]  [place] [address]   Telephone: [phone]    FAX [phone]      NAME:  Jamila Shanks  YOB: 1960  MEDICAL RECORD NUMBER:  [id number]  DATE:  5/21/2024    CPT code: E&M-Level 2 (66217)    Congratulations!  You have completed your treatment. Continue Medihoney gel and bandage for 1 more week then you can stop and leave open to air.      Return to your Primary Care Physician for all your health issues.   Resume your ordinary activities as tolerated.   Take your medications as prescribed by your primary care physician.   Check your skin daily for cracks, bruises, sores, or dryness. Use a moisturizer as needed.   Clean and dry your skin, using mild soap and warm water (not hot).   Maintain a nutritious diet.  Avoid pressure on your wound site. Keep your legs elevated above the level of the heart whenever possible.    THANK YOU FOR ALLOWING US TO SERVE YOU.  PLEASE CALL IF YOU DEVELOP ANOTHER WOUND.     Electronically signed by Sherrell Hoang RN on 5/21/2024 at 9:52 AM     Wound Care Center Information: Should you experience any significant changes in your wound(s) or have questions about your wound care, please contact the [facility] at MONDAY - FRIDAY 8:00 am - 4:30.  If you need help with your wound outside these hours and cannot wait until we are again available, contact your PCP or go to the hospital emergency room.     PLEASE NOTE: IF YOU ARE UNABLE TO OBTAIN WOUND SUPPLIES, CONTINUE TO USE THE SUPPLIES YOU HAVE AVAILABLE UNTIL YOU ARE ABLE TO REACH US. IT IS MOST IMPORTANT TO KEEP THE WOUND COVERED AT ALL TIMES.     Physician Signature:_______________________    Date: ___________ Time:  ____________

## 2024-05-21 NOTE — FLOWSHEET NOTE
05/21/24 0939   Wound 04/09/24 Ischium Right   Date First Assessed/Time First Assessed: 04/09/24 0839   Wound Approximate Age at First Assessment (Weeks): 3 weeks  Primary Wound Type: Pressure Injury  Location: Ischium  Wound Location Orientation: Right   Wound Image    Wound Etiology Pressure Stage 3   Dressing Status Intact   Wound Cleansed Cleansed with saline   Wound Length (cm) 0.1 cm   Wound Width (cm) 0.1 cm   Wound Depth (cm) 0.1 cm   Wound Surface Area (cm^2) 0.01 cm^2   Change in Wound Size % (l*w) 98.33   Wound Volume (cm^3) 0.001 cm^3   Wound Healing % 98   Wound Assessment Epithelialization   Drainage Amount None (dry)   Odor None   Pain Assessment   Pain Assessment None - Denies Pain     /61   Pulse 94   Temp 97.8 °F (36.6 °C) (Temporal)   Resp 18

## 2024-05-21 NOTE — PROGRESS NOTES
nightly  Patient not taking: Reported on 4/30/2024 4/1/24   Chris Sheppard III DO   Evolocumab 140 MG/ML SOAJ Inject 140 mg into the skin every 14 days 2/23/24   Chris Sheppard III DO   OMEPRAZOLE PO Take 40 mg by mouth daily    ProviderYuan MD   lisinopril (PRINIVIL;ZESTRIL) 10 MG tablet TAKE 1 TABLET BY MOUTH EVERY DAY  Patient taking differently: Take 0.5 tablets by mouth daily 11/13/23   Chris Sheppard III DO   cyanocobalamin 1000 MCG/ML injection Inject 1 mL into the muscle every 30 days  Patient not taking: Reported on 5/21/2024 10/3/23   Chris Sheppard III DO   SYRINGE-NEEDLE, DISP, 3 ML (VANISHPOINT SAFETY SYRINGE) 25G X 5/8\" 3 ML MISC Use one syringe with each B12 injection 10/3/23   Chris Sheppard III DO   hydrocortisone (ANUSOL-HC) 2.5 % CREA rectal cream Apply to external hemorrhoids as needed for pain 7/29/23   Shagufta Motta, DO   acetaminophen (TYLENOL) 500 MG tablet Take 1 tablet by mouth every 6 hours as needed    Automatic Reconciliation, Ar   diclofenac sodium (VOLTAREN) 1 % GEL Apply 2 g topically 2 times daily as needed  Patient not taking: Reported on 4/30/2024    Automatic Reconciliation, Ar   docusate (COLACE, DULCOLAX) 100 MG CAPS Take 100 mg by mouth daily as needed  Patient not taking: Reported on 4/30/2024    Automatic Reconciliation, Ar   mesalamine (APRISO) 0.375 g extended release capsule Take 4 capsules by mouth daily    Automatic Reconciliation, Ar      Allergies   Allergen Reactions    Sulfa Antibiotics Nausea And Vomiting    Topiramate Other (See Comments)      tingling sensation in hands and face          Signed By: Destiney Arizmendi MD      05/21/24

## 2024-06-17 ENCOUNTER — TELEPHONE (OUTPATIENT)
Age: 64
End: 2024-06-17

## 2024-06-17 RX ORDER — CYCLOBENZAPRINE HCL 5 MG
5 TABLET ORAL 3 TIMES DAILY PRN
Qty: 30 TABLET | Refills: 0 | Status: SHIPPED | OUTPATIENT
Start: 2024-06-17 | End: 2024-06-27

## 2024-06-17 NOTE — TELEPHONE ENCOUNTER
Patient notified of Dr. Sheppard's recommendations for shoulder pain.   Per Dr. Sheppard:     Flexeril 5 mg sent in.  Would also use ice to area for 15 minutes 2-3 times daily for next 3 days.     Patient verbalized understanding.

## 2024-06-17 NOTE — TELEPHONE ENCOUNTER
Pt has pulled muscle in her neck and shoulder pain.    Pt was pulling a trash bag out of container and pulled it this morning    Pt is asking for a low dose of flexeril to be called in for this.    [id number]  [address]    Please call pt to let her know what you can do.  Thanks.

## 2024-08-07 ENCOUNTER — TELEPHONE (OUTPATIENT)
Age: 64
End: 2024-08-07

## 2024-08-07 NOTE — TELEPHONE ENCOUNTER
LVM for Pt to r/s 1/6 appt @10:40 am. Pt can be scheduled w/ any NP and/or Dr Thapa's next available. Mailed letter.

## 2024-08-20 DIAGNOSIS — E53.8 B12 DEFICIENCY: ICD-10-CM

## 2024-08-20 RX ORDER — CYANOCOBALAMIN 1000 UG/ML
1000 INJECTION, SOLUTION INTRAMUSCULAR; SUBCUTANEOUS
Qty: 3 ML | Refills: 3 | Status: SHIPPED | OUTPATIENT
Start: 2024-08-20

## 2024-10-02 RX ORDER — LISINOPRIL 10 MG/1
TABLET ORAL
Qty: 90 TABLET | Refills: 3 | Status: SHIPPED | OUTPATIENT
Start: 2024-10-02

## 2024-10-25 ENCOUNTER — OFFICE VISIT (OUTPATIENT)
Age: 64
End: 2024-10-25

## 2024-10-25 VITALS
HEART RATE: 93 BPM | SYSTOLIC BLOOD PRESSURE: 130 MMHG | HEIGHT: 55 IN | BODY MASS INDEX: 34.24 KG/M2 | OXYGEN SATURATION: 99 % | DIASTOLIC BLOOD PRESSURE: 82 MMHG | TEMPERATURE: 97.5 F | RESPIRATION RATE: 14 BRPM

## 2024-10-25 DIAGNOSIS — G82.20 PARAPLEGIA (HCC): ICD-10-CM

## 2024-10-25 DIAGNOSIS — D50.8 OTHER IRON DEFICIENCY ANEMIA: ICD-10-CM

## 2024-10-25 DIAGNOSIS — E53.8 B12 DEFICIENCY: ICD-10-CM

## 2024-10-25 DIAGNOSIS — I10 ESSENTIAL (PRIMARY) HYPERTENSION: ICD-10-CM

## 2024-10-25 DIAGNOSIS — R73.03 PREDIABETES: ICD-10-CM

## 2024-10-25 DIAGNOSIS — Z23 NEEDS FLU SHOT: ICD-10-CM

## 2024-10-25 DIAGNOSIS — E78.49 FAMILIAL HYPERLIPIDEMIA, HIGH LDL: Primary | ICD-10-CM

## 2024-10-25 DIAGNOSIS — K51.00 ULCERATIVE (CHRONIC) PANCOLITIS WITHOUT COMPLICATIONS (HCC): ICD-10-CM

## 2024-10-25 DIAGNOSIS — E78.49 FAMILIAL HYPERLIPIDEMIA, HIGH LDL: ICD-10-CM

## 2024-10-25 DIAGNOSIS — Q07.01: ICD-10-CM

## 2024-10-25 DIAGNOSIS — N31.9 NEUROGENIC BLADDER: ICD-10-CM

## 2024-10-25 NOTE — PROGRESS NOTES
Jamila Shanks is a 64 y.o. female who presents for evaluation of routine follow up for familial hyperlipids , predm, paraplegic from spina bifida, neurogenic bladder, UC.  Last seen by me April 1, 2024 for right hip pressure ulcer/wound, that has cleared up after going to wound care clinic and using honey ointment.  She is now doing PT for bilateral shoulder/rotator cuff tears.    Continues to struggle with diffuse osteoarthritis aches and pains.      ROS:  Constitutional: negative for fevers, chills, anorexia and weight loss  Eyes:   negative for visual disturbance and irritation  ENT:   negative for tinnitus,sore throat,nasal congestion,ear pain,hoarseness  Respiratory:  negative for cough, hemoptysis, dyspnea,wheezing  CV:   negative for chest pain, palpitations, lower extremity edema  GI:   negative for nausea, vomiting, diarrhea, abdominal pain,melena  Genitourinary: negative for frequency, dysuria and hematuria  Musculoskel: negative for myalgias, arthralgias, back pain, muscle weakness, joint pain  Neurological:  negative for headaches, dizziness, focal weakness, numbness  Psychiatric:     Negative for depression or anxiety      Past Medical History:   Diagnosis Date    COVID-19 05/2021    GERD (gastroesophageal reflux disease)     Hypercholesterolemia     Hypertension     Irritable bowel syndrome     Obesity     Spina bifida (HCC)     Urinary incontinence        Past Surgical History:   Procedure Laterality Date    COLONOSCOPY  03/2022    COLONOSCOPY N/A 5/1/2019    COLONOSCOPY performed by Adebayo Hinojosa MD at [facility] ENDOSCOPY    COLONOSCOPY N/A 3/17/2022    COLONOSCOPY performed by Benigno Nogueira MD at [facility] ENDOSCOPY    COLONOSCOPY,BIOPSY  3/17/2022         COLONOSCOPY,BIOPSY  5/1/2019         OTHER SURGICAL HISTORY  1890s    sacral wound flap repair       Family History   Problem Relation Age of Onset    Other Mother         breast cancer, Aortic Dissection     Breast Cancer Mother     Heart Attack

## 2024-10-26 LAB
25(OH)D3+25(OH)D2 SERPL-MCNC: 13.9 NG/ML (ref 30–100)
ALBUMIN SERPL-MCNC: 4.4 G/DL (ref 3.9–4.9)
ALP SERPL-CCNC: 101 IU/L (ref 44–121)
ALT SERPL-CCNC: 133 IU/L (ref 0–32)
AST SERPL-CCNC: 59 IU/L (ref 0–40)
BASOPHILS # BLD AUTO: 0 X10E3/UL (ref 0–0.2)
BASOPHILS NFR BLD AUTO: 0 %
BILIRUB SERPL-MCNC: 0.5 MG/DL (ref 0–1.2)
BUN SERPL-MCNC: 13 MG/DL (ref 8–27)
BUN/CREAT SERPL: 32 (ref 12–28)
CALCIUM SERPL-MCNC: 9.2 MG/DL (ref 8.7–10.3)
CHLORIDE SERPL-SCNC: 102 MMOL/L (ref 96–106)
CHOLEST SERPL-MCNC: 179 MG/DL (ref 100–199)
CO2 SERPL-SCNC: 26 MMOL/L (ref 20–29)
CREAT SERPL-MCNC: 0.41 MG/DL (ref 0.57–1)
CRP SERPL HS-MCNC: 1.4 MG/L (ref 0–3)
EGFRCR SERPLBLD CKD-EPI 2021: 110 ML/MIN/1.73
EOSINOPHIL # BLD AUTO: 0 X10E3/UL (ref 0–0.4)
EOSINOPHIL NFR BLD AUTO: 1 %
ERYTHROCYTE [DISTWIDTH] IN BLOOD BY AUTOMATED COUNT: 11.8 % (ref 11.7–15.4)
FERRITIN SERPL-MCNC: 104 NG/ML (ref 15–150)
GLOBULIN SER CALC-MCNC: 2.3 G/DL (ref 1.5–4.5)
GLUCOSE SERPL-MCNC: 93 MG/DL (ref 70–99)
HBA1C MFR BLD: 6.4 % (ref 4.8–5.6)
HCT VFR BLD AUTO: 41.4 % (ref 34–46.6)
HDLC SERPL-MCNC: 56 MG/DL
HGB BLD-MCNC: 13.4 G/DL (ref 11.1–15.9)
HIV 1+2 AB+HIV1 P24 AG SERPL QL IA: NON REACTIVE
IMM GRANULOCYTES # BLD AUTO: 0 X10E3/UL (ref 0–0.1)
IMM GRANULOCYTES NFR BLD AUTO: 0 %
IRON SATN MFR SERPL: 20 % (ref 15–55)
IRON SERPL-MCNC: 74 UG/DL (ref 27–139)
LDLC SERPL CALC-MCNC: 88 MG/DL (ref 0–99)
LYMPHOCYTES # BLD AUTO: 1.3 X10E3/UL (ref 0.7–3.1)
LYMPHOCYTES NFR BLD AUTO: 28 %
MCH RBC QN AUTO: 31 PG (ref 26.6–33)
MCHC RBC AUTO-ENTMCNC: 32.4 G/DL (ref 31.5–35.7)
MCV RBC AUTO: 96 FL (ref 79–97)
MONOCYTES # BLD AUTO: 0.4 X10E3/UL (ref 0.1–0.9)
MONOCYTES NFR BLD AUTO: 8 %
NEUTROPHILS # BLD AUTO: 2.8 X10E3/UL (ref 1.4–7)
NEUTROPHILS NFR BLD AUTO: 63 %
PLATELET # BLD AUTO: 283 X10E3/UL (ref 150–450)
POTASSIUM SERPL-SCNC: 4.7 MMOL/L (ref 3.5–5.2)
PROT SERPL-MCNC: 6.7 G/DL (ref 6–8.5)
RBC # BLD AUTO: 4.32 X10E6/UL (ref 3.77–5.28)
SODIUM SERPL-SCNC: 139 MMOL/L (ref 134–144)
TIBC SERPL-MCNC: 374 UG/DL (ref 250–450)
TRIGL SERPL-MCNC: 212 MG/DL (ref 0–149)
TSH SERPL DL<=0.005 MIU/L-ACNC: 0.94 UIU/ML (ref 0.45–4.5)
UIBC SERPL-MCNC: 300 UG/DL (ref 118–369)
VIT B12 SERPL-MCNC: 798 PG/ML (ref 232–1245)
VLDLC SERPL CALC-MCNC: 35 MG/DL (ref 5–40)
WBC # BLD AUTO: 4.6 X10E3/UL (ref 3.4–10.8)

## 2024-10-27 DIAGNOSIS — R79.89 ELEVATED LFTS: Primary | ICD-10-CM

## 2024-10-27 RX ORDER — CHOLECALCIFEROL (VITAMIN D3) 125 MCG
5000 CAPSULE ORAL
Qty: 90 CAPSULE | Refills: 3 | Status: SHIPPED | OUTPATIENT
Start: 2024-10-27

## 2024-11-06 ENCOUNTER — HOSPITAL ENCOUNTER (OUTPATIENT)
Facility: HOSPITAL | Age: 64
Discharge: HOME | End: 2024-11-09
Attending: INTERNAL MEDICINE
Payer: COMMERCIAL

## 2024-11-06 DIAGNOSIS — R79.89 ELEVATED LFTS: ICD-10-CM

## 2024-11-06 PROCEDURE — 76705 ECHO EXAM OF ABDOMEN: CPT

## 2025-08-06 PROBLEM — L89.313 PRESSURE INJURY OF RIGHT ISCHIUM, STAGE 3 (HCC): Status: RESOLVED | Noted: 2024-04-09 | Resolved: 2025-08-06

## 2025-08-06 PROBLEM — G91.9 HYDROCEPHALUS (HCC): Status: RESOLVED | Noted: 2022-03-15 | Resolved: 2025-08-06

## 2025-08-06 PROBLEM — K51.00 ULCERATIVE (CHRONIC) PANCOLITIS WITHOUT COMPLICATIONS (HCC): Status: ACTIVE | Noted: 2025-08-06

## 2025-08-24 DIAGNOSIS — E53.8 B12 DEFICIENCY: ICD-10-CM

## 2025-08-24 RX ORDER — CYANOCOBALAMIN 1000 UG/ML
1000 INJECTION, SOLUTION INTRAMUSCULAR; SUBCUTANEOUS
Qty: 3 ML | Refills: 3 | Status: SHIPPED | OUTPATIENT
Start: 2025-08-24

## 2025-08-27 DIAGNOSIS — K51.00 ULCERATIVE (CHRONIC) PANCOLITIS WITHOUT COMPLICATIONS (HCC): Primary | ICD-10-CM

## 2025-08-28 RX ORDER — OMEPRAZOLE 40 MG/1
40 CAPSULE, DELAYED RELEASE ORAL DAILY
Qty: 90 CAPSULE | Refills: 3 | Status: SHIPPED | OUTPATIENT
Start: 2025-08-28

## (undated) DEVICE — SYR 3ML LL TIP 1/10ML GRAD --

## (undated) DEVICE — BASIN EMSIS 16OZ GRAPHITE PLAS KID SHP MOLD GRAD FOR ORAL

## (undated) DEVICE — SOLIDIFIER MEDC 1200ML -- CONVERT TO 356117

## (undated) DEVICE — SET ADMIN 16ML TBNG L100IN 2 Y INJ SITE IV PIGGY BK DISP

## (undated) DEVICE — HYPODERMIC SAFETY NEEDLE: Brand: MAGELLAN

## (undated) DEVICE — BAG SPEC BIOHZRD 10 X 10 IN --

## (undated) DEVICE — CONTAINER SPEC 20 ML LID NEUT BUFF FORMALIN 10 % POLYPR STS

## (undated) DEVICE — Device

## (undated) DEVICE — KENDALL RADIOLUCENT FOAM MONITORING ELECTRODE RECTANGULAR SHAPE: Brand: KENDALL

## (undated) DEVICE — CATH IV AUTOGRD BC PNK 20GA 25 -- INSYTE

## (undated) DEVICE — ELECTRODE,RADIOTRANSLUCENT,FOAM,5PK: Brand: MEDLINE

## (undated) DEVICE — NEONATAL-ADULT SPO2 SENSOR: Brand: NELLCOR

## (undated) DEVICE — SOLIDIFIER FLD 2OZ 1500CC N DISINF IN BTL DISP SAFESORB

## (undated) DEVICE — 1200 GUARD II KIT W/5MM TUBE W/O VAC TUBE: Brand: GUARDIAN

## (undated) DEVICE — TOWEL 4 PLY TISS 19X30 SUE WHT

## (undated) DEVICE — Z DISCONTINUED PER MEDLINE LINE GAS SAMPLING O2/CO2 LNG AD 13 FT NSL W/ TBNG FILTERLINE

## (undated) DEVICE — FORCEPS BX L240CM JAW DIA2.8MM L CAP W/ NDL MIC MESH TOOTH

## (undated) DEVICE — SYR 10ML LUER LOK 1/5ML GRAD --

## (undated) DEVICE — NEEDLE HYPO 18GA L1.5IN PNK S STL HUB POLYPR SHLD REG BVL